# Patient Record
Sex: MALE | Race: WHITE | NOT HISPANIC OR LATINO | Employment: PART TIME | URBAN - METROPOLITAN AREA
[De-identification: names, ages, dates, MRNs, and addresses within clinical notes are randomized per-mention and may not be internally consistent; named-entity substitution may affect disease eponyms.]

---

## 2017-01-14 DIAGNOSIS — I10 BENIGN ESSENTIAL HYPERTENSION: Primary | ICD-10-CM

## 2017-01-18 RX ORDER — CARVEDILOL 25 MG/1
TABLET ORAL
Qty: 180 TABLET | Refills: 0 | Status: SHIPPED | OUTPATIENT
Start: 2017-01-18 | End: 2017-04-25

## 2017-01-18 NOTE — TELEPHONE ENCOUNTER
Coreg      Last Written Prescription Date: 10/10/2016  Last Fill Quantity: 180, # refills: 0    Last Office Visit with G, P or [facility] prescribing provider:  8/31/2016   Future Office Visit:        BP Readings from Last 3 Encounters:   08/31/16 148/92   08/24/16 142/74   06/09/16 124/68

## 2017-01-18 NOTE — TELEPHONE ENCOUNTER
Medication is being filled for 1 time refill only due to:  Patient needs to be seen because Needs b/p check.

## 2017-02-27 ENCOUNTER — OFFICE VISIT (OUTPATIENT)
Dept: FAMILY MEDICINE | Facility: CLINIC | Age: 62
End: 2017-02-27
Payer: COMMERCIAL

## 2017-02-27 VITALS
RESPIRATION RATE: 16 BRPM | SYSTOLIC BLOOD PRESSURE: 128 MMHG | WEIGHT: 272.8 LBS | HEIGHT: 73 IN | OXYGEN SATURATION: 96 % | DIASTOLIC BLOOD PRESSURE: 78 MMHG | TEMPERATURE: 97.7 F | BODY MASS INDEX: 36.15 KG/M2 | HEART RATE: 66 BPM

## 2017-02-27 DIAGNOSIS — Z12.11 SCREEN FOR COLON CANCER: ICD-10-CM

## 2017-02-27 DIAGNOSIS — L97.521 FOOT ULCER, LEFT, LIMITED TO BREAKDOWN OF SKIN (H): Primary | ICD-10-CM

## 2017-02-27 DIAGNOSIS — I10 BENIGN ESSENTIAL HYPERTENSION: ICD-10-CM

## 2017-02-27 DIAGNOSIS — Z11.59 NEED FOR HEPATITIS C SCREENING TEST: ICD-10-CM

## 2017-02-27 DIAGNOSIS — I87.2 STASIS DERMATITIS OF BOTH LEGS: ICD-10-CM

## 2017-02-27 DIAGNOSIS — E66.09 NON MORBID OBESITY DUE TO EXCESS CALORIES: ICD-10-CM

## 2017-02-27 DIAGNOSIS — D50.9 IRON DEFICIENCY ANEMIA, UNSPECIFIED IRON DEFICIENCY ANEMIA TYPE: ICD-10-CM

## 2017-02-27 LAB
ERYTHROCYTE [DISTWIDTH] IN BLOOD BY AUTOMATED COUNT: 12.3 % (ref 10–15)
HBA1C MFR BLD: 5.6 % (ref 4.3–6)
HCT VFR BLD AUTO: 41.8 % (ref 40–53)
HGB BLD-MCNC: 14.2 G/DL (ref 13.3–17.7)
MCH RBC QN AUTO: 32.1 PG (ref 26.5–33)
MCHC RBC AUTO-ENTMCNC: 34 G/DL (ref 31.5–36.5)
MCV RBC AUTO: 94 FL (ref 78–100)
PLATELET # BLD AUTO: 247 10E9/L (ref 150–450)
RBC # BLD AUTO: 4.43 10E12/L (ref 4.4–5.9)
WBC # BLD AUTO: 7 10E9/L (ref 4–11)

## 2017-02-27 PROCEDURE — 85027 COMPLETE CBC AUTOMATED: CPT | Performed by: FAMILY MEDICINE

## 2017-02-27 PROCEDURE — 82043 UR ALBUMIN QUANTITATIVE: CPT | Performed by: FAMILY MEDICINE

## 2017-02-27 PROCEDURE — 83036 HEMOGLOBIN GLYCOSYLATED A1C: CPT | Performed by: FAMILY MEDICINE

## 2017-02-27 PROCEDURE — 36415 COLL VENOUS BLD VENIPUNCTURE: CPT | Performed by: FAMILY MEDICINE

## 2017-02-27 PROCEDURE — 80048 BASIC METABOLIC PNL TOTAL CA: CPT | Performed by: FAMILY MEDICINE

## 2017-02-27 PROCEDURE — 84460 ALANINE AMINO (ALT) (SGPT): CPT | Performed by: FAMILY MEDICINE

## 2017-02-27 PROCEDURE — 99214 OFFICE O/P EST MOD 30 MIN: CPT | Performed by: FAMILY MEDICINE

## 2017-02-27 RX ORDER — MUPIROCIN 20 MG/G
OINTMENT TOPICAL 3 TIMES DAILY
Qty: 22 G | Refills: 1 | Status: SHIPPED | OUTPATIENT
Start: 2017-02-27 | End: 2017-08-16

## 2017-02-27 NOTE — NURSING NOTE
"Chief Complaint   Patient presents with     Musculoskeletal Problem     left foot       Initial /78 (BP Location: Left arm, Cuff Size: Adult Large)  Pulse 66  Temp 97.7  F (36.5  C) (Tympanic)  Resp 16  Ht 6' 1\" (1.854 m)  Wt 272 lb 12.8 oz (123.7 kg)  SpO2 96%  BMI 35.99 kg/m2 Estimated body mass index is 35.99 kg/(m^2) as calculated from the following:    Height as of this encounter: 6' 1\" (1.854 m).    Weight as of this encounter: 272 lb 12.8 oz (123.7 kg).  Medication Reconciliation: complete   Raeann Huynh CMA    "

## 2017-02-27 NOTE — LETTER
[facility]  [address]  [address]  [place] [address] [phone]                                                                                                           Sav Sánchez Mendoza  [facility] [address] [facility] [address]    March 1, 2017      Dear Sav,    The results of your recent tests were reviewed and are enclosed.     NORMAL DIABETES URINE PROTEIN TEST   NORMAL, RENAL AND BLOOD SALTS  EXCEPT BORDERLINE GLUCOSE   NORMAL LIVER FUNCTION TEST   NORMAL THREE MONTH GLUCOSE AVERAGE IN PRE DIABETES AREA   NORMAL COMPLETE BLOOD PANEL WBCS RBCS AND PLATELETS   ANEMIA HAS CLEARED UP   CONGRATULATIONS   Results for orders placed or performed in visit on 02/27/17   Basic metabolic panel   Result Value Ref Range    Sodium 140 133 - 144 mmol/L    Potassium 3.8 3.4 - 5.3 mmol/L    Chloride 105 94 - 109 mmol/L    Carbon Dioxide 27 20 - 32 mmol/L    Anion Gap 8 3 - 14 mmol/L    Glucose 116 (H) 70 - 99 mg/dL    Urea Nitrogen 17 7 - 30 mg/dL    Creatinine 0.79 0.66 - 1.25 mg/dL    GFR Estimate >90  Non  GFR Calc   >60 mL/min/1.7m2    GFR Estimate If Black >90   GFR Calc   >60 mL/min/1.7m2    Calcium 9.1 8.5 - 10.1 mg/dL   Hemoglobin A1c   Result Value Ref Range    Hemoglobin A1C 5.6 4.3 - 6.0 %   ALT   Result Value Ref Range    ALT 24 0 - 70 U/L   Albumin Random Urine Quantitative   Result Value Ref Range    Creatinine Urine 176 mg/dL    Albumin Urine mg/L 22 mg/L    Albumin Urine mg/g Cr 12.78 0 - 17 mg/g Cr   CBC with platelets   Result Value Ref Range    WBC 7.0 4.0 - 11.0 10e9/L    RBC Count 4.43 4.4 - 5.9 10e12/L    Hemoglobin 14.2 13.3 - 17.7 g/dL    Hematocrit 41.8 40.0 - 53.0 %    MCV 94 78 - 100 fl    MCH 32.1 26.5 - 33.0 pg    MCHC 34.0 31.5 - 36.5 g/dL    RDW 12.3 10.0 - 15.0 %    Platelet Count 247 150 - 450 10e9/L           Thank you for choosing [facility].  We appreciate the opportunity to  serve you and look forward to supporting your healthcare needs in the future.    If you have any questions or concerns, please call me or my staff at [phone].      Sincerely,    Rodney Viveros Jr MD

## 2017-02-27 NOTE — MR AVS SNAPSHOT
"              After Visit Summary   2/27/2017    Sav Mendoza    MRN: [id number]           Patient Information     Date Of Birth          1955        Visit Information        Provider Department      2/27/2017 9:45 AM Rodney Viveros MD [facility]        Today's Diagnoses     Foot ulcer, left, limited to breakdown of skin (H)    -  1    Screen for colon cancer        Need for hepatitis C screening test        Stasis dermatitis of both legs        Benign essential hypertension        Non morbid obesity due to excess calories with comorbid HTN        Iron deficiency anemia, unspecified iron deficiency anemia type           Follow-ups after your visit        Who to contact     If you have questions or need follow up information about today's clinic visit or your schedule please contact [facility] directly at [phone].  Normal or non-critical lab and imaging results will be communicated to you by MyChart, letter or phone within 4 business days after the clinic has received the results. If you do not hear from us within 7 days, please contact the clinic through MyChart or phone. If you have a critical or abnormal lab result, we will notify you by phone as soon as possible.  Submit refill requests through [email or web address] or call your pharmacy and they will forward the refill request to us. Please allow 3 business days for your refill to be completed.          Additional Information About Your Visit        MyChart Information     [email or web address] lets you send messages to your doctor, view your test results, renew your prescriptions, schedule appointments and more. To sign up, go to [email or web address] . Click on \"Log in\" on the left side of the screen, which will take you to the Welcome page. Then click on \"Sign up Now\" on the right side of the page.     You will be asked to enter the access code listed below, as well as some personal " "information. Please follow the directions to create your username and password.     Your access code is: [email or web address]  Expires: 2017 10:47 AM     Your access code will  in 90 days. If you need help or a new code, please call your [facility] clinic or [phone].        Care EveryWhere ID     This is your Care EveryWhere ID. This could be used by other organizations to access your [facility] medical records  [id number]        Your Vitals Were     Pulse Temperature Respirations Height Pulse Oximetry BMI (Body Mass Index)    66 97.7  F (36.5  C) (Tympanic) 16 6' 1\" (1.854 m) 96% 35.99 kg/m2       Blood Pressure from Last 3 Encounters:   17 128/78   16 (!) 148/92   16 142/74    Weight from Last 3 Encounters:   17 272 lb 12.8 oz (123.7 kg)   16 271 lb (122.9 kg)   16 271 lb 9.6 oz (123.2 kg)              We Performed the Following     Albumin Random Urine Quantitative     ALT     Basic metabolic panel     CBC with platelets     Hemoglobin A1c          Today's Medication Changes          These changes are accurate as of: 17 10:47 AM.  If you have any questions, ask your nurse or doctor.               Start taking these medicines.        Dose/Directions    mupirocin 2 % ointment   Commonly known as:  BACTROBAN   Used for:  Foot ulcer, left, limited to breakdown of skin (H)   Started by:  Rodney Viveros MD        Apply topically 3 times daily for 5 days   Quantity:  22 g   Refills:  1            Where to get your medicines      These medications were sent to [facility] [address] [place] [address] - [address] [place] [address] Pkw[address]  [address], [facility] [address]     Phone:  [phone]     mupirocin 2 % ointment                Primary Care Provider Office Phone # Fax #    Rodney Viveros [phone]       [facility] [address]  [facility] [address]        Thank you!     Thank you for choosing [facility] " [facility]  for your care. Our goal is always to provide you with excellent care. Hearing back from our patients is one way we can continue to improve our services. Please take a few minutes to complete the written survey that you may receive in the mail after your visit with us. Thank you!             Your Updated Medication List - Protect others around you: Learn how to safely use, store and throw away your medicines at www.disposemymeds.org.          This list is accurate as of: 2/27/17 10:47 AM.  Always use your most recent med list.                   Brand Name Dispense Instructions for use    amLODIPine 10 MG tablet    NORVASC    90 tablet    Take 1 tablet (10 mg) by mouth daily       aspirin 81 MG EC tablet     90 tablet    Take 1 tablet (81 mg) by mouth daily       Blood Pressure Monitor/Wrist Coco     1 Device    1 Device 2 times daily       carvedilol 25 MG tablet    COREG    180 tablet    TAKE ONE TABLET BY MOUTH TWICE DAILY WITH MEALS. NEED TO BE SEEN FOR MORE REFILLS       hydrALAZINE 25 MG tablet    APRESOLINE    120 tablet    Take 1 tablet (25 mg) by mouth 4 times daily       hydrochlorothiazide 25 MG tablet    HYDRODIURIL    90 tablet    Take 1 tablet (25 mg) by mouth daily       lisinopril 40 MG tablet    PRINIVIL/ZESTRIL    90 tablet    TAKE ONE TABLET (40 MG) BY MOUTH EVERY EVENING       multivitamin, therapeutic with minerals Tabs tablet      Take 1 tablet by mouth daily       mupirocin 2 % ointment    BACTROBAN    22 g    Apply topically 3 times daily for 5 days       order for DME     3 Device    Equipment being ordered: stocking black closed toe  20- 25mm of mercury   3 pairs Wash by hand daily Please feet size to adjust probably large or extra large       * order for DME     2 each    Abdominal Binder - Large       * order for DME     1 Device    Equipment being ordered:  Right wrist carpal tunnel splint  right hand carpal tunnel syndrome  One* Use as right wrist at night or  when gripping walker       * order for DME     1 Device    Equipment being ordered:  Abdominal binder  One * Ventral hernia *       pyridOXINE 100 MG Tabs    VITAMIN B6    90 tablet    Take 1 tablet (100 mg) by mouth daily       VITAMIN C PO      Take 500 mg by mouth daily + 500 mg po qHS PRN       * Notice:  This list has 3 medication(s) that are the same as other medications prescribed for you. Read the directions carefully, and ask your doctor or other care provider to review them with you.

## 2017-02-27 NOTE — PROGRESS NOTES
SUBJECTIVE:                                                    Sav Mendoza is a 61 year old male who presents to clinic today for the following health issues:  Health Maintenance Due   Topic Date Due     HEPATITIS C SCREENING  04/24/1973     COLON CANCER SCREEN (SYSTEM ASSIGNED)  04/24/2005     INFLUENZA VACCINE (SYSTEM ASSIGNED)  09/01/2016     Health Maintenance reviewed at today's visit patient asked to schedule/complete:   Colon Cancer:  Patient agrees to schedule  Immunizations:  Patient declined      Musculoskeletal problem/pain      Duration: 2 weeks    Description  Location: left foot    Intensity:  moderate    Accompanying signs and symptoms: swelling, redness and open wound    History  Previous similar problem: no   Previous evaluation:  Has had cellulitis on the left fot in the past    Precipitating or alleviating factors:  Trauma or overuse: YES- foot was poked by a screw  Aggravating factors include: walking    Therapies tried and outcome: soaking with epsom, neosporin       .  Current Outpatient Prescriptions   Medication Sig Dispense Refill     mupirocin (BACTROBAN) 2 % ointment Apply topically 3 times daily for 5 days 22 g 1     carvedilol (COREG) 25 MG tablet TAKE ONE TABLET BY MOUTH TWICE DAILY WITH MEALS. NEED TO BE SEEN FOR MORE REFILLS 180 tablet 0     amLODIPine (NORVASC) 10 MG tablet Take 1 tablet (10 mg) by mouth daily 90 tablet 0     lisinopril (PRINIVIL,ZESTRIL) 40 MG tablet TAKE ONE TABLET (40 MG) BY MOUTH EVERY EVENING 90 tablet 2     order for DME Equipment being ordered:  Right wrist carpal tunnel splint   right hand carpal tunnel syndrome   One*  Use as right wrist at night or when gripping walker 1 Device 1     pyridOXINE (VITAMIN B6) 100 MG TABS Take 1 tablet (100 mg) by mouth daily 90 tablet 11     order for DME Equipment being ordered:  Abdominal binder   One *  Ventral hernia * 1 Device 0     hydrALAZINE (APRESOLINE) 25 MG tablet Take 1 tablet (25 mg) by mouth 4 times  daily 120 tablet 0     Ascorbic Acid (VITAMIN C PO) Take 500 mg by mouth daily + 500 mg po qHS PRN       multivitamin, therapeutic with minerals (THERA-VIT-M) TABS Take 1 tablet by mouth daily       hydrochlorothiazide (HYDRODIURIL) 25 MG tablet Take 1 tablet (25 mg) by mouth daily 90 tablet 3     Blood Pressure Monitoring (BLOOD PRESSURE MONITOR/WRIST) LAURA 1 Device 2 times daily 1 Device 0     ORDER FOR DME Abdominal Binder - Large 2 each 1     ORDER FOR DME Equipment being ordered: stocking black closed toe  20- 25mm of mercury    3 pairs  Wash by hand daily  Please feet size to adjust probably large or extra large 3 Device prn     [DISCONTINUED] hydrochlorothiazide (HYDRODIURIL) 25 MG tablet TAKE ONE TABLET (25 MG) BY MOUTH ONCE DAILY (Patient not taking: Reported on 2017) 90 tablet 2     aspirin 81 MG EC tablet Take 1 tablet (81 mg) by mouth daily (Patient not taking: Reported on 2017) 90 tablet 3     [DISCONTINUED] lisinopril (PRINIVIL,ZESTRIL) 40 MG tablet Take 1 tablet (40 mg) by mouth every evening (Patient not taking: Reported on 2017) 90 tablet 0        No Known Allergies    Immunization History   Administered Date(s) Administered     TDAP (ADACEL AGES 11-64) 05/10/2016         reports that he drinks alcohol.      reports that he does not use illicit drugs.    family history includes Alzheimer Disease in his mother.    indicated that his mother is . He indicated that his father is . He indicated that his daughter is alive. He indicated that both of his sons are alive.      has a past surgical history that includes orthopedic surgery (Left, 2010).     reports that he does not engage in sexual activity.  .  Pediatric History   Patient Guardian Status     Not on file.     Other Topics Concern     Parent/Sibling W/ Cabg, Mi Or Angioplasty Before 65f 55m? No     Social History Narrative         reports that he has never smoked. He has never used smokeless tobacco.    Medical,  social, surgical, and family histories reviewed.    Problem list, Medication list, Allergies, and Medical/Social/Surgical histories reviewed in [facility] and updated as appropriate.  Labs reviewed in EPIC  Patient Active Problem List   Diagnosis     Cellulitis of Lt lower leg since 2-02-hzpetxti since last saw ID  5-7-14     Baker's cyst     Ventral hernia     Left inguinal hernia     Diverticulosis of large intestine     Edema of both legs     Stasis dermatitis of both legs     Benign essential hypertension     Need for hepatitis C screening test     Cellulitis and abscess of leg: Lt  Lat Malleolus  onset late 4-16      ACP (advance care planning)     Non morbid obesity due to excess calories with comorbid HTN     Cellulitis of ankle     Bilateral leg edema     Past Surgical History   Procedure Laterality Date     Orthopedic surgery Left 2010     ankle fusion       Social History   Substance Use Topics     Smoking status: Never Smoker     Smokeless tobacco: Never Used     Alcohol use Yes     Family History   Problem Relation Age of Onset     Alzheimer Disease Mother          No Known Allergies  Recent Labs   Lab Test  05/31/16   1200  05/24/16   1400  05/17/16   0540  05/16/16   0612   05/10/16   1752  09/18/15   1049  04/08/14   1129  03/10/14   1100   02/18/14   1405   A1C   --    --    --    --    --   5.8   --    --    --    --   5.4   LDL   --    --    --    --    --    --   104  100   --    --    --    HDL   --    --    --    --    --    --   34*  26*   --    --    --    TRIG   --    --    --    --    --    --   172*  177*   --    --    --    ALT   --    --    --    --    --    --   30  28  32   < >   --    CR  0.68  0.84  0.80  0.63*   < >  0.73  1.00  0.80  0.80   < >  0.92   GFRESTIMATED  >90  Non  GFR Calc    >90  Non  GFR Calc    >90  Non  GFR Calc    >90  Non  GFR Calc     < >  >90  Non  GFR Calc    76  >90  >90   < >  85    GFRESTBLACK  >90   GFR Calc    >90   GFR Calc    >90   GFR Calc    >90   GFR Calc     < >  >90   GFR Calc    >90  >90  >90   < >  >90   POTASSIUM   --    --   3.8  3.6   < >  3.1*  3.9  3.8  3.5   < >  4.3    < > = values in this interval not displayed.        BP Readings from Last 6 Encounters:   02/27/17 128/78   08/31/16 (!) 148/92   08/24/16 142/74   06/09/16 124/68   06/06/16 134/77   05/18/16 144/79       Wt Readings from Last 3 Encounters:   02/27/17 272 lb 12.8 oz (123.7 kg)   08/31/16 271 lb (122.9 kg)   08/24/16 271 lb 9.6 oz (123.2 kg)         Positive symptoms or findings indicated by bold designation:     ROS: 10 point ROS neg other than the symptoms noted above in the HPI.except  has Cellulitis of Lt lower leg since 7-55-[id number] since last saw ID  5-7-14; Baker's cyst; Ventral hernia; Left inguinal hernia; Diverticulosis of large intestine; Edema of both legs; Stasis dermatitis of both legs; Benign essential hypertension; Need for hepatitis C screening test; Cellulitis and abscess of leg: Lt  Lat Malleolus  onset late 4-16 ; ACP (advance care planning); Non morbid obesity due to excess calories with comorbid HTN; Cellulitis of ankle; and Bilateral leg edema on his problem list.   Constitutional: The patient denied fatigue, fever, insomnia, night sweats, recent illness and weight loss.  OBESITY     Eyes: The patient denied blindness, eye pain, eye tearing, photophobia, vision change and visual disturbance. Normal vision       Ears/Nose/Throat/Neck: The patient denied dizziness, facial pain, hearing loss, nasal discharge, oral pain, otalgia, postnasal drip, sinus congestion, sore throat, tinnitus and voice change.   NORMAL HEARING AND BALANCE     Cardiovascular: The patient denied arrhythmia, chest pain/pressure, claudication, edema, exercise intolerance, fatigue, orthopnea, palpitations and syncope.  ASYMPTOMATIC EXCEPT  BILATERAL EDEMA AND VENOUS STASIS CONTINUES     Respiratory: The patient denied asthma, chest congestion, cough, dyspnea on exertion, dyspnea/shortness of breath, hemoptysis, pedal edema, pleuritic pain, productive sputum, snoring and wheezing. NORMAL BREATHING     Gastrointestinal: The patient denied abdominal pain, anorexia, constipation, diarrhea, dysphagia, gastroesophageal reflux, hematochezia, hemorrhoids, melena, nausea and vomiting . NO SIGNIFICANT GASTROESOPHAGEAL REFLUX DISEASE WITHOUT ESOPHAGITIS     Genitourinary/Nephrology: The patient denied breast complaint, dysuria, nocturia sexual dysfunction, t, urinary frequency, urinary incontinence, urinary urgency    ASYMPTOMATIC     Musculoskeletal: The patient denied arthralgia(s), back pain, joint complaint, muscle weakness, myalgias, osteoporosis, sciatica, stiffness and swelling.  PAIN LEFT OUTER FOOT SEE HP     Dermatoligic:: The patient denied acne, dermatitis, ecchymosis, itching, mole change, rash, skin cancer, skin lesion and sores.  VENOUS STASIS CHANGES ARE NOTED BILATERAL  LEFT OUTER FOOT CALLOSITY WITH SPLIT SKIN   HEALING PROCESS OF PLANTAR BLISTER  SKIN REMOVED AND PAIN RELIEVED     Neurologic: The patient denied dizziness, gait abnormality, headache, memory loss, mental status change, paresis, paresthesia, seizure, syncope, tremor and vision change.     Psychiatric: The patient denied anxiety, depression, disturbances of memory, drug abuse, insomnia, mood swings and relationship difficulties.      Endocrine: The patient denied , goiter, obesity, polyuria and thyroid disease.      Hematologic/Lymphatic: The patient denied abnormal bleeding and bruising, abnormal ecchymoses, anemia, lymph node enlargement/mass, petechiae and venous  Thrombosis.      Allergy/Immunology: The patient denied food allergy and  Allergic rhinitis or conjunctivitis.        PE:  /78 (BP Location: Left arm, Cuff Size: Adult Large)  Pulse 66  Temp 97.7  F (36.5  C)  "(Tympanic)  Resp 16  Ht 6' 1\" (1.854 m)  Wt 272 lb 12.8 oz (123.7 kg)  SpO2 96%  BMI 35.99 kg/m2 Body mass index is 35.99 kg/(m^2).    Constitutional: general appearance, well nourished, well developed, in no acute distress, well developed, appears stated age, normal body habitus,  OBESITY     Eyes:; The patient has normal eyelids sclerae and conjunctivae :      Ears/Nose/Throat: external ear, overall: normal appearance; external nose, overall: benign appearance, normal moujth gums and lips  The patient has:      Neck: thyroid, overall: normal size, normal consistency, nontender,      Respiratory:  palpation of chest, overall: normal excursion,    Clear to percussion and auscultation      Tachypnea   Color      Cardiovascular:  Good color with no peripheral edema    Regular sinus rhythm without murmur. Physiologic heart sounds Heart is unelarged  .   Chest/Breast: normal shape       Abdominal exam,  Liver and spleen are  unenlarged        Tenderness    Scars  NORMAL     Urogenital; no renal, flank or bladder  tenderness;  NORMAL     Lymphatic: neck nodes,  NORMAL    Other nodes  WITHIN NORMAL LIMITS     Musculoskeletal:  Brief ortho exam normal except:  LEFT OUTER BUNION PLANTAR HEALING BLISTER     Integument: inspection of skin, no rash, lesions; and, palpation, no induration, no tenderness.      Neurologic mental status, overall: alert and oriented; gait, no ataxia, no unsteadiness; coordination, no tremors; cranial nerves, overall: normal motor, overall: normal bulk, tone.      Psychiatric: orientation/consciousness, overall: oriented to person, place and time; behavior/psychomotor activity, no tics, normal psychomotor activity; mood and affect, overall: normal mood and affect; appearance, overall: well-groomed, good eye contact; speech, overall: normal quality, no aphasia and normal quality, quantity, intact.      Diagnostic Test Results:  Results for orders placed or performed in visit on 02/27/17   Hemoglobin " A1c   Result Value Ref Range    Hemoglobin A1C 5.6 4.3 - 6.0 %   CBC with platelets   Result Value Ref Range    WBC 7.0 4.0 - 11.0 10e9/L    RBC Count 4.43 4.4 - 5.9 10e12/L    Hemoglobin 14.2 13.3 - 17.7 g/dL    Hematocrit 41.8 40.0 - 53.0 %    MCV 94 78 - 100 fl    MCH 32.1 26.5 - 33.0 pg    MCHC 34.0 31.5 - 36.5 g/dL    RDW 12.3 10.0 - 15.0 %    Platelet Count 247 150 - 450 10e9/L         ICD-10-CM    1. Foot ulcer, left, limited to breakdown of skin (H) L97.521 mupirocin (BACTROBAN) 2 % ointment   2. Screen for colon cancer Z12.11    3. Need for hepatitis C screening test Z11.59    4. Stasis dermatitis of both legs I83.11     I83.12    5. Benign essential hypertension I10    6. Non morbid obesity due to excess calories with comorbid HTN E66.09 Basic metabolic panel     Hemoglobin A1c     ALT     Albumin Random Urine Quantitative   7. Iron deficiency anemia, unspecified iron deficiency anemia type D50.9 CBC with platelets        .    Side effects benefits and risks thoroughly discussed. .he may come in early if unimproved or getting worse          Importance of adhering to regimen discussed and if medications were dispensed, the importance of taking medications discussed and bringing in the medications after every visit for chronic problems         Please drink 2 glasses of water prior to meals and walk 15-30 minutes after meals    I spent 25 MINUTES SPENT  with patient discussing the following issues   The primary encounter diagnosis was Foot ulcer, left, limited to breakdown of skin (H). Diagnoses of Screen for colon cancer, Need for hepatitis C screening test, Stasis dermatitis of both legs, Benign essential hypertension, Non morbid obesity due to excess calories with comorbid HTN, and Iron deficiency anemia, unspecified iron deficiency anemia type were also pertinent to this visit. over half of which involved counseling and coordination of care.    There are no Patient Instructions on file for this  visit.    Diet:  MEDITERRANEAN DIET  LOW SALT     Exercise:  KILEY CONDE RECOMMENDED    Exercises Range of motion, balance, isometric, and strengthening exercises 30 repetitions twice daily of involved joints      .DAYSI GUIDRY MD 2/27/2017 10:34 AM  February 27, 2017

## 2017-02-28 LAB
ALT SERPL W P-5'-P-CCNC: 24 U/L (ref 0–70)
ANION GAP SERPL CALCULATED.3IONS-SCNC: 8 MMOL/L (ref 3–14)
BUN SERPL-MCNC: 17 MG/DL (ref 7–30)
CALCIUM SERPL-MCNC: 9.1 MG/DL (ref 8.5–10.1)
CHLORIDE SERPL-SCNC: 105 MMOL/L (ref 94–109)
CO2 SERPL-SCNC: 27 MMOL/L (ref 20–32)
CREAT SERPL-MCNC: 0.79 MG/DL (ref 0.66–1.25)
CREAT UR-MCNC: 176 MG/DL
GFR SERPL CREATININE-BSD FRML MDRD: ABNORMAL ML/MIN/1.7M2
GLUCOSE SERPL-MCNC: 116 MG/DL (ref 70–99)
MICROALBUMIN UR-MCNC: 22 MG/L
MICROALBUMIN/CREAT UR: 12.78 MG/G CR (ref 0–17)
POTASSIUM SERPL-SCNC: 3.8 MMOL/L (ref 3.4–5.3)
SODIUM SERPL-SCNC: 140 MMOL/L (ref 133–144)

## 2017-02-28 NOTE — PROGRESS NOTES
Please send normal lab letter when labs are complete  NORMAL DIABETES URINE PROTEIN TEST   NORMAL, RENAL AND BLOOD SALTS  EXCEPT BORDERLINE GLUCOSE   NORMAL LIVER FUNCTION TEST   NORMAL THREE MONTH GLUCOSE AVERAGE IN PRE DIABETES AREA   NORMAL COMPLETE BLOOD PANEL WBCS RBCS AND PLATELETS   ANEMIA HAS CLEARED UP   CONGRATULATIONS   DAYSI GUIDRY JR., MD

## 2017-03-13 DIAGNOSIS — I10 BENIGN ESSENTIAL HYPERTENSION: ICD-10-CM

## 2017-03-14 RX ORDER — AMLODIPINE BESYLATE 10 MG/1
TABLET ORAL
Qty: 90 TABLET | Refills: 2 | Status: SHIPPED | OUTPATIENT
Start: 2017-03-14 | End: 2017-08-28

## 2017-03-14 NOTE — TELEPHONE ENCOUNTER
Amlodipine 19 mg    Last Written Prescription Date: 11/29/2016  Last Fill Quantity: 90, # refills: 0  Last Office Visit with G, P or [facility] prescribing provider: 2/27/2017       Potassium   Date Value Ref Range Status   02/27/2017 3.8 3.4 - 5.3 mmol/L Final     Creatinine   Date Value Ref Range Status   02/27/2017 0.79 0.66 - 1.25 mg/dL Final     BP Readings from Last 3 Encounters:   02/27/17 128/78   08/31/16 (!) 148/92   08/24/16 142/74

## 2017-06-25 ENCOUNTER — HOSPITAL ENCOUNTER (EMERGENCY)
Facility: CLINIC | Age: 62
Discharge: HOME | End: 2017-06-26
Attending: EMERGENCY MEDICINE | Admitting: EMERGENCY MEDICINE
Payer: COMMERCIAL

## 2017-06-25 DIAGNOSIS — L08.9 INFECTED SKIN LESION: ICD-10-CM

## 2017-06-25 DIAGNOSIS — I89.1 LYMPHANGITIS: ICD-10-CM

## 2017-06-25 PROCEDURE — 99284 EMERGENCY DEPT VISIT MOD MDM: CPT | Mod: Z6 | Performed by: EMERGENCY MEDICINE

## 2017-06-25 PROCEDURE — 99284 EMERGENCY DEPT VISIT MOD MDM: CPT | Performed by: EMERGENCY MEDICINE

## 2017-06-25 PROCEDURE — 96365 THER/PROPH/DIAG IV INF INIT: CPT | Mod: 59 | Performed by: EMERGENCY MEDICINE

## 2017-06-25 PROCEDURE — 96366 THER/PROPH/DIAG IV INF ADDON: CPT | Performed by: EMERGENCY MEDICINE

## 2017-06-25 NOTE — ED AVS SNAPSHOT
[facility], [place], Emergency Department    [address]    [facility] [address]    Phone:  [phone]    Fax:  [phone]                                       Sav Mendoza   MRN: [id number]    Department:  [facility], Emergency Department   Date of Visit:  6/25/2017           After Visit Summary Signature Page     I have received my discharge instructions, and my questions have been answered. I have discussed any challenges I see with this plan with the nurse or doctor.    ..........................................................................................................................................  Patient/Patient Representative Signature      ..........................................................................................................................................  Patient Representative Print Name and Relationship to Patient    ..................................................               ................................................  Date                                            Time    ..........................................................................................................................................  Reviewed by Signature/Title    ...................................................              ..............................................  Date                                                            Time

## 2017-06-25 NOTE — ED AVS SNAPSHOT
[facility], Emergency Department    [address]    [place] [address]    Phone:  [phone]    Fax:  [phone]                                       Sav Mendoza   MRN: [id number]    Department:  [facility], Emergency Department   Date of Visit:  6/25/2017           Patient Information     Date Of Birth          1955        Your diagnoses for this visit were:     Infected skin lesion        You were seen by Reynaldo Scanlon MD.      Follow-up Information     Follow up with Rodney Viveros MD.    Specialty:  Family Practice    Contact information:    [facility]  [address]  [facility] [address] [phone]          Discharge Instructions       Take antibiotics as directed. Elevate leg.  Clinic follow up tomorrow.  Return if persistent symptoms or if new or worsening symptoms, especially if increased pain, redness, fever. Keep wound clean, dry, and covered.    24 Hour Appointment Hotline       To make an appointment at any [facility] clinic, call [phone] ([phone]). If you don't have a family doctor or clinic, we will help you find one. [facility] clinics are conveniently located to serve the needs of you and your family.             Review of your medicines      START taking        Dose / Directions Last dose taken    amoxicillin-clavulanate 875-125 MG per tablet   Commonly known as:  AUGMENTIN   Dose:  1 tablet   Quantity:  20 tablet        Take 1 tablet by mouth 2 times daily for 10 days   Refills:  0        sulfamethoxazole-trimethoprim 800-160 MG per tablet   Commonly known as:  BACTRIM DS   Dose:  1 tablet   Quantity:  20 tablet        Take 1 tablet by mouth 2 times daily for 10 days   Refills:  0          Our records show that you are taking the medicines listed below. If these are incorrect, please call your family doctor or clinic.        Dose / Directions Last dose taken    amLODIPine 10 MG tablet   Commonly known as:  NORVASC    Quantity:  90 tablet        TAKE ONE TABLET BY MOUTH ONCE DAILY   Refills:  2        aspirin 81 MG EC tablet   Dose:  81 mg   Quantity:  90 tablet        Take 1 tablet (81 mg) by mouth daily   Refills:  3        Blood Pressure Monitor/Wrist Coco   Dose:  1 Device   Quantity:  1 Device        1 Device 2 times daily   Refills:  0        carvedilol 25 MG tablet   Commonly known as:  COREG   Quantity:  180 tablet        TAKE ONE TABLET BY MOUTH TWICE DAILY WITH MEALS NEED  TO  BE  SEEN  FOR  MORE  REFILLS   Refills:  3        hydrALAZINE 25 MG tablet   Commonly known as:  APRESOLINE   Dose:  25 mg   Quantity:  120 tablet        Take 1 tablet (25 mg) by mouth 4 times daily   Refills:  0        hydrochlorothiazide 25 MG tablet   Commonly known as:  HYDRODIURIL   Dose:  25 mg   Quantity:  90 tablet        Take 1 tablet (25 mg) by mouth daily   Refills:  3        lisinopril 40 MG tablet   Commonly known as:  PRINIVIL/ZESTRIL   Quantity:  90 tablet        TAKE ONE TABLET (40 MG) BY MOUTH EVERY EVENING   Refills:  2        multivitamin, therapeutic with minerals Tabs tablet   Dose:  1 tablet        Take 1 tablet by mouth daily   Refills:  0        order for DME   Quantity:  3 Device        Equipment being ordered: stocking black closed toe  20- 25mm of mercury   3 pairs Wash by hand daily Please feet size to adjust probably large or extra large   Refills:  prn        * order for DME   Quantity:  2 each        Abdominal Binder - Large   Refills:  1        * order for DME   Quantity:  1 Device        Equipment being ordered:  Right wrist carpal tunnel splint  right hand carpal tunnel syndrome  One* Use as right wrist at night or when gripping walker   Refills:  1        * order for DME   Quantity:  1 Device        Equipment being ordered:  Abdominal binder  One * Ventral hernia *   Refills:  0        pyridOXINE 100 MG Tabs   Commonly known as:  VITAMIN B6   Dose:  100 mg   Quantity:  90 tablet        Take 1 tablet (100 mg) by  mouth daily   Refills:  11        VITAMIN C PO   Dose:  500 mg        Take 500 mg by mouth daily + 500 mg po qHS PRN   Refills:  0        * Notice:  This list has 3 medication(s) that are the same as other medications prescribed for you. Read the directions carefully, and ask your doctor or other care provider to review them with you.            Prescriptions were sent or printed at these locations (2 Prescriptions)                   Other Prescriptions                Printed at Department/Unit printer (2 of 2)         amoxicillin-clavulanate (AUGMENTIN) 875-125 MG per tablet               sulfamethoxazole-trimethoprim (BACTRIM DS) 800-160 MG per tablet                Procedures and tests performed during your visit     Basic metabolic panel    Blood culture ONE site    CBC with platelets differential    CRP inflammation    Foot XR, G/E 3 views, left    Peripheral IV catheter      Orders Needing Specimen Collection     None      Pending Results     Date and Time Order Name Status Description    6/26/2017 0054 Foot XR, G/E 3 views, left Preliminary     6/26/2017 0054 Blood culture ONE site In process             Pending Culture Results     Date and Time Order Name Status Description    6/26/2017 0054 Blood culture ONE site In process             Pending Results Instructions     If you had any lab results that were not finalized at the time of your Discharge, you can call the ED Lab Result RN at [phone]. You will be contacted by this team for any positive Lab results or changes in treatment. The nurses are available 7 days a week from 10A to 6:30P.  You can leave a message 24 hours per day and they will return your call.        Thank you for choosing [facility]       Thank you for choosing [facility] for your care. Our goal is always to provide you with excellent care. Hearing back from our patients is one way we can continue to improve our services. Please take a few minutes to complete the written survey that you  "may receive in the mail after you visit with us. Thank you!        [employer] Information     [employer] lets you send messages to your doctor, view your test results, renew your prescriptions, schedule appointments and more. To sign up, go to [email or web address] . Click on \"Log in\" on the left side of the screen, which will take you to the Welcome page. Then click on \"Sign up Now\" on the right side of the page.     You will be asked to enter the access code listed below, as well as some personal information. Please follow the directions to create your username and password.     Your access code is: [email or web address]  Expires: 2017  2:41 AM     Your access code will  in 90 days. If you need help or a new code, please call your [facility] clinic or [phone].        Care EveryWhere ID     This is your Care EveryWhere ID. This could be used by other organizations to access your [facility] medical records  [id number]        Equal Access to Services     [facility] Center[facility] : Hadii yumiko pazo Sodonna, waaxda luqadaha, qaybta kaalmada adepoppyyajessica, carlos sanches . So [facility] [phone].    ATENCIÓN: Si habla español, tiene a richards disposición servicios gratuitos de asistencia lingüística. Llame al [phone].    We comply with applicable federal civil rights laws and [place] laws. We do not discriminate on the basis of race, color, national origin, age, disability sex, sexual orientation or gender identity.            After Visit Summary       This is your record. Keep this with you and show to your community pharmacist(s) and doctor(s) at your next visit.                  "

## 2017-06-26 ENCOUNTER — APPOINTMENT (OUTPATIENT)
Dept: GENERAL RADIOLOGY | Facility: CLINIC | Age: 62
End: 2017-06-26
Attending: EMERGENCY MEDICINE
Payer: COMMERCIAL

## 2017-06-26 VITALS
BODY MASS INDEX: 36.5 KG/M2 | TEMPERATURE: 97.9 F | HEIGHT: 73 IN | OXYGEN SATURATION: 97 % | RESPIRATION RATE: 18 BRPM | SYSTOLIC BLOOD PRESSURE: 137 MMHG | DIASTOLIC BLOOD PRESSURE: 87 MMHG | HEART RATE: 70 BPM | WEIGHT: 275.4 LBS

## 2017-06-26 LAB
ANION GAP SERPL CALCULATED.3IONS-SCNC: 10 MMOL/L (ref 3–14)
BASOPHILS # BLD AUTO: 0 10E9/L (ref 0–0.2)
BASOPHILS NFR BLD AUTO: 0.6 %
BUN SERPL-MCNC: 23 MG/DL (ref 7–30)
CALCIUM SERPL-MCNC: 8.9 MG/DL (ref 8.5–10.1)
CHLORIDE SERPL-SCNC: 107 MMOL/L (ref 94–109)
CO2 SERPL-SCNC: 24 MMOL/L (ref 20–32)
CREAT SERPL-MCNC: 0.9 MG/DL (ref 0.66–1.25)
CRP SERPL-MCNC: 3.1 MG/L (ref 0–8)
DIFFERENTIAL METHOD BLD: NORMAL
EOSINOPHIL # BLD AUTO: 0.3 10E9/L (ref 0–0.7)
EOSINOPHIL NFR BLD AUTO: 4.7 %
ERYTHROCYTE [DISTWIDTH] IN BLOOD BY AUTOMATED COUNT: 12.4 % (ref 10–15)
GFR SERPL CREATININE-BSD FRML MDRD: 85 ML/MIN/1.7M2
GLUCOSE SERPL-MCNC: 140 MG/DL (ref 70–99)
HCT VFR BLD AUTO: 41.7 % (ref 40–53)
HGB BLD-MCNC: 14.3 G/DL (ref 13.3–17.7)
IMM GRANULOCYTES # BLD: 0 10E9/L (ref 0–0.4)
IMM GRANULOCYTES NFR BLD: 0.1 %
LYMPHOCYTES # BLD AUTO: 2.7 10E9/L (ref 0.8–5.3)
LYMPHOCYTES NFR BLD AUTO: 39.4 %
MCH RBC QN AUTO: 32.4 PG (ref 26.5–33)
MCHC RBC AUTO-ENTMCNC: 34.3 G/DL (ref 31.5–36.5)
MCV RBC AUTO: 94 FL (ref 78–100)
MONOCYTES # BLD AUTO: 0.6 10E9/L (ref 0–1.3)
MONOCYTES NFR BLD AUTO: 8.4 %
NEUTROPHILS # BLD AUTO: 3.2 10E9/L (ref 1.6–8.3)
NEUTROPHILS NFR BLD AUTO: 46.8 %
NRBC # BLD AUTO: 0 10*3/UL
NRBC BLD AUTO-RTO: 0 /100
PLATELET # BLD AUTO: 220 10E9/L (ref 150–450)
POTASSIUM SERPL-SCNC: 3.5 MMOL/L (ref 3.4–5.3)
RBC # BLD AUTO: 4.42 10E12/L (ref 4.4–5.9)
SODIUM SERPL-SCNC: 141 MMOL/L (ref 133–144)
WBC # BLD AUTO: 6.8 10E9/L (ref 4–11)

## 2017-06-26 PROCEDURE — 73630 X-RAY EXAM OF FOOT: CPT | Mod: LT

## 2017-06-26 PROCEDURE — S0077 INJECTION, CLINDAMYCIN PHOSP: HCPCS | Performed by: EMERGENCY MEDICINE

## 2017-06-26 PROCEDURE — 86140 C-REACTIVE PROTEIN: CPT | Performed by: EMERGENCY MEDICINE

## 2017-06-26 PROCEDURE — 80048 BASIC METABOLIC PNL TOTAL CA: CPT | Performed by: EMERGENCY MEDICINE

## 2017-06-26 PROCEDURE — 96365 THER/PROPH/DIAG IV INF INIT: CPT | Performed by: EMERGENCY MEDICINE

## 2017-06-26 PROCEDURE — 96366 THER/PROPH/DIAG IV INF ADDON: CPT | Performed by: EMERGENCY MEDICINE

## 2017-06-26 PROCEDURE — 25000125 ZZHC RX 250: Performed by: EMERGENCY MEDICINE

## 2017-06-26 PROCEDURE — 85025 COMPLETE CBC W/AUTO DIFF WBC: CPT | Performed by: EMERGENCY MEDICINE

## 2017-06-26 PROCEDURE — 87040 BLOOD CULTURE FOR BACTERIA: CPT | Performed by: EMERGENCY MEDICINE

## 2017-06-26 RX ORDER — CLINDAMYCIN PHOSPHATE 900 MG/50ML
900 INJECTION, SOLUTION INTRAVENOUS ONCE
Status: COMPLETED | OUTPATIENT
Start: 2017-06-26 | End: 2017-06-26

## 2017-06-26 RX ORDER — LIDOCAINE 40 MG/G
CREAM TOPICAL
Status: DISCONTINUED | OUTPATIENT
Start: 2017-06-26 | End: 2017-06-26 | Stop reason: HOSPADM

## 2017-06-26 RX ORDER — SULFAMETHOXAZOLE/TRIMETHOPRIM 800-160 MG
1 TABLET ORAL 2 TIMES DAILY
Qty: 20 TABLET | Refills: 0 | Status: SHIPPED | OUTPATIENT
Start: 2017-06-26 | End: 2017-07-06

## 2017-06-26 RX ADMIN — CLINDAMYCIN PHOSPHATE 900 MG: 18 INJECTION, SOLUTION INTRAVENOUS at 01:02

## 2017-06-26 ASSESSMENT — ENCOUNTER SYMPTOMS
ABDOMINAL PAIN: 0
SHORTNESS OF BREATH: 0
FEVER: 0

## 2017-06-26 NOTE — DISCHARGE INSTRUCTIONS
Take antibiotics as directed. Elevate leg.  Clinic follow up tomorrow.  Return if persistent symptoms or if new or worsening symptoms, especially if increased pain, redness, fever. Keep wound clean, dry, and covered.

## 2017-06-26 NOTE — ED PROVIDER NOTES
History     Chief Complaint   Patient presents with     Sore     open wound on left foot with swelling and red streaks     HPI  Sav Mendoza is a 62 year old male with a history of cellulitis of left and right leg, lower extremity edema and HTN who presents to the Emergency Department for evaluation of a wound. For the past several months, the patient has been struggling with a wound to the bottom of his left foot. He was seen in clinic at the end of February (4 months ago) where he was prescribed Bactroban ointment, which he was complaint with and reports improvement. However, recently his wound has worsened with associated pain, swelling and now red streaks to his foot and ankle.  He denies any other concerns or complaints at this time. No history of DVT/PE or diabetes.    Past Medical History:   Diagnosis Date     Hypertension      Ventral hernia 6/24/14       Past Surgical History:   Procedure Laterality Date     ORTHOPEDIC SURGERY Left 2010    ankle fusion       Family History   Problem Relation Age of Onset     Alzheimer Disease Mother        Social History   Substance Use Topics     Smoking status: Never Smoker     Smokeless tobacco: Never Used     Alcohol use Yes      Comment: rare       No current facility-administered medications for this encounter.      Current Outpatient Prescriptions   Medication     lisinopril (PRINIVIL/ZESTRIL) 40 MG tablet     hydrochlorothiazide (HYDRODIURIL) 25 MG tablet     carvedilol (COREG) 25 MG tablet     amLODIPine (NORVASC) 10 MG tablet     order for DME     pyridOXINE (VITAMIN B6) 100 MG TABS     order for DME     hydrALAZINE (APRESOLINE) 25 MG tablet     Ascorbic Acid (VITAMIN C PO)     multivitamin, therapeutic with minerals (THERA-VIT-M) TABS     aspirin 81 MG EC tablet     hydrochlorothiazide (HYDRODIURIL) 25 MG tablet     Blood Pressure Monitoring (BLOOD PRESSURE MONITOR/WRIST) LAURA     ORDER FOR DME     ORDER FOR DME      No Known Allergies     I have reviewed  "the Medications, Allergies, Past Medical and Surgical History, and Social History in the Epic system.    Review of Systems   Constitutional: Negative for chills, diaphoresis, fatigue and fever.   HENT: Negative for congestion, rhinorrhea, sinus pressure and sore throat.    Respiratory: Negative for cough, chest tightness and shortness of breath.    Cardiovascular: Positive for leg swelling. Negative for chest pain and palpitations.   Gastrointestinal: Negative for abdominal pain, nausea and vomiting.   Genitourinary: Negative for difficulty urinating, dysuria, flank pain and frequency.   Musculoskeletal: Negative for arthralgias, myalgias and neck pain.        Positive for left foot wound with swelling and pain.   Skin: Positive for wound.   Allergic/Immunologic: Negative for immunocompromised state.   Neurological: Negative for dizziness, syncope, weakness, light-headedness and headaches.   Hematological: Does not bruise/bleed easily.   Psychiatric/Behavioral: Negative for confusion.   All other systems reviewed and are negative.      Physical Exam   BP: (!) 166/92  Pulse: 70  Temp: 97.9  F (36.6  C)  Resp: 16  Height: 185.4 cm (6' 1\")  Weight: 124.9 kg (275 lb 6.4 oz)  SpO2: 96 %  Physical Exam   Constitutional: He appears well-developed and well-nourished. No distress.   HENT:   Head: Normocephalic and atraumatic.   Mouth/Throat: Oropharynx is clear and moist.   Eyes: Conjunctivae are normal.   Neck: Normal range of motion. Neck supple.   Cardiovascular: Normal rate, regular rhythm, normal heart sounds and intact distal pulses.    Pulmonary/Chest: Effort normal and breath sounds normal. No respiratory distress.   Abdominal: Soft. There is no tenderness.   Musculoskeletal: He exhibits edema and tenderness. He exhibits no deformity.   Neurological: He is alert.   Skin: Skin is warm and dry. Rash noted.   Superficial skin erosion and erythema with lymphangitis on left foot.    Psychiatric: He has a normal mood and " affect. His behavior is normal.   Nursing note and vitals reviewed.      ED Course     12:13 AM  The patient was seen and examined by Dr. Scanlon in Room 3.     ED Course     Procedures             Critical Care time:  none               Labs Ordered and Resulted from Time of ED Arrival Up to the Time of Departure from the ED   BASIC METABOLIC PANEL - Abnormal; Notable for the following:        Result Value    Glucose 140 (*)     All other components within normal limits   CBC WITH PLATELETS DIFFERENTIAL   CRP INFLAMMATION   PERIPHERAL IV CATHETER            Assessments & Plan (with Medical Decision Making)   Infected skin lesion with lymphangitis. No fever or systemic symptoms. No evidence of osteomyelitis on the XR. Findings do not suggest thrombophlebitis. No evidence of sepsis or necrotizing infection. Given dose of IV antibiotics and will discharge with antibiotics noted. Clinic recheck tomorrow. Return if persistent symptoms.    I have reviewed the nursing notes.    I have reviewed the findings, diagnosis, plan and need for follow up with the patient.    Discharge Medication List as of 6/26/2017  2:41 AM      START taking these medications    Details   amoxicillin-clavulanate (AUGMENTIN) 875-125 MG per tablet Take 1 tablet by mouth 2 times daily for 10 days, Disp-20 tablet, R-0, Local Print      sulfamethoxazole-trimethoprim (BACTRIM DS) 800-160 MG per tablet Take 1 tablet by mouth 2 times daily for 10 days, Disp-20 tablet, R-0, Local Print             Final diagnoses:   Infected skin lesion     IJacqueline, am serving as a trained medical scribe to document services personally performed by Reynaldo Scanlon MD, based on the provider's statements to me.      Reynaldo NICHOLSON MD, was physically present and have reviewed and verified the accuracy of this note documented by Jacqueline Ott.     6/25/2017   [facility] EMERGENCY DEPARTMENT     Reynaldo Scanlon MD  07/25/17 8212

## 2017-06-26 NOTE — ED NOTES
Patient has been struggling with an open sore on his left foot.  He thought it was about healed but when he took his sock off tonight he noticed a red streak up his foot

## 2017-07-02 LAB
BACTERIA SPEC CULT: NO GROWTH
Lab: NORMAL
MICRO REPORT STATUS: NORMAL
SPECIMEN SOURCE: NORMAL

## 2017-07-25 ASSESSMENT — ENCOUNTER SYMPTOMS
DIAPHORESIS: 0
FREQUENCY: 0
COUGH: 0
FATIGUE: 0
HEADACHES: 0
FLANK PAIN: 0
DIZZINESS: 0
CHILLS: 0
LIGHT-HEADEDNESS: 0
SINUS PRESSURE: 0
CHEST TIGHTNESS: 0
NECK PAIN: 0
RHINORRHEA: 0
DIFFICULTY URINATING: 0
WOUND: 1
NAUSEA: 0
ARTHRALGIAS: 0
MYALGIAS: 0
SORE THROAT: 0
VOMITING: 0
PALPITATIONS: 0
WEAKNESS: 0
BRUISES/BLEEDS EASILY: 0
CONFUSION: 0
DYSURIA: 0

## 2017-08-16 DIAGNOSIS — L97.521 FOOT ULCER, LEFT, LIMITED TO BREAKDOWN OF SKIN (H): ICD-10-CM

## 2017-08-16 NOTE — TELEPHONE ENCOUNTER
Mupirocin 2%      Last Written Prescription Date:  2/27/17  Last Fill Quantity: 22 g,   # refills: 1  Last Office Visit with [facility], [facility] or [facility] prescribing provider: 2/27/17  Future Office visit:    Next 5 appointments (look out 90 days)     Aug 28, 2017  9:00 AM CDT   [facility] Visit with Rod[facility]os [facility]   [facility]    [address] [place] [id number] [phone]                   Routing refill request to provider for review/approval because:  Drug not on the [facility], [facility] or [facility] refill protocol or controlled substance

## 2017-08-21 RX ORDER — MUPIROCIN 20 MG/G
OINTMENT TOPICAL
Qty: 15 G | Refills: 0 | Status: ON HOLD | OUTPATIENT
Start: 2017-08-21 | End: 2017-12-13

## 2017-08-28 ENCOUNTER — OFFICE VISIT (OUTPATIENT)
Dept: FAMILY MEDICINE | Facility: CLINIC | Age: 62
End: 2017-08-28
Payer: COMMERCIAL

## 2017-08-28 VITALS
TEMPERATURE: 98.2 F | HEART RATE: 70 BPM | SYSTOLIC BLOOD PRESSURE: 142 MMHG | OXYGEN SATURATION: 95 % | DIASTOLIC BLOOD PRESSURE: 88 MMHG | BODY MASS INDEX: 36.61 KG/M2 | WEIGHT: 277.5 LBS | RESPIRATION RATE: 16 BRPM

## 2017-08-28 DIAGNOSIS — R60.0 LOCALIZED EDEMA: Primary | ICD-10-CM

## 2017-08-28 DIAGNOSIS — I10 HYPERTENSION GOAL BP (BLOOD PRESSURE) < 140/80: Chronic | ICD-10-CM

## 2017-08-28 PROCEDURE — 99214 OFFICE O/P EST MOD 30 MIN: CPT | Performed by: FAMILY MEDICINE

## 2017-08-28 RX ORDER — HYDROCHLOROTHIAZIDE 25 MG/1
25 TABLET ORAL DAILY
Qty: 90 TABLET | Refills: 3 | Status: SHIPPED | OUTPATIENT
Start: 2017-08-28 | End: 2018-09-21

## 2017-08-28 RX ORDER — AMLODIPINE BESYLATE 5 MG/1
5 TABLET ORAL DAILY
Qty: 30 TABLET | Refills: 1 | Status: ON HOLD | OUTPATIENT
Start: 2017-08-28 | End: 2017-12-13

## 2017-08-28 NOTE — MR AVS SNAPSHOT
"              After Visit Summary   8/28/2017    Sav Mendoza    MRN: [id number]           Patient Information     Date Of Birth          1955        Visit Information        Provider Department      8/28/2017 9:00 AM Rodney Viveros MD [facility]        Today's Diagnoses     Localized edema    -  1    Hypertension goal BP (blood pressure) < 140/80          Care Instructions      TRIMMED CALLOSITIES TODAY     ELEVATE ABOVE HEART WHENEVER ABLE     COMPRESSION STOCKING    PEDRITO BOARD 5 MINUTES AFTER SHOWER OR FOOT SOAKIKNG    FIT BIT TWITCH RECOMMENDED  10,000 STEPS PER DAY     1400 CALORIES     PER DAY     EXERCYCYLE DAILY     Diabetes: Exchange List    What are the exchange lists?     The exchange lists show you portions of food that equal 1 exchange. Foods are divided into food lists. The foods on each list are called exchanges because they have a similar number of calories, protein, carbohydrate and fat content. Foods from each list can be traded or \"exchanged\" for any other food on the same list because they all have a similar exchange value. A dietitian will help you plan how much food your child should eat at each meal and from what lists the foods should come from. At first you should measure your food until you are able to make good estimates about serving sizes. The following list is a sample of foods found on the exchange lists. For more information, you can buy the Exchange Lists for Meal Planning from: The American Diabetes Association [address] [place] [address] [phone] http://www.diabetes.org    Carbohydrate group     Starch List: One starch exchange contains about 15 grams of carbohydrate, 3 grams of protein, 0 to 1 grams of fat, and 80 calories. A starch exchange is sometimes called a carb exchange. Examples of one starch (carb) exchange are:     one slice of bread     1/2 hamburger or hot dog bun     3/4 cup of unsweetened cereal " "    1/3 cup pasta     3 cups popcorn     crackers (6 small saltines, 3 squares of kylah crackers, 3 of most other crackers)     1 pancake or waffle (5 inch)     15 to 20 fat-free or baked potato or corn chips.     The vegetables included in the starch exchanges include:     corn (1/2 cup or 1/2 cob)     white potato (1/4 large baked with skin or 1/2 cup mashed)     yam or sweet potato (1/2 cup)     green peas (1/2 cup)     squash, winter (1 cup)     lima beans (2/3 cup).     Fruit List: 1 fruit exchange contains about 15 grams of carbohydrate and 60 calories. Examples of one fruit exchange are:     grape juice (1/3 cup)     apple or pineapple juice (1/2 cup)     orange or grapefruit juice (1/2 cup)     1 small apple     orange or peach     1/2 banana     1 cup raspberries     1/3 of a small cantaloupe     1 slice of watermelon.     Milk List: 1 milk exchange contains about 8 grams of protein and 12 grams of carbohydrate. Items on the milk list are divided into fat-free, reduced fat, and whole milk depending on the number of fat grams in the exchange. Examples of one milk exchange are: Fat-Free (0 to 3 grams of fat)     1 cup of skim or non-fat milk     1 cup of 1% milk (also includes 1/2 fat exchange)     6 ounces flavored fat-free yogurt     Reduced-Fat (5 grams of fat)     6 ounces of plain, low-fat yogurt     1 cup 2% milk (also includes one fat exchange).     Whole Milk (8 grams of fat)     8 ounces of plain yogurt (made from whole milk)     1 cup whole milk.     Vegetable List: One vegetable exchange has 5 grams of carbohydrate, 2 grams of protein, no fat, and 25 calories. One-half cup of cooked or a cup of raw vegetables is a good measure for 1 exchange of most vegetables. Raw lettuce may be taken in larger quantities, but salad dressing usually equals 1 fat exchange. Other Carbohydrates List: One \"other carbohydrate\" exchange has 15 grams of carbohydrate. Many of these foods count as a starch exchange and " one or more fat exchanges.     brownie (2 inch square) = 1 carb, 1 fat exchange     fruit snack roll = 1 carb exchange     granola bar = 1 and 1/2 carb exchanges     ice cream (1/2 cup) = 1 carb, 2 fat exchanges     frozen yogurt (1/2 cup) = 1 carb, 0 to 1 fat exchanges     tortilla chips (6-12) = 1 carb, 2 fat exchanges.     Meat and Meat Substitute Group     Meats are divided into very lean meats, lean meats, medium-fat meats, and high-fat meats. People with diabetes should try to eat more lean and medium fat meats and stay away from the high fat choices. The Very Lean meat group includes foods that contain 7 grams of protein, 0 to 1 gram of fat, and 35 calories for 1 exchange. Examples include:     1 ounce chicken or turkey (white meat, no skin)     1 ounce fresh fish     1 ounce fat-free cheese     2 egg whites     The Lean meat group includes foods that contain 7 grams of protein, 3 grams of fat, and 55 calories for 1 meat exchange. Examples include:     1 ounce chicken or turkey (dark meat, no skin)     1 ounce fish     1 ounce lean pork     1 ounce USDA Select or Choice grades of lean beef     1 ounce cheese (with 3 grams of fat or less per ounce).     The Medium-Fat group includes foods that have 7 grams of protein, 5 grams of fat, and 75 calories for 1 meat exchange. Examples include:     1 ounce of ground beef, most cuts of beef, pork, lamb or veal     1 ounce of cheese (5 grams of fat per ounce or less)     1 egg     1 ounce fried fish.     The High-Fat foods have 7 grams of protein, 8 grams of fat, and 100 calories for 1 meat exchange. This group includes:     1 ounce of pork sausage     1 ounce of spare ribs     1 oz of regular cheese (American, Swiss etc.)     1 oz of lunch meat     1 hot dog (turkey or chicken).     Fat Group     Fat List: Fat is necessary for the body and is particularly important during periods of fasting (overnight), when it is very slowly absorbed. 1 fat exchange contains 5 grams  of fat and 45 calories. The monounsaturated fats and polyunsaturated fats are better for us than saturated fats. The fat list includes: 1 exchange of monounsaturated fats equals:     1/2 Tbsp peanut butter     6 almonds     1 tsp of oil (olive, peanut, canola).     1 exchange of polyunsaturated fats equals:     1 tsp margarine     1 tsp of any vegetable oil (except coconut).     1 exchange of saturated fat includes:     1 tsp butter     1 strip of lam     2 Tbsp of cream (half and half).     Free Foods     A free food contains less than 20 calories or less than 5 grams of carbohydrate per serving. If the food has a serving size listed on its package, it should be limited to 3 servings spread throughout the day. Examples of free foods include:     4 Tbsp fat-free margarine     1 Tbsp fat-free Miracle Whip     sugar-free gelatin     diet soft drinks     catsup     soy sauce     spices.     Combination Foods     Many foods, such as casseroles, are mixed together. Your dietitian can help you figure out how many exchanges to count for combination foods. For example:     lasagna (1 cup) = 2 carb exchanges and 2 medium-fat meat exchanges     spaghetti with meatballs (1 cup) = 2 carb exchanges and 2 medium-fat meat exchanges     pizza, cheese (1/4 of 12 in.) = 2 carbs, 2 medium-fat meats     chicken noodle soup (1 cup) = 1 carb exchange     frozen entrée (less than 300 calories) = 2 carbs, 3 lean meat exchanges     macaroni and cheese (1 cup) = 2 carb exchanges and 2 medium-fat meat exchanges.                   Follow-ups after your visit        Who to contact     If you have questions or need follow up information about today's clinic visit or your schedule please contact [facility] directly at [phone].  Normal or non-critical lab and imaging results will be communicated to you by MyChart, letter or phone within 4 business days after the clinic has received the results. If you do  "not hear from us within 7 days, please contact the clinic through [employer] or phone. If you have a critical or abnormal lab result, we will notify you by phone as soon as possible.  Submit refill requests through [employer] or call your pharmacy and they will forward the refill request to us. Please allow 3 business days for your refill to be completed.          Additional Information About Your Visit        [employer] Information     [employer] lets you send messages to your doctor, view your test results, renew your prescriptions, schedule appointments and more. To sign up, go to [email or web address] . Click on \"Log in\" on the left side of the screen, which will take you to the Welcome page. Then click on \"Sign up Now\" on the right side of the page.     You will be asked to enter the access code listed below, as well as some personal information. Please follow the directions to create your username and password.     Your access code is: [email or web address]  Expires: 2017  2:41 AM     Your access code will  in 90 days. If you need help or a new code, please call your [facility] clinic or [phone].        Care EveryWhere ID     This is your Care EveryWhere ID. This could be used by other organizations to access your [facility] medical records  [id number]        Your Vitals Were     Pulse Temperature Respirations Pulse Oximetry BMI (Body Mass Index)       70 98.2  F (36.8  C) (Tympanic) 16 95% 36.61 kg/m2        Blood Pressure from Last 3 Encounters:   17 142/88   17 137/87   17 128/78    Weight from Last 3 Encounters:   17 277 lb 8 oz (125.9 kg)   17 275 lb 6.4 oz (124.9 kg)   17 272 lb 12.8 oz (123.7 kg)              Today, you had the following     No orders found for display         Today's Medication Changes          These changes are accurate as of: 17  9:46 AM.  If you have any questions, ask your nurse or doctor.               These medicines have changed or have " updated prescriptions.        Dose/Directions    amLODIPine 5 MG tablet   Commonly known as:  NORVASC   This may have changed:  See the new instructions.   Used for:  Localized edema   Changed by:  Rodney Viveros MD        Dose:  5 mg   Take 1 tablet (5 mg) by mouth daily   Quantity:  30 tablet   Refills:  1            Where to get your medicines      These medications were sent to [facility] [address]  [address] [facility] [address]     Phone:  [phone]     amLODIPine 5 MG tablet    hydrochlorothiazide 25 MG tablet                Primary Care Provider Office Phone # Fax #    Rodney Viveros [phone] [address] [address]        Equal Access to Services     KARINA GAINES : Hadii yumiko maza hadasho Soomaali, waaxda luqadaha, qaybta kaalmada adeegyada, carlos sanches . So [facility] [phone].    ATENCIÓN: Si habla español, tiene a richards disposición servicios gratuitos de asistencia lingüística. [facility] [phone].    We comply with applicable federal civil rights laws and [place] laws. We do not discriminate on the basis of race, color, national origin, age, disability sex, sexual orientation or gender identity.            Thank you!     Thank you for choosing [facility]  for your care. Our goal is always to provide you with excellent care. Hearing back from our patients is one way we can continue to improve our services. Please take a few minutes to complete the written survey that you may receive in the mail after your visit with us. Thank you!             Your Updated Medication List - Protect others around you: Learn how to safely use, store and throw away your medicines at www.disposemymeds.org.          This list is accurate as of: 8/28/17  9:46 AM.  Always use your most recent med list.                   Brand Name Dispense Instructions  for use Diagnosis    amLODIPine 5 MG tablet    NORVASC    30 tablet    Take 1 tablet (5 mg) by mouth daily    Localized edema       aspirin 81 MG EC tablet     90 tablet    Take 1 tablet (81 mg) by mouth daily    Hypertension goal BP (blood pressure) < 140/80, Hyperlipidemia LDL goal < 130       Blood Pressure Monitor/Wrist Coco     1 Device    1 Device 2 times daily    Hypertension goal BP (blood pressure) < 140/80       carvedilol 25 MG tablet    COREG    180 tablet    TAKE ONE TABLET BY MOUTH TWICE DAILY WITH MEALS NEED  TO  BE  SEEN  FOR  MORE  REFILLS    Benign essential hypertension       hydrochlorothiazide 25 MG tablet    HYDRODIURIL    90 tablet    Take 1 tablet (25 mg) by mouth daily    Hypertension goal BP (blood pressure) < 140/80       lisinopril 40 MG tablet    PRINIVIL/ZESTRIL    90 tablet    TAKE ONE TABLET BY MOUTH ONCE DAILY IN THE EVENING    Essential hypertension, benign       multivitamin, therapeutic with minerals Tabs tablet      Take 1 tablet by mouth daily        mupirocin 2 % ointment    BACTROBAN    15 g    APPLY  OINTMENT TOPICALLY THREE TIMES DAILY FOR 5 DAYS    Foot ulcer, left, limited to breakdown of skin (H)       order for DME     3 Device    Equipment being ordered: stocking black closed toe  20- 25mm of mercury   3 pairs Wash by hand daily Please feet size to adjust probably large or extra large    Cellulitis       * order for DME     2 each    Abdominal Binder - Large    Ventral hernia without mention of obstruction or gangrene       * order for DME     1 Device    Equipment being ordered:  Right wrist carpal tunnel splint  right hand carpal tunnel syndrome  One* Use as right wrist at night or when gripping walker    Carpal tunnel syndrome of right wrist       * order for DME     1 Device    Equipment being ordered:  Abdominal binder  One * Ventral hernia *    Ventral hernia without obstruction or gangrene       pyridOXINE 100 MG Tabs    VITAMIN B6    90 tablet    Take 1 tablet  (100 mg) by mouth daily    Carpal tunnel syndrome of right wrist       VITAMIN C PO      Take 500 mg by mouth daily + 500 mg po qHS PRN        * Notice:  This list has 3 medication(s) that are the same as other medications prescribed for you. Read the directions carefully, and ask your doctor or other care provider to review them with you.

## 2017-08-28 NOTE — PROGRESS NOTES
SUBJECTIVE:   Sav Mendoza is a 62 year old male who presents to clinic today for the following health issues:      Musculoskeletal problem/pain    CALLOSITY LEFT OUTER FOOT    SOURCE OF CELLULITIS    BILATERAL SWELLING AND VENOUS STASIS     HISTORY OF  BILATERAL ANKLE SURGERY     ON AMLODOPINE 10MG FOR HYPERTENSION WITH GOAL OF LESS THAN 140/80 CONTROL CONTRIBUTING TO SWELLING       Duration: ongoing    Description  Location: left foot    Intensity:  severe    Accompanying signs and symptoms: callous, pain when walking    History  Previous similar problem: YES  Previous evaluation:  yes    Precipitating or alleviating factors:  Trauma or overuse: YES  Aggravating factors include: walking    Therapies tried and outcome: needs to have callous removed    RESOLVED CELLULITIS     HISTORY OF FREITAS'S CYST    DIVERTICULOSIS IN REMISSION    BILATERAL STASIS DERMATITIS SEVERE     EDEMA BILATERAL     PREVIOUS ANKLE SURGERIES BILATERAL     MORBID     BENIGN ESSENTIAL HYPERTENSION  ,THAT IS HYPERTENSION WITH GOAL OF LESS THAN 140/80         Hypertension Follow-up      Outpatient blood pressures are not being checked.    Low Salt Diet: no added salt          Problem list and histories reviewed & adjusted, as indicated.  Additional history: as documented      Patient Active Problem List   Diagnosis     Cellulitis of Lt lower leg since 9-02-mvamyrxy since last saw ID  5-7-14     Baker's cyst     Ventral hernia     Left inguinal hernia     Diverticulosis of large intestine     Edema of both legs     Stasis dermatitis of both legs     Benign essential hypertension     Need for hepatitis C screening test     Cellulitis and abscess of leg: Lt  Lat Malleolus  onset late 4-16      ACP (advance care planning)     Non morbid obesity due to excess calories with comorbid HTN     Cellulitis of ankle     Bilateral leg edema     Past Surgical History:   Procedure Laterality Date     ORTHOPEDIC SURGERY Left 2010    ankle fusion        Social History   Substance Use Topics     Smoking status: Never Smoker     Smokeless tobacco: Never Used     Alcohol use Yes      Comment: rare     Family History   Problem Relation Age of Onset     Alzheimer Disease Mother          Current Outpatient Prescriptions   Medication Sig Dispense Refill     hydrochlorothiazide (HYDRODIURIL) 25 MG tablet Take 1 tablet (25 mg) by mouth daily 90 tablet 3     amLODIPine (NORVASC) 5 MG tablet Take 1 tablet (5 mg) by mouth daily 30 tablet 1     mupirocin (BACTROBAN) 2 % ointment APPLY  OINTMENT TOPICALLY THREE TIMES DAILY FOR 5 DAYS 15 g 0     lisinopril (PRINIVIL/ZESTRIL) 40 MG tablet TAKE ONE TABLET BY MOUTH ONCE DAILY IN THE EVENING 90 tablet 3     carvedilol (COREG) 25 MG tablet TAKE ONE TABLET BY MOUTH TWICE DAILY WITH MEALS NEED  TO  BE  SEEN  FOR  MORE  REFILLS 180 tablet 3     order for DME Equipment being ordered:  Right wrist carpal tunnel splint   right hand carpal tunnel syndrome   One*  Use as right wrist at night or when gripping walker 1 Device 1     pyridOXINE (VITAMIN B6) 100 MG TABS Take 1 tablet (100 mg) by mouth daily 90 tablet 11     order for DME Equipment being ordered:  Abdominal binder   One *  Ventral hernia * 1 Device 0     Ascorbic Acid (VITAMIN C PO) Take 500 mg by mouth daily + 500 mg po qHS PRN       multivitamin, therapeutic with minerals (THERA-VIT-M) TABS Take 1 tablet by mouth daily       aspirin 81 MG EC tablet Take 1 tablet (81 mg) by mouth daily 90 tablet 3     Blood Pressure Monitoring (BLOOD PRESSURE MONITOR/WRIST) LAURA 1 Device 2 times daily 1 Device 0     ORDER FOR DME Abdominal Binder - Large 2 each 1     ORDER FOR DME Equipment being ordered: stocking black closed toe  20- 25mm of mercury    3 pairs  Wash by hand daily  Please feet size to adjust probably large or extra large 3 Device prn     [DISCONTINUED] hydrochlorothiazide (HYDRODIURIL) 25 MG tablet TAKE ONE TABLET BY MOUTH ONCE DAILY (Patient not taking: Reported on 8/28/2017)  90 tablet 3     [DISCONTINUED] amLODIPine (NORVASC) 10 MG tablet TAKE ONE TABLET BY MOUTH ONCE DAILY 90 tablet 2     [DISCONTINUED] hydrochlorothiazide (HYDRODIURIL) 25 MG tablet Take 1 tablet (25 mg) by mouth daily 90 tablet 3     No Known Allergies  Recent Labs   Lab Test  06/26/17   0014  02/27/17   1056   05/10/16   1752  09/18/15   1049  04/08/14   1129   02/18/14   1405   A1C   --   5.6   --   5.8   --    --    --   5.4   LDL   --    --    --    --   104  100   --    --    HDL   --    --    --    --   34*  26*   --    --    TRIG   --    --    --    --   172*  177*   --    --    ALT   --   24   --    --   30  28   < >   --    CR  0.90  0.79   < >  0.73  1.00  0.80   < >  0.92   GFRESTIMATED  85  >90  Non  GFR Calc     < >  >90  Non  GFR Calc    76  >90   < >  85   GFRESTBLACK  >90   GFR Calc    >90   GFR Calc     < >  >90   GFR Calc    >90  >90   < >  >90   POTASSIUM  3.5  3.8   < >  3.1*  3.9  3.8   < >  4.3    < > = values in this interval not displayed.      BP Readings from Last 3 Encounters:   08/28/17 142/88   06/26/17 137/87   02/27/17 128/78    Wt Readings from Last 3 Encounters:   08/28/17 277 lb 8 oz (125.9 kg)   06/25/17 275 lb 6.4 oz (124.9 kg)   02/27/17 272 lb 12.8 oz (123.7 kg)                  Labs reviewed in EPIC          Reviewed and updated as needed this visit by clinical staff     Reviewed and updated as needed this visit by Provider         ROS: has Cellulitis of Lt lower leg since 1-00-[id number] since last saw ID  5-7-14; Baker's cyst; Ventral hernia; Left inguinal hernia; Diverticulosis of large intestine; Edema of both legs; Stasis dermatitis of both legs; Benign essential hypertension; Need for hepatitis C screening test; Cellulitis and abscess of leg: Lt  Lat Malleolus  onset late 4-16 ; ACP (advance care planning); Non morbid obesity due to excess calories with comorbid HTN; Cellulitis of ankle; and  Bilateral leg edema on his problem list.  Review Of Systems  Skin: negative,  VENOUS STASIS AND CALLOSITY REMOVAL   LEFT OUTER FOOT WITHOUT COMPLICATION   Eyes: negative, glasses  Ears/Nose/Throat: negative  Respiratory: No shortness of breath, dyspnea on exertion, cough, or hemoptysis  Cardiovascular: negative  Gastrointestinal: negative HISTORY OF DIVERTICULOSIS   Genitourinary: negative  Musculoskeletal: BILATERAL LEG SWELLING   CHRONIC ANKLE PAIN AND WEAKNESS   WITH TENDENCY TO FALL   Neurologic: negative  Psychiatric: negative  Hematologic/Lymphatic/Immunologic: negative  Endocrine: negative        OBJECTIVE:     /88  Pulse 70  Temp 98.2  F (36.8  C) (Tympanic)  Resp 16  Wt 277 lb 8 oz (125.9 kg)  SpO2 95%  BMI 36.61 kg/m2  Body mass index is 36.61 kg/(m^2).  GENERAL: healthy, alert and no distress  EYES: Eyes grossly normal to inspection, PERRL and conjunctivae and sclerae normal  HENT: ear canals and TM's normal, nose and mouth without ulcers or lesions  NECK: no adenopathy, no asymmetry, masses, or scars and thyroid normal to palpation  RESP: lungs clear to auscultation - no rales, rhonchi or wheezes  CV: regular rate and rhythm, normal S1 S2, no S3 or S4, no murmur, click or rub, no peripheral edema and peripheral pulses strong  ABDOMEN: soft, nontender, no hepatosplenomegaly, no masses and bowel sounds normal  MS: no gross musculoskeletal defects noted, no edema  SKIN: no suspicious lesions or rashes and  SEVERE BILATERAL VENOUS STASIS   NEURO: Normal strength and tone, mentation intact and speech normal  PSYCH: mentation appears normal, affect normal/bright  Diabetic foot exam: normal DP and PT pulses, no trophic changes or ulcerative lesions, normal sensory exam and   LEFT OUTER FOOT CALLOSITY     Diagnostic Test Results:  Results for orders placed or performed during the hospital encounter of 06/25/17   Foot XR, G/E 3 views, left    Narrative    XR FOOT LEFT GREATER THAN 3 VIEWS  6/26/2017  1:17 AM      HISTORY: Infected wound plantar aspect of the fifth toe.    COMPARISON: None.      Impression    IMPRESSION: No acute fracture or dislocation. No bone destruction to  suggest osteomyelitis. Orthopedic hardware in the ankle and hindfoot.    LILIAN HTIBODEAUX MD   CBC with platelets differential   Result Value Ref Range    WBC 6.8 4.0 - 11.0 10e9/L    RBC Count 4.42 4.4 - 5.9 10e12/L    Hemoglobin 14.3 13.3 - 17.7 g/dL    Hematocrit 41.7 40.0 - 53.0 %    MCV 94 78 - 100 fl    MCH 32.4 26.5 - 33.0 pg    MCHC 34.3 31.5 - 36.5 g/dL    RDW 12.4 10.0 - 15.0 %    Platelet Count 220 150 - 450 10e9/L    Diff Method Automated Method     % Neutrophils 46.8 %    % Lymphocytes 39.4 %    % Monocytes 8.4 %    % Eosinophils 4.7 %    % Basophils 0.6 %    % Immature Granulocytes 0.1 %    Nucleated RBCs 0 0 /100    Absolute Neutrophil 3.2 1.6 - 8.3 10e9/L    Absolute Lymphocytes 2.7 0.8 - 5.3 10e9/L    Absolute Monocytes 0.6 0.0 - 1.3 10e9/L    Absolute Eosinophils 0.3 0.0 - 0.7 10e9/L    Absolute Basophils 0.0 0.0 - 0.2 10e9/L    Abs Immature Granulocytes 0.0 0 - 0.4 10e9/L    Absolute Nucleated RBC 0.0    CRP inflammation   Result Value Ref Range    CRP Inflammation 3.1 0.0 - 8.0 mg/L   Basic metabolic panel   Result Value Ref Range    Sodium 141 133 - 144 mmol/L    Potassium 3.5 3.4 - 5.3 mmol/L    Chloride 107 94 - 109 mmol/L    Carbon Dioxide 24 20 - 32 mmol/L    Anion Gap 10 3 - 14 mmol/L    Glucose 140 (H) 70 - 99 mg/dL    Urea Nitrogen 23 7 - 30 mg/dL    Creatinine 0.90 0.66 - 1.25 mg/dL    GFR Estimate 85 >60 mL/min/1.7m2    GFR Estimate If Black >90   GFR Calc   >60 mL/min/1.7m2    Calcium 8.9 8.5 - 10.1 mg/dL   Blood culture ONE site   Result Value Ref Range    Specimen Description Blood Left Arm     Special Requests Aerobic and anaerobic bottles received     Culture Micro No growth     Micro Report Status FINAL 07/02/2017        ASSESSMENT/PLAN:           ICD-10-CM    1. Localized edema R60.0  "amLODIPine (NORVASC) 5 MG tablet   2. Hypertension goal BP (blood pressure) < 140/80 I10 hydrochlorothiazide (HYDRODIURIL) 25 MG tablet       Patient Instructions     TRIMMED CALLOSITIES TODAY     ELEVATE ABOVE HEART WHENEVER ABLE     COMPRESSION STOCKING    PEDRITO BOARD 5 MINUTES AFTER SHOWER OR FOOT SOAKIKNG    FIT BIT TWITCH RECOMMENDED  10,000 STEPS PER DAY     1400 CALORIES     PER DAY     EXERCYCYLE DAILY     Diabetes: Exchange List    What are the exchange lists?     The exchange lists show you portions of food that equal 1 exchange. Foods are divided into food lists. The foods on each list are called exchanges because they have a similar number of calories, protein, carbohydrate and fat content. Foods from each list can be traded or \"exchanged\" for any other food on the same list because they all have a similar exchange value. A dietitian will help you plan how much food your child should eat at each meal and from what lists the foods should come from. At first you should measure your food until you are able to make good estimates about serving sizes. The following list is a sample of foods found on the exchange lists. For more information, you can buy the Exchange Lists for Meal Planning from: The American Diabetes Association [address] [place] [address] 1-377.170.3869 http://www.diabetes.org    Carbohydrate group     Starch List: One starch exchange contains about 15 grams of carbohydrate, 3 grams of protein, 0 to 1 grams of fat, and 80 calories. A starch exchange is sometimes called a carb exchange. Examples of one starch (carb) exchange are:     one slice of bread     1/2 hamburger or hot dog bun     3/4 cup of unsweetened cereal     1/3 cup pasta     3 cups popcorn     crackers (6 small saltines, 3 squares of kylah crackers, 3 of most other crackers)     1 pancake or waffle (5 inch)     15 to 20 fat-free or baked potato or corn chips.     The vegetables included in the starch exchanges include: " "    corn (1/2 cup or 1/2 cob)     white potato (1/4 large baked with skin or 1/2 cup mashed)     yam or sweet potato (1/2 cup)     green peas (1/2 cup)     squash, winter (1 cup)     lima beans (2/3 cup).     Fruit List: 1 fruit exchange contains about 15 grams of carbohydrate and 60 calories. Examples of one fruit exchange are:     grape juice (1/3 cup)     apple or pineapple juice (1/2 cup)     orange or grapefruit juice (1/2 cup)     1 small apple     orange or peach     1/2 banana     1 cup raspberries     1/3 of a small cantaloupe     1 slice of watermelon.     Milk List: 1 milk exchange contains about 8 grams of protein and 12 grams of carbohydrate. Items on the milk list are divided into fat-free, reduced fat, and whole milk depending on the number of fat grams in the exchange. Examples of one milk exchange are: Fat-Free (0 to 3 grams of fat)     1 cup of skim or non-fat milk     1 cup of 1% milk (also includes 1/2 fat exchange)     6 ounces flavored fat-free yogurt     Reduced-Fat (5 grams of fat)     6 ounces of plain, low-fat yogurt     1 cup 2% milk (also includes one fat exchange).     Whole Milk (8 grams of fat)     8 ounces of plain yogurt (made from whole milk)     1 cup whole milk.     Vegetable List: One vegetable exchange has 5 grams of carbohydrate, 2 grams of protein, no fat, and 25 calories. One-half cup of cooked or a cup of raw vegetables is a good measure for 1 exchange of most vegetables. Raw lettuce may be taken in larger quantities, but salad dressing usually equals 1 fat exchange. Other Carbohydrates List: One \"other carbohydrate\" exchange has 15 grams of carbohydrate. Many of these foods count as a starch exchange and one or more fat exchanges.     brownie (2 inch square) = 1 carb, 1 fat exchange     fruit snack roll = 1 carb exchange     granola bar = 1 and 1/2 carb exchanges     ice cream (1/2 cup) = 1 carb, 2 fat exchanges     frozen yogurt (1/2 cup) = 1 carb, 0 to 1 fat exchanges "     tortilla chips (6-12) = 1 carb, 2 fat exchanges.     Meat and Meat Substitute Group     Meats are divided into very lean meats, lean meats, medium-fat meats, and high-fat meats. People with diabetes should try to eat more lean and medium fat meats and stay away from the high fat choices. The Very Lean meat group includes foods that contain 7 grams of protein, 0 to 1 gram of fat, and 35 calories for 1 exchange. Examples include:     1 ounce chicken or turkey (white meat, no skin)     1 ounce fresh fish     1 ounce fat-free cheese     2 egg whites     The Lean meat group includes foods that contain 7 grams of protein, 3 grams of fat, and 55 calories for 1 meat exchange. Examples include:     1 ounce chicken or turkey (dark meat, no skin)     1 ounce fish     1 ounce lean pork     1 ounce USDA Select or Choice grades of lean beef     1 ounce cheese (with 3 grams of fat or less per ounce).     The Medium-Fat group includes foods that have 7 grams of protein, 5 grams of fat, and 75 calories for 1 meat exchange. Examples include:     1 ounce of ground beef, most cuts of beef, pork, lamb or veal     1 ounce of cheese (5 grams of fat per ounce or less)     1 egg     1 ounce fried fish.     The High-Fat foods have 7 grams of protein, 8 grams of fat, and 100 calories for 1 meat exchange. This group includes:     1 ounce of pork sausage     1 ounce of spare ribs     1 oz of regular cheese (American, Swiss etc.)     1 oz of lunch meat     1 hot dog (turkey or chicken).     Fat Group     Fat List: Fat is necessary for the body and is particularly important during periods of fasting (overnight), when it is very slowly absorbed. 1 fat exchange contains 5 grams of fat and 45 calories. The monounsaturated fats and polyunsaturated fats are better for us than saturated fats. The fat list includes: 1 exchange of monounsaturated fats equals:     1/2 Tbsp peanut butter     6 almonds     1 tsp of oil (olive, peanut, canola).     1  exchange of polyunsaturated fats equals:     1 tsp margarine     1 tsp of any vegetable oil (except coconut).     1 exchange of saturated fat includes:     1 tsp butter     1 strip of lam     2 Tbsp of cream (half and half).     Free Foods     A free food contains less than 20 calories or less than 5 grams of carbohydrate per serving. If the food has a serving size listed on its package, it should be limited to 3 servings spread throughout the day. Examples of free foods include:     4 Tbsp fat-free margarine     1 Tbsp fat-free Miracle Whip     sugar-free gelatin     diet soft drinks     catsup     soy sauce     spices.     Combination Foods     Many foods, such as casseroles, are mixed together. Your dietitian can help you figure out how many exchanges to count for combination foods. For example:     lasagna (1 cup) = 2 carb exchanges and 2 medium-fat meat exchanges     spaghetti with meatballs (1 cup) = 2 carb exchanges and 2 medium-fat meat exchanges     pizza, cheese (1/4 of 12 in.) = 2 carbs, 2 medium-fat meats     chicken noodle soup (1 cup) = 1 carb exchange     frozen entrée (less than 300 calories) = 2 carbs, 3 lean meat exchanges     macaroni and cheese (1 cup) = 2 carb exchanges and 2 medium-fat meat exchanges.               DAYSI GUIDRY MD  [facility]

## 2017-08-28 NOTE — NURSING NOTE
"Chief Complaint   Patient presents with     Callouses       Initial /88  Pulse 70  Temp 98.2  F (36.8  C) (Tympanic)  Resp 16  Wt 277 lb 8 oz (125.9 kg)  SpO2 95%  BMI 36.61 kg/m2 Estimated body mass index is 36.61 kg/(m^2) as calculated from the following:    Height as of 6/25/17: 6' 1\" (1.854 m).    Weight as of this encounter: 277 lb 8 oz (125.9 kg).  Medication Reconciliation: complete   Raeann Huynh CMA    "

## 2017-09-29 ENCOUNTER — TELEPHONE (OUTPATIENT)
Dept: FAMILY MEDICINE | Facility: CLINIC | Age: 62
End: 2017-09-29

## 2017-09-29 NOTE — TELEPHONE ENCOUNTER
9/29/2017    Call Regarding Preventive Health Screening Colonoscopy    Attempt 1    Message on voicemail     Comments: MANUAL DIALED      Outreach   KV

## 2017-10-04 NOTE — TELEPHONE ENCOUNTER
10/4/2017    Call Regarding Preventive Health Screening Colonoscopy    Attempt 2    Message on voicemail     Comments: manual      Outreach   Kelly Watson

## 2017-10-07 NOTE — TELEPHONE ENCOUNTER
10/7/2017    Call Regarding Preventive Health Screening Colonoscopy    Attempt 3    Message on voicemail     Comments:         Outreach   Jacqueline Peñaloza

## 2017-12-12 ENCOUNTER — APPOINTMENT (OUTPATIENT)
Dept: GENERAL RADIOLOGY | Facility: CLINIC | Age: 62
DRG: 354 | End: 2017-12-12
Attending: EMERGENCY MEDICINE

## 2017-12-12 ENCOUNTER — APPOINTMENT (OUTPATIENT)
Dept: CT IMAGING | Facility: CLINIC | Age: 62
DRG: 354 | End: 2017-12-12
Attending: EMERGENCY MEDICINE

## 2017-12-12 ENCOUNTER — HOSPITAL ENCOUNTER (INPATIENT)
Facility: CLINIC | Age: 62
LOS: 4 days | Discharge: HOME | DRG: 354 | End: 2017-12-16
Attending: EMERGENCY MEDICINE | Admitting: SURGERY

## 2017-12-12 ENCOUNTER — ANESTHESIA (OUTPATIENT)
Dept: SURGERY | Facility: CLINIC | Age: 62
DRG: 354 | End: 2017-12-12

## 2017-12-12 ENCOUNTER — ANESTHESIA EVENT (OUTPATIENT)
Dept: SURGERY | Facility: CLINIC | Age: 62
DRG: 354 | End: 2017-12-12

## 2017-12-12 ENCOUNTER — SURGERY (OUTPATIENT)
Age: 62
End: 2017-12-12

## 2017-12-12 DIAGNOSIS — K46.0 INCARCERATED HERNIA: ICD-10-CM

## 2017-12-12 LAB
ABO + RH BLD: NORMAL
ABO + RH BLD: NORMAL
ALBUMIN SERPL-MCNC: 3.8 G/DL (ref 3.4–5)
ALP SERPL-CCNC: 58 U/L (ref 40–150)
ALT SERPL W P-5'-P-CCNC: 27 U/L (ref 0–70)
ANION GAP SERPL CALCULATED.3IONS-SCNC: 7 MMOL/L (ref 3–14)
AST SERPL W P-5'-P-CCNC: 16 U/L (ref 0–45)
BASOPHILS # BLD AUTO: 0 10E9/L (ref 0–0.2)
BASOPHILS NFR BLD AUTO: 0.4 %
BILIRUB SERPL-MCNC: 1 MG/DL (ref 0.2–1.3)
BLD GP AB SCN SERPL QL: NORMAL
BLOOD BANK CMNT PATIENT-IMP: NORMAL
BUN SERPL-MCNC: 17 MG/DL (ref 7–30)
CALCIUM SERPL-MCNC: 9.3 MG/DL (ref 8.5–10.1)
CHLORIDE SERPL-SCNC: 94 MMOL/L (ref 94–109)
CO2 SERPL-SCNC: 33 MMOL/L (ref 20–32)
CREAT SERPL-MCNC: 0.62 MG/DL (ref 0.66–1.25)
DIFFERENTIAL METHOD BLD: ABNORMAL
EOSINOPHIL # BLD AUTO: 0 10E9/L (ref 0–0.7)
EOSINOPHIL NFR BLD AUTO: 0.4 %
ERYTHROCYTE [DISTWIDTH] IN BLOOD BY AUTOMATED COUNT: 12.3 % (ref 10–15)
GFR SERPL CREATININE-BSD FRML MDRD: >90 ML/MIN/1.7M2
GLUCOSE BLDC GLUCOMTR-MCNC: 112 MG/DL (ref 70–99)
GLUCOSE SERPL-MCNC: 167 MG/DL (ref 70–99)
HCT VFR BLD AUTO: 45 % (ref 40–53)
HGB BLD-MCNC: 15.7 G/DL (ref 13.3–17.7)
IMM GRANULOCYTES # BLD: 0 10E9/L (ref 0–0.4)
IMM GRANULOCYTES NFR BLD: 0.3 %
LACTATE BLD-SCNC: 1.6 MMOL/L (ref 0.7–2)
LYMPHOCYTES # BLD AUTO: 1.7 10E9/L (ref 0.8–5.3)
LYMPHOCYTES NFR BLD AUTO: 14.9 %
MCH RBC QN AUTO: 31.8 PG (ref 26.5–33)
MCHC RBC AUTO-ENTMCNC: 34.9 G/DL (ref 31.5–36.5)
MCV RBC AUTO: 91 FL (ref 78–100)
MONOCYTES # BLD AUTO: 0.5 10E9/L (ref 0–1.3)
MONOCYTES NFR BLD AUTO: 4.5 %
NEUTROPHILS # BLD AUTO: 9.1 10E9/L (ref 1.6–8.3)
NEUTROPHILS NFR BLD AUTO: 79.5 %
NRBC # BLD AUTO: 0 10*3/UL
NRBC BLD AUTO-RTO: 0 /100
PLATELET # BLD AUTO: 246 10E9/L (ref 150–450)
POTASSIUM SERPL-SCNC: 3.2 MMOL/L (ref 3.4–5.3)
PROT SERPL-MCNC: 7.9 G/DL (ref 6.8–8.8)
RBC # BLD AUTO: 4.93 10E12/L (ref 4.4–5.9)
SODIUM SERPL-SCNC: 134 MMOL/L (ref 133–144)
SPECIMEN EXP DATE BLD: NORMAL
WBC # BLD AUTO: 11.4 10E9/L (ref 4–11)

## 2017-12-12 PROCEDURE — 25000128 H RX IP 250 OP 636: Performed by: STUDENT IN AN ORGANIZED HEALTH CARE EDUCATION/TRAINING PROGRAM

## 2017-12-12 PROCEDURE — 25000128 H RX IP 250 OP 636: Performed by: EMERGENCY MEDICINE

## 2017-12-12 PROCEDURE — 25000565 ZZH ISOFLURANE, EA 15 MIN: Performed by: SURGERY

## 2017-12-12 PROCEDURE — 96374 THER/PROPH/DIAG INJ IV PUSH: CPT | Mod: 59 | Performed by: EMERGENCY MEDICINE

## 2017-12-12 PROCEDURE — 37000009 ZZH ANESTHESIA TECHNICAL FEE, EACH ADDTL 15 MIN: Performed by: SURGERY

## 2017-12-12 PROCEDURE — C9399 UNCLASSIFIED DRUGS OR BIOLOG: HCPCS | Performed by: STUDENT IN AN ORGANIZED HEALTH CARE EDUCATION/TRAINING PROGRAM

## 2017-12-12 PROCEDURE — 40000170 ZZH STATISTIC PRE-PROCEDURE ASSESSMENT II: Performed by: SURGERY

## 2017-12-12 PROCEDURE — 86900 BLOOD TYPING SEROLOGIC ABO: CPT | Performed by: STUDENT IN AN ORGANIZED HEALTH CARE EDUCATION/TRAINING PROGRAM

## 2017-12-12 PROCEDURE — 96376 TX/PRO/DX INJ SAME DRUG ADON: CPT | Performed by: EMERGENCY MEDICINE

## 2017-12-12 PROCEDURE — 12000006 ZZH R&B IMCU INTERMEDIATE UMMC

## 2017-12-12 PROCEDURE — 0WUF0JZ SUPPLEMENT ABDOMINAL WALL WITH SYNTHETIC SUBSTITUTE, OPEN APPROACH: ICD-10-PCS | Performed by: SURGERY

## 2017-12-12 PROCEDURE — 99285 EMERGENCY DEPT VISIT HI MDM: CPT | Mod: 25 | Performed by: EMERGENCY MEDICINE

## 2017-12-12 PROCEDURE — 36000057 ZZH SURGERY LEVEL 3 1ST 30 MIN - UMMC: Performed by: SURGERY

## 2017-12-12 PROCEDURE — 25000125 ZZHC RX 250: Performed by: EMERGENCY MEDICINE

## 2017-12-12 PROCEDURE — 40000986 XR ABDOMEN 1 VW

## 2017-12-12 PROCEDURE — 37000008 ZZH ANESTHESIA TECHNICAL FEE, 1ST 30 MIN: Performed by: SURGERY

## 2017-12-12 PROCEDURE — 85025 COMPLETE CBC W/AUTO DIFF WBC: CPT | Performed by: EMERGENCY MEDICINE

## 2017-12-12 PROCEDURE — 00000146 ZZHCL STATISTIC GLUCOSE BY METER IP

## 2017-12-12 PROCEDURE — 99285 EMERGENCY DEPT VISIT HI MDM: CPT | Mod: Z6 | Performed by: EMERGENCY MEDICINE

## 2017-12-12 PROCEDURE — 96375 TX/PRO/DX INJ NEW DRUG ADDON: CPT | Mod: 59 | Performed by: EMERGENCY MEDICINE

## 2017-12-12 PROCEDURE — 86901 BLOOD TYPING SEROLOGIC RH(D): CPT | Performed by: STUDENT IN AN ORGANIZED HEALTH CARE EDUCATION/TRAINING PROGRAM

## 2017-12-12 PROCEDURE — 74177 CT ABD & PELVIS W/CONTRAST: CPT

## 2017-12-12 PROCEDURE — 80053 COMPREHEN METABOLIC PANEL: CPT | Performed by: EMERGENCY MEDICINE

## 2017-12-12 PROCEDURE — C1781 MESH (IMPLANTABLE): HCPCS | Performed by: SURGERY

## 2017-12-12 PROCEDURE — 36000059 ZZH SURGERY LEVEL 3 EA 15 ADDTL MIN UMMC: Performed by: SURGERY

## 2017-12-12 PROCEDURE — 25000128 H RX IP 250 OP 636: Performed by: ANESTHESIOLOGY

## 2017-12-12 PROCEDURE — 71000014 ZZH RECOVERY PHASE 1 LEVEL 2 FIRST HR: Performed by: SURGERY

## 2017-12-12 PROCEDURE — 88302 TISSUE EXAM BY PATHOLOGIST: CPT | Performed by: SURGERY

## 2017-12-12 PROCEDURE — 25000125 ZZHC RX 250: Performed by: NURSE ANESTHETIST, CERTIFIED REGISTERED

## 2017-12-12 PROCEDURE — 96361 HYDRATE IV INFUSION ADD-ON: CPT | Performed by: EMERGENCY MEDICINE

## 2017-12-12 PROCEDURE — 83605 ASSAY OF LACTIC ACID: CPT | Performed by: EMERGENCY MEDICINE

## 2017-12-12 PROCEDURE — 71000015 ZZH RECOVERY PHASE 1 LEVEL 2 EA ADDTL HR: Performed by: SURGERY

## 2017-12-12 PROCEDURE — 27210995 ZZH RX 272: Performed by: SURGERY

## 2017-12-12 PROCEDURE — 86850 RBC ANTIBODY SCREEN: CPT | Performed by: STUDENT IN AN ORGANIZED HEALTH CARE EDUCATION/TRAINING PROGRAM

## 2017-12-12 PROCEDURE — 27210794 ZZH OR GENERAL SUPPLY STERILE: Performed by: SURGERY

## 2017-12-12 PROCEDURE — 25000128 H RX IP 250 OP 636: Performed by: NURSE ANESTHETIST, CERTIFIED REGISTERED

## 2017-12-12 PROCEDURE — 25000125 ZZHC RX 250: Performed by: ANESTHESIOLOGY

## 2017-12-12 DEVICE — MESH PHASIX RECONSTRUCTIVE RESORB 4X6" 1201015: Type: IMPLANTABLE DEVICE | Site: ABDOMEN | Status: FUNCTIONAL

## 2017-12-12 RX ORDER — ONDANSETRON 2 MG/ML
8 INJECTION INTRAMUSCULAR; INTRAVENOUS ONCE
Status: COMPLETED | OUTPATIENT
Start: 2017-12-12 | End: 2017-12-12

## 2017-12-12 RX ORDER — SODIUM CHLORIDE, SODIUM LACTATE, POTASSIUM CHLORIDE, CALCIUM CHLORIDE 600; 310; 30; 20 MG/100ML; MG/100ML; MG/100ML; MG/100ML
INJECTION, SOLUTION INTRAVENOUS CONTINUOUS
Status: DISCONTINUED | OUTPATIENT
Start: 2017-12-12 | End: 2017-12-14

## 2017-12-12 RX ORDER — LIDOCAINE HYDROCHLORIDE 20 MG/ML
INJECTION, SOLUTION INFILTRATION; PERINEURAL PRN
Status: DISCONTINUED | OUTPATIENT
Start: 2017-12-12 | End: 2017-12-12

## 2017-12-12 RX ORDER — LIDOCAINE 40 MG/G
CREAM TOPICAL
Status: DISCONTINUED | OUTPATIENT
Start: 2017-12-12 | End: 2017-12-12

## 2017-12-12 RX ORDER — ONDANSETRON 2 MG/ML
4 INJECTION INTRAMUSCULAR; INTRAVENOUS EVERY 6 HOURS PRN
Status: DISCONTINUED | OUTPATIENT
Start: 2017-12-12 | End: 2017-12-12

## 2017-12-12 RX ORDER — POTASSIUM CHLORIDE 1.5 G/1.58G
20-40 POWDER, FOR SOLUTION ORAL
Status: DISCONTINUED | OUTPATIENT
Start: 2017-12-12 | End: 2017-12-12

## 2017-12-12 RX ORDER — LIDOCAINE 40 MG/G
CREAM TOPICAL
Status: DISCONTINUED | OUTPATIENT
Start: 2017-12-12 | End: 2017-12-16 | Stop reason: HOSPADM

## 2017-12-12 RX ORDER — OXYCODONE HYDROCHLORIDE 5 MG/1
5-10 TABLET ORAL
Status: DISCONTINUED | OUTPATIENT
Start: 2017-12-12 | End: 2017-12-12

## 2017-12-12 RX ORDER — SODIUM CHLORIDE, SODIUM LACTATE, POTASSIUM CHLORIDE, CALCIUM CHLORIDE 600; 310; 30; 20 MG/100ML; MG/100ML; MG/100ML; MG/100ML
INJECTION, SOLUTION INTRAVENOUS CONTINUOUS
Status: DISCONTINUED | OUTPATIENT
Start: 2017-12-12 | End: 2017-12-12 | Stop reason: HOSPADM

## 2017-12-12 RX ORDER — MAGNESIUM HYDROXIDE 1200 MG/15ML
LIQUID ORAL PRN
Status: DISCONTINUED | OUTPATIENT
Start: 2017-12-12 | End: 2017-12-12 | Stop reason: HOSPADM

## 2017-12-12 RX ORDER — SODIUM CHLORIDE 9 MG/ML
INJECTION, SOLUTION INTRAVENOUS CONTINUOUS
Status: DISCONTINUED | OUTPATIENT
Start: 2017-12-12 | End: 2017-12-12

## 2017-12-12 RX ORDER — HYDROMORPHONE HYDROCHLORIDE 1 MG/ML
.3-.5 INJECTION, SOLUTION INTRAMUSCULAR; INTRAVENOUS; SUBCUTANEOUS EVERY 5 MIN PRN
Status: DISCONTINUED | OUTPATIENT
Start: 2017-12-12 | End: 2017-12-12 | Stop reason: HOSPADM

## 2017-12-12 RX ORDER — POTASSIUM CL/LIDO/0.9 % NACL 10MEQ/0.1L
10 INTRAVENOUS SOLUTION, PIGGYBACK (ML) INTRAVENOUS
Status: DISCONTINUED | OUTPATIENT
Start: 2017-12-12 | End: 2017-12-12

## 2017-12-12 RX ORDER — FENTANYL CITRATE 50 UG/ML
INJECTION, SOLUTION INTRAMUSCULAR; INTRAVENOUS PRN
Status: DISCONTINUED | OUTPATIENT
Start: 2017-12-12 | End: 2017-12-12

## 2017-12-12 RX ORDER — POTASSIUM CHLORIDE 29.8 MG/ML
20 INJECTION INTRAVENOUS
Status: DISCONTINUED | OUTPATIENT
Start: 2017-12-12 | End: 2017-12-12

## 2017-12-12 RX ORDER — PROCHLORPERAZINE 25 MG
25 SUPPOSITORY, RECTAL RECTAL EVERY 12 HOURS PRN
Status: DISCONTINUED | OUTPATIENT
Start: 2017-12-12 | End: 2017-12-12

## 2017-12-12 RX ORDER — ONDANSETRON 4 MG/1
4 TABLET, ORALLY DISINTEGRATING ORAL EVERY 6 HOURS PRN
Status: DISCONTINUED | OUTPATIENT
Start: 2017-12-12 | End: 2017-12-12

## 2017-12-12 RX ORDER — POTASSIUM CHLORIDE 750 MG/1
20-40 TABLET, EXTENDED RELEASE ORAL
Status: DISCONTINUED | OUTPATIENT
Start: 2017-12-12 | End: 2017-12-12

## 2017-12-12 RX ORDER — GLYCOPYRROLATE 0.2 MG/ML
INJECTION, SOLUTION INTRAMUSCULAR; INTRAVENOUS PRN
Status: DISCONTINUED | OUTPATIENT
Start: 2017-12-12 | End: 2017-12-12

## 2017-12-12 RX ORDER — AMLODIPINE BESYLATE 5 MG/1
5 TABLET ORAL DAILY
Status: DISCONTINUED | OUTPATIENT
Start: 2017-12-13 | End: 2017-12-16 | Stop reason: HOSPADM

## 2017-12-12 RX ORDER — METOPROLOL TARTRATE 1 MG/ML
5 INJECTION, SOLUTION INTRAVENOUS EVERY 6 HOURS
Status: DISCONTINUED | OUTPATIENT
Start: 2017-12-13 | End: 2017-12-13

## 2017-12-12 RX ORDER — HYDROMORPHONE HYDROCHLORIDE 1 MG/ML
0.5 INJECTION, SOLUTION INTRAMUSCULAR; INTRAVENOUS; SUBCUTANEOUS ONCE
Status: COMPLETED | OUTPATIENT
Start: 2017-12-12 | End: 2017-12-12

## 2017-12-12 RX ORDER — SODIUM CHLORIDE 9 MG/ML
INJECTION, SOLUTION INTRAVENOUS CONTINUOUS PRN
Status: DISCONTINUED | OUTPATIENT
Start: 2017-12-12 | End: 2017-12-12

## 2017-12-12 RX ORDER — ACETAMINOPHEN 325 MG/1
650 TABLET ORAL EVERY 4 HOURS PRN
Status: DISCONTINUED | OUTPATIENT
Start: 2017-12-12 | End: 2017-12-12

## 2017-12-12 RX ORDER — FENTANYL CITRATE 50 UG/ML
25-50 INJECTION, SOLUTION INTRAMUSCULAR; INTRAVENOUS
Status: DISCONTINUED | OUTPATIENT
Start: 2017-12-12 | End: 2017-12-12 | Stop reason: HOSPADM

## 2017-12-12 RX ORDER — NALOXONE HYDROCHLORIDE 0.4 MG/ML
.1-.4 INJECTION, SOLUTION INTRAMUSCULAR; INTRAVENOUS; SUBCUTANEOUS
Status: DISCONTINUED | OUTPATIENT
Start: 2017-12-12 | End: 2017-12-16 | Stop reason: HOSPADM

## 2017-12-12 RX ORDER — IOPAMIDOL 755 MG/ML
100 INJECTION, SOLUTION INTRAVASCULAR ONCE
Status: COMPLETED | OUTPATIENT
Start: 2017-12-12 | End: 2017-12-12

## 2017-12-12 RX ORDER — PROPOFOL 10 MG/ML
INJECTION, EMULSION INTRAVENOUS PRN
Status: DISCONTINUED | OUTPATIENT
Start: 2017-12-12 | End: 2017-12-12

## 2017-12-12 RX ORDER — PROCHLORPERAZINE MALEATE 5 MG
10 TABLET ORAL EVERY 6 HOURS PRN
Status: DISCONTINUED | OUTPATIENT
Start: 2017-12-12 | End: 2017-12-12

## 2017-12-12 RX ORDER — PIPERACILLIN SODIUM, TAZOBACTAM SODIUM 3; .375 G/15ML; G/15ML
3.38 INJECTION, POWDER, LYOPHILIZED, FOR SOLUTION INTRAVENOUS ONCE
Status: COMPLETED | OUTPATIENT
Start: 2017-12-12 | End: 2017-12-12

## 2017-12-12 RX ORDER — NALOXONE HYDROCHLORIDE 0.4 MG/ML
.1-.4 INJECTION, SOLUTION INTRAMUSCULAR; INTRAVENOUS; SUBCUTANEOUS
Status: DISCONTINUED | OUTPATIENT
Start: 2017-12-12 | End: 2017-12-12

## 2017-12-12 RX ORDER — METOPROLOL TARTRATE 1 MG/ML
1-2 INJECTION, SOLUTION INTRAVENOUS EVERY 5 MIN PRN
Status: DISCONTINUED | OUTPATIENT
Start: 2017-12-12 | End: 2017-12-12 | Stop reason: HOSPADM

## 2017-12-12 RX ORDER — POTASSIUM CHLORIDE 7.45 MG/ML
10 INJECTION INTRAVENOUS
Status: DISCONTINUED | OUTPATIENT
Start: 2017-12-12 | End: 2017-12-12

## 2017-12-12 RX ORDER — ONDANSETRON 4 MG/1
4 TABLET, ORALLY DISINTEGRATING ORAL EVERY 30 MIN PRN
Status: DISCONTINUED | OUTPATIENT
Start: 2017-12-12 | End: 2017-12-12 | Stop reason: HOSPADM

## 2017-12-12 RX ORDER — HYDROMORPHONE HYDROCHLORIDE 1 MG/ML
.3-.5 INJECTION, SOLUTION INTRAMUSCULAR; INTRAVENOUS; SUBCUTANEOUS
Status: DISCONTINUED | OUTPATIENT
Start: 2017-12-12 | End: 2017-12-12

## 2017-12-12 RX ORDER — HYDRALAZINE HYDROCHLORIDE 20 MG/ML
2.5-5 INJECTION INTRAMUSCULAR; INTRAVENOUS EVERY 10 MIN PRN
Status: DISCONTINUED | OUTPATIENT
Start: 2017-12-12 | End: 2017-12-12 | Stop reason: HOSPADM

## 2017-12-12 RX ORDER — PIPERACILLIN SODIUM, TAZOBACTAM SODIUM 2; .25 G/10ML; G/10ML
INJECTION, POWDER, LYOPHILIZED, FOR SOLUTION INTRAVENOUS PRN
Status: DISCONTINUED | OUTPATIENT
Start: 2017-12-12 | End: 2017-12-12

## 2017-12-12 RX ORDER — SODIUM CHLORIDE, SODIUM LACTATE, POTASSIUM CHLORIDE, CALCIUM CHLORIDE 600; 310; 30; 20 MG/100ML; MG/100ML; MG/100ML; MG/100ML
INJECTION, SOLUTION INTRAVENOUS CONTINUOUS PRN
Status: DISCONTINUED | OUTPATIENT
Start: 2017-12-12 | End: 2017-12-12

## 2017-12-12 RX ORDER — ONDANSETRON 2 MG/ML
4 INJECTION INTRAMUSCULAR; INTRAVENOUS EVERY 30 MIN PRN
Status: DISCONTINUED | OUTPATIENT
Start: 2017-12-12 | End: 2017-12-12 | Stop reason: HOSPADM

## 2017-12-12 RX ADMIN — LIDOCAINE HYDROCHLORIDE 90 MG: 20 INJECTION, SOLUTION INFILTRATION; PERINEURAL at 14:50

## 2017-12-12 RX ADMIN — ONDANSETRON 4 MG: 2 INJECTION INTRAMUSCULAR; INTRAVENOUS at 20:03

## 2017-12-12 RX ADMIN — HYDROMORPHONE HYDROCHLORIDE 0.2 MG: 1 INJECTION, SOLUTION INTRAMUSCULAR; INTRAVENOUS; SUBCUTANEOUS at 18:32

## 2017-12-12 RX ADMIN — ROCURONIUM BROMIDE 20 MG: 10 INJECTION INTRAVENOUS at 16:31

## 2017-12-12 RX ADMIN — Medication 0.2 MG: at 15:38

## 2017-12-12 RX ADMIN — HYDROMORPHONE HYDROCHLORIDE: 10 INJECTION, SOLUTION INTRAMUSCULAR; INTRAVENOUS; SUBCUTANEOUS at 20:19

## 2017-12-12 RX ADMIN — ROCURONIUM BROMIDE 20 MG: 10 INJECTION INTRAVENOUS at 16:00

## 2017-12-12 RX ADMIN — SODIUM CHLORIDE 1000 ML: 900 IRRIGANT IRRIGATION at 18:22

## 2017-12-12 RX ADMIN — PROPOFOL 160 MG: 10 INJECTION, EMULSION INTRAVENOUS at 14:50

## 2017-12-12 RX ADMIN — ONDANSETRON 8 MG: 2 INJECTION INTRAMUSCULAR; INTRAVENOUS at 01:21

## 2017-12-12 RX ADMIN — METOPROLOL TARTRATE 2 MG: 5 INJECTION INTRAVENOUS at 19:35

## 2017-12-12 RX ADMIN — PIPERACILLIN SODIUM AND TAZOBACTAM SODIUM 3.38 G: .375; 3 INJECTION, POWDER, LYOPHILIZED, FOR SOLUTION INTRAVENOUS at 05:07

## 2017-12-12 RX ADMIN — HYDRALAZINE HYDROCHLORIDE 5 MG: 20 INJECTION INTRAMUSCULAR; INTRAVENOUS at 20:03

## 2017-12-12 RX ADMIN — PHENYLEPHRINE HYDROCHLORIDE 100 MCG: 10 INJECTION, SOLUTION INTRAMUSCULAR; INTRAVENOUS; SUBCUTANEOUS at 14:58

## 2017-12-12 RX ADMIN — HYDRALAZINE HYDROCHLORIDE 5 MG: 20 INJECTION INTRAMUSCULAR; INTRAVENOUS at 19:52

## 2017-12-12 RX ADMIN — HYDRALAZINE HYDROCHLORIDE 5 MG: 20 INJECTION INTRAMUSCULAR; INTRAVENOUS at 20:26

## 2017-12-12 RX ADMIN — ROCURONIUM BROMIDE 10 MG: 10 INJECTION INTRAVENOUS at 17:44

## 2017-12-12 RX ADMIN — FENTANYL CITRATE 50 MCG: 50 INJECTION, SOLUTION INTRAMUSCULAR; INTRAVENOUS at 16:20

## 2017-12-12 RX ADMIN — Medication 10 MEQ: at 11:30

## 2017-12-12 RX ADMIN — PIPERACILLIN SODIUM, TAZOBACTAM SODIUM 3.38 G: 3; .375 INJECTION, POWDER, LYOPHILIZED, FOR SOLUTION INTRAVENOUS at 15:10

## 2017-12-12 RX ADMIN — SODIUM CHLORIDE, POTASSIUM CHLORIDE, SODIUM LACTATE AND CALCIUM CHLORIDE: 600; 310; 30; 20 INJECTION, SOLUTION INTRAVENOUS at 16:23

## 2017-12-12 RX ADMIN — Medication 0.5 MG: at 04:13

## 2017-12-12 RX ADMIN — FENTANYL CITRATE 100 MCG: 50 INJECTION, SOLUTION INTRAMUSCULAR; INTRAVENOUS at 15:28

## 2017-12-12 RX ADMIN — SUGAMMADEX 200 MG: 100 INJECTION, SOLUTION INTRAVENOUS at 18:28

## 2017-12-12 RX ADMIN — FENTANYL CITRATE 50 MCG: 50 INJECTION, SOLUTION INTRAMUSCULAR; INTRAVENOUS at 20:03

## 2017-12-12 RX ADMIN — Medication 10 MEQ: at 10:25

## 2017-12-12 RX ADMIN — IOPAMIDOL 100 ML: 755 INJECTION, SOLUTION INTRAVENOUS at 02:26

## 2017-12-12 RX ADMIN — ROCURONIUM BROMIDE 10 MG: 10 INJECTION INTRAVENOUS at 17:15

## 2017-12-12 RX ADMIN — ROCURONIUM BROMIDE 20 MG: 10 INJECTION INTRAVENOUS at 15:19

## 2017-12-12 RX ADMIN — FENTANYL CITRATE 50 MCG: 50 INJECTION, SOLUTION INTRAMUSCULAR; INTRAVENOUS at 16:37

## 2017-12-12 RX ADMIN — HYDROMORPHONE HYDROCHLORIDE 0.5 MG: 1 INJECTION, SOLUTION INTRAMUSCULAR; INTRAVENOUS; SUBCUTANEOUS at 17:45

## 2017-12-12 RX ADMIN — SODIUM CHLORIDE: 9 INJECTION, SOLUTION INTRAVENOUS at 14:35

## 2017-12-12 RX ADMIN — PIPERACILLIN SODIUM AND TAZOBACTAM SODIUM 2.25 G: .25; 2 INJECTION, POWDER, LYOPHILIZED, FOR SOLUTION INTRAVENOUS at 17:10

## 2017-12-12 RX ADMIN — HYDROMORPHONE HYDROCHLORIDE 0.3 MG: 10 INJECTION, SOLUTION INTRAMUSCULAR; INTRAVENOUS; SUBCUTANEOUS at 19:58

## 2017-12-12 RX ADMIN — FENTANYL CITRATE 50 MCG: 50 INJECTION, SOLUTION INTRAMUSCULAR; INTRAVENOUS at 14:50

## 2017-12-12 RX ADMIN — METOPROLOL TARTRATE 2 MG: 5 INJECTION INTRAVENOUS at 19:17

## 2017-12-12 RX ADMIN — Medication 0.5 MG: at 02:07

## 2017-12-12 RX ADMIN — FENTANYL CITRATE 25 MCG: 50 INJECTION, SOLUTION INTRAMUSCULAR; INTRAVENOUS at 18:36

## 2017-12-12 RX ADMIN — SODIUM CHLORIDE: 9 INJECTION, SOLUTION INTRAVENOUS at 08:36

## 2017-12-12 RX ADMIN — MIDAZOLAM 2 MG: 1 INJECTION INTRAMUSCULAR; INTRAVENOUS at 14:35

## 2017-12-12 RX ADMIN — FENTANYL CITRATE 50 MCG: 50 INJECTION, SOLUTION INTRAMUSCULAR; INTRAVENOUS at 20:21

## 2017-12-12 RX ADMIN — SODIUM CHLORIDE 50 ML: 9 INJECTION, SOLUTION INTRAVENOUS at 02:26

## 2017-12-12 RX ADMIN — SODIUM CHLORIDE 1000 ML: 9 INJECTION, SOLUTION INTRAVENOUS at 02:07

## 2017-12-12 RX ADMIN — ROCURONIUM BROMIDE 50 MG: 10 INJECTION INTRAVENOUS at 14:50

## 2017-12-12 RX ADMIN — HYDROMORPHONE HYDROCHLORIDE 0.2 MG: 10 INJECTION, SOLUTION INTRAMUSCULAR; INTRAVENOUS; SUBCUTANEOUS at 20:28

## 2017-12-12 RX ADMIN — Medication 10 MEQ: at 12:38

## 2017-12-12 ASSESSMENT — ENCOUNTER SYMPTOMS
VOMITING: 0
NAUSEA: 1
FEVER: 0
ABDOMINAL PAIN: 1
COUGH: 0
SHORTNESS OF BREATH: 0

## 2017-12-12 ASSESSMENT — ACTIVITIES OF DAILY LIVING (ADL)
DRESS: 0-->INDEPENDENT
ADLS_ACUITY_SCORE: 12
FALL_HISTORY_WITHIN_LAST_SIX_MONTHS: NO
TRANSFERRING: 0-->INDEPENDENT
RETIRED_EATING: 0-->INDEPENDENT
BATHING: 0-->INDEPENDENT
RETIRED_COMMUNICATION: 0-->UNDERSTANDS/COMMUNICATES WITHOUT DIFFICULTY
SWALLOWING: 0-->SWALLOWS FOODS/LIQUIDS WITHOUT DIFFICULTY
COGNITION: 0 - NO COGNITION ISSUES REPORTED
AMBULATION: 1-->ASSISTIVE EQUIPMENT
TOILETING: 0-->INDEPENDENT

## 2017-12-12 ASSESSMENT — PAIN DESCRIPTION - DESCRIPTORS
DESCRIPTORS: SORE;DISCOMFORT
DESCRIPTORS: ACHING;SORE;DISCOMFORT
DESCRIPTORS: ACHING;SORE;DISCOMFORT

## 2017-12-12 NOTE — PROGRESS NOTES
Transfer  Transferred from: [facility]  Via: bed  Reason for transfer: Pt appropriate for 6B.  Family: Aware of transfer  Belongings: Received with pt  Chart: Received with pt    2 RN Skin Assessment Completed By: Celena Rodriguez and Vinay Burrows  Report received from: JAJA Wilson  Pt status: A/Ox4. Afebrile. SR with HR 80's. /100's. O2 sats stable on RA. NGT to LIS. NPO.

## 2017-12-12 NOTE — OR NURSING
Dr. CONNIE Parkinson contacted about Pt's hypertension.  He will come to see Pt. As soon as possible.

## 2017-12-12 NOTE — ED PROVIDER NOTES
[facility] EMERGENCY DEPARTMENT ([facility])    12/12/17     ED 5   History     Chief Complaint   Patient presents with     Hernia     ventral hernia became much larger than usual today and painful, not usually painful. Started about 12 hours ago. Feels bloated, dizzy, nauseated.     The history is provided by the patient and medical records.     Sav Mendoza is a 62-year-old male who presents to the Emergency Department today with complaint abdominal pain and enlarged and tender ventral hernia.  He states she s had a large hernia for quite some time.  He typically is able to self-reduce the hernia, wear an abdominal binder.  Today he states that when he came home for lunch, he saw the hernia was out, was much more swollen than normal.  He attempted to self-reduce but was unable to do so.  He did eat lunch.  At about 1 PM, nearly 12 hours prior to me seeing him, he stated he started having some pain in the hernia site.  He states the pain is progressively worsened and he has had progressively increasing nausea.  No vomiting.  He did have one bowel movement around the time the pain started, though doesn t believe he s had any gas since then, no stools.  No fever.  No cough or shortness of breath.  No other specific complaints.  He has attempted to self-reduce since then and has been unable to.    This part of the document was transcribed by Mark Anthony Palomo Scribe.     Past Medical History:   Diagnosis Date     Hypertension      Ventral hernia 6/24/14       Past Surgical History:   Procedure Laterality Date     ORTHOPEDIC SURGERY Left 2010    ankle fusion       Family History   Problem Relation Age of Onset     Alzheimer Disease Mother        Social History   Substance Use Topics     Smoking status: Never Smoker     Smokeless tobacco: Never Used     Alcohol use Yes      Comment: rare         I have reviewed the Medications, Allergies, Past Medical and Surgical History, and Social History in the  Epic system.    Review of Systems   Constitutional: Negative for fever.   Respiratory: Negative for cough and shortness of breath.    Gastrointestinal: Positive for abdominal pain and nausea. Negative for vomiting.        No passage of gas or stools since onset of pain   All other systems reviewed and are negative.      Physical Exam   BP: (!) 175/110  Pulse: 82  Heart Rate: 88  Temp: 97.6  F (36.4  C)  Resp: 16  Weight: 128.8 kg (284 lb)  SpO2: 96 %      Physical Exam   Constitutional: No distress.   HENT:   Head: Atraumatic.   Mouth/Throat: Oropharynx is clear and moist. No oropharyngeal exudate.   Eyes: Pupils are equal, round, and reactive to light. No scleral icterus.   Cardiovascular: Normal heart sounds and intact distal pulses.    Pulmonary/Chest: Breath sounds normal. No respiratory distress.   Abdominal: Soft. There is tenderness.       Musculoskeletal: He exhibits no edema or tenderness.   Skin: Skin is warm. No rash noted. He is not diaphoretic.       ED Course     ED Course     Procedures             Critical Care time:  none             Labs Ordered and Resulted from Time of ED Arrival Up to the Time of Departure from the ED   CBC WITH PLATELETS DIFFERENTIAL - Abnormal; Notable for the following:        Result Value    WBC 11.4 (*)     Absolute Neutrophil 9.1 (*)     All other components within normal limits   COMPREHENSIVE METABOLIC PANEL - Abnormal; Notable for the following:     Potassium 3.2 (*)     Carbon Dioxide 33 (*)     Glucose 167 (*)     Creatinine 0.62 (*)     All other components within normal limits   LACTIC ACID WHOLE BLOOD   NASOGASTRIC TUBE DECOMPRESSION            Assessments & Plan (with Medical Decision Making)   I was not able to reduce the hernia.  It is quite large.  CMP was obtained which revealed a mildly low potassium 3.2.  CBC revealed a mildly elevated white count of 14.1.  CT of the abdomen revealed a mechanical small bowel obstruction due to small bowel loops tract within  the large ventral hernia with a relatively narrow neck.  There is fluid and fat stranding/inflammation within the hernia.  I did speak with Dr. Parsons, regarding the above findings.  He has an NG tube placed, which was done.  The patient will be admitted to the surgical service at the [facility] for further definitive management.  He did receive some antiemetics and analgesia here in the ER.      Dictation Disclaimer: Some of this Note has been completed with voice-recognition dictation software. Although errors are generally corrected real-time, there is the potential for a rare error to be present in the completed chart.    I have reviewed the nursing notes.    I have reviewed the findings, diagnosis, plan and need for follow up with the patient.    Current Discharge Medication List          Final diagnoses:   Incarcerated hernia       12/12/2017   [facility] [place], EMERGENCY DEPARTMENT     Manuela Jeffries MD  12/12/17 0539

## 2017-12-12 NOTE — ANESTHESIA PREPROCEDURE EVALUATION
Anesthesia Evaluation     .             ROS/MED HX    ENT/Pulmonary:       Neurologic:       Cardiovascular:     (+) hypertension----. : . . . :. .       METS/Exercise Tolerance:  >4 METS   Hematologic:         Musculoskeletal:         GI/Hepatic:         Renal/Genitourinary:         Endo:     (+) Obesity, .      Psychiatric:         Infectious Disease:         Malignancy:         Other:                                    Anesthesia Plan      History & Physical Review  History and physical reviewed and following examination; no interval change.    ASA Status:  3 .    NPO Status:  > 8 hours    Plan for General and ETT with Intravenous induction. Maintenance will be Balanced.           Postoperative Care      Consents  Anesthetic plan, risks, benefits and alternatives discussed with:  Patient..        Procedure:  Procedure(s):  DiagnosticLaparoscopy possible Laparotomy and venteral hernia repair - Wound Class: I-Clean   - Wound Class: I-Clean    Patient Active Problem List   Diagnosis     Cellulitis of Lt lower leg since 5-23-oubelcni since last saw ID  [id number]     Baker's cyst     Ventral hernia     Left inguinal hernia     Diverticulosis of large intestine     Edema of both legs     Stasis dermatitis of both legs     Benign essential hypertension     Need for hepatitis C screening test     Cellulitis and abscess of leg: Lt  Lat Malleolus  onset late 4-16      ACP (advance care planning)     Non morbid obesity due to excess calories with comorbid HTN     Cellulitis of ankle     Bilateral leg edema     Incarcerated hernia       Past Medical History:   Diagnosis Date     Hypertension      Ventral hernia 6/24/14       Past Surgical History:   Procedure Laterality Date     ORTHOPEDIC SURGERY Left 2010    ankle fusion         No current facility-administered medications on file prior to encounter.   Current Outpatient Prescriptions on File Prior to Encounter:  amLODIPine (NORVASC) 5 MG tablet Take 1 tablet (5 mg) by  mouth daily   lisinopril (PRINIVIL/ZESTRIL) 40 MG tablet TAKE ONE TABLET BY MOUTH ONCE DAILY IN THE EVENING   carvedilol (COREG) 25 MG tablet TAKE ONE TABLET BY MOUTH TWICE DAILY WITH MEALS NEED  TO  BE  SEEN  FOR  MORE  REFILLS   pyridOXINE (VITAMIN B6) 100 MG TABS Take 1 tablet (100 mg) by mouth daily   Ascorbic Acid (VITAMIN C PO) Take 500 mg by mouth daily + 500 mg po qHS PRN   multivitamin, therapeutic with minerals (THERA-VIT-M) TABS Take 1 tablet by mouth daily   hydrochlorothiazide (HYDRODIURIL) 25 MG tablet Take 1 tablet (25 mg) by mouth daily   mupirocin (BACTROBAN) 2 % ointment APPLY  OINTMENT TOPICALLY THREE TIMES DAILY FOR 5 DAYS   order for DME Equipment being ordered:  Right wrist carpal tunnel splint right hand carpal tunnel syndrome One*Use as right wrist at night or when gripping walker   order for DME Equipment being ordered:  Abdominal binder One *Ventral hernia *   aspirin 81 MG EC tablet Take 1 tablet (81 mg) by mouth daily   Blood Pressure Monitoring (BLOOD PRESSURE MONITOR/WRIST) LAURA 1 Device 2 times daily   ORDER FOR DME Abdominal Binder - Large   ORDER FOR DME Equipment being ordered: stocking black closed toe  20- 25mm of mercury  3 pairsWash by hand dailyPlease feet size to adjust probably large or extra large       No Known Allergies    Recent Labs   Lab Test  12/12/17 0107   HGB  15.7     Recent Labs   Lab Test  12/12/17 0107   POTASSIUM  3.2*     Recent Labs   Lab Test  12/12/17 0107   PLT  246     No results for input(s): INR in the last 23723 hours.    No results found for this or any previous visit (from the past 4320 hour(s)).      Attending Anesthesiologist    Kamran Parkinson MD    *[phone]                  .

## 2017-12-12 NOTE — ED NOTES
ED to Floor Handoff      S:  Sav Mendoza is a 62 year old male who speaks English and lives with a spouse,  in a home  They arrived in the ED by car from home with a complaint of Hernia (ventral hernia became much larger than usual today and painful, not usually painful. Started about 12 hours ago. Feels bloated, dizzy, nauseated.)    Initial vitals were:   BP: (!) 175/110  Pulse: 82  Heart Rate: 88  Temp: 97.6  F (36.4  C)  Resp: 16  Weight: 128.8 kg (284 lb)  SpO2: 96 %  Allergies: No Known Allergies.  The meds given in the ED and their home medications are:   No current facility-administered medications for this encounter.      Current Outpatient Prescriptions   Medication     amLODIPine (NORVASC) 5 MG tablet     lisinopril (PRINIVIL/ZESTRIL) 40 MG tablet     carvedilol (COREG) 25 MG tablet     hydrochlorothiazide (HYDRODIURIL) 25 MG tablet     mupirocin (BACTROBAN) 2 % ointment     order for DME     pyridOXINE (VITAMIN B6) 100 MG TABS     order for DME     Ascorbic Acid (VITAMIN C PO)     multivitamin, therapeutic with minerals (THERA-VIT-M) TABS     aspirin 81 MG EC tablet     Blood Pressure Monitoring (BLOOD PRESSURE MONITOR/WRIST) LAURA     ORDER FOR DME     ORDER FOR DME     Social demographics are   Social History     Social History     Marital status:      Spouse name: N/A     Number of children: N/A     Years of education: N/A     Social History Main Topics     Smoking status: Never Smoker     Smokeless tobacco: Never Used     Alcohol use Yes      Comment: rare     Drug use: No     Sexual activity: No     Other Topics Concern     Parent/Sibling W/ Cabg, Mi Or Angioplasty Before 65f 55m? No     Social History Narrative       B:   The patient has been ill for 1 day(s) and during this time the symptoms have increased.  In the ED was diagnosed with   Final diagnoses:   Incarcerated hernia    Infection/sepsis suspected:No Isolation type; No active isolations   A:   In the ED these meds were given:    Medications   ondansetron (ZOFRAN) injection 8 mg (8 mg Intravenous Given 12/12/17 0121)   HYDROmorphone (PF) (DILAUDID) injection 0.5 mg (0.5 mg Intravenous Given 12/12/17 0207)   0.9% sodium chloride BOLUS (0 mLs Intravenous Stopped 12/12/17 0314)   sodium chloride 0.9 % bag 500mL for CT scan flush use (50 mLs As instructed Given 12/12/17 0226)   iopamidol (ISOVUE-370) solution 100 mL (100 mLs Intravenous Given 12/12/17 0226)   HYDROmorphone (PF) (DILAUDID) injection 0.5 mg (0.5 mg Intravenous Given 12/12/17 0753)     Drips running?  No  Labs results   Labs Ordered and Resulted from Time of ED Arrival Up to the Time of Departure from the ED   CBC WITH PLATELETS DIFFERENTIAL - Abnormal; Notable for the following:        Result Value    WBC 11.4 (*)     Absolute Neutrophil 9.1 (*)     All other components within normal limits   COMPREHENSIVE METABOLIC PANEL - Abnormal; Notable for the following:     Potassium 3.2 (*)     Carbon Dioxide 33 (*)     Glucose 167 (*)     Creatinine 0.62 (*)     All other components within normal limits   LACTIC ACID WHOLE BLOOD   NASOGASTRIC TUBE DECOMPRESSION     Imaging Studies:   Recent Results (from the past 24 hour(s))   CT Abdomen Pelvis w Contrast    Narrative    CT ABDOMEN PELVIS W CONTRAST  12/12/2017 2:21 AM     HISTORY: Incarcerated/Strangulated hernia - abdominal pain.    TECHNIQUE: Volumetric acquisition through abdomen and pelvis with IV  contrast. 100 mL Isovue-370. Radiation dose for this scan was reduced  using automated exposure control, adjustment of the mA and/or kV  according to patient size, or iterative reconstruction technique.    COMPARISON: 2/23/2014    FINDINGS: The liver, gallbladder, spleen, pancreas, adrenal glands and  kidneys demonstrate no worrisome findings. There are two small left  renal lesions which are too small to characterize but are likely  cysts. Small nodules in each adrenal gland measuring up to 1.7 cm on  the right. Small hiatal hernia  containing fluid. Bochdalek  fat-containing hernia at the left lung base. Coronary artery  calcifications.    There is a large midline ventral hernia containing fat and small bowel  loops, as well as fat stranding and fluid. The bowel loops within the  hernia are mostly decompressed and the bowel loops above the hernia  are dilated with air-fluid levels consistent with mechanical small  bowel obstruction. The hernia measures approximately 10 x 19.2 cm with  a neck of approximately 6.5 cm in diameter.    Diverticulosis in the descending and sigmoid colon. Mild prostatic  enlargement. Small fat-containing left inguinal hernia. Atheromatous  changes of the abdominal aorta with slight ectasia of the infrarenal  aorta. No free air.      Impression    IMPRESSION:  1. Mechanical small bowel obstruction due to small bowel loops trapped  within a large ventral hernia with a relatively narrow neck. Recommend  surgical consult.  2. There is fluid and fat stranding/inflammation within the hernia.  3. Small bilateral adrenal nodules, most likely adenomas.  4. Colonic diverticulosis without diverticulitis. Other incidental  findings noted above.    ENRRIQUE WHITE MD   XR Abdomen 1 View    Narrative    XR ABDOMEN 1 VW   12/12/2017 4:02 AM     INDICATION: Small bowel obstruction. Status post NG tube placement.    COMPARISON: CT 12/12/2017 at 0230 hours.      Impression    IMPRESSION: Multiple dilated small bowel loops consistent with  mechanical small bowel obstruction as reported on the earlier CT. NG  tube tip in the stomach.    ENRRIQUE WHITE MD     Recent vital signs BP (!) 157/104  Pulse 82  Temp 97.6  F (36.4  C) (Oral)  Resp 18  Wt 128.8 kg (284 lb)  SpO2 94%  BMI 37.47 kg/m2  Cardiac Rhythm: ,      Abnormal labs/tests/findings requiring intervention:---  Pain control: fair  Nausea control: fair  R:   Transfer assistance needed: Stand with Assist  Family present during ED course? Yes   Family currently present?  No  Pt needs tele? No  Code Status:   Tasks needing to be completed:---    BRANDON WOODSON  [facility]-- [phone] [place] [facility]  [phone] [facility] ED

## 2017-12-12 NOTE — IP AVS SNAPSHOT
[address] [address]    Phone:  [phone]                                       After Visit Summary   12/12/2017    Sav Mendoza    MRN: [id number]           After Visit Summary Signature Page     I have received my discharge instructions, and my questions have been answered. I have discussed any challenges I see with this plan with the nurse or doctor.    ..........................................................................................................................................  Patient/Patient Representative Signature      ..........................................................................................................................................  Patient Representative Print Name and Relationship to Patient    ..................................................               ................................................  Date                                            Time    ..........................................................................................................................................  Reviewed by Signature/Title    ...................................................              ..............................................  Date                                                            Time

## 2017-12-12 NOTE — IP AVS SNAPSHOT
MRN:[id number]                      After Visit Summary   12/12/2017    Sav Mendoza    MRN: [id number]           Thank you!     Thank you for choosing [facility] for your care. Our goal is always to provide you with excellent care. Hearing back from our patients is one way we can continue to improve our services. Please take a few minutes to complete the written survey that you may receive in the mail after you visit with us. Thank you!        Patient Information     Date Of Birth          1955        Designated Caregiver       Most Recent Value    Caregiver    Will someone help with your care after discharge? no      About your hospital stay     You were admitted on:  December 12, 2017 You last received care in the:  [address] [facility]    You were discharged on:  December 16, 2017        Reason for your hospital stay       You had an incarcerated hernia that was repaired with mesh.                  Who to Call     For medical emergencies, please call 911.  For non-urgent questions about your medical care, please call your primary care provider or clinic, [phone]  For questions related to your surgery, please call your surgery clinic        Attending Provider     Provider Specialty    Manuela Jeffries MD Emergency Medicine    Parsons, Mina Aj MD General Surgery       Primary Care Provider Office Phone # Fax #    Rodney Maddy Viveros [phone]      After Care Instructions     Activity       See discharge instructions.            Diet       Regular diet            Discharge Instructions       If you have questions about your follow-up appointment, please call the General Surgery clinic at [phone].     WHEN TO CALL OR SEEK CARE:  Please call or seek medical attention if you experience increasing abdominal pain, nausea, vomiting, are unable to have bowel movements or stop passing gas, increasing drainage from or spreading redness around your wounds,  chills, or fever >101.5.   - You may contact Dr. Mina Parsons (your surgeon) any time on his cell phones (he has two separate numbers, and you may try both: [phone] or [phone]).  - During normal business hours, please contact the General Surgery clinic at [phone].  - If you are having troubles in the evenings, at night, or on weekends, call [phone] and ask to speak with surgery resident on call.    ACTIVITY:  Do not lift more than 10 pounds for 8 weeks after surgery. You must wear an abdominal binder while out of bed for the first 8 weeks after surgery. Otherwise, activity as tolerated. Use common sense; if it hurts, don't do it. Stay active and walk plenty.    WOUND CARE:  You may shower but do not scrub incisions; simply let soapy water run over them. You may cover your incision with a dressing to keep it from being irritated, but this is not necessary. Staples will be taken out at your follow up appointment in General Surgery clinic (should be taken out ~2 weeks after surgery).    The part of the incision near your belly button is a little blackened; this is expected and the top layer of skin will likely slough off. If it becomes more painful or reddened, or is draining pus, please call or seek care as above.    Do not soak in a bath tub or pool for 6 weeks after surgery.    PAIN:  No driving while on narcotic pain medications. You may wish to take a stool softener or laxative while taking narcotics (Miralax has been prescribed for you).    You may take Tylenol (acetaminophen) and/or Motrin (ibuprofen) in addition or instead of narcotic medication; it is helpful to take these medications as you wean down off of the narcotic medications. If you have been prescribed Percocet, be aware that it contains Tylenol and oxycodone together. Do not take a total of more than 3500 mg of Tylenol per 24 hours to avoid liver damage.            Supplies       Abdominal binder                  Follow-up  "Appointments     Adult [facility] Follow-up and recommended labs and tests       You should follow up with your surgeon, Dr. Parsons, in 1-2 weeks for a wound check and to see how you are doing. Our office will call to schedule this appointment for you. If you do not hear from us within 3 business days from leaving the hospital, please call us ([phone] General Surgery clinic) to schedule your appointment.     You should see your primary care provider, Dr. Viveros, within 1 week from discharge from the hospital to follow up after surgery. Your blood pressures were elevated in the hospital, so you should also discuss blood pressure control with Dr. Viveros at that appointment. You will need to call Dr. Viveros's office to make this appointment.      Appointments on [facility] and/or [facility] (with [facility] or [facility] provider or service). Call [phone] if you haven't heard regarding these appointments within 7 days of discharge.                  Pending Results     No orders found from 12/10/2017 to 12/13/2017.            Statement of Approval     Ordered          12/16/17 1258  I have reviewed and agree with all the recommendations and orders detailed in this document.  EFFECTIVE NOW     Approved and electronically signed by:  Bernadette Bean MD             Admission Information     Date & Time Department Dept. Phone    12/12/2017 [address] [facility] [employer] [phone]      Your Vitals Were     Blood Pressure Pulse Temperature Respirations Height Weight    124/67 (BP Location: Left arm) 82 97.1  F (36.2  C) (Oral) 18 1.854 m (6' 1\") 124.1 kg (273 lb 9.5 oz)    Pulse Oximetry BMI (Body Mass Index)                96% 36.1 kg/m2          MyChart Information     [employer] lets you send messages to your doctor, view your test results, renew your prescriptions, schedule appointments and more. To sign up, go to [email or web address] . Click on \"Log in\" on the left side of the screen, which will take you to the " "Welcome page. Then click on \"Sign up Now\" on the right side of the page.     You will be asked to enter the access code listed below, as well as some personal information. Please follow the directions to create your username and password.     Your access code is: [email or web address]  Expires: 3/16/2018  1:46 PM     Your access code will  in 90 days. If you need help or a new code, please call your [facility] clinic or [phone].        Care EveryWhere ID     This is your Care EveryWhere ID. This could be used by other organizations to access your [facility] medical records  [id number]        Equal Access to Services     JENA GAINES : Pradip Hidalgo, abeba ruiz, raphael rasmussen, carlos sanches . So [facility] [phone].    ATENCIÓN: Si habla español, tiene a richards disposición servicios gratuitos de asistencia lingüística. Llame al [phone].    We comply with applicable federal civil rights laws and [place] laws. We do not discriminate on the basis of race, color, national origin, age, disability, sex, sexual orientation, or gender identity.               Review of your medicines      START taking        Dose / Directions    oxyCODONE IR 5 MG tablet   Commonly known as:  ROXICODONE   Used for:  Incarcerated hernia        Dose:  5 mg   Take 1 tablet (5 mg) by mouth every 4 hours as needed for pain maximum 12 tablet(s) per day   Quantity:  15 tablet   Refills:  0       polyethylene glycol powder   Commonly known as:  MIRALAX   Used for:  Incarcerated hernia        Dose:  1 capful   Take 17 g (1 capful) by mouth daily Take while taking narcotics to prevent constipation.   Quantity:  510 g   Refills:  1         CONTINUE these medicines which may have CHANGED, or have new prescriptions. If we are uncertain of the size of tablets/capsules you have at home, strength may be listed as something that might have changed.        Dose / Directions    acetaminophen 500 MG " tablet   Commonly known as:  TYLENOL   This may have changed:    - medication strength  - when to take this  - reasons to take this  - additional instructions   Used for:  Incarcerated hernia        Dose:  500 mg   Take 1 tablet (500 mg) by mouth every 8 hours Take around the clock while taking narcotic pain medication, then can take as needed.   Quantity:  100 tablet   Refills:  3         CONTINUE these medicines which have NOT CHANGED        Dose / Directions    aspirin 81 MG EC tablet   Used for:  Hypertension goal BP (blood pressure) < 140/80, Hyperlipidemia LDL goal < 130        Dose:  81 mg   Take 1 tablet (81 mg) by mouth daily   Quantity:  90 tablet   Refills:  3       Blood Pressure Monitor/Wrist Coco   Used for:  Hypertension goal BP (blood pressure) < 140/80        Dose:  1 Device   1 Device 2 times daily   Quantity:  1 Device   Refills:  0       carvedilol 25 MG tablet   Commonly known as:  COREG   Used for:  Benign essential hypertension        TAKE ONE TABLET BY MOUTH TWICE DAILY WITH MEALS NEED  TO  BE  SEEN  FOR  MORE  REFILLS   Quantity:  180 tablet   Refills:  3       hydrochlorothiazide 25 MG tablet   Commonly known as:  HYDRODIURIL   Used for:  Hypertension goal BP (blood pressure) < 140/80        Dose:  25 mg   Take 1 tablet (25 mg) by mouth daily   Quantity:  90 tablet   Refills:  3       lisinopril 40 MG tablet   Commonly known as:  PRINIVIL/ZESTRIL   Used for:  Essential hypertension, benign        TAKE ONE TABLET BY MOUTH ONCE DAILY IN THE EVENING   Quantity:  90 tablet   Refills:  3       multivitamin, therapeutic with minerals Tabs tablet        Dose:  1 tablet   Take 1 tablet by mouth daily   Refills:  0       order for DME   Used for:  Cellulitis        Equipment being ordered: stocking black closed toe  20- 25mm of mercury   3 pairs Wash by hand daily Please feet size to adjust probably large or extra large   Quantity:  3 Device   Refills:  prn       * order for DME   Used for:  Ventral  hernia without mention of obstruction or gangrene        Abdominal Binder - Large   Quantity:  2 each   Refills:  1       * order for DME   Used for:  Carpal tunnel syndrome of right wrist        Equipment being ordered:  Right wrist carpal tunnel splint  right hand carpal tunnel syndrome  One* Use as right wrist at night or when gripping walker   Quantity:  1 Device   Refills:  1       * order for DME   Used for:  Ventral hernia without obstruction or gangrene        Equipment being ordered:  Abdominal binder  One * Ventral hernia *   Quantity:  1 Device   Refills:  0       pyridOXINE 100 MG Tabs   Commonly known as:  VITAMIN B6   Used for:  Carpal tunnel syndrome of right wrist        Dose:  100 mg   Take 1 tablet (100 mg) by mouth daily   Quantity:  90 tablet   Refills:  11       VITAMIN C PO        Dose:  500 mg   Take 500 mg by mouth daily + 500 mg po qHS PRN   Refills:  0       Zinc 50 MG Caps        Dose:  1 capsule   Take 1 capsule by mouth daily   Refills:  0       * Notice:  This list has 3 medication(s) that are the same as other medications prescribed for you. Read the directions carefully, and ask your doctor or other care provider to review them with you.         Where to get your medicines      These medications were sent to [facility] [address]  [address] [address]     Phone:  [phone]     acetaminophen 500 MG tablet    polyethylene glycol powder         Some of these will need a paper prescription and others can be bought over the counter. Ask your nurse if you have questions.     Bring a paper prescription for each of these medications     oxyCODONE IR 5 MG tablet                Protect others around you: Learn how to safely use, store and throw away your medicines at www.disposemymeds.org.             Medication List: This is a list of all your medications and when to take them. Check marks below indicate your daily home  schedule. Keep this list as a reference.      Medications           Morning Afternoon Evening Bedtime As Needed    acetaminophen 500 MG tablet   Commonly known as:  TYLENOL   Take 1 tablet (500 mg) by mouth every 8 hours Take around the clock while taking narcotic pain medication, then can take as needed.                                aspirin 81 MG EC tablet   Take 1 tablet (81 mg) by mouth daily   Last time this was given:  81 mg on 12/16/2017  8:11 AM                                Blood Pressure Monitor/Wrist Coco   1 Device 2 times daily                                carvedilol 25 MG tablet   Commonly known as:  COREG   TAKE ONE TABLET BY MOUTH TWICE DAILY WITH MEALS NEED  TO  BE  SEEN  FOR  MORE  REFILLS   Last time this was given:  25 mg on 12/16/2017  8:21 AM                                hydrochlorothiazide 25 MG tablet   Commonly known as:  HYDRODIURIL   Take 1 tablet (25 mg) by mouth daily   Last time this was given:  25 mg on 12/16/2017  8:17 AM                                lisinopril 40 MG tablet   Commonly known as:  PRINIVIL/ZESTRIL   TAKE ONE TABLET BY MOUTH ONCE DAILY IN THE EVENING   Last time this was given:  40 mg on 12/15/2017  8:54 PM                                multivitamin, therapeutic with minerals Tabs tablet   Take 1 tablet by mouth daily                                order for DME   Equipment being ordered: stocking black closed toe  20- 25mm of mercury   3 pairs Wash by hand daily Please feet size to adjust probably large or extra large                                * order for DME   Abdominal Binder - Large                                * order for DME   Equipment being ordered:  Right wrist carpal tunnel splint  right hand carpal tunnel syndrome  One* Use as right wrist at night or when gripping walker                                * order for DME   Equipment being ordered:  Abdominal binder  One * Ventral hernia *                                oxyCODONE IR 5 MG tablet    Commonly known as:  ROXICODONE   Take 1 tablet (5 mg) by mouth every 4 hours as needed for pain maximum 12 tablet(s) per day                                polyethylene glycol powder   Commonly known as:  MIRALAX   Take 17 g (1 capful) by mouth daily Take while taking narcotics to prevent constipation.                                pyridOXINE 100 MG Tabs   Commonly known as:  VITAMIN B6   Take 1 tablet (100 mg) by mouth daily                                VITAMIN C PO   Take 500 mg by mouth daily + 500 mg po qHS PRN                                Zinc 50 MG Caps   Take 1 capsule by mouth daily                                * Notice:  This list has 3 medication(s) that are the same as other medications prescribed for you. Read the directions carefully, and ask your doctor or other care provider to review them with you.

## 2017-12-12 NOTE — OR NURSING
Dr. CONNIE Parkinson contacted. He is okay with BP values at this time.  He will be here to see Pt.

## 2017-12-12 NOTE — PLAN OF CARE
Problem: Patient Care Overview  Goal: Plan of Care/Patient Progress Review  Outcome: No Change  Pt BP elevated, no meds currently ordered for pt.  Denies any pain or nausea.  NG to LIS, minimal drainage.  Voiding per urinal.  NS @ 150cc/hr.  K+ replacement started, continued in pre op.  Left floor around 1030.  All belongings left at the bedside.  Wife aware of plan.

## 2017-12-12 NOTE — LETTER
Return to  Work Release    Date: 12/16/2017      Name: Sav Mendoza                       YOB: 1955    Medical Record Number: [id number]    The patient was seen at the [facility] from 12/12/2017 to 12/16/2017.    He may return to work on 1/2/2018 provided the following restrictions may be met:  - Do not lift more than 10 pounds for 8 weeks after surgery.   - You must wear an abdominal binder while out of bed for the first 8 weeks after surgery.     Please contact us with questions or concerns. Note that we cannot give out personalized health information without the patient's consent.    Sincerely,         Bernadette Bean MD  General Surgery

## 2017-12-13 LAB
ANION GAP SERPL CALCULATED.3IONS-SCNC: 7 MMOL/L (ref 3–14)
BASOPHILS # BLD AUTO: 0 10E9/L (ref 0–0.2)
BASOPHILS NFR BLD AUTO: 0.2 %
BUN SERPL-MCNC: 12 MG/DL (ref 7–30)
CALCIUM SERPL-MCNC: 8.4 MG/DL (ref 8.5–10.1)
CHLORIDE SERPL-SCNC: 103 MMOL/L (ref 94–109)
CO2 SERPL-SCNC: 30 MMOL/L (ref 20–32)
CREAT SERPL-MCNC: 0.68 MG/DL (ref 0.66–1.25)
DIFFERENTIAL METHOD BLD: NORMAL
EOSINOPHIL # BLD AUTO: 0.2 10E9/L (ref 0–0.7)
EOSINOPHIL NFR BLD AUTO: 1.6 %
ERYTHROCYTE [DISTWIDTH] IN BLOOD BY AUTOMATED COUNT: 13.5 % (ref 10–15)
GFR SERPL CREATININE-BSD FRML MDRD: >90 ML/MIN/1.7M2
GLUCOSE SERPL-MCNC: 102 MG/DL (ref 70–99)
HCT VFR BLD AUTO: 44 % (ref 40–53)
HGB BLD-MCNC: 14.3 G/DL (ref 13.3–17.7)
IMM GRANULOCYTES # BLD: 0 10E9/L (ref 0–0.4)
IMM GRANULOCYTES NFR BLD: 0.1 %
LYMPHOCYTES # BLD AUTO: 2.2 10E9/L (ref 0.8–5.3)
LYMPHOCYTES NFR BLD AUTO: 21.7 %
MAGNESIUM SERPL-MCNC: 2.1 MG/DL (ref 1.6–2.3)
MCH RBC QN AUTO: 32.1 PG (ref 26.5–33)
MCHC RBC AUTO-ENTMCNC: 32.5 G/DL (ref 31.5–36.5)
MCV RBC AUTO: 99 FL (ref 78–100)
MONOCYTES # BLD AUTO: 0.9 10E9/L (ref 0–1.3)
MONOCYTES NFR BLD AUTO: 8.7 %
NEUTROPHILS # BLD AUTO: 6.8 10E9/L (ref 1.6–8.3)
NEUTROPHILS NFR BLD AUTO: 67.7 %
NRBC # BLD AUTO: 0 10*3/UL
NRBC BLD AUTO-RTO: 0 /100
PHOSPHATE SERPL-MCNC: 2.6 MG/DL (ref 2.5–4.5)
PLATELET # BLD AUTO: 208 10E9/L (ref 150–450)
POTASSIUM SERPL-SCNC: 3.4 MMOL/L (ref 3.4–5.3)
RBC # BLD AUTO: 4.46 10E12/L (ref 4.4–5.9)
SODIUM SERPL-SCNC: 140 MMOL/L (ref 133–144)
WBC # BLD AUTO: 10.1 10E9/L (ref 4–11)

## 2017-12-13 PROCEDURE — 84100 ASSAY OF PHOSPHORUS: CPT | Performed by: SURGERY

## 2017-12-13 PROCEDURE — 85025 COMPLETE CBC W/AUTO DIFF WBC: CPT | Performed by: SURGERY

## 2017-12-13 PROCEDURE — 12000006 ZZH R&B IMCU INTERMEDIATE UMMC

## 2017-12-13 PROCEDURE — 83735 ASSAY OF MAGNESIUM: CPT | Performed by: SURGERY

## 2017-12-13 PROCEDURE — 25000128 H RX IP 250 OP 636: Performed by: STUDENT IN AN ORGANIZED HEALTH CARE EDUCATION/TRAINING PROGRAM

## 2017-12-13 PROCEDURE — 25000125 ZZHC RX 250: Performed by: STUDENT IN AN ORGANIZED HEALTH CARE EDUCATION/TRAINING PROGRAM

## 2017-12-13 PROCEDURE — 25000128 H RX IP 250 OP 636: Performed by: SURGERY

## 2017-12-13 PROCEDURE — 36415 COLL VENOUS BLD VENIPUNCTURE: CPT | Performed by: SURGERY

## 2017-12-13 PROCEDURE — 25000132 ZZH RX MED GY IP 250 OP 250 PS 637: Performed by: STUDENT IN AN ORGANIZED HEALTH CARE EDUCATION/TRAINING PROGRAM

## 2017-12-13 PROCEDURE — 25000132 ZZH RX MED GY IP 250 OP 250 PS 637: Performed by: SURGERY

## 2017-12-13 PROCEDURE — 80048 BASIC METABOLIC PNL TOTAL CA: CPT | Performed by: SURGERY

## 2017-12-13 RX ORDER — POTASSIUM CHLORIDE 7.45 MG/ML
10 INJECTION INTRAVENOUS
Status: DISCONTINUED | OUTPATIENT
Start: 2017-12-13 | End: 2017-12-16 | Stop reason: HOSPADM

## 2017-12-13 RX ORDER — MAGNESIUM SULFATE HEPTAHYDRATE 40 MG/ML
4 INJECTION, SOLUTION INTRAVENOUS EVERY 4 HOURS PRN
Status: DISCONTINUED | OUTPATIENT
Start: 2017-12-13 | End: 2017-12-16 | Stop reason: HOSPADM

## 2017-12-13 RX ORDER — HYDROCHLOROTHIAZIDE 25 MG/1
25 TABLET ORAL DAILY
Status: DISCONTINUED | OUTPATIENT
Start: 2017-12-13 | End: 2017-12-16 | Stop reason: HOSPADM

## 2017-12-13 RX ORDER — HYDRALAZINE HYDROCHLORIDE 20 MG/ML
5-10 INJECTION INTRAMUSCULAR; INTRAVENOUS EVERY 6 HOURS PRN
Status: DISCONTINUED | OUTPATIENT
Start: 2017-12-13 | End: 2017-12-16 | Stop reason: HOSPADM

## 2017-12-13 RX ORDER — POTASSIUM CHLORIDE 29.8 MG/ML
20 INJECTION INTRAVENOUS
Status: DISCONTINUED | OUTPATIENT
Start: 2017-12-13 | End: 2017-12-13 | Stop reason: RX

## 2017-12-13 RX ORDER — METOPROLOL TARTRATE 1 MG/ML
10 INJECTION, SOLUTION INTRAVENOUS EVERY 6 HOURS
Status: DISCONTINUED | OUTPATIENT
Start: 2017-12-13 | End: 2017-12-14

## 2017-12-13 RX ORDER — NALOXONE HYDROCHLORIDE 0.4 MG/ML
.1-.4 INJECTION, SOLUTION INTRAMUSCULAR; INTRAVENOUS; SUBCUTANEOUS
Status: ACTIVE | OUTPATIENT
Start: 2017-12-13 | End: 2017-12-14

## 2017-12-13 RX ORDER — POTASSIUM CHLORIDE 1.5 G/1.58G
20-40 POWDER, FOR SOLUTION ORAL
Status: DISCONTINUED | OUTPATIENT
Start: 2017-12-13 | End: 2017-12-16 | Stop reason: HOSPADM

## 2017-12-13 RX ORDER — ASPIRIN 81 MG/1
81 TABLET ORAL DAILY
Status: DISCONTINUED | OUTPATIENT
Start: 2017-12-13 | End: 2017-12-16 | Stop reason: HOSPADM

## 2017-12-13 RX ORDER — POTASSIUM CHLORIDE 750 MG/1
20-40 TABLET, EXTENDED RELEASE ORAL
Status: DISCONTINUED | OUTPATIENT
Start: 2017-12-13 | End: 2017-12-16 | Stop reason: HOSPADM

## 2017-12-13 RX ORDER — POTASSIUM CL/LIDO/0.9 % NACL 10MEQ/0.1L
10 INTRAVENOUS SOLUTION, PIGGYBACK (ML) INTRAVENOUS
Status: DISCONTINUED | OUTPATIENT
Start: 2017-12-13 | End: 2017-12-16 | Stop reason: HOSPADM

## 2017-12-13 RX ADMIN — Medication 10 MEQ: at 16:00

## 2017-12-13 RX ADMIN — HYDRALAZINE HYDROCHLORIDE 10 MG: 20 INJECTION INTRAMUSCULAR; INTRAVENOUS at 10:44

## 2017-12-13 RX ADMIN — HYDROCHLOROTHIAZIDE 25 MG: 25 TABLET ORAL at 15:56

## 2017-12-13 RX ADMIN — HYDRALAZINE HYDROCHLORIDE 10 MG: 20 INJECTION INTRAMUSCULAR; INTRAVENOUS at 16:54

## 2017-12-13 RX ADMIN — METOPROLOL TARTRATE 10 MG: 5 INJECTION INTRAVENOUS at 19:27

## 2017-12-13 RX ADMIN — HYDRALAZINE HYDROCHLORIDE 10 MG: 20 INJECTION INTRAMUSCULAR; INTRAVENOUS at 23:29

## 2017-12-13 RX ADMIN — ENOXAPARIN SODIUM 40 MG: 40 INJECTION SUBCUTANEOUS at 13:59

## 2017-12-13 RX ADMIN — METOPROLOL TARTRATE 5 MG: 5 INJECTION INTRAVENOUS at 07:47

## 2017-12-13 RX ADMIN — Medication 10 MEQ: at 17:32

## 2017-12-13 RX ADMIN — AMLODIPINE BESYLATE 5 MG: 5 TABLET ORAL at 07:47

## 2017-12-13 RX ADMIN — METOPROLOL TARTRATE 10 MG: 5 INJECTION INTRAVENOUS at 14:00

## 2017-12-13 RX ADMIN — ASPIRIN 81 MG: 81 TABLET, COATED ORAL at 15:57

## 2017-12-13 ASSESSMENT — PAIN DESCRIPTION - DESCRIPTORS
DESCRIPTORS: DISCOMFORT
DESCRIPTORS: ACHING;DISCOMFORT
DESCRIPTORS: DISCOMFORT

## 2017-12-13 ASSESSMENT — ACTIVITIES OF DAILY LIVING (ADL)
ADLS_ACUITY_SCORE: 12

## 2017-12-13 NOTE — PROVIDER NOTIFICATION
Dr. Bolaños notified at 0945 of pt elevated BP's.  /180's with DBP 's.  AM meds were given without any improvement.  MD stated he would reorder pt other home medications for better control.   Orders were then placed for PRN Hydralazine and increased scheduled metoprolol dose from 5 to 10mg q 6 hours.  Will continue to monitor.

## 2017-12-13 NOTE — PROGRESS NOTES
"GVS Progress Note    Subjective:  POD # 1 s/p ex-lap, ventral hernia repair. No acute issues overnight. Pain controlled.    Objective:   BP (!) 165/95  Pulse 82  Temp 98.2  F (36.8  C) (Oral)  Resp 18  Ht 1.854 m (6' 1\")  Wt 124.1 kg (273 lb 9.5 oz)  SpO2 96%  BMI 36.1 kg/m2    I/O:  I/O last 3 completed shifts:  In: 3737.5 [I.V.:3677.5; NG/GT:60]  Out: 3535 [Urine:3135; Emesis/NG output:400]    PE:  Gen: Awake, alert, NAD   CV: RRR  Resp: non-labored at rest, CTAB  Abd: Soft, non-distended, NTTP  Ext: warm and well perfused  Incision: c/d/i    Labs: Reviewed    Imaging: Reviewed    A/P: Sav Mendoza is a 62 year old male, who is POD # 1 following an Ex-lap and open ventral hernia repair     Doing well this AM  Keep NG on LIS  Replete electrolytes per protocol  BMP, Mg, Phos and CBC in the AM  Continue pain control with dilaudid pca  Restarted home meds    Disposition Plan   Expected discharge in 2-3 days to prior living arrangement once bowel function returns.     Entered: Coy Bolaños 12/13/2017, 5:25 PM     Seen and discussed with GVS Team and staff    Coy Bolaños MD  General Surgery PGY-1  Pager [phone]        "

## 2017-12-13 NOTE — ANESTHESIA POSTPROCEDURE EVALUATION
Patient: Sav Mendoza    Procedure(s):  Exploratory Laparotomy and  Gint Ventral Hernia Repair with Mesh - Wound Class: I-Clean    Diagnosis:incarcerated ventral hernia  Diagnosis Additional Information: No value filed.    Anesthesia Type:  General, ETT    Note:  Anesthesia Post Evaluation    Patient location during evaluation: PACU  Patient participation: Able to fully participate in evaluation  Level of consciousness: awake  Pain management: adequate  Airway patency: patent  Cardiovascular status: acceptable  Respiratory status: acceptable  Hydration status: acceptable  PONV: none             Last vitals:  Vitals:    12/12/17 2020 12/12/17 2030 12/12/17 2040   BP: 157/81 148/81 145/79   Pulse:      Resp: 12 14 14   Temp:      SpO2: 97% 94% 97%         Electronically Signed By: Olman Fowler MD  December 12, 2017  8:51 PM

## 2017-12-13 NOTE — CONSULTS
General Surgery Consult    Sav Mendoza MRN# [id number]   YOB: 1955 Age: 62 year old      Date of Admission:  12/12/2017        Consult for:    Consulting physician/team: ED        Assessment:      62 year old male who presents with abdominal pain 2/2 to incarcerated ventral hernia ( dilated loops of bowels in hernia sac with signs of mechanical bowel obstruction)        Plan:        NPO, NG to Health system    OR for emergent ex-lap, ventral hernia repair +/mesh    Preop orders and consent completed         History of Present Illness:     Sav Mendoza is a 62 year old male who presents to the ED today with complaints of abdominal pain.    Of note patient has a ventral hernia for unknown duration. He reports that he typically is able to reduce his hernia but today he noticed increased size of his hernia and he has been unable to reduce it.  He also noticed abdominal pain which ha sbeen localized aroun his hernia.  has progressively worsened and associated with nausea.  Denies any vomiting, chest pian, SOB, fevers or chills.  He did have one bowel movement around the time the pain started, though doesn t believe he s had any gas since then.    He reports having no prior intraabdominal surgeries.     Past Medical History:  Past Medical History:   Diagnosis Date     Hypertension      Ventral hernia 6/24/14       Past Surgical History:  Past Surgical History:   Procedure Laterality Date     ORTHOPEDIC SURGERY Left 2010    ankle fusion       Allergies:   No Known Allergies    Medications:    No current facility-administered medications on file prior to encounter.   Current Outpatient Prescriptions on File Prior to Encounter:  amLODIPine (NORVASC) 5 MG tablet Take 1 tablet (5 mg) by mouth daily   lisinopril (PRINIVIL/ZESTRIL) 40 MG tablet TAKE ONE TABLET BY MOUTH ONCE DAILY IN THE EVENING   carvedilol (COREG) 25 MG tablet TAKE ONE TABLET BY MOUTH TWICE DAILY WITH MEALS NEED  TO  BE  SEEN  FOR  MORE   REFILLS   pyridOXINE (VITAMIN B6) 100 MG TABS Take 1 tablet (100 mg) by mouth daily   Ascorbic Acid (VITAMIN C PO) Take 500 mg by mouth daily + 500 mg po qHS PRN   multivitamin, therapeutic with minerals (THERA-VIT-M) TABS Take 1 tablet by mouth daily   hydrochlorothiazide (HYDRODIURIL) 25 MG tablet Take 1 tablet (25 mg) by mouth daily   mupirocin (BACTROBAN) 2 % ointment APPLY  OINTMENT TOPICALLY THREE TIMES DAILY FOR 5 DAYS   order for DME Equipment being ordered:  Right wrist carpal tunnel splint right hand carpal tunnel syndrome One*Use as right wrist at night or when gripping walker   order for DME Equipment being ordered:  Abdominal binder One *Ventral hernia *   aspirin 81 MG EC tablet Take 1 tablet (81 mg) by mouth daily   Blood Pressure Monitoring (BLOOD PRESSURE MONITOR/WRIST) LAURA 1 Device 2 times daily   ORDER FOR DME Abdominal Binder - Large   ORDER FOR DME Equipment being ordered: stocking black closed toe  20- 25mm of mercury  3 pairsWash by hand dailyPlease feet size to adjust probably large or extra large       Social History:  Social History     Social History     Marital status:      Spouse name: N/A     Number of children: N/A     Years of education: N/A     Occupational History     Not on file.     Social History Main Topics     Smoking status: Never Smoker     Smokeless tobacco: Never Used     Alcohol use Yes      Comment: rare     Drug use: No     Sexual activity: No     Other Topics Concern     Parent/Sibling W/ Cabg, Mi Or Angioplasty Before 65f 55m? No     Social History Narrative       Family History:  Family History   Problem Relation Age of Onset     Alzheimer Disease Mother        ROS:  C: NEGATIVE for fever, chills, change in weight,  vomiting   E/M: NEGATIVE for changes in vision, hearing, voice, or swallowing. No visual irritation, epistaxis, rhinorrhea, or other ear, mouth and throat problems.  R: NEGATIVE for significant cough, SOB, difficulty breathing.  CV: NEGATIVE for  "chest pain, palpitations or peripheral edema   GI: NEGATIVE for melena, hematochezia, heartburn  : NEGATIVE for frequency, dysuria, or hematuria   M: NEGATIVE for significant arthralgias or myalgia.  N: NEGATIVE for weakness, dizziness or paresthesias   H/I: NEGATIVE for bleeding problems, worrisome rashes, moles or lesions.  P: NEGATIVE for changes in mood or affect  The remainder of the complete ROS was negative unless noted in the HPI.    Exam:  BP (!) 167/101  Pulse 82  Temp 98.5  F (36.9  C) (Oral)  Resp 16  Ht 1.854 m (6' 1\")  Wt 125 kg (275 lb 8 oz)  SpO2 96%  BMI 36.35 kg/m2  General: Alert and oriented with appropriate responses to questions, in distress  HEENT: NC/AT, sclera anicteric, PERRL, EOMI, OP clear with MMM  Neck: Supple, no JVD or cervical LAD, full aROM.  Resp: clear to auscultation bilaterally, no crackles or wheezes  Cardiac: regular rate and rhythm, no murmur.  Abdomen: Soft, moderately tender to palpation diffusely but especially around hernia site. No rebound tenderness or guarding appreciated. Hernia was 13 x 14 cm approximately , tender to touch with signs of chronic skin changes  Extremities: No LE edema, 5/5 strength, 2+ radial and dp pulses.   Skin: Warm and dry, no jaundice or rash  Neuro: Cn II-XII intact, moves all extremities equally    Labs:  Most Recent CBC:   Recent Labs   Lab Test  12/12/17   0107   WBC  11.4*   RBC  4.93   HGB  15.7   HCT  45.0   MCV  91   MCH  31.8   MCHC  34.9   RDW  12.3   PLT  246     Most Recent BMP:   Recent Labs   Lab Test  12/12/17   0107   NA  134   POTASSIUM  3.2*   CHLORIDE  94   CO2  33*   BUN  17   CR  0.62*   GLC  167*         Imaging:  Recent Results (from the past 24 hour(s))   CT Abdomen Pelvis w Contrast    Narrative    CT ABDOMEN PELVIS W CONTRAST  12/12/2017 2:21 AM     HISTORY: Incarcerated/Strangulated hernia - abdominal pain.    TECHNIQUE: Volumetric acquisition through abdomen and pelvis with IV  contrast. 100 mL Isovue-370. " Radiation dose for this scan was reduced  using automated exposure control, adjustment of the mA and/or kV  according to patient size, or iterative reconstruction technique.    COMPARISON: 2/23/2014    FINDINGS: The liver, gallbladder, spleen, pancreas, adrenal glands and  kidneys demonstrate no worrisome findings. There are two small left  renal lesions which are too small to characterize but are likely  cysts. Small nodules in each adrenal gland measuring up to 1.7 cm on  the right. Small hiatal hernia containing fluid. [place]k  fat-containing hernia at the left lung base. Coronary artery  calcifications.    There is a large midline ventral hernia containing fat and small bowel  loops, as well as fat stranding and fluid. The bowel loops within the  hernia are mostly decompressed and the bowel loops above the hernia  are dilated with air-fluid levels consistent with mechanical small  bowel obstruction. The hernia measures approximately 10 x 19.2 cm with  a neck of approximately 6.5 cm in diameter.    Diverticulosis in the descending and sigmoid colon. Mild prostatic  enlargement. Small fat-containing left inguinal hernia. Atheromatous  changes of the abdominal aorta with slight ectasia of the infrarenal  aorta. No free air.      Impression    IMPRESSION:  1. Mechanical small bowel obstruction due to small bowel loops trapped  within a large ventral hernia with a relatively narrow neck. Recommend  surgical consult.  2. There is fluid and fat stranding/inflammation within the hernia.  3. Small bilateral adrenal nodules, most likely adenomas.  4. Colonic diverticulosis without diverticulitis. Other incidental  findings noted above.    ENRRIQUE WHITE MD   XR Abdomen 1 View    Narrative    XR ABDOMEN 1 VW   12/12/2017 4:02 AM     INDICATION: Small bowel obstruction. Status post NG tube placement.    COMPARISON: CT 12/12/2017 at 0230 hours.      Impression    IMPRESSION: Multiple dilated small bowel loops consistent  with  mechanical small bowel obstruction as reported on the earlier CT. NG  tube tip in the stomach.    ENRRIQUE WHITE MD       Assessment/ Plan: See above.     Kandi Velasco MD   General Surgery Resident PGY-2  Pager: [phone]    Pt reviewed with Dr. Parsons

## 2017-12-13 NOTE — PLAN OF CARE
"Problem: Patient Care Overview  Goal: Plan of Care/Patient Progress Review  Outcome: No Change  BP (!) 162/92 (BP Location: Left arm)  Pulse 82  Temp 98.2  F (36.8  C) (Oral)  Resp 16  Ht 1.854 m (6' 1\")  Wt 124.1 kg (273 lb 9.5 oz)  SpO2 97%  BMI 36.1 kg/m2    Neuro: A/Ox4.   Cardiac: SR with HR 70-80. 's/90's. Afebrile.   Respiratory: O2 sats stable on 2L NC. Capno in use with no alarms.  GI/: Love intact with adequate UOP.   Diet/appetite: NPO.   Activity:  Ax 1.   Pain: PCA dilaudid 0.2mg Q10 min.   Skin: Abd incision with drainage - drsg reinforced.     MIV at 150mL/hr.     R: Continue with POC. Notify primary team with changes.      "

## 2017-12-13 NOTE — BRIEF OP NOTE
[facility] [place]    Brief Operative Note    Pre-operative diagnosis: incarcerated ventral hernia  Post-operative diagnosis Incarcerated ventral hernia  Procedure: Procedure(s):  Exploratory Laparotomy and Ventral Hernia Repair with Mesh - Wound Class: I-Clean  Surgeon: Surgeon(s) and Role:     * Mina Parsons MD - Primary     * Jose Juan Higginbotham MD - Resident - Assisting     * Kandi Velasco MD - Resident - Assisting  Anesthesia: General   Estimated blood loss: Less than 10 ml  Drains: None  Specimens:   ID Type Source Tests Collected by Time Destination   A : Ventral hernia sac and abdominal skin Tissue Abdomen SURGICAL PATHOLOGY EXAM Mina Parsons MD 12/12/2017  5:01 PM      Findings:   Large supraumbilical ventral hernia, hernia reduced after induction of anesthesia. Pre-peritoneal mesh placement (Phasix).  Complications: None.  Implants: None.

## 2017-12-13 NOTE — PHARMACY-ADMISSION MEDICATION HISTORY
Admission medication history interview status for the 12/12/2017 admission is complete. See Epic admission navigator for allergy information, pharmacy, prior to admission medications and immunization status.     Medication history interview sources:  patient, wife    Changes made to PTA medication list (reason)  Added: zinc 50 mg, Tylenol 500 mg  Deleted: amlodipine 5 mg (no longer taking), mupirocin 2% ointment (therapy completed)  Changed: none    Additional medication history information (including reliability of information, actions taken by pharmacist):  - patient is a decent historian  - he does not get flu shots  - currently not taking aspirin 81 mg due to recent surgery   - he stated he is no longer taking amlodipine 5 mg because it was a short course therapy (discontinued per MD)      Prior to Admission medications    Medication Sig Last Dose Taking? Auth Provider   Zinc 50 MG CAPS Take 1 capsule by mouth daily 12/11/2017 Yes Unknown, Entered By History   hydrochlorothiazide (HYDRODIURIL) 25 MG tablet Take 1 tablet (25 mg) by mouth daily 12/11/2017 Yes Rodney Viveros MD   lisinopril (PRINIVIL/ZESTRIL) 40 MG tablet TAKE ONE TABLET BY MOUTH ONCE DAILY IN THE EVENING 12/11/2017 at 2200 Yes Rodney Viveros MD   carvedilol (COREG) 25 MG tablet TAKE ONE TABLET BY MOUTH TWICE DAILY WITH MEALS NEED  TO  BE  SEEN  FOR  MORE  REFILLS 12/11/2017 at 2200 Yes Rodney Viveros MD   pyridOXINE (VITAMIN B6) 100 MG TABS Take 1 tablet (100 mg) by mouth daily 12/11/2017 at 0600 Yes Rodney Viveros MD   Ascorbic Acid (VITAMIN C PO) Take 500 mg by mouth daily + 500 mg po qHS PRN 12/11/2017 at 2200 Yes Unknown, Entered By History   multivitamin, therapeutic with minerals (THERA-VIT-M) TABS Take 1 tablet by mouth daily 12/11/2017 at 0600 Yes Unknown, Entered By History   Acetaminophen (TYLENOL PO) Take 500 mg by mouth every 6 hours as needed for mild pain or fever Unknown at Unknown time   Unknown, Entered By History   order for DME Equipment being ordered:  Right wrist carpal tunnel splint   right hand carpal tunnel syndrome   One*  Use as right wrist at night or when gripping walker   Rodney Viveros MD   order for DME Equipment being ordered:  Abdominal binder   One *  Ventral hernia *   Rodney Viveros MD   aspirin 81 MG EC tablet Take 1 tablet (81 mg) by mouth daily  Patient not taking: Reported on 12/13/2017 More than a month at Unknown time  Rodney Viveros MD   Blood Pressure Monitoring (BLOOD PRESSURE MONITOR/WRIST) LAURA 1 Device 2 times daily   Rodney Viveros MD   ORDER FOR DME Abdominal Binder - Large   Luis Alberto Fuentes MD   ORDER FOR DME Equipment being ordered: stocking black closed toe  20- 25mm of mercury    3 pairs  Wash by hand daily  Please feet size to adjust probably large or extra large   Rodney Viveros MD         Medication history completed by:   Cyndee Rollins  PharmD Candidate

## 2017-12-13 NOTE — OR NURSING
"Patient received a total of Metoprolol 4 mg IV and Hydralazine 15 mg IV, in addition to Fentanyl 100 mcg and Dilaudid 0.5 mg IV for pain management.  Hypertension resolved.  Patient will have a Dilaudid PCA for pain management on 6B and will be restarted on his oral hypertension medications.      Continue to monitor closely.     /81 (BP Location: Right arm)  Pulse 82  Temp 98.7  F (37.1  C) (Oral)  Resp 18  Ht 1.854 m (6' 1\")  Wt 125 kg (275 lb 8 oz)  SpO2 96%  BMI 36.35 kg/m2    "

## 2017-12-13 NOTE — ANESTHESIA CARE TRANSFER NOTE
Patient: Sav Mendoza    Procedure(s):  Exploratory Laparotomy and  Gint Ventral Hernia Repair with Mesh - Wound Class: I-Clean    Diagnosis: incarcerated ventral hernia  Diagnosis Additional Information: No value filed.    Anesthesia Type:   General, ETT     Note:  Airway :Face Mask  Patient transferred to:PACU  Comments: Vss, sats 100% on facemask, No pain on extubation, No adverse anesthesia events.          Vitals: (Last set prior to Anesthesia Care Transfer)    CRNA VITALS  12/12/2017 1815 - 12/12/2017 1852      12/12/2017             Pulse: 107    Ht Rate: 106    SpO2: 92 %                Electronically Signed By: Vicky Shafer MD  December 12, 2017  6:52 PM

## 2017-12-13 NOTE — PLAN OF CARE
"Problem: Patient Care Overview  Goal: Plan of Care/Patient Progress Review  Outcome: No Change  Blood pressure (!) 157/94, pulse 82, temperature 98.6  F (37  C), temperature source Oral, resp. rate 16, height 1.854 m (6' 1\"), weight 124.1 kg (273 lb 9.5 oz), SpO2 97 %.    Pt  BP elevated, started on PRN Hydralazine, given x 1 with good response.  Scheduled metoprolol dose increased.  C/o abdominal discomfort, tolerable with Dilaudid PCA.  Incision CDI, abd's in place, no drainage noted.  Up in chair x 1.  Ambulated in halls x 1 with walker and SBA.  Love in place, good UOP.  NG to LIS, brown drainage.  Call light within reach.  Continue with plan of care.       "

## 2017-12-13 NOTE — OR NURSING
Dr. Fowler at the bedside to assess patient.  Patient is hypertensive and will receive Metoprolol and Hydralazine in addition to pain medication.

## 2017-12-13 NOTE — PROGRESS NOTES
"POST OP CHECK  12/13/2017    Sav Mendoza is a 62 year old male with h/o incarcerated ventral hernia now POD #0 s/p ex lap and ventral hernia repair with mesh.    Pt reports doing well and pain controlled. NPO with NG tube in place to LIS. Denies nausea or emesis. Voiding to romero with adequate UOP.     BP (!) 150/94 (BP Location: Left arm)  Pulse 82  Temp 98.6  F (37  C) (Oral)  Resp 16  Ht 1.854 m (6' 1\")  Wt 125 kg (275 lb 8 oz)  SpO2 96%  BMI 36.35 kg/m2  General: Alert, interactive, & in NA  Resp: Non labored breathing  Cardiac: RRR  Abdomen: Soft, obese, non-tender, mildly distended  Incision: dressed with saturated s/s shadowing, appears intact  Extremities: WWP    A/P:Sav Mendoza is a 62 year old male with h/o incarcerated ventral hernia now POD #0 s/p above procedure.  -recovering well  -continue cares per primary team orders    Abby Chambers MD  PGY-1, General Surgery        "

## 2017-12-14 LAB
ANION GAP SERPL CALCULATED.3IONS-SCNC: 10 MMOL/L (ref 3–14)
BUN SERPL-MCNC: 8 MG/DL (ref 7–30)
CALCIUM SERPL-MCNC: 8.7 MG/DL (ref 8.5–10.1)
CHLORIDE SERPL-SCNC: 97 MMOL/L (ref 94–109)
CO2 SERPL-SCNC: 26 MMOL/L (ref 20–32)
CREAT SERPL-MCNC: 0.6 MG/DL (ref 0.66–1.25)
ERYTHROCYTE [DISTWIDTH] IN BLOOD BY AUTOMATED COUNT: 13.2 % (ref 10–15)
GFR SERPL CREATININE-BSD FRML MDRD: >90 ML/MIN/1.7M2
GLUCOSE SERPL-MCNC: 108 MG/DL (ref 70–99)
HCT VFR BLD AUTO: 42.4 % (ref 40–53)
HGB BLD-MCNC: 13.7 G/DL (ref 13.3–17.7)
MAGNESIUM SERPL-MCNC: 1.9 MG/DL (ref 1.6–2.3)
MCH RBC QN AUTO: 31.9 PG (ref 26.5–33)
MCHC RBC AUTO-ENTMCNC: 32.3 G/DL (ref 31.5–36.5)
MCV RBC AUTO: 99 FL (ref 78–100)
PHOSPHATE SERPL-MCNC: 2 MG/DL (ref 2.5–4.5)
PLATELET # BLD AUTO: 180 10E9/L (ref 150–450)
POTASSIUM SERPL-SCNC: 3.1 MMOL/L (ref 3.4–5.3)
POTASSIUM SERPL-SCNC: 3.4 MMOL/L (ref 3.4–5.3)
RBC # BLD AUTO: 4.29 10E12/L (ref 4.4–5.9)
SODIUM SERPL-SCNC: 133 MMOL/L (ref 133–144)
WBC # BLD AUTO: 10.6 10E9/L (ref 4–11)

## 2017-12-14 PROCEDURE — 25000132 ZZH RX MED GY IP 250 OP 250 PS 637: Performed by: STUDENT IN AN ORGANIZED HEALTH CARE EDUCATION/TRAINING PROGRAM

## 2017-12-14 PROCEDURE — 83735 ASSAY OF MAGNESIUM: CPT | Performed by: STUDENT IN AN ORGANIZED HEALTH CARE EDUCATION/TRAINING PROGRAM

## 2017-12-14 PROCEDURE — 25000128 H RX IP 250 OP 636: Performed by: STUDENT IN AN ORGANIZED HEALTH CARE EDUCATION/TRAINING PROGRAM

## 2017-12-14 PROCEDURE — 36415 COLL VENOUS BLD VENIPUNCTURE: CPT | Performed by: SURGERY

## 2017-12-14 PROCEDURE — 25000125 ZZHC RX 250: Performed by: STUDENT IN AN ORGANIZED HEALTH CARE EDUCATION/TRAINING PROGRAM

## 2017-12-14 PROCEDURE — 84100 ASSAY OF PHOSPHORUS: CPT | Performed by: STUDENT IN AN ORGANIZED HEALTH CARE EDUCATION/TRAINING PROGRAM

## 2017-12-14 PROCEDURE — 36415 COLL VENOUS BLD VENIPUNCTURE: CPT | Performed by: STUDENT IN AN ORGANIZED HEALTH CARE EDUCATION/TRAINING PROGRAM

## 2017-12-14 PROCEDURE — 12000006 ZZH R&B IMCU INTERMEDIATE UMMC

## 2017-12-14 PROCEDURE — 84132 ASSAY OF SERUM POTASSIUM: CPT | Performed by: SURGERY

## 2017-12-14 PROCEDURE — 25000132 ZZH RX MED GY IP 250 OP 250 PS 637: Performed by: SURGERY

## 2017-12-14 PROCEDURE — 25000125 ZZHC RX 250: Performed by: SURGERY

## 2017-12-14 PROCEDURE — 25800025 ZZH RX 258: Performed by: SURGERY

## 2017-12-14 PROCEDURE — 80048 BASIC METABOLIC PNL TOTAL CA: CPT | Performed by: STUDENT IN AN ORGANIZED HEALTH CARE EDUCATION/TRAINING PROGRAM

## 2017-12-14 PROCEDURE — 25000128 H RX IP 250 OP 636: Performed by: SURGERY

## 2017-12-14 PROCEDURE — 85027 COMPLETE CBC AUTOMATED: CPT | Performed by: STUDENT IN AN ORGANIZED HEALTH CARE EDUCATION/TRAINING PROGRAM

## 2017-12-14 RX ORDER — DEXTROSE MONOHYDRATE, SODIUM CHLORIDE, AND POTASSIUM CHLORIDE 50; 1.49; 4.5 G/1000ML; G/1000ML; G/1000ML
INJECTION, SOLUTION INTRAVENOUS CONTINUOUS
Status: DISCONTINUED | OUTPATIENT
Start: 2017-12-14 | End: 2017-12-16

## 2017-12-14 RX ORDER — LABETALOL HYDROCHLORIDE 5 MG/ML
5-10 INJECTION, SOLUTION INTRAVENOUS EVERY 4 HOURS PRN
Status: DISCONTINUED | OUTPATIENT
Start: 2017-12-14 | End: 2017-12-16

## 2017-12-14 RX ORDER — BISACODYL 10 MG
10 SUPPOSITORY, RECTAL RECTAL ONCE
Status: COMPLETED | OUTPATIENT
Start: 2017-12-14 | End: 2017-12-14

## 2017-12-14 RX ORDER — LISINOPRIL 20 MG/1
40 TABLET ORAL EVERY EVENING
Status: DISCONTINUED | OUTPATIENT
Start: 2017-12-14 | End: 2017-12-16 | Stop reason: HOSPADM

## 2017-12-14 RX ADMIN — Medication 10 MEQ: at 16:58

## 2017-12-14 RX ADMIN — AMLODIPINE BESYLATE 5 MG: 5 TABLET ORAL at 08:43

## 2017-12-14 RX ADMIN — Medication 10 MG: at 17:32

## 2017-12-14 RX ADMIN — ASPIRIN 81 MG: 81 TABLET, COATED ORAL at 08:43

## 2017-12-14 RX ADMIN — POTASSIUM CHLORIDE, DEXTROSE MONOHYDRATE AND SODIUM CHLORIDE: 150; 5; 450 INJECTION, SOLUTION INTRAVENOUS at 15:21

## 2017-12-14 RX ADMIN — HYDROMORPHONE HYDROCHLORIDE: 10 INJECTION, SOLUTION INTRAMUSCULAR; INTRAVENOUS; SUBCUTANEOUS at 22:00

## 2017-12-14 RX ADMIN — Medication 10 MEQ: at 18:17

## 2017-12-14 RX ADMIN — POTASSIUM CHLORIDE, DEXTROSE MONOHYDRATE AND SODIUM CHLORIDE: 150; 5; 450 INJECTION, SOLUTION INTRAVENOUS at 06:48

## 2017-12-14 RX ADMIN — Medication 10 MEQ: at 09:54

## 2017-12-14 RX ADMIN — POTASSIUM PHOSPHATE, MONOBASIC AND POTASSIUM PHOSPHATE, DIBASIC 15 MMOL: 224; 236 INJECTION, SOLUTION INTRAVENOUS at 17:07

## 2017-12-14 RX ADMIN — PANTOPRAZOLE SODIUM 40 MG: 40 TABLET, DELAYED RELEASE ORAL at 20:41

## 2017-12-14 RX ADMIN — PANTOPRAZOLE SODIUM 40 MG: 40 TABLET, DELAYED RELEASE ORAL at 10:59

## 2017-12-14 RX ADMIN — BISACODYL 10 MG: 10 SUPPOSITORY RECTAL at 08:55

## 2017-12-14 RX ADMIN — METOPROLOL TARTRATE 10 MG: 5 INJECTION INTRAVENOUS at 03:17

## 2017-12-14 RX ADMIN — Medication 2 G: at 08:47

## 2017-12-14 RX ADMIN — ENOXAPARIN SODIUM 40 MG: 40 INJECTION SUBCUTANEOUS at 11:59

## 2017-12-14 RX ADMIN — HYDROCHLOROTHIAZIDE 25 MG: 25 TABLET ORAL at 08:42

## 2017-12-14 RX ADMIN — Medication 10 MEQ: at 08:47

## 2017-12-14 RX ADMIN — METOPROLOL TARTRATE 10 MG: 5 INJECTION INTRAVENOUS at 08:43

## 2017-12-14 RX ADMIN — CARVEDILOL 25 MG: 25 TABLET, FILM COATED ORAL at 20:41

## 2017-12-14 RX ADMIN — Medication 10 MEQ: at 11:59

## 2017-12-14 RX ADMIN — Medication 10 MEQ: at 10:58

## 2017-12-14 RX ADMIN — LISINOPRIL 40 MG: 40 TABLET ORAL at 20:41

## 2017-12-14 ASSESSMENT — PAIN DESCRIPTION - DESCRIPTORS
DESCRIPTORS: DISCOMFORT;ACHING
DESCRIPTORS: DISCOMFORT
DESCRIPTORS: ACHING
DESCRIPTORS: DISCOMFORT

## 2017-12-14 ASSESSMENT — ACTIVITIES OF DAILY LIVING (ADL)
ADLS_ACUITY_SCORE: 12

## 2017-12-14 NOTE — PLAN OF CARE
"Problem: Patient Care Overview  Goal: Plan of Care/Patient Progress Review  Outcome: No Change  /89  Pulse 82  Temp 98.6  F (37  C) (Oral)  Resp 16  Ht 1.854 m (6' 1\")  Wt 124.1 kg (273 lb 9.5 oz)  SpO2 93%  BMI 36.1 kg/m2    Neuro: A/Ox4.   Cardiac: SR with HR 70's. -170/90's - PRN hydralazine administered x1. Afebrile.    Respiratory: O2 sats stable on RA.   GI/: Love intact with adequate UOP. Pt not passing gas yet.   Diet/appetite: NPO.   Activity:  Ax 1.   Pain: PCA dilaudid 0.2mg Q10min.    Skin: Abd incision with ABD pads over CDI - abd binder in place.     MIV at 150mL/hr.     R: Continue with POC. Notify primary team with changes.      "

## 2017-12-14 NOTE — PLAN OF CARE
Problem: Patient Care Overview  Goal: Plan of Care/Patient Progress Review  Outcome: No Change  [id number]:    Neuro: A/Ox4.   Cardiac: SR with HR 70-80. 's/90's, PRN hydralazine given x 1 with little response, available q6h. Afebrile.   Respiratory: O2 sats stable on 2L NC. Capno in use with no alarms.  GI/: Love intact with adequate UOP. Hypoactive BS all quads, denies flatus  Diet/appetite: NPO. Clamped NG for 1 hour p po pills admin, denies nausea while clampled  Activity:  Ax 1.   Pain: PCA dilaudid avail 0.2mg Q10 min, states mostly has pain w/ movement  Skin: Abd incision with drainage - drsg reinforced on prior shift, not needed this shift     MIV at 150mL/hr. K+ replaced per protocol, recheck in AM     R: Continue with POC. Notify primary team with changes. Awaiting PEDRO

## 2017-12-14 NOTE — PLAN OF CARE
"Problem: Patient Care Overview  Goal: Plan of Care/Patient Progress Review  Outcome: No Change  Blood pressure 146/86, pulse 82, temperature 98.1  F (36.7  C), temperature source Oral, resp. rate 16, height 1.854 m (6' 1\"), weight 124.1 kg (273 lb 9.5 oz), SpO2 94 %.    Pt BP elevated this morning, has been improving as home meds are restarted.  Pain well controlled with Dilaudid PCA.  NG to LIS, dark brown.  Given suppository this morning, no BM or flatus yet.  Love removed, void x 1 since.  Remains NPO.  D5 1/2 NS+ 20 KCL infusing.  Mag and K+ replaced per protocols.  Phos replacement protocol just ordered.  Midline incision dressing CDI, changed by MD today, abdominal binder on.  Up in chair most of the afternoon.  Ambulated in halls x 1 with SBA and walker.  Call light within reach.  Continue with plan of care.       "

## 2017-12-14 NOTE — PROGRESS NOTES
"[facility] Progress Note    Subjective:  POD # 2 s/p ex-lap, ventral hernia repair. No acute issues overnight. Pain controlled. -flatus, -BM    Objective:   /86 (BP Location: Left arm)  Pulse 82  Temp 98.1  F (36.7  C) (Oral)  Resp 16  Ht 1.854 m (6' 1\")  Wt 124.1 kg (273 lb 9.5 oz)  SpO2 94%  BMI 36.1 kg/m2    I/O:  I/O last 3 completed shifts:  In: 3140 [I.V.:3050; NG/GT:90]  Out: 4525 [Urine:3825; Emesis/NG output:700]    PE:  Gen: Awake, alert, NAD   CV: RRR  Resp: non-labored at rest, CTAB  Abd: Soft, non-distended, NTTP  Ext: warm and well perfused  Incision: c/d/i    Labs: Reviewed  Lab Results   Component Value Date    WBC 10.6 12/14/2017    HGB 13.7 12/14/2017    HCT 42.4 12/14/2017     12/14/2017     12/14/2017    POTASSIUM 3.1 (L) 12/14/2017    CHLORIDE 97 12/14/2017    CO2 26 12/14/2017    BUN 8 12/14/2017    CR 0.60 (L) 12/14/2017     (H) 12/14/2017    SED 38 (H) 05/31/2016    AST 16 12/12/2017    ALT 27 12/12/2017    ALKPHOS 58 12/12/2017    BILITOTAL 1.0 12/12/2017       Imaging:   XR ABDOMEN 1 VW   12/12/2017 4:02 AM   INDICATION: Small bowel obstruction. Status post NG tube placement    IMPRESSION:   Multiple dilated small bowel loops consistent with  mechanical small bowel obstruction as reported on the earlier CT. NG  tube tip in the stomach.      A/P: Sav Mendoza is a 62 year old male, who is POD # 1 following an Ex-lap and open ventral hernia repair, with a post-op course complicated by ileus.     Doing well this AM, continue bowel rest.  D/c romero catheter  Keep NG on LIS  Protonix BID  Replete electrolytes per protocol  Daily BMP, Mg, Phos and CBC in the AM  Continue pain control with dilaudid pca  Restarted some home meds  Prn antihypertensives on board      Disposition Plan   Expected discharge in 2-3 days to prior living arrangement once bowel function returns.     Entered: Coy Bolaños 12/14/2017, 1:29 PM     Seen and discussed with GVS Team and " staff    Coy Bolaños MD  General Surgery PGY-1

## 2017-12-15 LAB
ANION GAP SERPL CALCULATED.3IONS-SCNC: 9 MMOL/L (ref 3–14)
BUN SERPL-MCNC: 8 MG/DL (ref 7–30)
CALCIUM SERPL-MCNC: 8.1 MG/DL (ref 8.5–10.1)
CHLORIDE SERPL-SCNC: 106 MMOL/L (ref 94–109)
CO2 SERPL-SCNC: 26 MMOL/L (ref 20–32)
CREAT SERPL-MCNC: 0.66 MG/DL (ref 0.66–1.25)
GFR SERPL CREATININE-BSD FRML MDRD: >90 ML/MIN/1.7M2
GLUCOSE SERPL-MCNC: 122 MG/DL (ref 70–99)
MAGNESIUM SERPL-MCNC: 2.3 MG/DL (ref 1.6–2.3)
PHOSPHATE SERPL-MCNC: 2.2 MG/DL (ref 2.5–4.5)
PLATELET # BLD AUTO: 160 10E9/L (ref 150–450)
POTASSIUM SERPL-SCNC: 3.6 MMOL/L (ref 3.4–5.3)
SODIUM SERPL-SCNC: 140 MMOL/L (ref 133–144)

## 2017-12-15 PROCEDURE — 25000128 H RX IP 250 OP 636: Performed by: STUDENT IN AN ORGANIZED HEALTH CARE EDUCATION/TRAINING PROGRAM

## 2017-12-15 PROCEDURE — 83735 ASSAY OF MAGNESIUM: CPT | Performed by: STUDENT IN AN ORGANIZED HEALTH CARE EDUCATION/TRAINING PROGRAM

## 2017-12-15 PROCEDURE — 80048 BASIC METABOLIC PNL TOTAL CA: CPT | Performed by: STUDENT IN AN ORGANIZED HEALTH CARE EDUCATION/TRAINING PROGRAM

## 2017-12-15 PROCEDURE — 85049 AUTOMATED PLATELET COUNT: CPT | Performed by: SURGERY

## 2017-12-15 PROCEDURE — 25000132 ZZH RX MED GY IP 250 OP 250 PS 637: Performed by: SURGERY

## 2017-12-15 PROCEDURE — 36415 COLL VENOUS BLD VENIPUNCTURE: CPT | Performed by: STUDENT IN AN ORGANIZED HEALTH CARE EDUCATION/TRAINING PROGRAM

## 2017-12-15 PROCEDURE — 25000132 ZZH RX MED GY IP 250 OP 250 PS 637: Performed by: STUDENT IN AN ORGANIZED HEALTH CARE EDUCATION/TRAINING PROGRAM

## 2017-12-15 PROCEDURE — 12000001 ZZH R&B MED SURG/OB UMMC

## 2017-12-15 PROCEDURE — 84100 ASSAY OF PHOSPHORUS: CPT | Performed by: STUDENT IN AN ORGANIZED HEALTH CARE EDUCATION/TRAINING PROGRAM

## 2017-12-15 RX ORDER — CARVEDILOL 12.5 MG/1
25 TABLET ORAL 2 TIMES DAILY WITH MEALS
Status: DISCONTINUED | OUTPATIENT
Start: 2017-12-15 | End: 2017-12-16 | Stop reason: HOSPADM

## 2017-12-15 RX ORDER — PANTOPRAZOLE SODIUM 40 MG/1
40 TABLET, DELAYED RELEASE ORAL
Status: DISCONTINUED | OUTPATIENT
Start: 2017-12-15 | End: 2017-12-16 | Stop reason: HOSPADM

## 2017-12-15 RX ORDER — HYDROMORPHONE HYDROCHLORIDE 1 MG/ML
.3-.5 INJECTION, SOLUTION INTRAMUSCULAR; INTRAVENOUS; SUBCUTANEOUS EVERY 6 HOURS PRN
Status: DISCONTINUED | OUTPATIENT
Start: 2017-12-15 | End: 2017-12-16

## 2017-12-15 RX ADMIN — ENOXAPARIN SODIUM 40 MG: 40 INJECTION SUBCUTANEOUS at 14:42

## 2017-12-15 RX ADMIN — CARVEDILOL 25 MG: 12.5 TABLET, FILM COATED ORAL at 17:17

## 2017-12-15 RX ADMIN — ASPIRIN 81 MG: 81 TABLET, COATED ORAL at 08:45

## 2017-12-15 RX ADMIN — PANTOPRAZOLE SODIUM 40 MG: 40 TABLET, DELAYED RELEASE ORAL at 08:40

## 2017-12-15 RX ADMIN — CARVEDILOL 25 MG: 25 TABLET, FILM COATED ORAL at 08:40

## 2017-12-15 RX ADMIN — PANTOPRAZOLE SODIUM 40 MG: 40 TABLET, DELAYED RELEASE ORAL at 15:58

## 2017-12-15 RX ADMIN — POTASSIUM CHLORIDE 20 MEQ: 1.5 POWDER, FOR SOLUTION ORAL at 08:40

## 2017-12-15 RX ADMIN — AMLODIPINE BESYLATE 5 MG: 5 TABLET ORAL at 08:40

## 2017-12-15 RX ADMIN — HYDROMORPHONE HYDROCHLORIDE: 10 INJECTION, SOLUTION INTRAMUSCULAR; INTRAVENOUS; SUBCUTANEOUS at 14:42

## 2017-12-15 RX ADMIN — HYDROCHLOROTHIAZIDE 25 MG: 25 TABLET ORAL at 08:40

## 2017-12-15 RX ADMIN — LISINOPRIL 40 MG: 40 TABLET ORAL at 20:54

## 2017-12-15 ASSESSMENT — ACTIVITIES OF DAILY LIVING (ADL)
ADLS_ACUITY_SCORE: 12

## 2017-12-15 NOTE — PLAN OF CARE
"Problem: Patient Care Overview  Goal: Plan of Care/Patient Progress Review  Outcome: No Change  BP (!) 165/91  Pulse 82  Temp 98.1  F (36.7  C) (Oral)  Resp 16  Ht 1.854 m (6' 1\")  Wt 124.1 kg (273 lb 9.5 oz)  SpO2 96%  BMI 36.1 kg/m2     SBP >160, HR 94, prn labetalol given, K+ re checked came back 3.4 and was replaced per protocol. Phos 2.0, K+ phos infusing now. Not passing flatus or belching. Abdomen distended with hypoactive bowel sound. NGT to LIS with small brown drainage. NPO. IVF at 100ml/hr.       "

## 2017-12-15 NOTE — OP NOTE
DATE OF PROCEDURE:  12/12/2017      PREOPERATIVE DIAGNOSIS:  Incarcerated ventral hernia.      POSTOPERATIVE DIAGNOSIS:  Incarcerated ventral hernia.      PROCEDURES:  Exploratory laparotomy, ventral hernia repair with mesh.      STAFF:  Mina Parsons MD      RESIDENT SURGEON:  Jose Juan Higginbotham MD      ASSISTANT:  MEAGAN Plasencia      CLINICAL HISTORY:  Sav Mendoza is a 62-year-old male with a longstanding ventral hernia which has increased in size.  This hernia has always been reducible in the past; however, at this point the patient's hernia is no longer reducible.  The patient has severe pain and has not passed stool or flatus.  The patient underwent a CT scan which demonstrated an incarcerated ventral hernia with a limb of threatened bowel.  This was markedly tender as well as a complete mechanical obstruction was identified.  At this point, the patient was given full informed consent regarding the risks and benefits of surgery including the potential for recurrence of the hernia and emergency repair,  the potential for need for resection of bowel that was injured as well as all the complications associated with a general anesthetic including myocardial infarction, pulmonary emboli, stroke and even death in the operating room.  The patient understood these risks and wished to proceed.      DESCRIPTION OF PROCEDURE:  Sav Mendoza was taken to the main operating room under general endotracheal anesthesia.  The patient was prepped and draped in sterile fashion.  Following the appropriate timeout procedure to assure patient identification and procedure, a midline abdominal incision was made with a #10 blade and taken down to the level of the midline fascia with electrocautery and blunt dissection.  The abdomen was entered, the fascia was identified in a 360-degree manner surrounding the fascial defect of which there were 2.  The superior defect was approximately 3 cm in diameter.  The lower larger  defect was approximately 12 mm in diameter.  These 2 were combined to create a 15 cm defect.  At this point, the hernia sac was completely dissected from the subcutaneous tissue and  from the overlying skin.  Hernia sac was then opened and the bowel was carefully inspected.  The bowel was run from the proximal ligament of Treitz all the way to the distal terminal ileum.  No small bowel injury was noted other than some erythema and some area of swelling.  There was no threatened bowel, no injury to bowel, and the remainder of the laparotomy was unremarkable.  The fascia was dissected free inferiorly.  The hernia sac was then resected in portion and sent to pathology.  The remainder of the hernia sac was then closed with running 3-0 Vicryl suture, and a portion of Phasix mesh was placed below the level of fascia, sutured into place with 0 PDS suture.  Following this the anterior midline fascia was then reapproximated with running 0 looped PDS as well as interrupted 0 PDS.  Following this the skin edges were carefully prepared by removing excessive skin on the right and the left abdominal wall.  Skin was then sent with the hernia sac for permanent gross only evaluation.  At this point the skin edges were then carefully inspected.  The umbilicus was noted to be slightly dusky; however, all the skin edges were then reapproximated with interrupted 3-0 Vicryl and the skin edges were then finally reapproximated with surgical clips.  A sterile dressing was applied.  The patient tolerated the procedure well.  Needle and sponge count were correct x2, and the patient was returned to postanesthesia recovery in good condition.         JOSE SHORT MD             D: 2017 20:16   T: 12/15/2017 08:10   MT: kayla      Name:     BRADEN LUIS   MRN:      [id number]        Account:        [id number]   :      1955           Procedure Date: 2017      Document: [id number]       cc: [facility] Billing

## 2017-12-15 NOTE — PROGRESS NOTES
"GVS Progress Note    Subjective:  POD # 3 s/p ex-lap, ventral hernia repair. No acute issues overnight. Pain controlled. +flatus, +BM    Objective:   /88 (BP Location: Left arm)  Pulse 82  Temp 98.2  F (36.8  C) (Oral)  Resp 18  Ht 1.854 m (6' 1\")  Wt 124.1 kg (273 lb 9.5 oz)  SpO2 96%  BMI 36.1 kg/m2    I/O:  I/O last 3 completed shifts:  In: 3687.5 [I.V.:3387.5; NG/GT:300]  Out: 2200 [Urine:2050; Emesis/NG output:150]    PE:  Gen: Awake, alert, NAD   CV: RRR  Resp: non-labored at rest, CTAB  Abd: Soft, non-distended, NTTP  Ext: warm and well perfused  Incision: c/d/i    Labs: Reviewed  Lab Results   Component Value Date    WBC 10.6 12/14/2017    HGB 13.7 12/14/2017    HCT 42.4 12/14/2017     12/15/2017     12/15/2017    POTASSIUM 3.6 12/15/2017    CHLORIDE 106 12/15/2017    CO2 26 12/15/2017    BUN 8 12/15/2017    CR 0.66 12/15/2017     (H) 12/15/2017    SED 38 (H) 05/31/2016    AST 16 12/12/2017    ALT 27 12/12/2017    ALKPHOS 58 12/12/2017    BILITOTAL 1.0 12/12/2017       Imaging:   XR ABDOMEN 1 VW   12/12/2017 4:02 AM   INDICATION: Small bowel obstruction. Status post NG tube placement    IMPRESSION:   Multiple dilated small bowel loops consistent with  mechanical small bowel obstruction as reported on the earlier CT. NG  tube tip in the stomach.      A/P: Sav Mendoza is a 62 year old male, who is POD # 3 following an Ex-lap and open ventral hernia repair, with a post-op course complicated by ileus.     Doing well this AM, having anterograde bowel function  D/c NG today and started clears today  Continue Protonix BID  Replete electrolytes per protocol, daily BMP, Mg, Phos in the AM  Continue pain control with prn meds  Restarted some home meds  Prn antihypertensives on board      Disposition Plan   Expected discharge in 1-2 days to prior living arrangement once bowel function returns.     Entered: Coy Bolaños 12/15/2017, 3:12 PM     Seen and discussed with GVS Team and " staff    Coy Bolaños MD  General Surgery PGY-1

## 2017-12-16 VITALS
HEIGHT: 73 IN | HEART RATE: 82 BPM | BODY MASS INDEX: 36.26 KG/M2 | SYSTOLIC BLOOD PRESSURE: 124 MMHG | DIASTOLIC BLOOD PRESSURE: 67 MMHG | OXYGEN SATURATION: 96 % | TEMPERATURE: 97.1 F | RESPIRATION RATE: 18 BRPM | WEIGHT: 273.59 LBS

## 2017-12-16 LAB — COPATH REPORT: NORMAL

## 2017-12-16 PROCEDURE — 25000132 ZZH RX MED GY IP 250 OP 250 PS 637: Performed by: SURGERY

## 2017-12-16 PROCEDURE — 25000132 ZZH RX MED GY IP 250 OP 250 PS 637: Performed by: STUDENT IN AN ORGANIZED HEALTH CARE EDUCATION/TRAINING PROGRAM

## 2017-12-16 PROCEDURE — 25800025 ZZH RX 258: Performed by: SURGERY

## 2017-12-16 PROCEDURE — 25000128 H RX IP 250 OP 636: Performed by: STUDENT IN AN ORGANIZED HEALTH CARE EDUCATION/TRAINING PROGRAM

## 2017-12-16 RX ORDER — OXYCODONE HYDROCHLORIDE 5 MG/1
5-10 TABLET ORAL EVERY 4 HOURS PRN
Status: DISCONTINUED | OUTPATIENT
Start: 2017-12-16 | End: 2017-12-16 | Stop reason: HOSPADM

## 2017-12-16 RX ORDER — ACETAMINOPHEN 500 MG
500 TABLET ORAL EVERY 8 HOURS
Qty: 100 TABLET | Refills: 3 | Status: SHIPPED | OUTPATIENT
Start: 2017-12-16 | End: 2019-02-09

## 2017-12-16 RX ORDER — POLYETHYLENE GLYCOL 3350 17 G/17G
1 POWDER, FOR SOLUTION ORAL DAILY
Qty: 510 G | Refills: 1 | Status: SHIPPED | OUTPATIENT
Start: 2017-12-16 | End: 2019-02-15

## 2017-12-16 RX ORDER — OXYCODONE HYDROCHLORIDE 5 MG/1
5 TABLET ORAL EVERY 4 HOURS PRN
Qty: 15 TABLET | Refills: 0 | Status: SHIPPED | OUTPATIENT
Start: 2017-12-16 | End: 2019-02-15

## 2017-12-16 RX ADMIN — ASPIRIN 81 MG: 81 TABLET, COATED ORAL at 08:11

## 2017-12-16 RX ADMIN — ENOXAPARIN SODIUM 40 MG: 40 INJECTION SUBCUTANEOUS at 12:11

## 2017-12-16 RX ADMIN — AMLODIPINE BESYLATE 5 MG: 5 TABLET ORAL at 08:10

## 2017-12-16 RX ADMIN — HYDROCHLOROTHIAZIDE 25 MG: 25 TABLET ORAL at 08:17

## 2017-12-16 RX ADMIN — POTASSIUM CHLORIDE, DEXTROSE MONOHYDRATE AND SODIUM CHLORIDE: 150; 5; 450 INJECTION, SOLUTION INTRAVENOUS at 03:03

## 2017-12-16 RX ADMIN — CARVEDILOL 25 MG: 12.5 TABLET, FILM COATED ORAL at 08:21

## 2017-12-16 RX ADMIN — PANTOPRAZOLE SODIUM 40 MG: 40 TABLET, DELAYED RELEASE ORAL at 08:11

## 2017-12-16 NOTE — PLAN OF CARE
Problem: Patient Care Overview  Goal: Plan of Care/Patient Progress Review  Vitals:    12/16/17 0155 12/16/17 0710 12/16/17 0930 12/16/17 1216   BP: 148/85 (!) 155/97 131/78 124/67   BP Location: Right arm Left arm Right arm Left arm   Pulse:       Resp: 18 18 18 18   Temp:  97.8  F (36.6  C) 96.8  F (36  C) 97.1  F (36.2  C)   TempSrc:  Oral Oral Oral   SpO2: 98% 97% 95% 96%   Weight:       Height:       discharge script written, teaching has been done, pt discharged home.

## 2017-12-16 NOTE — PLAN OF CARE
Problem: Patient Care Overview  Goal: Plan of Care/Patient Progress Review  Patient transferred from , with ongoing MIVF at 125 ml/hr via PIV in left arm, other PIV SL. A/Ox4. Ambulatory with assistance, right foot with deformity. Denies SOB, clear LS. +BS, soft non tender, mid abdominal incision is covered with primapore, cdi. Denies pain. Denies problem with B&B.  Oriented to unit set up and call light use. Wearing own compression stockings, +pedal pulses. Continue poc.

## 2017-12-16 NOTE — PROGRESS NOTES
"GVS Progress Note    Subjective:  No acute issues overnight. Pain controlled. +flatus, +BM. Tolerated clears with no n/v. Ambulating frequently.    Objective:   /78 (BP Location: Right arm)  Pulse 82  Temp 96.8  F (36  C) (Oral)  Resp 18  Ht 1.854 m (6' 1\")  Wt 124.1 kg (273 lb 9.5 oz)  SpO2 95%  BMI 36.1 kg/m2    I/O:  I/O last 3 completed shifts:  In: 2585 [P.O.:620; I.V.:1875; NG/GT:90]  Out: 4725 [Urine:4725]    PE:  Gen: Awake, alert, NAD   CV: regular rate, wwp  Resp: non-labored at rest  Abd: Soft, non-distended, NTTP  Ext: no edema  Incision: c/d/i with staples, umbilical skin slightly dusky but warm and no erythema or drainage    Labs: No new.  Imaging: No new.      A/P: Sav Mendoza is a 62 year old male, who is POD #4 following an Ex-lap and open ventral hernia repair. Doing well this AM, continues to have antegrade bowel fu    - Regular diet, d/c IVFs.  - No need for AM labs.  - Continue pain control with prn meds  - May discharge later today if tolerating regular diet well and pain controlled. Please page on-call for final discharge order. Will follow up with PCP for HTN.      Disposition Plan   Expected discharge in 0-1 days to prior living arrangement once tolerating diet.     Entered: Bernadette Bean 12/16/2017, 9:37 AM     Patient will be discussed with staff.  - - - - - - - - - - - - - - - - - -  Bernadette Bean MD  General Surgery PGY-2    "

## 2017-12-16 NOTE — DISCHARGE SUMMARY
[facility] Discharge Summary    Sav Mendoza MRN: [id number]   YOB: 1955 Age: 62 year old     Date of Admission:  12/12/2017  Date of Discharge::  12/16/2017  Admitting Physician:  Mina Parsons MD  Discharge Physician:  Bernadette Bean MD  Primary Care Physician:         Rodney Viveros          Admission Diagnoses:   Incarcerated hernia [K46.0]          Discharge Diagnosis:   Same         Procedures:   12/12/2017 open ventral hernia repair with mesh (Dr. Parsons)        Non-operative procedures:   None performed          Consultations:   MEDICATION HISTORY IP PHARMACY CONSULT            Brief History of Illness:   Sav Mendoza is a 62 year old male who presented to the ED with complaints of abdominal pain. Of note patient has a ventral hernia for unknown duration. He reports that he typically is able to reduce his hernia but today he noticed increased size of his hernia and he has been unable to reduce it.  He also noticed abdominal pain which ha sbeen localized aroun his hernia. It has progressively worsened and associated with nausea.  Denies any vomiting, chest pian, SOB, fevers or chills.  He did have one bowel movement around the time the pain started, though doesn t believe he s had any gas since then. He reports having no prior intraabdominal surgeries.            Hospital Course:   The patient underwent open ventral hernia repair urgently on 12/12/2017, which he tolerated well without complications. There were no signs of bowel ischemia intraoperatively and no bowel resection was necessary. He was transferred to the floor for routine postoperative care and the remainder of his hospitalization was unremarkable.     He remained with an NG tube in place for decompression given his presentation of obstruction until POD#3, when he had return of bowel function and the NG tube was removed. His diet was slowly advanced and at the time of discharge, he was tolerating a  regular diet. He had had BMs and passed flatus and was not distended. He was ambulating, voiding spontaneously without difficulty, and pain was controlled with oral pain medications. The patient was discharged home in stable and improved condition. He will follow up in clinic in 1-2 weeks with Dr. Parsons for wound check and staple removal. He will also call to make an appointment with his PCP to follow up on his chronic hypertension as he had high blood pressures while in the hospital.         Final Pathology Result:   No pathology submitted         Medications Prior to Admission:     Prescriptions Prior to Admission   Medication Sig Dispense Refill Last Dose     Zinc 50 MG CAPS Take 1 capsule by mouth daily   12/11/2017     hydrochlorothiazide (HYDRODIURIL) 25 MG tablet Take 1 tablet (25 mg) by mouth daily 90 tablet 3 12/11/2017     lisinopril (PRINIVIL/ZESTRIL) 40 MG tablet TAKE ONE TABLET BY MOUTH ONCE DAILY IN THE EVENING 90 tablet 3 12/11/2017 at 2200     carvedilol (COREG) 25 MG tablet TAKE ONE TABLET BY MOUTH TWICE DAILY WITH MEALS NEED  TO  BE  SEEN  FOR  MORE  REFILLS 180 tablet 3 12/11/2017 at 2200     pyridOXINE (VITAMIN B6) 100 MG TABS Take 1 tablet (100 mg) by mouth daily 90 tablet 11 12/11/2017 at 0600     Ascorbic Acid (VITAMIN C PO) Take 500 mg by mouth daily + 500 mg po qHS PRN   12/11/2017 at 2200     multivitamin, therapeutic with minerals (THERA-VIT-M) TABS Take 1 tablet by mouth daily   12/11/2017 at 0600     order for DME Equipment being ordered:  Right wrist carpal tunnel splint   right hand carpal tunnel syndrome   One*  Use as right wrist at night or when gripping walker 1 Device 1 Taking     order for DME Equipment being ordered:  Abdominal binder   One *  Ventral hernia * 1 Device 0 Taking     aspirin 81 MG EC tablet Take 1 tablet (81 mg) by mouth daily (Patient not taking: Reported on 12/13/2017) 90 tablet 3 More than a month at Unknown time     Blood Pressure Monitoring (BLOOD PRESSURE  MONITOR/WRIST) LAURA 1 Device 2 times daily 1 Device 0 Taking     ORDER FOR DME Abdominal Binder - Large 2 each 1 Taking     ORDER FOR DME Equipment being ordered: stocking black closed toe  20- 25mm of mercury    3 pairs  Wash by hand daily  Please feet size to adjust probably large or extra large 3 Device prn Taking            Discharge Medications:     Current Discharge Medication List      START taking these medications    Details   polyethylene glycol (MIRALAX) powder Take 17 g (1 capful) by mouth daily Take while taking narcotics to prevent constipation.  Qty: 510 g, Refills: 1    Associated Diagnoses: Incarcerated hernia      oxyCODONE IR (ROXICODONE) 5 MG tablet Take 1 tablet (5 mg) by mouth every 4 hours as needed for pain maximum 12 tablet(s) per day  Qty: 15 tablet, Refills: 0    Associated Diagnoses: Incarcerated hernia         CONTINUE these medications which have CHANGED    Details   acetaminophen (TYLENOL) 500 MG tablet Take 1 tablet (500 mg) by mouth every 8 hours Take around the clock while taking narcotic pain medication, then can take as needed.  Qty: 100 tablet, Refills: 3    Associated Diagnoses: Incarcerated hernia         CONTINUE these medications which have NOT CHANGED    Details   Zinc 50 MG CAPS Take 1 capsule by mouth daily      hydrochlorothiazide (HYDRODIURIL) 25 MG tablet Take 1 tablet (25 mg) by mouth daily  Qty: 90 tablet, Refills: 3    Associated Diagnoses: Hypertension goal BP (blood pressure) < 140/80      lisinopril (PRINIVIL/ZESTRIL) 40 MG tablet TAKE ONE TABLET BY MOUTH ONCE DAILY IN THE EVENING  Qty: 90 tablet, Refills: 3    Associated Diagnoses: Essential hypertension, benign      carvedilol (COREG) 25 MG tablet TAKE ONE TABLET BY MOUTH TWICE DAILY WITH MEALS NEED  TO  BE  SEEN  FOR  MORE  REFILLS  Qty: 180 tablet, Refills: 3    Associated Diagnoses: Benign essential hypertension      pyridOXINE (VITAMIN B6) 100 MG TABS Take 1 tablet (100 mg) by mouth daily  Qty: 90 tablet,  Refills: 11    Associated Diagnoses: Carpal tunnel syndrome of right wrist      Ascorbic Acid (VITAMIN C PO) Take 500 mg by mouth daily + 500 mg po qHS PRN      multivitamin, therapeutic with minerals (THERA-VIT-M) TABS Take 1 tablet by mouth daily      !! order for DME Equipment being ordered:  Right wrist carpal tunnel splint   right hand carpal tunnel syndrome   One*  Use as right wrist at night or when gripping walker  Qty: 1 Device, Refills: 1    Associated Diagnoses: Carpal tunnel syndrome of right wrist      !! order for DME Equipment being ordered:  Abdominal binder   One *  Ventral hernia *  Qty: 1 Device, Refills: 0    Associated Diagnoses: Ventral hernia without obstruction or gangrene      aspirin 81 MG EC tablet Take 1 tablet (81 mg) by mouth daily  Qty: 90 tablet, Refills: 3    Associated Diagnoses: Hypertension goal BP (blood pressure) < 140/80; Hyperlipidemia LDL goal < 130      Blood Pressure Monitoring (BLOOD PRESSURE MONITOR/WRIST) LAURA 1 Device 2 times daily  Qty: 1 Device, Refills: 0    Associated Diagnoses: Hypertension goal BP (blood pressure) < 140/80      !! ORDER FOR DME Abdominal Binder - Large  Qty: 2 each, Refills: 1    Associated Diagnoses: Ventral hernia without mention of obstruction or gangrene      !! ORDER FOR DME Equipment being ordered: stocking black closed toe  20- 25mm of mercury    3 pairs  Wash by hand daily  Please feet size to adjust probably large or extra large  Qty: 3 Device, Refills: prn    Associated Diagnoses: Cellulitis       !! - Potential duplicate medications found. Please discuss with provider.      STOP taking these medications       amLODIPine (NORVASC) 5 MG tablet Comments:   Reason for Stopping:                    Day of Discharge Physical Exam:   Temp:  [96.8  F (36  C)-98.8  F (37.1  C)] 96.8  F (36  C)  Heart Rate:  [76-80] 77  Resp:  [18] 18  BP: (131-183)/(78-97) 131/78  SpO2:  [95 %-98 %] 95 %     Gen: Awake, alert, NAD   CV: regular rate, wwp  Resp:  non-labored at rest  Abd: Soft, non-distended, NTTP  Ext: no edema  Incision: c/d/i with staples, umbilical skin slightly dusky but warm and no erythema or drainage         Discharge Instructions and Follow-Up:     Reason for your hospital stay   You had an incarcerated hernia that was repaired with mesh.     Adult [facility] Follow-up and recommended labs and tests   You should follow up with your surgeon, Dr. Parsons, in 1-2 weeks for a wound check and to see how you are doing. Our office will call to schedule this appointment for you. If you do not hear from us within 3 business days from leaving the hospital, please call us ([phone] General Surgery clinic) to schedule your appointment.     You should see your primary care provider, Dr. Viveros, within 1 week from discharge from the hospital to follow up after surgery. Your blood pressures were elevated in the hospital, so you should also discuss blood pressure control with Dr. Viveros at that appointment. You will need to call Dr. Viveros's office to make this appointment.      Appointments on [place] and/or [facility] (with [facility] or [facility] provider or service). Call [phone] if you haven't heard regarding these appointments within 7 days of discharge.     Activity   See discharge instructions.     Discharge Instructions   If you have questions about your follow-up appointment, please call the General Surgery clinic at [phone].     WHEN TO CALL OR SEEK CARE:  Please call or seek medical attention if you experience increasing abdominal pain, nausea, vomiting, are unable to have bowel movements or stop passing gas, increasing drainage from or spreading redness around your wounds, chills, or fever >101.5.   - You may contact Dr. Mina Parsons (your surgeon) any time on his cell phones (he has two separate numbers, and you may try both: [phone] or [phone]).  - During normal business hours, please contact the General Surgery clinic at  [phone].  - If you are having troubles in the evenings, at night, or on weekends, call [phone] and ask to speak with surgery resident on call.    ACTIVITY:  Do not lift more than 10 pounds for 8 weeks after surgery. You must wear an abdominal binder while out of bed for the first 8 weeks after surgery. Otherwise, activity as tolerated. Use common sense; if it hurts, don't do it. Stay active and walk plenty.    WOUND CARE:  You may shower but do not scrub incisions; simply let soapy water run over them. You may cover your incision with a dressing to keep it from being irritated, but this is not necessary. Staples will be taken out at your follow up appointment in General Surgery clinic (should be taken out ~2 weeks after surgery).    The part of the incision near your belly button is a little blackened; this is expected and the top layer of skin will likely slough off. If it becomes more painful or reddened, or is draining pus, please call or seek care as above.    Do not soak in a bath tub or pool for 6 weeks after surgery.    PAIN:  No driving while on narcotic pain medications. You may wish to take a stool softener or laxative while taking narcotics (Miralax has been prescribed for you).    You may take Tylenol (acetaminophen) and/or Motrin (ibuprofen) in addition or instead of narcotic medication; it is helpful to take these medications as you wean down off of the narcotic medications. If you have been prescribed Percocet, be aware that it contains Tylenol and oxycodone together. Do not take a total of more than 3500 mg of Tylenol per 24 hours to avoid liver damage.     Supplies   Abdominal binder     Diet   Regular diet             Home Health Care:   Not needed            Discharge Disposition:   Discharged to home      Condition at discharge: Stable      Discussed with Dr. Parsons.  - - - - - - - - - - - - - - - - - -  Bernadette Bean MD  General Surgery PGY-2

## 2017-12-18 ENCOUNTER — CARE COORDINATION (OUTPATIENT)
Dept: SURGERY | Facility: CLINIC | Age: 62
End: 2017-12-18

## 2017-12-18 NOTE — PROGRESS NOTES
RN Post-Op/Post-Discharge Care Coordination Note    Mr. Sav Mendoza is a 62 year old male who underwent ventral hernia repair with recent discharge from the hospital.  Spoke with Patient.    Support  Patient able to care for self independently.  Spouse is available if needed.     Health Status  Fevers/chills: Patient denies any fever or chills.  Nausea/Vomiting: Patient denies nausea/vomiting.  Eating/drinking: Patient is able to eat and drink without any complaints.  Bowel habits: Patient reports having a normal bowel movement.  Patient is taking Miralax.  Asked to continue until no longer taking narcotics or bowels regulate.  Drains (JOSH): N/A  Incisions: Patient denies any signs and symptoms of infection..  Wound closure:  Staples   Pain: Improving.  Took Oxycodone last evening and is going to try Tylenol.  Discussed weaning off of narcotics.  New Medications:  Tylenol, Miralax, Oxycodone  Patient states that he does not like how he feels on narcotics- felt light headed this am but it passed.  Drinking adequate fluids.  Discussed stopping narcotics.  If symptoms continue, he should contact this office.    Activity/Restrictions  No lifting in excess of 15-20 pounds for 3-4 weeks.  Restrictions will be reviewed at post op appointment.    Equipment  None    Pathology reviewed with patient:  No: Will be reviewed at post op visit    All of his questions were answered including reviewing restrictions and wound care.  He will call this office if he has any further questions and/or concerns.      Post op appointment arranged with Dr. Parsons 12/26 at 1030 for wound check and staple removal    Whom and When to Call  Patient acknowledges understanding of how to manage any medication changes and   when to seek medical care.     Patient advised that if after hour medical concerns arise to please call [phone] and choose option 4 to speak to the physician on call.

## 2017-12-18 NOTE — LETTER
12/18/2017           TO: Sav Mendoza  [address] [id number]           Dear Sav,  This letter serves as a confirmation of what we discussed in our recent phone conversation.      Appointment date December 26, 2017   Appointment time 10:30 AM     Provider Mina Parsons MD, PhD   [facility]  [address]  4th Floor ([address])  [place] [address]  Phone: [phone]  Fax: [phone]       Sincerely,  Dori LANGLEY  Phone Number: [phone], #[phone]

## 2017-12-21 ENCOUNTER — OFFICE VISIT (OUTPATIENT)
Dept: FAMILY MEDICINE | Facility: CLINIC | Age: 62
End: 2017-12-21

## 2017-12-21 VITALS
DIASTOLIC BLOOD PRESSURE: 74 MMHG | WEIGHT: 275.5 LBS | TEMPERATURE: 98.2 F | SYSTOLIC BLOOD PRESSURE: 120 MMHG | OXYGEN SATURATION: 96 % | HEART RATE: 78 BPM | BODY MASS INDEX: 36.35 KG/M2 | RESPIRATION RATE: 16 BRPM

## 2017-12-21 DIAGNOSIS — K46.0 INCARCERATED HERNIA: ICD-10-CM

## 2017-12-21 DIAGNOSIS — I10 BENIGN ESSENTIAL HYPERTENSION: Primary | ICD-10-CM

## 2017-12-21 DIAGNOSIS — R60.0 BILATERAL LEG EDEMA: ICD-10-CM

## 2017-12-21 PROCEDURE — 99214 OFFICE O/P EST MOD 30 MIN: CPT | Performed by: FAMILY MEDICINE

## 2017-12-21 RX ORDER — AMLODIPINE BESYLATE 5 MG/1
5 TABLET ORAL DAILY
Qty: 30 TABLET | Refills: 11 | Status: SHIPPED | OUTPATIENT
Start: 2017-12-21 | End: 2018-12-27

## 2017-12-21 NOTE — NURSING NOTE
"Chief Complaint   Patient presents with     Hospital F/U     hernia       Initial /74  Pulse 78  Temp 98.2  F (36.8  C) (Tympanic)  Resp 16  Wt 275 lb 8 oz (125 kg)  SpO2 96%  BMI 36.35 kg/m2 Estimated body mass index is 36.35 kg/(m^2) as calculated from the following:    Height as of 12/12/17: 6' 1\" (1.854 m).    Weight as of this encounter: 275 lb 8 oz (125 kg).  Medication Reconciliation: complete   Raeann Huynh CMA    "

## 2017-12-21 NOTE — PROGRESS NOTES
SUBJECTIVE:   Sav Mendoza is a 62 year old male who presents to clinic today for the following health issues:          Hospital Follow-up Visit:    Hospital/Nursing Home/IP Rehab Facility: [facility]  Date of Admission: 12/11/2017  Date of Discharge: 12/16/2017  Reason(s) for Admission: hernia            Problems taking medications regularly:  None       Medication changes since discharge: None       Problems adhering to non-medication therapy:  None    Summary of hospitalization:  [facility] discharge summary reviewed  Diagnostic Tests/Treatments reviewed.  Follow up needed: none  Other Healthcare Providers Involved in Patient s Care:         None  Update since discharge: improved.     Post Discharge Medication Reconciliation: discharge medications reconciled, continue medications without change.  Plan of care communicated with patient     Coding guidelines for this visit:  Type of Medical   Decision Making Face-to-Face Visit       within 7 Days of discharge Face-to-Face Visit        within 14 days of discharge   Moderate Complexity 29713 [id number]   High Complexity 21363 [id number]          /74  Pulse 78  Temp 98.2  F (36.8  C) (Tympanic)  Resp 16  Wt 275 lb 8 oz (125 kg)  SpO2 96%  BMI 36.35 kg/m2    .DAYSI GUIDRY MD .12/21/2017 4:24 PM .December 21, 2017      Sav Mendoza is a 62 year old male who is who presents with follow up  Incarcerated hernia requiring surgery and mesh     Wound looking ok except at T lower part of incision with some gap    Also  HYPERTENSION WITH GOAL OF LESS THAN 140/80 HIGHER THAN USUAL    HE HAS BEEN OFF AMLODAPINE  WHICH HAD BEEN RESTARTED IN HOSPITAL     Onset : 12/10/2017     Severity: SEVERE      Home treatments NOT APPLICABLE      Additional Symptoms: OBSTRUCTIVE BOWEL SYMPTOMS AND PAIN WITH MESH REPAIR     Course ONGOING MORBID OBESITY BMI 36.35    HISTORY OF  RECURRENT CELLULITIS     HISTORY OF LEFT INGUINAL AND VENTRAL HERNIA      HISTORY OF DIVERTICULOSIS     BILATERAL LEG EDEMA AND STASIS DERMATITIS     HYPERTENSION WITH GOAL OF LESS THAN 140/80 CONTROLLEE WELL UNTIL RECENTLY     CONSTIPATION ON MIRALAX            .  Current Outpatient Prescriptions   Medication Sig Dispense Refill     acetaminophen (TYLENOL) 500 MG tablet Take 1 tablet (500 mg) by mouth every 8 hours Take around the clock while taking narcotic pain medication, then can take as needed. 100 tablet 3     polyethylene glycol (MIRALAX) powder Take 17 g (1 capful) by mouth daily Take while taking narcotics to prevent constipation. 510 g 1     Zinc 50 MG CAPS Take 1 capsule by mouth daily       hydrochlorothiazide (HYDRODIURIL) 25 MG tablet Take 1 tablet (25 mg) by mouth daily 90 tablet 3     lisinopril (PRINIVIL/ZESTRIL) 40 MG tablet TAKE ONE TABLET BY MOUTH ONCE DAILY IN THE EVENING 90 tablet 3     carvedilol (COREG) 25 MG tablet TAKE ONE TABLET BY MOUTH TWICE DAILY WITH MEALS NEED  TO  BE  SEEN  FOR  MORE  REFILLS 180 tablet 3     order for DME Equipment being ordered:  Right wrist carpal tunnel splint   right hand carpal tunnel syndrome   One*  Use as right wrist at night or when gripping walker 1 Device 1     pyridOXINE (VITAMIN B6) 100 MG TABS Take 1 tablet (100 mg) by mouth daily 90 tablet 11     order for DME Equipment being ordered:  Abdominal binder   One *  Ventral hernia * 1 Device 0     Ascorbic Acid (VITAMIN C PO) Take 500 mg by mouth daily + 500 mg po qHS PRN       multivitamin, therapeutic with minerals (THERA-VIT-M) TABS Take 1 tablet by mouth daily       aspirin 81 MG EC tablet Take 1 tablet (81 mg) by mouth daily 90 tablet 3     Blood Pressure Monitoring (BLOOD PRESSURE MONITOR/WRIST) LAURA 1 Device 2 times daily 1 Device 0     ORDER FOR DME Abdominal Binder - Large 2 each 1     ORDER FOR DME Equipment being ordered: stocking black closed toe  20- 25mm of mercury    3 pairs  Wash by hand daily  Please feet size to adjust probably large or extra large 3  Device prn     oxyCODONE IR (ROXICODONE) 5 MG tablet Take 1 tablet (5 mg) by mouth every 4 hours as needed for pain maximum 12 tablet(s) per day (Patient not taking: Reported on 2017) 15 tablet 0        No Known Allergies    Immunization History   Administered Date(s) Administered     TDAP Vaccine (Adacel) 05/10/2016         reports that he drinks alcohol.      reports that he does not use illicit drugs.    family history includes Alzheimer Disease in his mother.    indicated that his mother is . He indicated that his father is . He indicated that his daughter is alive. He indicated that both of his sons are alive.      has a past surgical history that includes orthopedic surgery (Left, ) and Laparotomy exploratory (N/A, 2017).     reports that he does not engage in sexual activity.  .  Pediatric History   Patient Guardian Status     Not on file.     Other Topics Concern     Parent/Sibling W/ Cabg, Mi Or Angioplasty Before 65f 55m? No     Social History Narrative         reports that he has never smoked. He has never used smokeless tobacco.    Medical, social, surgical, and family histories reviewed.    Labs reviewed in EPIC  Patient Active Problem List   Diagnosis     Cellulitis of Lt lower leg since 9-65-[id number] since last saw ID  5-14     Baker's cyst     Ventral hernia     Left inguinal hernia     Diverticulosis of large intestine     Edema of both legs     Stasis dermatitis of both legs     Benign essential hypertension     Need for hepatitis C screening test     Cellulitis and abscess of leg: Lt  Lat Malleolus  onset late 4-16      ACP (advance care planning)     Non morbid obesity due to excess calories with comorbid HTN     Cellulitis of ankle     Bilateral leg edema     Incarcerated hernia     Past Surgical History:   Procedure Laterality Date     LAPAROTOMY EXPLORATORY N/A 2017    Procedure: LAPAROTOMY EXPLORATORY;  Exploratory Laparotomy and  Gint Ventral Hernia  Repair with Mesh;  Surgeon: Mina Parsons MD;  Location: [facility] OR     ORTHOPEDIC SURGERY Left 2010    ankle fusion       Social History   Substance Use Topics     Smoking status: Never Smoker     Smokeless tobacco: Never Used     Alcohol use Yes      Comment: rare     Family History   Problem Relation Age of Onset     Alzheimer Disease Mother          No Known Allergies  Recent Labs   Lab Test  12/15/17   0547  12/14/17   1625  12/14/17   0618   12/12/17   0107   02/27/17   1056   05/10/16   1752  09/18/15   1049  04/08/14   1129   02/18/14   1405   A1C   --    --    --    --    --    --   5.6   --   5.8   --    --    --   5.4   LDL   --    --    --    --    --    --    --    --    --   104  100   --    --    HDL   --    --    --    --    --    --    --    --    --   34*  26*   --    --    TRIG   --    --    --    --    --    --    --    --    --   172*  177*   --    --    ALT   --    --    --    --   27   --   24   --    --   30  28   < >   --    CR  0.66   --   0.60*   < >  0.62*   < >  0.79   < >  0.73  1.00  0.80   < >  0.92   GFRESTIMATED  >90   --   >90   < >  >90   < >  >90  Non  GFR Calc     < >  >90  Non  GFR Calc    76  >90   < >  85   GFRESTBLACK  >90   --   >90   < >  >90   < >  >90   GFR Calc     < >  >90   GFR Calc    >90  >90   < >  >90   POTASSIUM  3.6  3.4  3.1*   < >  3.2*   < >  3.8   < >  3.1*  3.9  3.8   < >  4.3    < > = values in this interval not displayed.        BP Readings from Last 6 Encounters:   12/21/17 120/74   12/16/17 124/67   08/28/17 142/88   06/26/17 137/87   02/27/17 128/78   08/31/16 (!) 148/92       Wt Readings from Last 3 Encounters:   12/21/17 275 lb 8 oz (125 kg)   12/13/17 273 lb 9.5 oz (124.1 kg)   08/28/17 277 lb 8 oz (125.9 kg)         Positive symptoms or findings indicated by bold designation:     ROS: 10 point ROS neg other than the symptoms noted above in the HPI.except  has Cellulitis of Lt lower  leg since 6-34-srmpuaow since last saw ID  5-7-14; Baker's cyst; Ventral hernia; Left inguinal hernia; Diverticulosis of large intestine; Edema of both legs; Stasis dermatitis of both legs; Benign essential hypertension; Need for hepatitis C screening test; Cellulitis and abscess of leg: Lt  Lat Malleolus  onset late 4-16 ; ACP (advance care planning); Non morbid obesity due to excess calories with comorbid HTN; Cellulitis of ankle; Bilateral leg edema; and Incarcerated hernia on his problem list.   Constitutional: The patient denied fatigue, fever, insomnia, night sweats, recent illness and weight loss.  MORBID OBESITY     Eyes: The patient denied blindness, eye pain, eye tearing, photophobia, vision change and visual disturbance. GLASSES WORKING DISTANCE AND READING       Ears/Nose/Throat/Neck: The patient denied dizziness, facial pain, hearing loss, nasal discharge, oral pain, otalgia, postnasal drip, sinus congestion, sore throat, tinnitus and voice change.   NORMAL HEARING     Cardiovascular: The patient denied arrhythmia, chest pain/pressure, claudication, edema, exercise intolerance, fatigue, orthopnea, palpitations and syncope.  NORMAL     Respiratory: The patient denied asthma, chest congestion, cough, dyspnea on exertion, dyspnea/shortness of breath, hemoptysis, pedal edema, pleuritic pain, productive sputum, snoring and wheezing. NOL     Gastrointestinal: The patient denied abdominal pain, anorexia, constipation, diarrhea, dysphagia, gastroesophageal reflux, hematochezia, hemorrhoids, melena, nausea and vomiting . RECENT HERNIA REPAIN  DIVERTICULAR DISEASE ASYMPTOMATIC     Genitourinary/Nephrology: The patient denied breast complaint, dysuria, nocturia sexual dysfunction, t, urinary frequency, urinary incontinence, urinary urgency        Musculoskeletal: The patient denied arthralgia(s), back pain, joint complaint, muscle weakness, myalgias, osteoporosis, sciatica, stiffness and swelling.       Dermatoligic:: The patient denied acne, dermatitis, ecchymosis, itching, mole change, rash, skin cancer, skin lesion and sores.  HISTORY OF RECURRENT CELLULITIS LOWER EXTREMITY BILATERAL     Neurologic: The patient denied dizziness, gait abnormality, headache, memory loss, mental status change, paresis, paresthesia, seizure, syncope, tremor and vision change.     Psychiatric: The patient denied anxiety, depression, disturbances of memory, drug abuse, insomnia, mood swings and relationship difficulties.      Endocrine: The patient denied , goiter, obesity, polyuria and thyroid disease.  OBESITY     Hematologic/Lymphatic: The patient denied abnormal bleeding and bruising, abnormal ecchymoses, anemia, lymph node enlargement/mass, petechiae and venous  Thrombosis.      Allergy/Immunology: The patient denied food allergy and  Allergic rhinitis or conjunctivitis.        PE:  /74  Pulse 78  Temp 98.2  F (36.8  C) (Tympanic)  Resp 16  Wt 275 lb 8 oz (125 kg)  SpO2 96%  BMI 36.35 kg/m2 Body mass index is 36.35 kg/(m^2).    Constitutional: general appearance, well nourished, well developed, in no acute distress, well developed, appears stated age, normal body habitus,  OBESITY     Eyes:; The patient has normal eyelids sclerae and conjunctivae :      Ears/Nose/Throat: external ear, overall: normal appearance; external nose, overall: benign appearance, normal moujth gums and lips  The patient has:      Neck: thyroid, overall: normal size, normal consistency, nontender,      Respiratory:  palpation of chest, overall: normal excursion,    Clear to percussion and auscultation      Tachypnea   Color  NORMAL     Cardiovascular:  Good color with no peripheral edema    Regular sinus rhythm without murmur. Physiologic heart sounds Heart is unelarged  .   Chest/Breast: normal shape        Abdominal exam,  Liver and spleen are  unenlarged        Tenderness  INCISIONAL   Scars   MIDLINE WITH LOWER T     Urogenital; no  renal, flank or bladder  tenderness;      Lymphatic: neck nodes,     Other nodes     Musculoskeletal:  Brief ortho exam normal except:   DECREASE RANGE OF MOTION OF BACK     Integument: inspection of skin, no rash, lesions; and, palpation, no induration, no tenderness.      Neurologic mental status, overall: alert and oriented; gait, no ataxia, no unsteadiness; coordination, no tremors; cranial nerves, overall: normal motor, overall: normal bulk, tone.      Psychiatric: orientation/consciousness, overall: oriented to person, place and time; behavior/psychomotor activity, no tics, normal psychomotor activity; mood and affect, overall: normal mood and affect; appearance, overall: well-groomed, good eye contact; speech, overall: normal quality, no aphasia and normal quality, quantity, intact.      Diagnostic Test Results:  Results for orders placed or performed during the hospital encounter of 12/12/17   CT Abdomen Pelvis w Contrast    Narrative    CT ABDOMEN PELVIS W CONTRAST  12/12/2017 2:21 AM     HISTORY: Incarcerated/Strangulated hernia - abdominal pain.    TECHNIQUE: Volumetric acquisition through abdomen and pelvis with IV  contrast. 100 mL Isovue-370. Radiation dose for this scan was reduced  using automated exposure control, adjustment of the mA and/or kV  according to patient size, or iterative reconstruction technique.    COMPARISON: 2/23/2014    FINDINGS: The liver, gallbladder, spleen, pancreas, adrenal glands and  kidneys demonstrate no worrisome findings. There are two small left  renal lesions which are too small to characterize but are likely  cysts. Small nodules in each adrenal gland measuring up to 1.7 cm on  the right. Small hiatal hernia containing fluid. [place]  fat-containing hernia at the left lung base. Coronary artery  calcifications.    There is a large midline ventral hernia containing fat and small bowel  loops, as well as fat stranding and fluid. The bowel loops within the  hernia are  mostly decompressed and the bowel loops above the hernia  are dilated with air-fluid levels consistent with mechanical small  bowel obstruction. The hernia measures approximately 10 x 19.2 cm with  a neck of approximately 6.5 cm in diameter.    Diverticulosis in the descending and sigmoid colon. Mild prostatic  enlargement. Small fat-containing left inguinal hernia. Atheromatous  changes of the abdominal aorta with slight ectasia of the infrarenal  aorta. No free air.      Impression    IMPRESSION:  1. Mechanical small bowel obstruction due to small bowel loops trapped  within a large ventral hernia with a relatively narrow neck. Recommend  surgical consult.  2. There is fluid and fat stranding/inflammation within the hernia.  3. Small bilateral adrenal nodules, most likely adenomas.  4. Colonic diverticulosis without diverticulitis. Other incidental  findings noted above.    ENRRIQUE WHITE MD   XR Abdomen 1 View    Narrative    XR ABDOMEN 1 VW   12/12/2017 4:02 AM     INDICATION: Small bowel obstruction. Status post NG tube placement.    COMPARISON: CT 12/12/2017 at 0230 hours.      Impression    IMPRESSION: Multiple dilated small bowel loops consistent with  mechanical small bowel obstruction as reported on the earlier CT. NG  tube tip in the stomach.    ENRRIQUE WHITE MD   CBC with platelets differential   Result Value Ref Range    WBC 11.4 (H) 4.0 - 11.0 10e9/L    RBC Count 4.93 4.4 - 5.9 10e12/L    Hemoglobin 15.7 13.3 - 17.7 g/dL    Hematocrit 45.0 40.0 - 53.0 %    MCV 91 78 - 100 fl    MCH 31.8 26.5 - 33.0 pg    MCHC 34.9 31.5 - 36.5 g/dL    RDW 12.3 10.0 - 15.0 %    Platelet Count 246 150 - 450 10e9/L    Diff Method Automated Method     % Neutrophils 79.5 %    % Lymphocytes 14.9 %    % Monocytes 4.5 %    % Eosinophils 0.4 %    % Basophils 0.4 %    % Immature Granulocytes 0.3 %    Nucleated RBCs 0 0 /100    Absolute Neutrophil 9.1 (H) 1.6 - 8.3 10e9/L    Absolute Lymphocytes 1.7 0.8 - 5.3 10e9/L     "Absolute Monocytes 0.5 0.0 - 1.3 10e9/L    Absolute Eosinophils 0.0 0.0 - 0.7 10e9/L    Absolute Basophils 0.0 0.0 - 0.2 10e9/L    Abs Immature Granulocytes 0.0 0 - 0.4 10e9/L    Absolute Nucleated RBC 0.0    Lactic acid   Result Value Ref Range    Lactic Acid 1.6 0.7 - 2.0 mmol/L   Comprehensive metabolic panel   Result Value Ref Range    Sodium 134 133 - 144 mmol/L    Potassium 3.2 (L) 3.4 - 5.3 mmol/L    Chloride 94 94 - 109 mmol/L    Carbon Dioxide 33 (H) 20 - 32 mmol/L    Anion Gap 7 3 - 14 mmol/L    Glucose 167 (H) 70 - 99 mg/dL    Urea Nitrogen 17 7 - 30 mg/dL    Creatinine 0.62 (L) 0.66 - 1.25 mg/dL    GFR Estimate >90 >60 mL/min/1.7m2    GFR Estimate If Black >90 >60 mL/min/1.7m2    Calcium 9.3 8.5 - 10.1 mg/dL    Bilirubin Total 1.0 0.2 - 1.3 mg/dL    Albumin 3.8 3.4 - 5.0 g/dL    Protein Total 7.9 6.8 - 8.8 g/dL    Alkaline Phosphatase 58 40 - 150 U/L    ALT 27 0 - 70 U/L    AST 16 0 - 45 U/L   Glucose by meter   Result Value Ref Range    Glucose 112 (H) 70 - 99 mg/dL   Surgical pathology exam   Result Value Ref Range    Copath Report       Patient Name: BRADEN LUIS  MR#: [id number]  Specimen #: [id number]  Collected: 12/12/2017  Received: 12/12/2017  Reported: 12/16/2017 14:53  Ordering Phy(s): JOSE SHORT    For improved result formatting, select 'View Enhanced Report Format'  under Linked Documents section.    SPECIMEN(S):  Ventral hernia sac and abdominal skin    FINAL DIAGNOSIS:  Ventral hernia sac and abdominal skin:  - Benign skin and fibroadipose tissue    I have personally reviewed all specimens and/or slides, including the  listed special stains, and used them with my medical judgement to  determine or confirm the final diagnosis.    Electronically signed out by:    Marlen Quinonez M.D., [facility]    CLINICAL HISTORY:  Incarcerated ventral hernia.  GROSS:  The specimen is received in formalin with proper patient identification,  labeled \"ventral hernia sac and abdominal skin\".  " The specimen consists  of an 18.5 x 12.2 x 4.1 cm aggregate of pink-tan fibromembranous tissue  and skin, which is seria lly sectioned to reveal a fibromembranous cut  surface with no areas of firmness or nodularity.  Representative  sections are submitted in cassette A1. (Dictated by: Mirian Crowder  12/13/2017 12:24 PM)    MICROSCOPIC:  Microscopic examination was performed.    CPT Codes:  A: 46169-XR1    TESTING LAB LOCATION:  [address]   [id number] [phone]    COLLECTION SITE:  Client: [facility]  Location: [facility] (B)       Basic metabolic panel   Result Value Ref Range    Sodium 140 133 - 144 mmol/L    Potassium 3.4 3.4 - 5.3 mmol/L    Chloride 103 94 - 109 mmol/L    Carbon Dioxide 30 20 - 32 mmol/L    Anion Gap 7 3 - 14 mmol/L    Glucose 102 (H) 70 - 99 mg/dL    Urea Nitrogen 12 7 - 30 mg/dL    Creatinine 0.68 0.66 - 1.25 mg/dL    GFR Estimate >90 >60 mL/min/1.7m2    GFR Estimate If Black >90 >60 mL/min/1.7m2    Calcium 8.4 (L) 8.5 - 10.1 mg/dL   Magnesium   Result Value Ref Range    Magnesium 2.1 1.6 - 2.3 mg/dL   Phosphorus   Result Value Ref Range    Phosphorus 2.6 2.5 - 4.5 mg/dL   CBC with platelets differential   Result Value Ref Range    WBC 10.1 4.0 - 11.0 10e9/L    RBC Count 4.46 4.4 - 5.9 10e12/L    Hemoglobin 14.3 13.3 - 17.7 g/dL    Hematocrit 44.0 40.0 - 53.0 %    MCV 99 78 - 100 fl    MCH 32.1 26.5 - 33.0 pg    MCHC 32.5 31.5 - 36.5 g/dL    RDW 13.5 10.0 - 15.0 %    Platelet Count 208 150 - 450 10e9/L    Diff Method Automated Method     % Neutrophils 67.7 %    % Lymphocytes 21.7 %    % Monocytes 8.7 %    % Eosinophils 1.6 %    % Basophils 0.2 %    % Immature Granulocytes 0.1 %    Nucleated RBCs 0 0 /100    Absolute Neutrophil 6.8 1.6 - 8.3 10e9/L    Absolute Lymphocytes 2.2 0.8 - 5.3 10e9/L    Absolute Monocytes 0.9 0.0 - 1.3 10e9/L    Absolute Eosinophils 0.2  0.0 - 0.7 10e9/L    Absolute Basophils 0.0 0.0 - 0.2 10e9/L    Abs Immature Granulocytes 0.0 0 - 0.4 10e9/L    Absolute Nucleated RBC 0.0    Basic metabolic panel   Result Value Ref Range    Sodium 133 133 - 144 mmol/L    Potassium 3.1 (L) 3.4 - 5.3 mmol/L    Chloride 97 94 - 109 mmol/L    Carbon Dioxide 26 20 - 32 mmol/L    Anion Gap 10 3 - 14 mmol/L    Glucose 108 (H) 70 - 99 mg/dL    Urea Nitrogen 8 7 - 30 mg/dL    Creatinine 0.60 (L) 0.66 - 1.25 mg/dL    GFR Estimate >90 >60 mL/min/1.7m2    GFR Estimate If Black >90 >60 mL/min/1.7m2    Calcium 8.7 8.5 - 10.1 mg/dL   Magnesium   Result Value Ref Range    Magnesium 1.9 1.6 - 2.3 mg/dL   Phosphorus   Result Value Ref Range    Phosphorus 2.0 (L) 2.5 - 4.5 mg/dL   CBC with platelets   Result Value Ref Range    WBC 10.6 4.0 - 11.0 10e9/L    RBC Count 4.29 (L) 4.4 - 5.9 10e12/L    Hemoglobin 13.7 13.3 - 17.7 g/dL    Hematocrit 42.4 40.0 - 53.0 %    MCV 99 78 - 100 fl    MCH 31.9 26.5 - 33.0 pg    MCHC 32.3 31.5 - 36.5 g/dL    RDW 13.2 10.0 - 15.0 %    Platelet Count 180 150 - 450 10e9/L   Potassium   Result Value Ref Range    Potassium 3.4 3.4 - 5.3 mmol/L   Basic metabolic panel   Result Value Ref Range    Sodium 140 133 - 144 mmol/L    Potassium 3.6 3.4 - 5.3 mmol/L    Chloride 106 94 - 109 mmol/L    Carbon Dioxide 26 20 - 32 mmol/L    Anion Gap 9 3 - 14 mmol/L    Glucose 122 (H) 70 - 99 mg/dL    Urea Nitrogen 8 7 - 30 mg/dL    Creatinine 0.66 0.66 - 1.25 mg/dL    GFR Estimate >90 >60 mL/min/1.7m2    GFR Estimate If Black >90 >60 mL/min/1.7m2    Calcium 8.1 (L) 8.5 - 10.1 mg/dL   Magnesium   Result Value Ref Range    Magnesium 2.3 1.6 - 2.3 mg/dL   Phosphorus   Result Value Ref Range    Phosphorus 2.2 (L) 2.5 - 4.5 mg/dL   Platelet count   Result Value Ref Range    Platelet Count 160 150 - 450 10e9/L   ABO/Rh type and screen   Result Value Ref Range    ABO O     RH(D) Pos     Antibody Screen Neg     Test Valid Only At          [facility]  [place],[facility]    Specimen Expires 12/15/2017          ICD-10-CM    1. Benign essential hypertension I10    2. Bilateral leg edema R60.0    3. BMI 36.0-36.9,adult Z68.36 BARIATRIC ADULT REFERRAL   4. Incarcerated hernia K46.0     MESH WTHOUT COMPLICATION        .    Side effects benefits and risks thoroughly discussed. .he may come in early if unimproved or getting worse          Importance of adhering to regimen discussed and if medications were dispensed, the importance of taking medications discussed and bringing in the medications after every visit for chronic problems         Please drink 2 glasses of water prior to meals and walk 15-30 minutes after meals    I spent  25 MINUTES SPENT  with patient discussing the following issues   The primary encounter diagnosis was Benign essential hypertension. Diagnoses of Bilateral leg edema, BMI 36.0-36.9,adult, and Incarcerated hernia were also pertinent to this visit. over half of which involved counseling and coordination of care.    Patient Instructions   (I10) Benign essential hypertension  (primary encounter diagnosis)  Comment:    Plan:      (R60.0) Bilateral leg edema  Comment:    Plan:      (Z68.36) BMI 36.0-36.9,adult  Comment:    Plan: BARIATRIC ADULT REFERRAL                          ALL THE ABOVE PROBLEMS ARE STABLE AND MED CHANGES AS NOTED    Diet:  MEDITERRANEAN DIET DIAG AND WEIGHT LOSS     Exercise:  FIT BIT TWITCH RECOMMENDED  AND AEROBIC   Exercises Range of motion, balance, isometric, and strengthening exercises 30 repetitions twice daily of involved joints      .DAYSI GUIDRY MD 12/21/2017 4:24 PM  December 21, 2017

## 2017-12-21 NOTE — PATIENT INSTRUCTIONS
(I10) Benign essential hypertension  (primary encounter diagnosis)  Comment:    Plan:      (R60.0) Bilateral leg edema  Comment:    Plan:      (Z68.36) BMI 36.0-36.9,adult  Comment:    Plan: BARIATRIC ADULT REFERRAL

## 2017-12-21 NOTE — MR AVS SNAPSHOT
After Visit Summary   12/21/2017    Sav Mendoza    MRN: [id number]           Patient Information     Date Of Birth          1955        Visit Information        Provider Department      12/21/2017 9:30 AM Rodney Viveros MD [facility]        Today's Diagnoses     Benign essential hypertension    -  1    Bilateral leg edema        BMI 36.0-36.9,adult          Care Instructions    (I10) Benign essential hypertension  (primary encounter diagnosis)  Comment:    Plan:      (R60.0) Bilateral leg edema  Comment:    Plan:      (Z68.36) BMI 36.0-36.9,adult  Comment:    Plan: BARIATRIC ADULT REFERRAL                            Follow-ups after your visit        Additional Services     BARIATRIC ADULT REFERRAL       Your provider has referred you to: [facility]: [facility] [phone]. [email or web address]    Please be aware that coverage of these services is subject to the terms and limitations of your health insurance plan.  Call member services at your health plan with any benefit or coverage questions.      Please bring the following with you to your appointment:      (1) List of current medications   (2) This referral request   (3) Any documents/labs given to you for this referral                  Your next 10 appointments already scheduled     Dec 26, 2017 10:30 AM CST   (Arrive by 10:15 AM)   Post-Op with Mina Parsons MD   [facility] ([facility])    [address] [place] [address] [phone]              Who to contact     If you have questions or need follow up information about today's clinic visit or your schedule please contact [facility] [place] directly at [phone].  Normal or non-critical lab and imaging results will be communicated to  "you by MyChart, letter or phone within 4 business days after the clinic has received the results. If you do not hear from us within 7 days, please contact the clinic through [employer] or phone. If you have a critical or abnormal lab result, we will notify you by phone as soon as possible.  Submit refill requests through [employer] or call your pharmacy and they will forward the refill request to us. Please allow 3 business days for your refill to be completed.          Additional Information About Your Visit        [employer] Information     [employer] lets you send messages to your doctor, view your test results, renew your prescriptions, schedule appointments and more. To sign up, go to [email or web address] . Click on \"Log in\" on the left side of the screen, which will take you to the Welcome page. Then click on \"Sign up Now\" on the right side of the page.     You will be asked to enter the access code listed below, as well as some personal information. Please follow the directions to create your username and password.     Your access code is: [email or web address]  Expires: 3/16/2018  1:46 PM     Your access code will  in 90 days. If you need help or a new code, please call your [facility] clinic or [phone].        Care EveryWhere ID     This is your Care EveryWhere ID. This could be used by other organizations to access your [facility] medical records  [id number]        Your Vitals Were     Pulse Temperature Respirations Pulse Oximetry BMI (Body Mass Index)       78 98.2  F (36.8  C) (Tympanic) 16 96% 36.35 kg/m2        Blood Pressure from Last 3 Encounters:   17 120/74   17 124/67   17 142/88    Weight from Last 3 Encounters:   17 275 lb 8 oz (125 kg)   17 273 lb 9.5 oz (124.1 kg)   17 277 lb 8 oz (125.9 kg)              We Performed the Following     BARIATRIC ADULT REFERRAL        Primary Care Provider Office Phone # Fax #    Rodney Viveros [phone]    "    [address] [address]        Equal Access to Services     KARINA GAINES : Hadii yumiko Hidalgo, abeba ruiz, raphael rasmussen, carlos cordero. So [facility] [phone].    ATENCIÓN: Si habla español, tiene a richards disposición servicios gratuitos de asistencia lingüística. Llame al [phone].    We comply with applicable federal civil rights laws and [place] laws. We do not discriminate on the basis of race, color, national origin, age, disability, sex, sexual orientation, or gender identity.            Thank you!     Thank you for choosing [facility]  for your care. Our goal is always to provide you with excellent care. Hearing back from our patients is one way we can continue to improve our services. Please take a few minutes to complete the written survey that you may receive in the mail after your visit with us. Thank you!             Your Updated Medication List - Protect others around you: Learn how to safely use, store and throw away your medicines at www.disposemymeds.org.          This list is accurate as of: 12/21/17 10:26 AM.  Always use your most recent med list.                   Brand Name Dispense Instructions for use Diagnosis    acetaminophen 500 MG tablet    TYLENOL    100 tablet    Take 1 tablet (500 mg) by mouth every 8 hours Take around the clock while taking narcotic pain medication, then can take as needed.    Incarcerated hernia       aspirin 81 MG EC tablet     90 tablet    Take 1 tablet (81 mg) by mouth daily    Hypertension goal BP (blood pressure) < 140/80, Hyperlipidemia LDL goal < 130       Blood Pressure Monitor/Wrist Coco     1 Device    1 Device 2 times daily    Hypertension goal BP (blood pressure) < 140/80       carvedilol 25 MG tablet    COREG    180 tablet    TAKE ONE TABLET BY MOUTH TWICE DAILY WITH MEALS NEED  TO  BE  SEEN  FOR  MORE  REFILLS    Benign essential  hypertension       hydrochlorothiazide 25 MG tablet    HYDRODIURIL    90 tablet    Take 1 tablet (25 mg) by mouth daily    Hypertension goal BP (blood pressure) < 140/80       lisinopril 40 MG tablet    PRINIVIL/ZESTRIL    90 tablet    TAKE ONE TABLET BY MOUTH ONCE DAILY IN THE EVENING    Essential hypertension, benign       multivitamin, therapeutic with minerals Tabs tablet      Take 1 tablet by mouth daily        order for DME     3 Device    Equipment being ordered: stocking black closed toe  20- 25mm of mercury   3 pairs Wash by hand daily Please feet size to adjust probably large or extra large    Cellulitis       * order for DME     2 each    Abdominal Binder - Large    Ventral hernia without mention of obstruction or gangrene       * order for DME     1 Device    Equipment being ordered:  Right wrist carpal tunnel splint  right hand carpal tunnel syndrome  One* Use as right wrist at night or when gripping walker    Carpal tunnel syndrome of right wrist       * order for DME     1 Device    Equipment being ordered:  Abdominal binder  One * Ventral hernia *    Ventral hernia without obstruction or gangrene       oxyCODONE IR 5 MG tablet    ROXICODONE    15 tablet    Take 1 tablet (5 mg) by mouth every 4 hours as needed for pain maximum 12 tablet(s) per day    Incarcerated hernia       polyethylene glycol powder    MIRALAX    510 g    Take 17 g (1 capful) by mouth daily Take while taking narcotics to prevent constipation.    Incarcerated hernia       pyridOXINE 100 MG Tabs    VITAMIN B6    90 tablet    Take 1 tablet (100 mg) by mouth daily    Carpal tunnel syndrome of right wrist       VITAMIN C PO      Take 500 mg by mouth daily + 500 mg po qHS PRN        Zinc 50 MG Caps      Take 1 capsule by mouth daily        * Notice:  This list has 3 medication(s) that are the same as other medications prescribed for you. Read the directions carefully, and ask your doctor or other care provider to review them with you.

## 2017-12-22 ENCOUNTER — TELEPHONE (OUTPATIENT)
Dept: SURGERY | Facility: CLINIC | Age: 62
End: 2017-12-22

## 2017-12-22 NOTE — TELEPHONE ENCOUNTER
Established Patient Telephone Reminder Call    Date of call:  12/22/17  Phone numbers:  Home number on file [phone] (home)    Reached patient/confirmed appointment:  No - left message:   on voicemail  Appointment with:   Dr. Mina Parsons  Reason for visit:  Post op-staples out

## 2017-12-26 ENCOUNTER — OFFICE VISIT (OUTPATIENT)
Dept: SURGERY | Facility: CLINIC | Age: 62
End: 2017-12-26

## 2017-12-26 VITALS
HEIGHT: 73 IN | TEMPERATURE: 97.5 F | HEART RATE: 75 BPM | OXYGEN SATURATION: 96 % | WEIGHT: 275.2 LBS | SYSTOLIC BLOOD PRESSURE: 155 MMHG | BODY MASS INDEX: 36.47 KG/M2 | DIASTOLIC BLOOD PRESSURE: 94 MMHG

## 2017-12-26 DIAGNOSIS — G89.18 POSTOPERATIVE PAIN: Primary | ICD-10-CM

## 2017-12-26 ASSESSMENT — PAIN SCALES - GENERAL: PAINLEVEL: NO PAIN (0)

## 2017-12-26 NOTE — MR AVS SNAPSHOT
After Visit Summary   2017    Sav Mendoza    MRN: [id number]           Patient Information     Date Of Birth          1955        Visit Information        Provider Department      2017 10:30 AM Mina Parsons MD [facility]        Care Instructions    Please follow up with Dr. Parsons on 2017 at 8:00 am. This appointment will be located at:    [place] [address]  1st floor-Endoscopy Center          Follow-ups after your visit        Who to contact     Please call your clinic at [phone] to:    Ask questions about your health    Make or cancel appointments    Discuss your medicines    Learn about your test results    Speak to your doctor   If you have compliments or concerns about an experience at your clinic, or if you wish to file a complaint, please contact [facility] Patient Relations at [phone] or email us at [email or web address]         Additional Information About Your Visit        MyChart Information     [employer] is an electronic gateway that provides easy, online access to your medical records. With [employer], you can request a clinic appointment, read your test results, renew a prescription or communicate with your care team.     To sign up for [employer] visit the website at [email or web address]   You will be asked to enter the access code listed below, as well as some personal information. Please follow the directions to create your username and password.     Your access code is: [email or web address]  Expires: 3/16/2018  1:46 PM     Your access code will  in 90 days. If you need help or a new code, please contact your [facility] or call [phone] for assistance.        Care EveryWhere ID     This is your Care EveryWhere ID. This could be used by other organizations to access your [facility]  "records  [id number]        Your Vitals Were     Pulse Temperature Height Pulse Oximetry BMI (Body Mass Index)       75 97.5  F (36.4  C) 6' 1\" 96% 36.31 kg/m2        Blood Pressure from Last 3 Encounters:   12/26/17 (!) 155/94   12/21/17 120/74   12/16/17 124/67    Weight from Last 3 Encounters:   12/26/17 275 lb 3.2 oz   12/21/17 275 lb 8 oz   12/13/17 273 lb 9.5 oz              Today, you had the following     No orders found for display       Primary Care Provider Office Phone # Fax #    Rodney Maddy Viveros [phone] [address] [address]        Equal Access to Services     KARINA GAINES : Pradip Hidalgo, wajeancarlos luqadaha, qaybta kaalmada adepoppyyajessica, carlos sanches . So [facility] [phone].    ATENCIÓN: Si habla español, tiene a richards disposición servicios gratuitos de asistencia lingüística. Llame al [phone].    We comply with applicable federal civil rights laws and [place] laws. We do not discriminate on the basis of race, color, national origin, age, disability, sex, sexual orientation, or gender identity.            Thank you!     Thank you for choosing [facility]  for your care. Our goal is always to provide you with excellent care. Hearing back from our patients is one way we can continue to improve our services. Please take a few minutes to complete the written survey that you may receive in the mail after your visit with us. Thank you!             Your Updated Medication List - Protect others around you: Learn how to safely use, store and throw away your medicines at www.disposemymeds.org.          This list is accurate as of: 12/26/17 11:38 AM.  Always use your most recent med list.                   Brand Name Dispense Instructions for use Diagnosis    acetaminophen 500 MG tablet    TYLENOL    100 tablet    Take 1 tablet (500 mg) by mouth every 8 hours Take around the clock while taking narcotic " pain medication, then can take as needed.    Incarcerated hernia       amLODIPine 5 MG tablet    NORVASC    30 tablet    Take 1 tablet (5 mg) by mouth daily    Benign essential hypertension       aspirin 81 MG EC tablet     90 tablet    Take 1 tablet (81 mg) by mouth daily    Hypertension goal BP (blood pressure) < 140/80, Hyperlipidemia LDL goal < 130       Blood Pressure Monitor/Wrist Coco     1 Device    1 Device 2 times daily    Hypertension goal BP (blood pressure) < 140/80       carvedilol 25 MG tablet    COREG    180 tablet    TAKE ONE TABLET BY MOUTH TWICE DAILY WITH MEALS NEED  TO  BE  SEEN  FOR  MORE  REFILLS    Benign essential hypertension       hydrochlorothiazide 25 MG tablet    HYDRODIURIL    90 tablet    Take 1 tablet (25 mg) by mouth daily    Hypertension goal BP (blood pressure) < 140/80       lisinopril 40 MG tablet    PRINIVIL/ZESTRIL    90 tablet    TAKE ONE TABLET BY MOUTH ONCE DAILY IN THE EVENING    Essential hypertension, benign       multivitamin, therapeutic with minerals Tabs tablet      Take 1 tablet by mouth daily        order for DME     3 Device    Equipment being ordered: stocking black closed toe  20- 25mm of mercury   3 pairs Wash by hand daily Please feet size to adjust probably large or extra large    Cellulitis       * order for DME     2 each    Abdominal Binder - Large    Ventral hernia without mention of obstruction or gangrene       * order for DME     1 Device    Equipment being ordered:  Right wrist carpal tunnel splint  right hand carpal tunnel syndrome  One* Use as right wrist at night or when gripping walker    Carpal tunnel syndrome of right wrist       * order for DME     1 Device    Equipment being ordered:  Abdominal binder  One * Ventral hernia *    Ventral hernia without obstruction or gangrene       oxyCODONE IR 5 MG tablet    ROXICODONE    15 tablet    Take 1 tablet (5 mg) by mouth every 4 hours as needed for pain maximum 12 tablet(s) per day    Incarcerated  hernia       polyethylene glycol powder    MIRALAX    510 g    Take 17 g (1 capful) by mouth daily Take while taking narcotics to prevent constipation.    Incarcerated hernia       pyridOXINE 100 MG Tabs    VITAMIN B6    90 tablet    Take 1 tablet (100 mg) by mouth daily    Carpal tunnel syndrome of right wrist       VITAMIN C PO      Take 500 mg by mouth daily + 500 mg po qHS PRN        Zinc 50 MG Caps      Take 1 capsule by mouth daily        * Notice:  This list has 3 medication(s) that are the same as other medications prescribed for you. Read the directions carefully, and ask your doctor or other care provider to review them with you.

## 2017-12-26 NOTE — PATIENT INSTRUCTIONS
Please follow up with Dr. Parsons on 12/28/2017 at 8:00 am. This appointment will be located at:    [facility]  [address] [place] [address]  1st floor-Endoscopy Center

## 2017-12-26 NOTE — LETTER
12/26/2017       RE: Sav Mendoza  [address] [facility] [address]     Dear Colleague,    Thank you for referring your patient, Sav Mendoza, to the [facility]. Please see a copy of my visit note below.    [facility] Staff;    The patient, Mr. Sav Mendoza, is a 62-year-old man who is s/p emergency repair of a large ventral hernia.  The area around the umbilicus has some spotting on the guaze and there is a bulge in the mid portion of the repair.      Exam:  Gen: the patient appears completely non-toxic.  He moves easily to the exam table.    The skin edges are clean and dry except at the umbilicus where the edges of the skin are elvin and drain a small amount of brown material.  The mid portion of the repair is a firm mass 10 x 8 x 3 cm with is stained with light green yellow pigment c/w hematoma.  There is no facial deficit detected.  The remainder of the abdominal exam is soft and non-tender.    Ext: warm and well perfused.    A/P:     Mr. Sav Mendoza, is doing well after emergency repair of a large ventral hernia.  The skin at the umbilicus appears ischemic but without infection.  There appears to be a hematoma a the mid portion of the repair.  Will wash with skin with 3.3% PCMX soap and keep the area of the umbilicus dry using a guaze packing.  The hematoma should resolve without surgical intervention.  Will follow closely.       Mina Parsons MD, PhD

## 2017-12-26 NOTE — NURSING NOTE
"No chief complaint on file.      Vitals:    12/26/17 1038   BP: (!) 155/94   BP Location: Left arm   Patient Position: Chair   Cuff Size: Adult Regular   Pulse: 75   Temp: 97.5  F (36.4  C)   SpO2: 96%   Weight: 275 lb 3.2 oz   Height: 6' 1\"       Body mass index is 36.31 kg/(m^2).      Yakelin Samuels                          "

## 2017-12-28 ENCOUNTER — OFFICE VISIT (OUTPATIENT)
Dept: SURGERY | Facility: CLINIC | Age: 62
End: 2017-12-28
Attending: SURGERY

## 2017-12-28 DIAGNOSIS — T14.8XXD WOUND HEALING, DELAYED: Primary | ICD-10-CM

## 2017-12-28 NOTE — LETTER
12/28/2017       RE: Sav Mendoza  [address] [facility] [address]     Dear Colleague,    Thank you for referring your patient, Sav Mendoza, to the [facility] SURGICAL CARE OUTPATIENT at [facility]. Please see a copy of my visit note below.    [facility] Staff:    The patient, Mr. Sav Mendoza,?is a 62-year-old man who is s/p emergency repair of a large ventral hernia. ?The area around the umbilicus has some spotting on the guaze and there is a bulge in the mid portion of the repair.   ?   Exam:   Gen: the patient appears completely non-toxic. ?He moves easily to the exam table. ?   The skin edges are clean and dry except at the umbilicus where the edges of the skin are elvin and drain a small amount of brown material. ?The mid portion of the repair is a firm mass 10 x 8 x 3 cm with is stained with light green yellow pigment c/w hematoma. ?There is no facial deficit detected. ?The remainder of the abdominal exam is soft and non-tender. ?   Ext: warm and well perfused.     A/P:     Mr. Sav Mendoza, is doing well after emergency repair of a large ventral hernia. ?The skin at the umbilicus appears ischemic but without infection. ?There appears to be a hematoma a the mid portion of the repair. ?Will wash with skin with 3.3% PCMX soap and keep the area of the umbilicus dry using a guaze packing. ?The hematoma should resolve without surgical intervention. ?    Mina Parsons MD, PhD

## 2017-12-28 NOTE — NURSING NOTE
0808: Pt admitted to SOC Rm: 328 for SOC visit.  Pt is accompanied today by self. Ambulated without assistance.     Total initial assessment time spent with pt: 1 minutes.     Dr. Parsons paged regarding pt arrival.  0824: Dr. Parsons in room with patient for assessment/treatment.   0851: Dr. Parsons out of room following including assessment, and update to POC.     Discharge education complete by Dr. Parsons.  0851: Pt discharged from SOC to home via ambulation, accompanied by self.    Total additional time spent with pt 0 minutes.     Supplies used: staple remover kit, chloroxylenol 3% surgical scrub 4 oz, 3 packs 4x4 gauze    JAJA Camarillo  [facility]

## 2017-12-28 NOTE — MR AVS SNAPSHOT
After Visit Summary   2017    Sav Mendoza    MRN: [id number]           Patient Information     Date Of Birth          1955        Visit Information        Provider Department      2017 8:00 AM [facility] SOC PROVIDER [facility] Surgical Care Outpatient        Today's Diagnoses     Wound healing, delayed    -  1       Follow-ups after your visit        Who to contact     Please call your clinic at [phone] to:    Ask questions about your health    Make or cancel appointments    Discuss your medicines    Learn about your test results    Speak to your doctor   If you have compliments or concerns about an experience at your clinic, or if you wish to file a complaint, please contact [facility] Patient Relations at [phone] or email us at [email or web address]         Additional Information About Your Visit        MyChart Information     Zouxiu is an electronic gateway that provides easy, online access to your medical records. With Zouxiu, you can request a clinic appointment, read your test results, renew a prescription or communicate with your care team.     To sign up for Zouxiu visit the website at [email or web address]   You will be asked to enter the access code listed below, as well as some personal information. Please follow the directions to create your username and password.     Your access code is: [email or web address]  Expires: 3/16/2018  1:46 PM     Your access code will  in 90 days. If you need help or a new code, please contact your [facility] or call [phone] for assistance.        Care EveryWhere ID     This is your Care EveryWhere ID. This could be used by other organizations to access your [facility] medical records  [id number]         Blood Pressure from Last 3 Encounters:   18 (!) 165/109   17 (!) 155/94   17 120/74    Weight from Last 3 Encounters:   18 276 lb 1.6 oz    12/26/17 275 lb 3.2 oz   12/21/17 275 lb 8 oz              Today, you had the following     No orders found for display       Primary Care Provider Office Phone # Fax #    Rodney Maddy Viveros [phone]       [address] [address]        Equal Access to Services     KARINA GAINES : Hadii aad ku hadasho Soomaali, waaxda luqadaha, qaybta kaalmada adeegyada, waxrafael knoxin hayaan adepoppy khyashdillon sanches . So [facility] [phone].    ATENCIÓN: Si habla español, tiene a richards disposición servicios gratuitos de asistencia lingüística. Llame al [phone].    We comply with applicable federal civil rights laws and [place] laws. We do not discriminate on the basis of race, color, national origin, age, disability, sex, sexual orientation, or gender identity.            Thank you!     Thank you for choosing [facility] SURGICAL CARE OUTPATIENT  for your care. Our goal is always to provide you with excellent care. Hearing back from our patients is one way we can continue to improve our services. Please take a few minutes to complete the written survey that you may receive in the mail after your visit with us. Thank you!             Your Updated Medication List - Protect others around you: Learn how to safely use, store and throw away your medicines at www.disposemymeds.org.          This list is accurate as of: 12/28/17 11:59 PM.  Always use your most recent med list.                   Brand Name Dispense Instructions for use Diagnosis    acetaminophen 500 MG tablet    TYLENOL    100 tablet    Take 1 tablet (500 mg) by mouth every 8 hours Take around the clock while taking narcotic pain medication, then can take as needed.    Incarcerated hernia       amLODIPine 5 MG tablet    NORVASC    30 tablet    Take 1 tablet (5 mg) by mouth daily    Benign essential hypertension       aspirin 81 MG EC tablet     90 tablet    Take 1 tablet (81 mg) by mouth daily    Hypertension goal BP (blood pressure) <  140/80, Hyperlipidemia LDL goal < 130       Blood Pressure Monitor/Wrist Coco     1 Device    1 Device 2 times daily    Hypertension goal BP (blood pressure) < 140/80       carvedilol 25 MG tablet    COREG    180 tablet    TAKE ONE TABLET BY MOUTH TWICE DAILY WITH MEALS NEED  TO  BE  SEEN  FOR  MORE  REFILLS    Benign essential hypertension       hydrochlorothiazide 25 MG tablet    HYDRODIURIL    90 tablet    Take 1 tablet (25 mg) by mouth daily    Hypertension goal BP (blood pressure) < 140/80       lisinopril 40 MG tablet    PRINIVIL/ZESTRIL    90 tablet    TAKE ONE TABLET BY MOUTH ONCE DAILY IN THE EVENING    Essential hypertension, benign       multivitamin, therapeutic with minerals Tabs tablet      Take 1 tablet by mouth daily        order for DME     3 Device    Equipment being ordered: stocking black closed toe  20- 25mm of mercury   3 pairs Wash by hand daily Please feet size to adjust probably large or extra large    Cellulitis       * order for DME     2 each    Abdominal Binder - Large    Ventral hernia without mention of obstruction or gangrene       * order for DME     1 Device    Equipment being ordered:  Right wrist carpal tunnel splint  right hand carpal tunnel syndrome  One* Use as right wrist at night or when gripping walker    Carpal tunnel syndrome of right wrist       * order for DME     1 Device    Equipment being ordered:  Abdominal binder  One * Ventral hernia *    Ventral hernia without obstruction or gangrene       oxyCODONE IR 5 MG tablet    ROXICODONE    15 tablet    Take 1 tablet (5 mg) by mouth every 4 hours as needed for pain maximum 12 tablet(s) per day    Incarcerated hernia       polyethylene glycol powder    MIRALAX    510 g    Take 17 g (1 capful) by mouth daily Take while taking narcotics to prevent constipation.    Incarcerated hernia       pyridOXINE 100 MG Tabs    VITAMIN B6    90 tablet    Take 1 tablet (100 mg) by mouth daily    Carpal tunnel syndrome of right wrist        VITAMIN C PO      Take 500 mg by mouth daily + 500 mg po qHS PRN        Zinc 50 MG Caps      Take 1 capsule by mouth daily        * Notice:  This list has 3 medication(s) that are the same as other medications prescribed for you. Read the directions carefully, and ask your doctor or other care provider to review them with you.

## 2017-12-29 ENCOUNTER — TELEPHONE (OUTPATIENT)
Dept: SURGERY | Facility: CLINIC | Age: 62
End: 2017-12-29

## 2017-12-29 NOTE — TELEPHONE ENCOUNTER
----- Message from Bravo Taylor RN sent at 12/29/2017 10:18 AM CST -----  Can you call pt and help him set up an appt with  for next Thursday at 3:30 in clinic?    He can be reached at [phone].    Thanks,  Bravo

## 2017-12-29 NOTE — TELEPHONE ENCOUNTER
Per task, I called the patient and let him know that he is scheduled to see Dr. Parsons on 01/04/2018 at 3:30 pm.

## 2017-12-29 NOTE — PROGRESS NOTES
S Staff;    The patient, Mr. Sav Mendoza, is a 62-year-old man who is s/p emergency repair of a large ventral hernia.  The area around the umbilicus has some spotting on the guaze and there is a bulge in the mid portion of the repair.      Exam:  Gen: the patient appears completely non-toxic.  He moves easily to the exam table.    The skin edges are clean and dry except at the umbilicus where the edges of the skin are elvin and drain a small amount of brown material.  The mid portion of the repair is a firm mass 10 x 8 x 3 cm with is stained with light green yellow pigment c/w hematoma.  There is no facial deficit detected.  The remainder of the abdominal exam is soft and non-tender.    Ext: warm and well perfused.    A/P:     Mr. Sav Mendoza, is doing well after emergency repair of a large ventral hernia.  The skin at the umbilicus appears ischemic but without infection.  There appears to be a hematoma a the mid portion of the repair.  Will wash with skin with 3.3% PCMX soap and keep the area of the umbilicus dry using a guaze packing.  The hematoma should resolve without surgical intervention.  Will follow closely.       Mina Parsons MD, PhD

## 2018-01-03 ENCOUNTER — TELEPHONE (OUTPATIENT)
Dept: SURGERY | Facility: CLINIC | Age: 63
End: 2018-01-03

## 2018-01-03 NOTE — TELEPHONE ENCOUNTER
Established Patient Telephone Reminder Call    Date of call:  01/03/18  Phone numbers:  Home number on file [phone] (home)    Reached patient/confirmed appointment:  No - left message:   on voicemail  Appointment with:   Dr. Mina Parsons  Reason for visit:  Post op-wound check

## 2018-01-04 ENCOUNTER — OFFICE VISIT (OUTPATIENT)
Dept: SURGERY | Facility: CLINIC | Age: 63
End: 2018-01-04

## 2018-01-04 VITALS
OXYGEN SATURATION: 100 % | HEART RATE: 76 BPM | DIASTOLIC BLOOD PRESSURE: 109 MMHG | SYSTOLIC BLOOD PRESSURE: 165 MMHG | HEIGHT: 73 IN | WEIGHT: 276.1 LBS | BODY MASS INDEX: 36.59 KG/M2

## 2018-01-04 DIAGNOSIS — Z51.89 VISIT FOR WOUND CHECK: Primary | ICD-10-CM

## 2018-01-04 ASSESSMENT — PAIN SCALES - GENERAL: PAINLEVEL: NO PAIN (0)

## 2018-01-04 NOTE — NURSING NOTE
"Chief Complaint   Patient presents with     Surgical Followup     wound check        Vitals:    01/04/18 1553   BP: (!) 165/109   Pulse: 76   SpO2: 100%   Weight: 276 lb 1.6 oz   Height: 6' 1\"       Body mass index is 36.43 kg/(m^2).      Marquise CARR.                  "

## 2018-01-04 NOTE — LETTER
1/4/2018       RE: Sav Mendoza  [address] [address]     Dear Colleague,    Thank you for referring your patient, Sav Mendoza, to the [facility] SURGERY at [facility]. Please see a copy of my visit note below.    [facility]:     The patient, Mr. Sav Mendoza, is a 62-year-old man who is s/p emergency repair of a large ventral hernia. The area around the umbilicus has an escar and there is resolution  and flattening of the bulge in the mid portion of the repair. ?      Exam:      Gen: the patient appears completely non-toxic. ?He moves easily to the exam table. ?   The mid portion of the repair is now nearly flat with very light green yellow pigment c/w resolving hematoma. There is an escar at the umbilicus.?The remainder of the abdominal exam is soft and non-tender. All surgical clips were removed and steri-strips applied.     Ext: warm and well perfused.      A/P:      Mr. Sav Mendoza, is doing very well after emergency repair of a large ventral hernia. The skin at the umbilicus appears healthy and without infection. There appears to be resolution of the hematoma at the mid portion of the repair. Rec. continuing to wash with skin with 3.3% PCMX soap and keep the area of the umbilicus dry using a guaze packing. Will follow the repair one more time in the [facility] clinic in the spring of 2018.       Mina Parsons MD, PhD

## 2018-01-04 NOTE — PROCEDURES
[facility]:     The patient, Mr. Sav Mendoza, is a 62-year-old man who is s/p emergency repair of a large ventral hernia. The area around the umbilicus has an escar and there is resolution  and flattening of the bulge in the mid portion of the repair. ?      Exam:      Gen: the patient appears completely non-toxic. ?He moves easily to the exam table. ?   The mid portion of the repair is now nearly flat with very light green yellow pigment c/w resolving hematoma. There is an escar at the umbilicus.?The remainder of the abdominal exam is soft and non-tender. All surgical clips were removed and steri-strips applied.     Ext: warm and well perfused.      A/P:      Mr. Sav Mendoza, is doing very well after emergency repair of a large ventral hernia. The skin at the umbilicus appears healthy and without infection. There appears to be resolution of the hematoma at the mid portion of the repair. Rec. continuing to wash with skin with 3.3% PCMX soap and keep the area of the umbilicus dry using a guaze packing. Will follow the repair one more time in the [facility] clinic in the spring of 2018.       Mina Parsons MD, PhD

## 2018-01-04 NOTE — PROGRESS NOTES
S Staff:    The patient, Mr. Sav Mendoza,?is a 62-year-old man who is s/p emergency repair of a large ventral hernia. ?The area around the umbilicus has some spotting on the guaze and there is a bulge in the mid portion of the repair.   ?   Exam:   Gen: the patient appears completely non-toxic. ?He moves easily to the exam table. ?   The skin edges are clean and dry except at the umbilicus where the edges of the skin are elvin and drain a small amount of brown material. ?The mid portion of the repair is a firm mass 10 x 8 x 3 cm with is stained with light green yellow pigment c/w hematoma. ?There is no facial deficit detected. ?The remainder of the abdominal exam is soft and non-tender. ?   Ext: warm and well perfused.     A/P:     Mr. Sav Mendoza, is doing well after emergency repair of a large ventral hernia. ?The skin at the umbilicus appears ischemic but without infection. ?There appears to be a hematoma a the mid portion of the repair. ?Will wash with skin with 3.3% PCMX soap and keep the area of the umbilicus dry using a guaze packing. ?The hematoma should resolve without surgical intervention. ?    Mina Parsons MD, PhD

## 2018-01-04 NOTE — MR AVS SNAPSHOT
"              After Visit Summary   2018    Sav Mendoza    MRN: [id number]           Patient Information     Date Of Birth          1955        Visit Information        Provider Department      2018 3:30 PM Mina Parsons MD [facility] Surgery        Today's Diagnoses     Visit for wound check    -  1       Follow-ups after your visit        Who to contact     Please call your clinic at [phone] to:    Ask questions about your health    Make or cancel appointments    Discuss your medicines    Learn about your test results    Speak to your doctor   If you have compliments or concerns about an experience at your clinic, or if you wish to file a complaint, please contact [facility] Patient Relations at [phone] or email us at [email or web address]         Additional Information About Your Visit        MyChart Information     [employer] is an electronic gateway that provides easy, online access to your medical records. With [employer], you can request a clinic appointment, read your test results, renew a prescription or communicate with your care team.     To sign up for Wormholet visit the website at [email or web address]   You will be asked to enter the access code listed below, as well as some personal information. Please follow the directions to create your username and password.     Your access code is: [email or web address]  Expires: 3/16/2018  1:46 PM     Your access code will  in 90 days. If you need help or a new code, please contact your [facility] or call [phone] for assistance.        Care EveryWhere ID     This is your Care EveryWhere ID. This could be used by other organizations to access your [facility] medical records  [id number]        Your Vitals Were     Pulse Height Pulse Oximetry BMI (Body Mass Index)          76 6' 1\" 100% 36.43 kg/m2         Blood Pressure from Last 3 Encounters:   18 " (!) 165/109   12/26/17 (!) 155/94   12/21/17 120/74    Weight from Last 3 Encounters:   01/04/18 276 lb 1.6 oz   12/26/17 275 lb 3.2 oz   12/21/17 275 lb 8 oz              Today, you had the following     No orders found for display       Primary Care Provider Office Phone # Fax #    Rodney Maddy Viveros [phone]       [address] [address]        Equal Access to Services     KARINA GAINES : Hadii yumiko ku hadasho Soomaali, waaxda luqadaha, qaybta kaalmada adeegyada, carlos sanches . So [facility] [phone].    ATENCIÓN: Si habla español, tiene a richards disposición servicios gratuitos de asistencia lingüística. [facility] [phone].    We comply with applicable federal civil rights laws and [place] laws. We do not discriminate on the basis of race, color, national origin, age, disability, sex, sexual orientation, or gender identity.            Thank you!     Thank you for choosing [facility] SURGERY  for your care. Our goal is always to provide you with excellent care. Hearing back from our patients is one way we can continue to improve our services. Please take a few minutes to complete the written survey that you may receive in the mail after your visit with us. Thank you!             Your Updated Medication List - Protect others around you: Learn how to safely use, store and throw away your medicines at www.disposemymeds.org.          This list is accurate as of: 1/4/18  5:15 PM.  Always use your most recent med list.                   Brand Name Dispense Instructions for use Diagnosis    acetaminophen 500 MG tablet    TYLENOL    100 tablet    Take 1 tablet (500 mg) by mouth every 8 hours Take around the clock while taking narcotic pain medication, then can take as needed.    Incarcerated hernia       amLODIPine 5 MG tablet    NORVASC    30 tablet    Take 1 tablet (5 mg) by mouth daily    Benign essential hypertension       aspirin 81 MG EC  tablet     90 tablet    Take 1 tablet (81 mg) by mouth daily    Hypertension goal BP (blood pressure) < 140/80, Hyperlipidemia LDL goal < 130       Blood Pressure Monitor/Wrist Coco     1 Device    1 Device 2 times daily    Hypertension goal BP (blood pressure) < 140/80       carvedilol 25 MG tablet    COREG    180 tablet    TAKE ONE TABLET BY MOUTH TWICE DAILY WITH MEALS NEED  TO  BE  SEEN  FOR  MORE  REFILLS    Benign essential hypertension       hydrochlorothiazide 25 MG tablet    HYDRODIURIL    90 tablet    Take 1 tablet (25 mg) by mouth daily    Hypertension goal BP (blood pressure) < 140/80       lisinopril 40 MG tablet    PRINIVIL/ZESTRIL    90 tablet    TAKE ONE TABLET BY MOUTH ONCE DAILY IN THE EVENING    Essential hypertension, benign       multivitamin, therapeutic with minerals Tabs tablet      Take 1 tablet by mouth daily        order for DME     3 Device    Equipment being ordered: stocking black closed toe  20- 25mm of mercury   3 pairs Wash by hand daily Please feet size to adjust probably large or extra large    Cellulitis       * order for DME     2 each    Abdominal Binder - Large    Ventral hernia without mention of obstruction or gangrene       * order for DME     1 Device    Equipment being ordered:  Right wrist carpal tunnel splint  right hand carpal tunnel syndrome  One* Use as right wrist at night or when gripping walker    Carpal tunnel syndrome of right wrist       * order for DME     1 Device    Equipment being ordered:  Abdominal binder  One * Ventral hernia *    Ventral hernia without obstruction or gangrene       oxyCODONE IR 5 MG tablet    ROXICODONE    15 tablet    Take 1 tablet (5 mg) by mouth every 4 hours as needed for pain maximum 12 tablet(s) per day    Incarcerated hernia       polyethylene glycol powder    MIRALAX    510 g    Take 17 g (1 capful) by mouth daily Take while taking narcotics to prevent constipation.    Incarcerated hernia       pyridOXINE 100 MG Tabs    VITAMIN  B6    90 tablet    Take 1 tablet (100 mg) by mouth daily    Carpal tunnel syndrome of right wrist       VITAMIN C PO      Take 500 mg by mouth daily + 500 mg po qHS PRN        Zinc 50 MG Caps      Take 1 capsule by mouth daily        * Notice:  This list has 3 medication(s) that are the same as other medications prescribed for you. Read the directions carefully, and ask your doctor or other care provider to review them with you.

## 2018-04-06 ENCOUNTER — OFFICE VISIT (OUTPATIENT)
Dept: SURGERY | Facility: CLINIC | Age: 63
End: 2018-04-06
Attending: SURGERY

## 2018-04-06 DIAGNOSIS — Z98.890 POSTOPERATIVE STATE: Primary | ICD-10-CM

## 2018-04-06 NOTE — LETTER
4/6/2018     RE: Sav Mendoza  [address] [facility] [address]     Dear Colleague,    Thank you for referring your patient, Sav Mendoza, to the [facility] SURGICAL CARE OUTPATIENT at [facility]. Please see a copy of my visit note below.    [facility] Staff:    I examined the patient.  I reviewed the patient's medical record and the progress notes.  The patient, Mr. Sav Mendoza, is a 62-year-old man who is well known to the S service.  The patient is now doing well and has no new concerns or complaints.      ROS: a ten point ROS is negative.    PAST MEDICAL HISTORY:  Past Medical History:   Diagnosis Date     Hypertension      Ventral hernia 6/24/14        PAST SURGICAL HISTORY:  Past Surgical History:   Procedure Laterality Date     LAPAROTOMY EXPLORATORY N/A 12/12/2017    Procedure: LAPAROTOMY EXPLORATORY;  Exploratory Laparotomy and  Gint Ventral Hernia Repair with Mesh;  Surgeon: Mina Parsons MD;  Location: [facility] OR     ORTHOPEDIC SURGERY Left 2010    ankle fusion        ALLERGIES:  No Known Allergies     SOCIAL HISTORY:  Social History     Social History     Marital status:      Spouse name: N/A     Number of children: N/A     Years of education: N/A     Social History Main Topics     Smoking status: Never Smoker     Smokeless tobacco: Never Used     Alcohol use Yes      Comment: rare     Drug use: No     Sexual activity: No     Other Topics Concern     Parent/Sibling W/ Cabg, Mi Or Angioplasty Before 65f 55m? No     Social History Narrative       FAMILY HISTORY:  Family History   Problem Relation Age of Onset     Alzheimer Disease Mother       PHYSICAL EXAM::  Gen: The patient appears completely non-toxic.  He moves easily to the exam table.    ABD: the abdomen is soft and non tender.  The surgical site is healing well.   EXT: warm and well perfused.      A/P:  Mr. Sav Mendoza, is doing well after emergency repair of a large ventral hernia.   There is  no evidence of hernia recurrence. The skin at the umbilicus is now healing well.  The patient will return to the [facility] clinic on a prn basis.       Mina Parsons MD, PhD

## 2018-05-17 DIAGNOSIS — I10 BENIGN ESSENTIAL HYPERTENSION: ICD-10-CM

## 2018-05-18 RX ORDER — CARVEDILOL 25 MG/1
TABLET ORAL
Qty: 60 TABLET | Refills: 11 | Status: SHIPPED | OUTPATIENT
Start: 2018-05-18 | End: 2019-02-09

## 2018-05-18 NOTE — TELEPHONE ENCOUNTER
"Requested Prescriptions   Pending Prescriptions Disp Refills     carvedilol (COREG) 25 MG tablet [Pharmacy Med Name: CARVEDILOL 25MG     TAB]  PATIENT NEEDS AN OFFICE VISIT.  Last Written Prescription Date:  4/27/17  Last Fill Quantity: 180 TABLET,  # refills: 3   Last office visit: 12/21/2017 with prescribing provider:  JOSÉ GUIDRY   Future Office Visit:     60 tablet 11     Sig: TAKE ONE TABLET BY MOUTH TWICE DAILY WITH MEALS NEED  TO  BE  SEEN  FOR  MORE  REFILLS    Beta-Blockers Protocol Failed    5/17/2018  8:55 PM       Failed - Blood pressure under 140/90 in past 12 months    BP Readings from Last 3 Encounters:   01/04/18 (!) 165/109   12/26/17 (!) 155/94   12/21/17 120/74                Passed - Patient is age 6 or older       Passed - Recent (12 mo) or future (30 days) visit within the authorizing provider's specialty    Patient had office visit in the last 12 months or has a visit in the next 30 days with authorizing provider or within the authorizing provider's specialty.  See \"Patient Info\" tab in inbasket, or \"Choose Columns\" in Meds & Orders section of the refill encounter.              "

## 2018-05-18 NOTE — TELEPHONE ENCOUNTER
Left voice message asking patient to schedule office visit.  Routing refill request to provider for review/approval because:  BP out of range:  Above 140/90

## 2018-06-04 NOTE — PROGRESS NOTES
S Staff:    I examined the patient.  I reviewed the patient's medical record and the progress notes.  The patient, Mr. Sav Mendoza, is a 62-year-old man who is well known to the S service.  The patient is now doing well and has no new concerns or complaints.      ROS: a ten point ROS is negative.    PAST MEDICAL HISTORY:  Past Medical History:   Diagnosis Date     Hypertension      Ventral hernia 6/24/14        PAST SURGICAL HISTORY:  Past Surgical History:   Procedure Laterality Date     LAPAROTOMY EXPLORATORY N/A 12/12/2017    Procedure: LAPAROTOMY EXPLORATORY;  Exploratory Laparotomy and  Gint Ventral Hernia Repair with Mesh;  Surgeon: Mina Parsons MD;  Location: [facility] OR     ORTHOPEDIC SURGERY Left 2010    ankle fusion        ALLERGIES:  No Known Allergies     SOCIAL HISTORY:  Social History     Social History     Marital status:      Spouse name: N/A     Number of children: N/A     Years of education: N/A     Social History Main Topics     Smoking status: Never Smoker     Smokeless tobacco: Never Used     Alcohol use Yes      Comment: rare     Drug use: No     Sexual activity: No     Other Topics Concern     Parent/Sibling W/ Cabg, Mi Or Angioplasty Before 65f 55m? No     Social History Narrative       FAMILY HISTORY:  Family History   Problem Relation Age of Onset     Alzheimer Disease Mother         PHYSICAL EXAM::  Gen: The patient appears completely non-toxic.  He moves easily to the exam table.    ABD: the abdomen is soft and non tender.  The surgical site is healing well.   EXT: warm and well perfused.      A/P:  Mr. Sav Mendoza, is doing well after emergency repair of a large ventral hernia.  There is no evidence of hernia recurrence. The skin at the umbilicus is now healing well.  The patient will return to the S clinic on a prn basis.       Mina Parsons MD, PhD

## 2018-07-06 DIAGNOSIS — I10 ESSENTIAL HYPERTENSION, BENIGN: ICD-10-CM

## 2018-07-06 NOTE — TELEPHONE ENCOUNTER
"Requested Prescriptions   Pending Prescriptions Disp Refills     lisinopril (PRINIVIL/ZESTRIL) 40 MG tablet [Pharmacy Med Name: LISINOPRIL 40MG     TAB] 30 tablet 5    Last Written Prescription Date:  06/30/2017  Last Fill Quantity: 90,  # refills: 3   Last office visit: 12/21/2017 with prescribing provider:  Dr. Viveros   Future Office Visit:   Sig: TAKE ONE TABLET BY MOUTH ONCE DAILY IN THE EVENING    ACE Inhibitors (Including Combos) Protocol Failed    7/6/2018  5:30 AM       Failed - Blood pressure under 140/90 in past 12 months    BP Readings from Last 3 Encounters:   01/04/18 (!) 165/109   12/26/17 (!) 155/94   12/21/17 120/74                Passed - Recent (12 mo) or future (30 days) visit within the authorizing provider's specialty    Patient had office visit in the last 12 months or has a visit in the next 30 days with authorizing provider or within the authorizing provider's specialty.  See \"Patient Info\" tab in inbasket, or \"Choose Columns\" in Meds & Orders section of the refill encounter.           Passed - Patient is age 18 or older       Passed - Normal serum creatinine on file in past 12 months    Recent Labs   Lab Test  12/15/17   0547   CR  0.66            Passed - Normal serum potassium on file in past 12 months    Recent Labs   Lab Test  12/15/17   0547   POTASSIUM  3.6             "

## 2018-07-09 RX ORDER — LISINOPRIL 40 MG/1
TABLET ORAL
Qty: 30 TABLET | Refills: 5 | Status: SHIPPED | OUTPATIENT
Start: 2018-07-09 | End: 2019-01-23

## 2018-09-21 DIAGNOSIS — I10 HYPERTENSION GOAL BP (BLOOD PRESSURE) < 140/80: Chronic | ICD-10-CM

## 2018-09-22 NOTE — TELEPHONE ENCOUNTER
"Requested Prescriptions   Pending Prescriptions Disp Refills     hydrochlorothiazide (HYDRODIURIL) 25 MG tablet [Pharmacy Med Name: HYDROCHLOROT 25MG   TAB]  Last Written Prescription Date:  08/28/2017  Last Fill Quantity: 90,  # refills: 3   Last office visit: 12/21/2017 with prescribing provider:  JOSÉ GUIDRY    Future Office Visit:     30 tablet 11     Sig: TAKE ONE TABLET BY MOUTH ONCE DAILY    Diuretics (Including Combos) Protocol Failed    9/21/2018  7:14 PM       Failed - Blood pressure under 140/90 in past 12 months    BP Readings from Last 3 Encounters:   01/04/18 (!) 165/109   12/26/17 (!) 155/94   12/21/17 120/74                Passed - Recent (12 mo) or future (30 days) visit within the authorizing provider's specialty    Patient had office visit in the last 12 months or has a visit in the next 30 days with authorizing provider or within the authorizing provider's specialty.  See \"Patient Info\" tab in inbasket, or \"Choose Columns\" in Meds & Orders section of the refill encounter.           Passed - Patient is age 18 or older       Passed - Normal serum creatinine on file in past 12 months    Recent Labs   Lab Test  12/15/17   0547   CR  0.66             Passed - Normal serum potassium on file in past 12 months    Recent Labs   Lab Test  12/15/17   0547   POTASSIUM  3.6                   Passed - Normal serum sodium on file in past 12 months    Recent Labs   Lab Test  12/15/17   0547   NA  140                "

## 2018-09-24 NOTE — TELEPHONE ENCOUNTER
Routing refill request to provider for review/approval because:  BP elevated.  Would you like him to schedule a Office Visit  Celia Cannon RN- Triage [employer]

## 2018-09-25 RX ORDER — HYDROCHLOROTHIAZIDE 25 MG/1
TABLET ORAL
Qty: 30 TABLET | Refills: 11 | Status: SHIPPED | OUTPATIENT
Start: 2018-09-25 | End: 2019-02-18

## 2018-12-27 DIAGNOSIS — I10 BENIGN ESSENTIAL HYPERTENSION: ICD-10-CM

## 2018-12-28 NOTE — TELEPHONE ENCOUNTER
"Requested Prescriptions   Pending Prescriptions Disp Refills     amLODIPine (NORVASC) 5 MG tablet [Pharmacy Med Name: AMLODIPINE 5MG TAB]  Last Written Prescription Date:  12/21/17  Last Fill Quantity: 30,  # refills: 11   Last office visit: 12/21/2017 with prescribing provider:  chandler   Future Office Visit:     30 tablet 11     Sig: TAKE ONE TABLET BY MOUTH ONCE DAILY    Calcium Channel Blockers Protocol  Failed - 12/27/2018  8:46 PM       Failed - Blood pressure under 140/90 in past 12 months    BP Readings from Last 3 Encounters:   01/04/18 (!) 165/109   12/26/17 (!) 155/94   12/21/17 120/74                Failed - Recent (12 mo) or future (30 days) visit within the authorizing provider's specialty    Patient had office visit in the last 12 months or has a visit in the next 30 days with authorizing provider or within the authorizing provider's specialty.  See \"Patient Info\" tab in inbasket, or \"Choose Columns\" in Meds & Orders section of the refill encounter.             Failed - Normal serum creatinine on file in past 12 months    Recent Labs   Lab Test 12/15/17  0547   CR 0.66            Passed - Patient is age 18 or older          "

## 2019-01-02 RX ORDER — AMLODIPINE BESYLATE 5 MG/1
TABLET ORAL
Qty: 30 TABLET | Refills: 11 | Status: SHIPPED | OUTPATIENT
Start: 2019-01-02 | End: 2019-02-09

## 2019-01-02 NOTE — TELEPHONE ENCOUNTER
Routing refill request to provider for review/approval because:  Yamini given x1 and patient did not follow up, please advise  Patient needs to be seen because it has been more than 1 year since last office visit.

## 2019-02-09 ENCOUNTER — OFFICE VISIT (OUTPATIENT)
Dept: FAMILY MEDICINE | Facility: CLINIC | Age: 64
End: 2019-02-09

## 2019-02-09 VITALS
BODY MASS INDEX: 39.28 KG/M2 | WEIGHT: 290 LBS | TEMPERATURE: 97.6 F | RESPIRATION RATE: 14 BRPM | HEIGHT: 72 IN | DIASTOLIC BLOOD PRESSURE: 84 MMHG | HEART RATE: 67 BPM | SYSTOLIC BLOOD PRESSURE: 136 MMHG | OXYGEN SATURATION: 98 %

## 2019-02-09 DIAGNOSIS — E66.01 MORBID OBESITY (H): ICD-10-CM

## 2019-02-09 DIAGNOSIS — Z11.59 NEED FOR HEPATITIS C SCREENING TEST: ICD-10-CM

## 2019-02-09 DIAGNOSIS — Z11.4 SCREENING FOR HIV (HUMAN IMMUNODEFICIENCY VIRUS): ICD-10-CM

## 2019-02-09 DIAGNOSIS — I10 BENIGN ESSENTIAL HYPERTENSION: ICD-10-CM

## 2019-02-09 DIAGNOSIS — E78.5 HYPERLIPIDEMIA LDL GOAL <130: ICD-10-CM

## 2019-02-09 DIAGNOSIS — Z12.11 SCREEN FOR COLON CANCER: ICD-10-CM

## 2019-02-09 DIAGNOSIS — L84 CALLUS OF FOOT: ICD-10-CM

## 2019-02-09 DIAGNOSIS — Z00.00 ROUTINE HISTORY AND PHYSICAL EXAMINATION OF ADULT: Primary | ICD-10-CM

## 2019-02-09 DIAGNOSIS — R60.0 BILATERAL LEG EDEMA: ICD-10-CM

## 2019-02-09 DIAGNOSIS — Z87.2 HISTORY OF CELLULITIS: ICD-10-CM

## 2019-02-09 DIAGNOSIS — K46.0 INCARCERATED HERNIA: ICD-10-CM

## 2019-02-09 DIAGNOSIS — Z12.5 SCREENING PSA (PROSTATE SPECIFIC ANTIGEN): ICD-10-CM

## 2019-02-09 LAB
ALT SERPL W P-5'-P-CCNC: 30 U/L (ref 0–70)
ANION GAP SERPL CALCULATED.3IONS-SCNC: 6 MMOL/L (ref 3–14)
BUN SERPL-MCNC: 17 MG/DL (ref 7–30)
CALCIUM SERPL-MCNC: 9.1 MG/DL (ref 8.5–10.1)
CHLORIDE SERPL-SCNC: 105 MMOL/L (ref 94–109)
CHOLEST SERPL-MCNC: 182 MG/DL
CO2 SERPL-SCNC: 28 MMOL/L (ref 20–32)
CREAT SERPL-MCNC: 0.7 MG/DL (ref 0.66–1.25)
CREAT UR-MCNC: 158 MG/DL
GFR SERPL CREATININE-BSD FRML MDRD: >90 ML/MIN/{1.73_M2}
GLUCOSE SERPL-MCNC: 112 MG/DL (ref 70–99)
HBA1C MFR BLD: 5.6 % (ref 0–5.6)
HDLC SERPL-MCNC: 35 MG/DL
LDLC SERPL CALC-MCNC: 104 MG/DL
MICROALBUMIN UR-MCNC: 14 MG/L
MICROALBUMIN/CREAT UR: 8.86 MG/G CR (ref 0–17)
NONHDLC SERPL-MCNC: 147 MG/DL
POTASSIUM SERPL-SCNC: 4.2 MMOL/L (ref 3.4–5.3)
SODIUM SERPL-SCNC: 139 MMOL/L (ref 133–144)
TRIGL SERPL-MCNC: 217 MG/DL

## 2019-02-09 PROCEDURE — 80048 BASIC METABOLIC PNL TOTAL CA: CPT | Performed by: FAMILY MEDICINE

## 2019-02-09 PROCEDURE — 82043 UR ALBUMIN QUANTITATIVE: CPT | Performed by: FAMILY MEDICINE

## 2019-02-09 PROCEDURE — G0103 PSA SCREENING: HCPCS | Performed by: FAMILY MEDICINE

## 2019-02-09 PROCEDURE — 36415 COLL VENOUS BLD VENIPUNCTURE: CPT | Performed by: FAMILY MEDICINE

## 2019-02-09 PROCEDURE — 80061 LIPID PANEL: CPT | Performed by: FAMILY MEDICINE

## 2019-02-09 PROCEDURE — 99396 PREV VISIT EST AGE 40-64: CPT | Performed by: FAMILY MEDICINE

## 2019-02-09 PROCEDURE — 83036 HEMOGLOBIN GLYCOSYLATED A1C: CPT | Performed by: FAMILY MEDICINE

## 2019-02-09 PROCEDURE — 84460 ALANINE AMINO (ALT) (SGPT): CPT | Performed by: FAMILY MEDICINE

## 2019-02-09 RX ORDER — ACETAMINOPHEN 500 MG
500 TABLET ORAL EVERY 8 HOURS
Qty: 100 TABLET | Refills: 3 | Status: SHIPPED | OUTPATIENT
Start: 2019-02-09 | End: 2019-02-15

## 2019-02-09 RX ORDER — AMLODIPINE BESYLATE 5 MG/1
5 TABLET ORAL DAILY
Qty: 90 TABLET | Refills: 3 | Status: SHIPPED | OUTPATIENT
Start: 2019-02-09 | End: 2020-04-20

## 2019-02-09 RX ORDER — CARVEDILOL 25 MG/1
TABLET ORAL
Qty: 1180 TABLET | Refills: 3 | Status: SHIPPED | OUTPATIENT
Start: 2019-02-09 | End: 2019-02-15

## 2019-02-09 RX ORDER — LISINOPRIL 40 MG/1
TABLET ORAL
Qty: 90 TABLET | Refills: 3 | Status: SHIPPED | OUTPATIENT
Start: 2019-02-09 | End: 2020-03-29

## 2019-02-09 ASSESSMENT — MIFFLIN-ST. JEOR: SCORE: 2148.43

## 2019-02-09 NOTE — PROGRESS NOTES
SUBJECTIVE:   CC: Sav Mendoza is an 63 year old male who presents for preventative health visit.     Physical   Annual:     Getting at least 3 servings of Calcium per day:  Yes    Bi-annual eye exam:  Yes    Dental care twice a year:  NO    Sleep apnea or symptoms of sleep apnea:  None    Diet:  Regular (no restrictions)    Frequency of exercise:  2-3 days/week    Duration of exercise:  15-30 minutes    Taking medications regularly:  Yes    Medication side effects:  None    Additional concerns today:  Yes    PHQ-2 Total Score: 0        Morbid obesity secondary to excessive calories difficult to lose weight    History of diverticulosis of large colon    Hypertension well-controlled current regimen    Bilateral leg edema with tendency toward cellulitis    History of Baker's cyst behind the knee    History of ventral hernia    Patient has a large callosity in the right outer foot which is been bothering him for the last several months is because he walks on the outside of his foot he has been using a foot pad I believe he needs to see Dr. Murphy at the [facility] he is at risk for getting another episode of cellulitis        Today's PHQ-2 Score:   PHQ-2 ( 1999 [employer]) 2/9/2019   Q1: Little interest or pleasure in doing things 0   Q2: Feeling down, depressed or hopeless 0   PHQ-2 Score 0   Q1: Little interest or pleasure in doing things Not at all   Q2: Feeling down, depressed or hopeless Not at all   PHQ-2 Score 0       Abuse: Current or Past(Physical, Sexual or Emotional)- No  Do you feel safe in your environment? Yes    Social History     Tobacco Use     Smoking status: Never Smoker     Smokeless tobacco: Never Used   Substance Use Topics     Alcohol use: Yes     Comment: rare     Alcohol Use 2/9/2019   If you drink alcohol do you typically have greater than 3 drinks per day OR greater than 7 drinks per week? No   No flowsheet data found.    Last PSA:   PSA   Date Value Ref Range Status   02/09/2019 2.59 0 -  4 ug/L Final     Comment:     Assay Method:  Chemiluminescence using Siemens Vista analyzer       Reviewed orders with patient. Reviewed health maintenance and updated orders accordingly - Yes     Labs reviewed in EPIC  BP Readings from Last 3 Encounters:   02/09/19 136/84   01/04/18 (!) 165/109   12/26/17 (!) 155/94    Wt Readings from Last 3 Encounters:   02/09/19 131.5 kg (290 lb)   01/04/18 125.2 kg (276 lb 1.6 oz)   12/26/17 124.8 kg (275 lb 3.2 oz)                  Patient Active Problem List   Diagnosis     Cellulitis of Lt lower leg since [id number] since last saw ID  5-7-14     Baker's cyst     Ventral hernia     Left inguinal hernia     Diverticulosis of large intestine     Edema of both legs     Stasis dermatitis of both legs     Benign essential hypertension     Need for hepatitis C screening test     Cellulitis and abscess of leg: Lt  Lat Malleolus  onset late 4-16      ACP (advance care planning)     Non morbid obesity due to excess calories with comorbid HTN     Bilateral leg edema     Incarcerated hernia     Visit for wound check     Obesity (BMI 35.0-39.9) with comorbidity (H)     Past Surgical History:   Procedure Laterality Date     LAPAROTOMY EXPLORATORY N/A 12/12/2017    Procedure: LAPAROTOMY EXPLORATORY;  Exploratory Laparotomy and  Gint Ventral Hernia Repair with Mesh;  Surgeon: Mina Parsons MD;  Location: [facility] OR     ORTHOPEDIC SURGERY Left 2010    ankle fusion       Social History     Tobacco Use     Smoking status: Never Smoker     Smokeless tobacco: Never Used   Substance Use Topics     Alcohol use: Yes     Comment: rare     Family History   Problem Relation Age of Onset     Alzheimer Disease Mother          Current Outpatient Medications   Medication Sig Dispense Refill     acetaminophen (TYLENOL) 500 MG tablet Take 1 tablet (500 mg) by mouth every 8 hours Take around the clock while taking narcotic pain medication, then can take as needed. 100 tablet 3     amLODIPine (NORVASC) 5  MG tablet Take 1 tablet (5 mg) by mouth daily 90 tablet 3     Ascorbic Acid (VITAMIN C PO) Take 500 mg by mouth daily + 500 mg po qHS PRN       carvedilol (COREG) 25 MG tablet TAKE ONE TABLET BY MOUTH TWICE DAILY WITH MEALS NEED  TO  BE  SEEN  FOR  MORE  REFILLS 1180 tablet 3     lisinopril (PRINIVIL/ZESTRIL) 40 MG tablet TAKE 1 TABLET BY MOUTH ONCE DAILY IN THE EVENING 90 tablet 3     multivitamin, therapeutic with minerals (THERA-VIT-M) TABS Take 1 tablet by mouth daily       ORDER FOR DME Equipment being ordered: stocking black closed toe  20- 25mm of mercury    3 pairs  Wash by hand daily  Please feet size to adjust probably large or extra large 3 Device prn     Zinc 50 MG CAPS Take 1 capsule by mouth daily       Blood Pressure Monitoring (BLOOD PRESSURE MONITOR/WRIST) LAURA 1 Device 2 times daily (Patient not taking: Reported on 2/9/2019) 1 Device 0     hydrochlorothiazide (HYDRODIURIL) 25 MG tablet TAKE ONE TABLET BY MOUTH ONCE DAILY (Patient not taking: Reported on 2/9/2019) 30 tablet 11     order for DME Equipment being ordered:  Right wrist carpal tunnel splint   right hand carpal tunnel syndrome   One*  Use as right wrist at night or when gripping walker (Patient not taking: Reported on 2/9/2019) 1 Device 1     order for DME Equipment being ordered:  Abdominal binder   One *  Ventral hernia * (Patient not taking: Reported on 2/9/2019) 1 Device 0     ORDER FOR DME Abdominal Binder - Large (Patient not taking: Reported on 2/9/2019) 2 each 1     oxyCODONE IR (ROXICODONE) 5 MG tablet Take 1 tablet (5 mg) by mouth every 4 hours as needed for pain maximum 12 tablet(s) per day (Patient not taking: Reported on 2/9/2019) 15 tablet 0     polyethylene glycol (MIRALAX) powder Take 17 g (1 capful) by mouth daily Take while taking narcotics to prevent constipation. (Patient not taking: Reported on 2/9/2019) 510 g 1     pyridOXINE (VITAMIN B6) 100 MG TABS Take 1 tablet (100 mg) by mouth daily (Patient not taking: Reported  on 2/9/2019) 90 tablet 11     No Known Allergies  Recent Labs   Lab Test 02/09/19  1124 12/15/17  0547  12/12/17  0107  02/27/17  1056  05/10/16  1752 09/18/15  1049 04/08/14  1129   A1C 5.6  --   --   --   --  5.6  --  5.8  --   --    *  --   --   --   --   --   --   --  104 100   HDL 35*  --   --   --   --   --   --   --  34* 26*   TRIG 217*  --   --   --   --   --   --   --  172* 177*   ALT 30  --   --  27  --  24  --   --  30 28   CR 0.70 0.66   < > 0.62*   < > 0.79   < > 0.73 1.00 0.80   GFRESTIMATED >90 >90   < > >90   < > >90  Non  GFR Calc     < > >90  Non  GFR Calc   76 >90   GFRESTBLACK >90 >90   < > >90   < > >90  African American GFR Calc     < > >90   GFR Calc   >90 >90   POTASSIUM 4.2 3.6   < > 3.2*   < > 3.8   < > 3.1* 3.9 3.8    < > = values in this interval not displayed.        Reviewed and updated as needed this visit by clinical staff  Tobacco  Allergies  Meds  Problems  Med Hx  Surg Hx  Fam Hx  Soc Hx          Reviewed and updated as needed this visit by Provider  Allergies  Meds  Problems  Med Hx  Surg Hx            Review of Systems  CONSTITUTIONAL: NEGATIVE for fever, chills, change in weight  INTEGUMENTARY/SKIN: NEGATIVE for worrisome rashes, moles or lesions  EYES: NEGATIVE for vision changes or irritation  ENT: NEGATIVE for ear, mouth and throat problems  RESP: NEGATIVE for significant cough or SOB  CV: NEGATIVE for chest pain, palpitations or peripheral edema  GI: NEGATIVE for nausea, abdominal pain, heartburn, or change in bowel habits   male: negative for dysuria, hematuria, decreased urinary stream, erectile dysfunction, urethral discharge  MUSCULOSKELETAL: NEGATIVE for significant arthralgias or myalgia  NEURO: NEGATIVE for weakness, dizziness or paresthesias  PSYCHIATRIC: NEGATIVE for changes in mood or affect    OBJECTIVE:   /84 (Patient Position: Sitting, Cuff Size: Adult Large)   Pulse 67   Temp 97.6  F  (36.4  C) (Tympanic)   Resp 14   Ht 1.829 m (6')   Wt 131.5 kg (290 lb)   SpO2 98%   BMI 39.33 kg/m      Physical Exam  GENERAL: healthy, alert and no distress  EYES: Eyes grossly normal to inspection, PERRL and conjunctivae and sclerae normal  HENT: ear canals and TM's normal, nose and mouth without ulcers or lesions  NECK: no adenopathy, no asymmetry, masses, or scars and thyroid normal to palpation  RESP: lungs clear to auscultation - no rales, rhonchi or wheezes  BREAST: normal without masses, tenderness or nipple discharge and no palpable axillary masses or adenopathy  CV: regular rate and rhythm, normal S1 S2, no S3 or S4, no murmur, click or rub, no peripheral edema and peripheral pulses strong  ABDOMEN: soft, nontender, no hepatosplenomegaly, no masses and bowel sounds normal   (male): normal male genitalia without lesions or urethral discharge, no hernia  RECTAL: normal sphincter tone, no rectal masses, prostate normal size, smooth, nontender without nodules or masses  MS: Bilateral leg swelling with interning of the right ankle velocity in the bottom of the right foot decreased range of motion of the ankle OA of both knees OA of the left ankle  SKIN: no suspicious lesions or rashes and 1.5 cm callosity right outer foot  NEURO: Normal strength and tone, mentation intact and speech normal  PSYCH: mentation appears normal, affect normal/bright  LYMPH: no cervical adenopathy       Diagnostic Test Results:  Results for orders placed or performed in visit on 02/09/19   Hemoglobin A1c   Result Value Ref Range    Hemoglobin A1C 5.6 0 - 5.6 %   ALT   Result Value Ref Range    ALT 30 0 - 70 U/L   Basic metabolic panel   Result Value Ref Range    Sodium 139 133 - 144 mmol/L    Potassium 4.2 3.4 - 5.3 mmol/L    Chloride 105 94 - 109 mmol/L    Carbon Dioxide 28 20 - 32 mmol/L    Anion Gap 6 3 - 14 mmol/L    Glucose 112 (H) 70 - 99 mg/dL    Urea Nitrogen 17 7 - 30 mg/dL    Creatinine 0.70 0.66 - 1.25 mg/dL    GFR  Estimate >90 >60 mL/min/[1.73_m2]    GFR Estimate If Black >90 >60 mL/min/[1.73_m2]    Calcium 9.1 8.5 - 10.1 mg/dL   Lipid panel reflex to direct LDL Fasting   Result Value Ref Range    Cholesterol 182 <200 mg/dL    Triglycerides 217 (H) <150 mg/dL    HDL Cholesterol 35 (L) >39 mg/dL    LDL Cholesterol Calculated 104 (H) <100 mg/dL    Non HDL Cholesterol 147 (H) <130 mg/dL   Albumin Random Urine Quantitative with Creat Ratio   Result Value Ref Range    Creatinine Urine 158 mg/dL    Albumin Urine mg/L 14 mg/L    Albumin Urine mg/g Cr 8.86 0 - 17 mg/g Cr   PSA, screen   Result Value Ref Range    PSA 2.59 0 - 4 ug/L       ASSESSMENT/PLAN:       ICD-10-CM    1. Routine history and physical examination of adult Z00.00    2. Benign essential hypertension I10 ALT     Basic metabolic panel     Albumin Random Urine Quantitative with Creat Ratio     carvedilol (COREG) 25 MG tablet     amLODIPine (NORVASC) 5 MG tablet   3. Screen for colon cancer Z12.11    4. Need for hepatitis C screening test Z11.59    5. Screening for HIV (human immunodeficiency virus) Z11.4    6. Morbid obesity (H) E66.01 Hemoglobin A1c     ALT   7. History of cellulitis Z87.2    8. Bilateral leg edema R60.0    9. Callus of foot L84 PODIATRY/FOOT & ANKLE SURGERY REFERRAL   10. Hyperlipidemia LDL goal <130 E78.5 Lipid panel reflex to direct LDL Fasting   11. Screening PSA (prostate specific antigen) Z12.5 PSA, screen   12. Incarcerated hernia K46.0 acetaminophen (TYLENOL) 500 MG tablet   13. Essential hypertension, benign I10 lisinopril (PRINIVIL/ZESTRIL) 40 MG tablet       COUNSELING:   Reviewed preventive health counseling, as reflected in patient instructions       Regular exercise       Healthy diet/nutrition       Vision screening       Hearing screening    BP Readings from Last 1 Encounters:   02/09/19 136/84     Estimated body mass index is 39.33 kg/m  as calculated from the following:    Height as of this encounter: 1.829 m (6').    Weight as of  this encounter: 131.5 kg (290 lb).      Weight management plan: Discussed healthy diet and exercise guidelines     reports that  has never smoked. he has never used smokeless tobacco.      Counseling Resources:  ATP IV Guidelines  Pooled Cohorts Equation Calculator  FRAX Risk Assessment  ICSI Preventive Guidelines  Dietary Guidelines for Americans, 2010  USDA's MyPlate  ASA Prophylaxis  Lung CA Screening    DAYSI GUIDRY MD  [facility]

## 2019-02-09 NOTE — PATIENT INSTRUCTIONS
"  Preventive Health Recommendations  Male Ages 50 - 64    Yearly exam:             See your health care provider every year in order to  o   Review health changes.   o   Discuss preventive care.    o   Review your medicines if your doctor has prescribed any.     Have a cholesterol test every 5 years, or more frequently if you are at risk for high cholesterol/heart disease.     Have a diabetes test (fasting glucose) every three years. If you are at risk for diabetes, you should have this test more often.     Have a colonoscopy at age 50, or have a yearly FIT test (stool test). These exams will check for colon cancer.      Talk with your health care provider about whether or not a prostate cancer screening test (PSA) is right for you.    You should be tested each year for STDs (sexually transmitted diseases), if you re at risk.     Shots: Get a flu shot each year. Get a tetanus shot every 10 years.     Nutrition:    Eat at least 5 servings of fruits and vegetables daily.     Eat whole-grain bread, whole-wheat pasta and brown rice instead of white grains and rice.     Get adequate Calcium and Vitamin D.     Lifestyle    Exercise for at least 150 minutes a week (30 minutes a day, 5 days a week). This will help you control your weight and prevent disease.     Limit alcohol to one drink per day.     No smoking.     Wear sunscreen to prevent skin cancer.     See your dentist every six months for an exam and cleaning.     See your eye doctor every 1 to 2 years.  Diabetes: Exchange List  What are the exchange lists?   The exchange lists show you portions of food that equal 1 exchange. Foods are divided into food lists. The foods on each list are called exchanges because they have a similar number of calories, protein, carbohydrate and fat content. Foods from each list can be traded or \"exchanged\" for any other food on the same list because they all have a similar exchange value. A dietitian will help you plan how much food " your child should eat at each meal and from what lists the foods should come from. At first you should measure your food until you are able to make good estimates about serving sizes. The following list is a sample of foods found on the exchange lists. For more information, you can buy the Exchange Lists for Meal Planning from: The American Diabetes Association [address] [place] [address] [phone] http://www.diabetes.org  Carbohydrate group   Starch List: One starch exchange contains about 15 grams of carbohydrate, 3 grams of protein, 0 to 1 grams of fat, and 80 calories. A starch exchange is sometimes called a carb exchange. Examples of one starch (carb) exchange are:   one slice of bread   1/2 hamburger or hot dog bun   3/4 cup of unsweetened cereal   1/3 cup pasta   3 cups popcorn   crackers (6 small saltines, 3 squares of kylah crackers, 3 of most other crackers)   1 pancake or waffle (5 inch)   15 to 20 fat-free or baked potato or corn chips.   The vegetables included in the starch exchanges include:   corn (1/2 cup or 1/2 cob)   white potato (1/4 large baked with skin or 1/2 cup mashed)   yam or sweet potato (1/2 cup)   green peas (1/2 cup)   squash, winter (1 cup)   lima beans (2/3 cup).   Fruit List: 1 fruit exchange contains about 15 grams of carbohydrate and 60 calories. Examples of one fruit exchange are:   grape juice (1/3 cup)   apple or pineapple juice (1/2 cup)   orange or grapefruit juice (1/2 cup)   1 small apple   orange or peach   1/2 banana   1 cup raspberries   1/3 of a small cantaloupe   1 slice of watermelon.   Milk List: 1 milk exchange contains about 8 grams of protein and 12 grams of carbohydrate. Items on the milk list are divided into fat-free, reduced fat, and whole milk depending on the number of fat grams in the exchange. Examples of one milk exchange are: Fat-Free (0 to 3 grams of fat)   1 cup of skim or non-fat milk   1 cup of 1% milk (also includes 1/2 fat exchange)   6  "ounces flavored fat-free yogurt   Reduced-Fat (5 grams of fat)   6 ounces of plain, low-fat yogurt   1 cup 2% milk (also includes one fat exchange).   Whole Milk (8 grams of fat)   8 ounces of plain yogurt (made from whole milk)   1 cup whole milk.   Vegetable List: One vegetable exchange has 5 grams of carbohydrate, 2 grams of protein, no fat, and 25 calories. One-half cup of cooked or a cup of raw vegetables is a good measure for 1 exchange of most vegetables. Raw lettuce may be taken in larger quantities, but salad dressing usually equals 1 fat exchange. Other Carbohydrates List: One \"other carbohydrate\" exchange has 15 grams of carbohydrate. Many of these foods count as a starch exchange and one or more fat exchanges.   brownie (2 inch square) = 1 carb, 1 fat exchange   fruit snack roll = 1 carb exchange   granola bar = 1 and 1/2 carb exchanges   ice cream (1/2 cup) = 1 carb, 2 fat exchanges   frozen yogurt (1/2 cup) = 1 carb, 0 to 1 fat exchanges   tortilla chips (6-12) = 1 carb, 2 fat exchanges.   Meat and Meat Substitute Group   Meats are divided into very lean meats, lean meats, medium-fat meats, and high-fat meats. People with diabetes should try to eat more lean and medium fat meats and stay away from the high fat choices. The Very Lean meat group includes foods that contain 7 grams of protein, 0 to 1 gram of fat, and 35 calories for 1 exchange. Examples include:   1 ounce chicken or turkey (white meat, no skin)   1 ounce fresh fish   1 ounce fat-free cheese   2 egg whites   The Lean meat group includes foods that contain 7 grams of protein, 3 grams of fat, and 55 calories for 1 meat exchange. Examples include:   1 ounce chicken or turkey (dark meat, no skin)   1 ounce fish   1 ounce lean pork   1 ounce USDA Select or Choice grades of lean beef   1 ounce cheese (with 3 grams of fat or less per ounce).   The Medium-Fat group includes foods that have 7 grams of protein, 5 grams of fat, and 75 calories for 1 " meat exchange. Examples include:   1 ounce of ground beef, most cuts of beef, pork, lamb or veal   1 ounce of cheese (5 grams of fat per ounce or less)   1 egg   1 ounce fried fish.   The High-Fat foods have 7 grams of protein, 8 grams of fat, and 100 calories for 1 meat exchange. This group includes:   1 ounce of pork sausage   1 ounce of spare ribs   1 oz of regular cheese (American, Swiss etc.)   1 oz of lunch meat   1 hot dog (turkey or chicken).   Fat Group   Fat List: Fat is necessary for the body and is particularly important during periods of fasting (overnight), when it is very slowly absorbed. 1 fat exchange contains 5 grams of fat and 45 calories. The monounsaturated fats and polyunsaturated fats are better for us than saturated fats. The fat list includes: 1 exchange of monounsaturated fats equals:   1/2 Tbsp peanut butter   6 almonds   1 tsp of oil (olive, peanut, canola).   1 exchange of polyunsaturated fats equals:   1 tsp margarine   1 tsp of any vegetable oil (except coconut).   1 exchange of saturated fat includes:   1 tsp butter   1 strip of lam   2 Tbsp of cream (half and half).   Free Foods   A free food contains less than 20 calories or less than 5 grams of carbohydrate per serving. If the food has a serving size listed on its package, it should be limited to 3 servings spread throughout the day. Examples of free foods include:   4 Tbsp fat-free margarine   1 Tbsp fat-free Miracle Whip   sugar-free gelatin   diet soft drinks   catsup   soy sauce   spices.   Combination Foods   Many foods, such as casseroles, are mixed together. Your dietitian can help you figure out how many exchanges to count for combination foods. For example:   lasagna (1 cup) = 2 carb exchanges and 2 medium-fat meat exchanges   spaghetti with meatballs (1 cup) = 2 carb exchanges and 2 medium-fat meat exchanges   pizza, cheese (1/4 of 12 in.) = 2 carbs, 2 medium-fat meats   chicken noodle soup (1 cup) = 1 carb exchange    frozen entrée (less than 300 calories) = 2 carbs, 3 lean meat exchanges   macaroni and cheese (1 cup) = 2 carb exchanges and 2 medium-fat meat exchanges.   1. MODIFIED FAST 250 CALORIES TWICE DAILY FEMALES  300 CALORIES TWICE DAILY FOR MALES   OATMEAL AND COTTAGE CHEESE WORK NICELY   COPIOUS WATER BETWEEN   5/2 PLAN DR BATISTA [facility]  NORMAL DIET 5 DAYS PER WEEK  SEMIFAST 2 DAYS PER WEEK  LOOK UP 5-2 PLAN ON THE INTERNET    Weight Loss Tips  1. Do not eat after 6 hrs before your expected bedtime  2. Have your heaviest meal for breakfast, a slightly lighter meal at lunch and a snack 6 hrs before bed  3. No sugar/calorie drinks except milk ie no fruit juice, pop, alcohol.  4. Drink milk OR WATER  30min before meals to decrease your hunger. Also it is excellent as part of your last meal of the day snack  5. Drink lots of water  6. Increase fiber in diet: all bran cereal, salads, popcorn etc  7. Have only one small serving of fruit a day about 1/2 cup (as this is high in sugar)  8. EXERCISE is the bottom line. Without it, you will gain weight even on a low calorie diet. Best if done 2-3X a day as can    2. The only way known to prevent diabetes or keep it from getting worse is exercise, 20-40 minutes 3 times a day around the time of meals    SLEEP 8 HOURS PER DAY  BLACK AND BLUEBERRIES EVERY DAY ANTINFLAMMATORY BENEFIT   PLAIN YOGURT SEVERAL TIMES DAILY AS TOLERATED IF NOT ALLERGIC   AVOID HIGH FRUCTOSE SYRUP AND OLENE IN YOUR DIET   GREEN TEA EXTRACT  PROBIOTIC CAPSULE DAILY  HIGH QUALITY   DON'T EAT LATE AT NIGHT  CARALLUMA FIMBRIATA HELPS WITH APPETITE  KEEP A BLESSINGS JOURNAL  888 BREATHER WHEN STRESSED OR COUNT BACK FROM 100 WITH SLOW BREATHING  POSITIVE AFFIRMATIONS       FIT BIT OR PEDOMETER 10,000 STEPS PER DAY   FIT BIT [facility]IO[facility] RECOMMENDED      Rodney Viveros Jr., MD

## 2019-02-09 NOTE — LETTER
February 11, 2019      Sav Sánchez Mendoza  [address] [id number]        Dear ,    We are writing to inform you of your test results.    Your 3-month sugar average was normal and you do not have diabetes   Your liver function test was normal   Your microalbumin test for kidney damage was normal   Have a normal total cholesterol at 182   Borderline high triglycerides at 217   Low HDL or good cholesterol I would recommend you exercise regularly to raise   Also eating fish or fish oil might be of some benefit   Your LDL or bad cholesterol is borderline at 104 exercise will also help that   Follow the Mediterranean diet that I gave you at the office visit and also I recommend a weight loss program   By taking 20 exchanges from the diabetes exchange list and stopping eating about 5 PM daily   Your fasting blood sugar was borderline but I do not think you are completely fasting     Was great to see you today   I want to see Dr. Murphy the podiatrist to make sure that foot ulcer will heal   Come back and see me for a follow-up of your blood pressure in approximately 6 months     Resulted Orders   Hemoglobin A1c   Result Value Ref Range    Hemoglobin A1C 5.6 0 - 5.6 %      Comment:      Normal <5.7% Prediabetes 5.7-6.4%  Diabetes 6.5% or higher - adopted from ADA   consensus guidelines.     ALT   Result Value Ref Range    ALT 30 0 - 70 U/L   Basic metabolic panel   Result Value Ref Range    Sodium 139 133 - 144 mmol/L    Potassium 4.2 3.4 - 5.3 mmol/L    Chloride 105 94 - 109 mmol/L    Carbon Dioxide 28 20 - 32 mmol/L    Anion Gap 6 3 - 14 mmol/L    Glucose 112 (H) 70 - 99 mg/dL      Comment:      Fasting specimen    Urea Nitrogen 17 7 - 30 mg/dL    Creatinine 0.70 0.66 - 1.25 mg/dL    GFR Estimate >90 >60 mL/min/[1.73_m2]      Comment:      Non  GFR Calc  Starting 12/18/2018, serum creatinine based estimated GFR (eGFR) will be   calculated using the Chronic Kidney Disease  Epidemiology Collaboration   (CKD-EPI) equation.      GFR Estimate If Black >90 >60 mL/min/[1.73_m2]      Comment:       GFR Calc  Starting 12/18/2018, serum creatinine based estimated GFR (eGFR) will be   calculated using the Chronic Kidney Disease Epidemiology Collaboration   (CKD-EPI) equation.      Calcium 9.1 8.5 - 10.1 mg/dL   Lipid panel reflex to direct LDL Fasting   Result Value Ref Range    Cholesterol 182 <200 mg/dL    Triglycerides 217 (H) <150 mg/dL      Comment:      Borderline high:  150-199 mg/dl  High:             200-499 mg/dl  Very high:       >499 mg/dl  Fasting specimen      HDL Cholesterol 35 (L) >39 mg/dL    LDL Cholesterol Calculated 104 (H) <100 mg/dL      Comment:      Above desirable:  100-129 mg/dl  Borderline High:  130-159 mg/dL  High:             160-189 mg/dL  Very high:       >189 mg/dl      Non HDL Cholesterol 147 (H) <130 mg/dL      Comment:      Above Desirable:  130-159 mg/dl  Borderline high:  160-189 mg/dl  High:             190-219 mg/dl  Very high:       >219 mg/dl     Albumin Random Urine Quantitative with Creat Ratio   Result Value Ref Range    Creatinine Urine 158 mg/dL    Albumin Urine mg/L 14 mg/L    Albumin Urine mg/g Cr 8.86 0 - 17 mg/g Cr       If you have any questions or concerns, please call the clinic at the number listed above.       Sincerely,        DAYSI GUIDRY MD

## 2019-02-11 LAB — PSA SERPL-ACNC: 2.59 UG/L (ref 0–4)

## 2019-02-15 ENCOUNTER — TELEPHONE (OUTPATIENT)
Dept: FAMILY MEDICINE | Facility: CLINIC | Age: 64
End: 2019-02-15

## 2019-02-15 DIAGNOSIS — I10 BENIGN ESSENTIAL HYPERTENSION: ICD-10-CM

## 2019-02-15 RX ORDER — CARVEDILOL 25 MG/1
TABLET ORAL
Qty: 180 TABLET | Refills: 3 | Status: SHIPPED | OUTPATIENT
Start: 2019-02-15 | End: 2019-08-24

## 2019-02-15 NOTE — TELEPHONE ENCOUNTER
carvedilol (COREG) 25 MG tablet  You prescribed 1180 tabs with 3 refills did you really mean to prescribe this many pills at once?

## 2019-02-18 ENCOUNTER — OFFICE VISIT (OUTPATIENT)
Dept: PODIATRY | Facility: CLINIC | Age: 64
End: 2019-02-18

## 2019-02-18 ENCOUNTER — ANCILLARY PROCEDURE (OUTPATIENT)
Dept: GENERAL RADIOLOGY | Facility: CLINIC | Age: 64
End: 2019-02-18
Attending: PODIATRIST

## 2019-02-18 VITALS
HEIGHT: 72 IN | BODY MASS INDEX: 39.01 KG/M2 | DIASTOLIC BLOOD PRESSURE: 85 MMHG | WEIGHT: 288 LBS | SYSTOLIC BLOOD PRESSURE: 136 MMHG | HEART RATE: 68 BPM

## 2019-02-18 DIAGNOSIS — S91.301A OPEN WOUND OF RIGHT FOOT, INITIAL ENCOUNTER: ICD-10-CM

## 2019-02-18 DIAGNOSIS — M21.961 ACQUIRED DEFORMITY OF RIGHT ANKLE AND FOOT: ICD-10-CM

## 2019-02-18 DIAGNOSIS — S91.301A OPEN WOUND OF RIGHT FOOT, INITIAL ENCOUNTER: Primary | ICD-10-CM

## 2019-02-18 PROCEDURE — 73630 X-RAY EXAM OF FOOT: CPT | Mod: RT

## 2019-02-18 PROCEDURE — 99214 OFFICE O/P EST MOD 30 MIN: CPT | Performed by: PODIATRIST

## 2019-02-18 ASSESSMENT — MIFFLIN-ST. JEOR: SCORE: 2139.36

## 2019-02-18 NOTE — PATIENT INSTRUCTIONS
Thank you for choosing [facility]!    Follow up in 1 month    DR. BANKS'S CLINIC LOCATIONS     MONDAY  [place] TUESDAY & FRIDAY AM  [place]   [address] [facility]   [address]   [place] [address] [place] [address]   [phone]  [phone]  [phone]       WEDNESDAY  [facility] [phone]   [address] [facility] APPOINTMENTS: [phone]   [place] [address] BILLING QUESTIONS: [phone]   [phone]       Calluses, Corns, IPKs, Porokeratosis    When there is excessive friction or pressure on the skin, the body responds by making the skin thicker to protect the deeper structures from becoming exposed.  While this works well to protect the deeper structures, the thickened skin can increase pressure and pain.    Flat, diffuse thickening are simple calluses and they are usually caused by friction.  Often these are the result of rubbing on a shoe or going barefoot.    Calluses with a central core between the toes are called corns.  These result from prominent joints on adjacent toes rubbing together.  Theses are a symptom of bone malalignment and will always recur unless the underlying bones are addressed surgically.    Calluses with a central core on the ball of the foot are usually IPKs (intractable plantar keratosis).  These are caused by excessive pressure from the metatarsals, the bones that make up the ball of the foot.  Often one of these bones is too long or too prominent.  Again, these will always recur unless the underlying bone issue is addressed.  There is no cure for these.  They will either go away by themselves, recur, or more could develop.    Regardless of the diagnosis, most of these lesions can be kept comfortable with routine maintenance.   1.This consists of filing them with a pumice stone or callus file a couple of times per week.    2. Lotion can be applied to soften the  callus. A urea based cream such as Kerasal or Vanicream or thicker cream with shea butter are good options.  3. Toe spacers or toe covers can be used for corns, gel pads can be used for other lesions on the bottom of the foot.   If there is a surgical pathology noted, such as a prominent bone, often this needs to be addressed surgically to minimize recurrence. However, sometimes the lesion simply migrates to another spot after surgery, so it is not a guaranteed cure.     Please call with any additional questions.       We discussed the potential costs of callus trimming including the possibility this may not be covered under insurance.  Cost of this can be around $150 if paying out of pocket.    Wound Care Recommendations:    1)  Keep the wound covered by a bandage when bathing.    2)  Gently clean the wound with soap water, separate from bath/shower water.      3)  Each day, apply a topical antibiotic ointment to the wound (Neosporin, Triple antibiotic, Bacitracin).   Cover with large band-aid or gauze.      4)  Please seek immediate medical attention if any increasing redness, swelling, drainage, smell, or pain related to the wound.     5)  Please return to clinic in the period of time requested.      Body Mass Index (BMI)  Many things can cause foot and ankle problems. Foot structure, activity level, foot mechanics and injuries are common causes of pain.  One very important issue that often goes unmentioned, is body weight. Extra weight can cause increased stress on muscles, ligaments, bones and tendons.  Sometimes just a few extra pounds is all it takes to put one over her/his threshold. Without reducing that stress, it can be difficult to alleviate pain. Some people are uncomfortable addressing this issue, but we feel it is important for you to think about it. As Foot &  Ankle specialists, our job is addressing the lower extremity problem and possible causes. Regarding extra body weight, we encourage patients  to discuss diet and weight management plans with their primary care doctors. It is this team approach that gives you the best opportunity for pain relief and getting you back on your feet.

## 2019-02-18 NOTE — LETTER
"    2/18/2019         RE: Sav Mendoza  [address] [id number]        Dear Colleague,    Thank you for referring your patient, Sav Mendoza, to the [facility]. Please see a copy of my visit note below.    PATIENT HISTORY:  Sav Mendoza is a 63 year old male who presents to clinic for R foot \"callus.\"  Reports bleeding from the area for about a month.  Hx of L ankle fusion per pt at [facility].  States he has had longstanding R ankle deformity.  Reports injury in the 70s/80s.  He states a brace has been attempted but didn't work.  Reports numbness in feet.  Denies DM.  He dresses the R foot daily.  4/10 pain.    Review of Systems:  Patient denies fever, chills, rash, wound, stiffness, limping, numbness, weakness, heart burn, blood in stool, chest pain with activity, calf pain when walking, shortness of breath with activity, chronic cough, easy bleeding/bruising, swelling of ankles, excessive thirst, fatigue, depression, anxiety.  Patient admits to limping.     PAST MEDICAL HISTORY:   Past Medical History:   Diagnosis Date     Hypertension      Ventral hernia 6/24/14        PAST SURGICAL HISTORY:   Past Surgical History:   Procedure Laterality Date     LAPAROTOMY EXPLORATORY N/A 12/12/2017    Procedure: LAPAROTOMY EXPLORATORY;  Exploratory Laparotomy and  Gint Ventral Hernia Repair with Mesh;  Surgeon: Mina Parsons MD;  Location: [facility] OR     ORTHOPEDIC SURGERY Left 2010    ankle fusion        MEDICATIONS:   Current Outpatient Medications:      amLODIPine (NORVASC) 5 MG tablet, Take 1 tablet (5 mg) by mouth daily, Disp: 90 tablet, Rfl: 3     Ascorbic Acid (VITAMIN C PO), Take 500 mg by mouth daily + 500 mg po qHS PRN, Disp: , Rfl:      carvedilol (COREG) 25 MG tablet, TAKE ONE TABLET BY MOUTH TWICE DAILY WITH MEALS NEED  TO  BE  SEEN  FOR  MORE  REFILLS, Disp: 180 tablet, Rfl: 3     lisinopril (PRINIVIL/ZESTRIL) 40 MG tablet, TAKE 1 TABLET BY MOUTH ONCE DAILY IN THE " EVENING, Disp: 90 tablet, Rfl: 3     multivitamin, therapeutic with minerals (THERA-VIT-M) TABS, Take 1 tablet by mouth daily, Disp: , Rfl:      ALLERGIES:  No Known Allergies     SOCIAL HISTORY:   Social History     Socioeconomic History     Marital status:      Spouse name: Not on file     Number of children: Not on file     Years of education: Not on file     Highest education level: Not on file   Social Needs     Financial resource strain: Not on file     Food insecurity - worry: Not on file     Food insecurity - inability: Not on file     Transportation needs - medical: Not on file     Transportation needs - non-medical: Not on file   Occupational History     Not on file   Tobacco Use     Smoking status: Never Smoker     Smokeless tobacco: Never Used   Substance and Sexual Activity     Alcohol use: Yes     Comment: rare     Drug use: No     Sexual activity: No   Other Topics Concern     Parent/sibling w/ CABG, MI or angioplasty before 65F 55M? No   Social History Narrative     Not on file        FAMILY HISTORY:   Family History   Problem Relation Age of Onset     Alzheimer Disease Mother         EXAM:Vitals: /85   Pulse 68   Ht 1.829 m (6')   Wt 130.6 kg (288 lb)   BMI 39.06 kg/m     BMI= Body mass index is 39.06 kg/m .    General appearance: Patient is alert and fully cooperative with history & exam.  No sign of distress is noted during the visit.     Psychiatric: Affect is pleasant & appropriate.  Patient appears motivated to improve health.     Respiratory: Breathing is regular & unlabored while sitting.     HEENT: Hearing is intact to spoken word.  Speech is clear.  No gross evidence of visual impairment that would impact ambulation.    Pt deferred L foot/ankle exam.     Dermatologic: L 5th met base laterally with hyperkeratotic tissue, local wound 5mm in diameter with granular base.  No deep probing.  No paronychia or evidence of soft tissue infection is noted.     Vascular: DP & PT pulses  are intact & regular on the R.  No significant edema or varicosities noted.  CFT and skin temperature are normal.     Neurologic: Lower extremity sensation is diminished to monofilament exam.     Musculoskeletal:  Severe varus deformity of R ankle.  Ankle ROM is basically absent.  Rigid deformity overall.  Pt walks on the side of his foot with ambulation. Patient is ambulatory without assistive device or brace.  Uses a cane at times.  No gross ankle deformity noted.  No foot or ankle joint effusion is noted.    XRs of R foot/ankle reviewed with pt.  Obliteration of ankle joint with hypertrophic bone formation.  Evidence of prior 5th met base fracture with callus/healing.  No erosions to suggest osteomyelitis.  Varus angulation of foot at level of the ankle.     ASSESSMENT:   R foot ulcer, callus  Severe R foot/ankle varus deformity  Peripheral neuropathy     PLAN:  Reviewed patient's chart in epic.  Discussed condition and treatment options including pros and cons.    Treatment alternatives for foot ulceration were discussed.  Local wound care options were explained.  Healing will be a significant challenge based on the weight bearing / pressure location of the ulcer.  I explained that the ulcer is likely to become complicated at some point.  Risk of deep infection will become greater if the wound becomes larger or deeper.    Potential for infection was described including the soft tissues, bone or joints.  Surgery would likely involve hospitalization and partial limb amputation.     Pt has a severe rigid contributory ankle deformity.  ? prior charcot event, more likely posttraumatic.  Pt with hx of neuropathy, but not diabetic.  I'm not sure this is reconstructable.  Below knee amputation may be the only option to address this deformity.  We discussed offloading options including inserts/padding.    Will send to Dr. Ortiz at [facility] to see if there are any reconstruction options for this patient.    Offloading options  discussed including padding, brace, inserts.  Pt has an offloading insert he will reinforce.  Minimize WB to allow for healing.    Wound Care Recommendations:    1)  Keep the wound covered by a bandage when bathing.    2)  Gently clean the wound with soap water, separate from bath/shower water.      3)  Each day, apply a topical antibiotic ointment to the wound (Neosporin, Triple antibiotic, Bacitracin).   Cover with large band-aid or gauze.      4)  Please seek immediate medical attention if any increasing redness, swelling, drainage, smell, or pain related to the wound.     5)  Please return to clinic in the period of time requested.    F/u in 4 wks.       Kamran Pineda DPM, FACFAS    Weight management plan: Patient was referred to their PCP to discuss a diet and exercise plan.      Again, thank you for allowing me to participate in the care of your patient.        Sincerely,        Kamran Pineda DPM

## 2019-02-20 ENCOUNTER — TELEPHONE (OUTPATIENT)
Dept: ORTHOPEDICS | Facility: CLINIC | Age: 64
End: 2019-02-20

## 2019-02-20 NOTE — TELEPHONE ENCOUNTER
[facility] Call Center    Phone Message    May a detailed message be left on voicemail: yes    Reason for Call: Other: pt has referral to Dr Ortiz for foot deformity and open wound, requested timeframe is 1-2 weeks, provider not available till 3/26. Can pt be worked in sooner? Thanks!     Action Taken: Message routed to:  [facility] ortho

## 2019-04-12 ENCOUNTER — HOSPITAL ENCOUNTER (EMERGENCY)
Facility: CLINIC | Age: 64
Discharge: HOME | End: 2019-04-12
Attending: EMERGENCY MEDICINE | Admitting: EMERGENCY MEDICINE

## 2019-04-12 ENCOUNTER — APPOINTMENT (OUTPATIENT)
Dept: GENERAL RADIOLOGY | Facility: CLINIC | Age: 64
End: 2019-04-12
Attending: EMERGENCY MEDICINE

## 2019-04-12 VITALS
SYSTOLIC BLOOD PRESSURE: 150 MMHG | RESPIRATION RATE: 18 BRPM | OXYGEN SATURATION: 98 % | HEART RATE: 67 BPM | DIASTOLIC BLOOD PRESSURE: 88 MMHG | BODY MASS INDEX: 38.65 KG/M2 | WEIGHT: 285 LBS | TEMPERATURE: 98.3 F

## 2019-04-12 DIAGNOSIS — L03.115 CELLULITIS OF RIGHT LOWER EXTREMITY: ICD-10-CM

## 2019-04-12 DIAGNOSIS — L03.115 CELLULITIS OF RIGHT ANKLE: ICD-10-CM

## 2019-04-12 LAB
BASOPHILS # BLD AUTO: 0 10E9/L (ref 0–0.2)
BASOPHILS NFR BLD AUTO: 0.3 %
CRP SERPL-MCNC: 36.6 MG/L (ref 0–8)
DIFFERENTIAL METHOD BLD: ABNORMAL
EOSINOPHIL # BLD AUTO: 0.3 10E9/L (ref 0–0.7)
EOSINOPHIL NFR BLD AUTO: 3.9 %
ERYTHROCYTE [DISTWIDTH] IN BLOOD BY AUTOMATED COUNT: 12.9 % (ref 10–15)
ERYTHROCYTE [SEDIMENTATION RATE] IN BLOOD BY WESTERGREN METHOD: 30 MM/H (ref 0–20)
HCT VFR BLD AUTO: 41.7 % (ref 40–53)
HGB BLD-MCNC: 13.8 G/DL (ref 13.3–17.7)
IMM GRANULOCYTES # BLD: 0 10E9/L (ref 0–0.4)
IMM GRANULOCYTES NFR BLD: 0.3 %
LYMPHOCYTES # BLD AUTO: 2 10E9/L (ref 0.8–5.3)
LYMPHOCYTES NFR BLD AUTO: 27.1 %
MCH RBC QN AUTO: 31.6 PG (ref 26.5–33)
MCHC RBC AUTO-ENTMCNC: 33.1 G/DL (ref 31.5–36.5)
MCV RBC AUTO: 95 FL (ref 78–100)
MONOCYTES # BLD AUTO: 0.5 10E9/L (ref 0–1.3)
MONOCYTES NFR BLD AUTO: 6.7 %
NEUTROPHILS # BLD AUTO: 4.5 10E9/L (ref 1.6–8.3)
NEUTROPHILS NFR BLD AUTO: 61.7 %
NRBC # BLD AUTO: 0 10*3/UL
NRBC BLD AUTO-RTO: 0 /100
PLATELET # BLD AUTO: 254 10E9/L (ref 150–450)
RBC # BLD AUTO: 4.37 10E12/L (ref 4.4–5.9)
WBC # BLD AUTO: 7.3 10E9/L (ref 4–11)

## 2019-04-12 PROCEDURE — 99284 EMERGENCY DEPT VISIT MOD MDM: CPT | Mod: Z6 | Performed by: EMERGENCY MEDICINE

## 2019-04-12 PROCEDURE — 85652 RBC SED RATE AUTOMATED: CPT | Performed by: EMERGENCY MEDICINE

## 2019-04-12 PROCEDURE — 73610 X-RAY EXAM OF ANKLE: CPT | Mod: RT

## 2019-04-12 PROCEDURE — 96365 THER/PROPH/DIAG IV INF INIT: CPT | Performed by: EMERGENCY MEDICINE

## 2019-04-12 PROCEDURE — 99284 EMERGENCY DEPT VISIT MOD MDM: CPT | Mod: 25 | Performed by: EMERGENCY MEDICINE

## 2019-04-12 PROCEDURE — 25000128 H RX IP 250 OP 636: Performed by: EMERGENCY MEDICINE

## 2019-04-12 PROCEDURE — 86140 C-REACTIVE PROTEIN: CPT | Performed by: EMERGENCY MEDICINE

## 2019-04-12 PROCEDURE — 85025 COMPLETE CBC W/AUTO DIFF WBC: CPT | Performed by: EMERGENCY MEDICINE

## 2019-04-12 RX ORDER — CEFTRIAXONE 1 G/1
1 INJECTION, POWDER, FOR SOLUTION INTRAMUSCULAR; INTRAVENOUS ONCE
Status: COMPLETED | OUTPATIENT
Start: 2019-04-12 | End: 2019-04-12

## 2019-04-12 RX ORDER — CLINDAMYCIN HCL 300 MG
300 CAPSULE ORAL 4 TIMES DAILY
Qty: 40 CAPSULE | Refills: 0 | Status: SHIPPED | OUTPATIENT
Start: 2019-04-12 | End: 2019-05-02

## 2019-04-12 RX ADMIN — CEFTRIAXONE SODIUM 1 G: 1 INJECTION, POWDER, FOR SOLUTION INTRAMUSCULAR; INTRAVENOUS at 13:52

## 2019-04-12 ASSESSMENT — ENCOUNTER SYMPTOMS
COLOR CHANGE: 1
FEVER: 0
ABDOMINAL PAIN: 1

## 2019-04-12 NOTE — ED PROVIDER NOTES
HPI  Sav Mendoza is a 63 year old male with a history of hypertension, cellulitis, and status post left ankle fusion (2010), who presents to the Emergency Department accompanied by daughter for evaluation of foot pain. Patient complains of bilateral foot pain, which is worse on the right foot, as well as lateral right foot redness that he states is progressing up the calf. Patient does report a chronic deformity of the right ankle from a previous injury, but reports that it has not been painful for him until about three days ago. He denies any fevers, chest pain or other symptoms. He denies any recent injuries, abrasions or lacerations. Patient does report previous history of cellulitis of the bilateral feet. Patient reports that his left ankle was operated on sometime in 2010.        I have reviewed the Medications, Allergies, Past Medical and Surgical History, and Social History in the [facility] system.     Past Medical History        Past Medical History:   Diagnosis Date     Hypertension       Ventral hernia 6/24/14            Past Surgical History   Past Surgical History:   Procedure Laterality Date     LAPAROTOMY EXPLORATORY N/A 12/12/2017     Procedure: LAPAROTOMY EXPLORATORY;  Exploratory Laparotomy and  Gint Ventral Hernia Repair with Mesh;  Surgeon: Mina Parsons MD;  Location: [facility] OR     ORTHOPEDIC SURGERY Left 2010     ankle fusion            Family History         Family History   Problem Relation Age of Onset     Alzheimer Disease Mother              Social History            Tobacco Use     Smoking status: Never Smoker     Smokeless tobacco: Never Used   Substance Use Topics     Alcohol use: Yes       Comment: rare      No current facility-administered medications for this encounter.           Current Outpatient Medications   Medication     amLODIPine (NORVASC) 5 MG tablet     Ascorbic Acid (VITAMIN C PO)     carvedilol (COREG) 25 MG tablet     lisinopril (PRINIVIL/ZESTRIL) 40 MG tablet      multivitamin, therapeutic with minerals (THERA-VIT-M) TABS       No Known Allergies     Review of Systems   Constitutional: Negative for fever.   Gastrointestinal: Positive for abdominal pain.   Musculoskeletal:        Positive for right foot pain.   Skin: Positive for color change (redness of lateral right foot).   All other systems reviewed and are negative.        Physical Exam   BP: (!) 166/110  Pulse: 79  Temp: 98.3  F (36.8  C)  Resp: 18  Weight: 129.3 kg (285 lb)  SpO2: 98 %        Physical Exam  Physical Exam   Constitutional:   well nourished, well developed, resting comfortably   HENT:   Head: Normocephalic and atraumatic.   Cardiovascular: regular rate and rhythm without murmurs or gallops  Pulmonary/Chest: bibasilar crackles, with no wheezes or retractions. No respiratory distress.  GI: Soft with good bowel sounds.  Non-tender, non-distended  Musculoskeletal: See photographs below.  The patient has a right ankle deformity, which is old,  He has a wound to the lateral aspect of the right foot, he has redness that extends up the lateral aspect of his leg, the area is hot to touch.  Left foot also has an ulcer without erythema  Skin: Skin is warm and dry. No rash noted.   Neurological: alert and oriented to person, place, and time. Nonfocal exam  Psychiatric:  normal mood and affect.                ED Course      Procedures              Critical Care time:  none                 Labs Ordered and Resulted from Time of ED Arrival Up to the Time of Departure from the ED   CBC WITH PLATELETS DIFFERENTIAL - Abnormal; Notable for the following components:       Result Value      RBC Count 4.37 (*)       All other components within normal limits   CRP INFLAMMATION - Abnormal; Notable for the following components:     CRP Inflammation 36.6 (*)       All other components within normal limits   ERYTHROCYTE SEDIMENTATION RATE AUTO - Abnormal; Notable for the following components:     Sed Rate 30 (*)       All other  components within normal limits            Results for orders placed or performed during the hospital encounter of 04/12/19   Ankle XR, G/E 3 views, right     Narrative     RIGHT ANKLE THREE OR MORE VIEWS   4/12/2019 2:29 PM      HISTORY: Deformity, possible cellulitis.     COMPARISON: X-ray from 2/18/2019.        Impression     IMPRESSION: There is severe deformity of the ankle with apparent  tibiotalar and subtalar joint effusion with some medial positioning of  the midfoot. No definite lytic or destructive lesion. No significant  change.   CBC with platelets differential   Result Value Ref Range     WBC 7.3 4.0 - 11.0 10e9/L     RBC Count 4.37 (L) 4.4 - 5.9 10e12/L     Hemoglobin 13.8 13.3 - 17.7 g/dL     Hematocrit 41.7 40.0 - 53.0 %     MCV 95 78 - 100 fl     MCH 31.6 26.5 - 33.0 pg     MCHC 33.1 31.5 - 36.5 g/dL     RDW 12.9 10.0 - 15.0 %     Platelet Count 254 150 - 450 10e9/L     Diff Method Automated Method       % Neutrophils 61.7 %     % Lymphocytes 27.1 %     % Monocytes 6.7 %     % Eosinophils 3.9 %     % Basophils 0.3 %     % Immature Granulocytes 0.3 %     Nucleated RBCs 0 0 /100     Absolute Neutrophil 4.5 1.6 - 8.3 10e9/L     Absolute Lymphocytes 2.0 0.8 - 5.3 10e9/L     Absolute Monocytes 0.5 0.0 - 1.3 10e9/L     Absolute Eosinophils 0.3 0.0 - 0.7 10e9/L     Absolute Basophils 0.0 0.0 - 0.2 10e9/L     Abs Immature Granulocytes 0.0 0 - 0.4 10e9/L     Absolute Nucleated RBC 0.0     CRP inflammation   Result Value Ref Range     CRP Inflammation 36.6 (H) 0.0 - 8.0 mg/L   Erythrocyte sedimentation rate auto   Result Value Ref Range     Sed Rate 30 (H) 0 - 20 mm/h         Assessments & Plan (with Medical Decision Making)      I have reviewed the nursing notes.  Emergency Department course:  The patient was seen and examined at 1315 pm.   Photographs of the patient's lower extremity are in the physical examination section above.  He has a chronic deformity of his right ankle with a wound and cellulitis of  the right lower extremity.       I treated the patient with IV ceftriaxone for presumed cellulitis.  Laboratory studies show a WBC of 7.3 and a slightly elevated ESR of 30.  CRP is elevated at 36.6.    The patient has a wound to his lateral right foot with cellulitis of the ankle and right lateral lower extremity.  He has no fevers or systemic symptoms.  I believe he can be discharged home with close outpatient follow-up.  I prescribed clindamycin for presumed cellulitis.  He does have a podiatrist and orthopedic physician.  He should follow-up within 2-3 days for recheck.  He should elevate his leg when resting.  I counseled the patient in my usual and standard fashion for cellulitis and reasons to return to the Emergency Department.   I have reviewed the findings, diagnosis, plan and need for follow up with the patient.         Medication List       Clindamycin 300 mg po qid for 10 rojas             Final diagnoses:   Cellulitis of right ankle  Cellulitis of right lower extremity      I, Francesca Pearce, am serving as a trained medical scribe to document services personally performed by Rashmi Tirado MD, based on the provider's statements to me.   I,Rashmi Tirado MD, was physically present and have reviewed and verified the accuracy of this note documented by Francesca Pearce.  This note was created in part by the use of Dragon voice recognition dictation system. Inadvertent grammatical errors and typographical errors may still exist.  Rashmi Tirado MD       4/12/2019   [facility] EMERGENCY DEPARTMENT           Rashmi Tirado MD  04/12/19 [id number]

## 2019-04-12 NOTE — ED PROVIDER NOTES
"DUPLICATE NOTE  History     Chief Complaint   Patient presents with     Foot Pain     has callouses on bilateral feet that possibly are infected. left foot \"crooked\" and loosing stability. denies diabetes. hx of cellulitis. symptoms today are chronic.      HPI  Sav Mendoza is a 63 year old male with a history of hypertension, cellulitis, and status post left ankle fusion (2010) and chronic right ankle dislocation, who presents to the Emergency Department accompanied by daughter for evaluation of foot pain. Patient complains of bilateral foot pain, which is worse on the right foot, as well as lateral right foot redness that he states is progressing up the calf. Patient does report a chronic deformity of the right ankle from a previous injury, but reports that it has not been painful for him until about three days ago. He denies any fevers, chest pain or other symptoms. He denies any recent injuries, abrasions or lacerations. Patient does report previous history of cellulitis of the bilateral feet. Patient reports that his left ankle was operated on sometime in 2010.      I have reviewed the Medications, Allergies, Past Medical and Surgical History, and Social History in the [facility] system.    Past Medical History:   Diagnosis Date     Hypertension      Ventral hernia 6/24/14       Past Surgical History:   Procedure Laterality Date     LAPAROTOMY EXPLORATORY N/A 12/12/2017    Procedure: LAPAROTOMY EXPLORATORY;  Exploratory Laparotomy and  Gint Ventral Hernia Repair with Mesh;  Surgeon: Mina Parsons MD;  Location: [facility] OR     ORTHOPEDIC SURGERY Left 2010    ankle fusion       Family History   Problem Relation Age of Onset     Alzheimer Disease Mother        Social History     Tobacco Use     Smoking status: Never Smoker     Smokeless tobacco: Never Used   Substance Use Topics     Alcohol use: Yes     Comment: rare     No current facility-administered medications for this encounter.      Current Outpatient " Medications   Medication     amLODIPine (NORVASC) 5 MG tablet     Ascorbic Acid (VITAMIN C PO)     carvedilol (COREG) 25 MG tablet     clindamycin (CLEOCIN) 300 MG capsule     lisinopril (PRINIVIL/ZESTRIL) 40 MG tablet     multivitamin, therapeutic with minerals (THERA-VIT-M) TABS      No Known Allergies    Review of Systems   Constitutional: Negative for fever.   Gastrointestinal: Positive for abdominal pain.   Musculoskeletal:        Positive for right foot pain.   Skin: Positive for color change (redness of lateral right foot).   All other systems reviewed and are negative.      Physical Exam   BP: (!) 166/110  Pulse: 79  Heart Rate: 72  Temp: 98.3  F (36.8  C)  Resp: 18  Weight: 129.3 kg (285 lb)  SpO2: 98 %      Physical Exam  Physical Exam   Constitutional:   well nourished, well developed, resting comfortably   HENT:   Head: Normocephalic and atraumatic.   Cardiovascular: regular rate and rhythm without murmurs or gallops  Pulmonary/Chest: bibasilar crackles, with no wheezes or retractions. No respiratory distress.  GI: Soft with good bowel sounds.  Non-tender, non-distended  Musculoskeletal: See photographs below.  The patient has a right ankle deformity, which is old,  He has a wound to the lateral aspect of the right foot, he has redness that extends up the lateral aspect of his leg, the area is hot to touch.  Left foot also has an ulcer without erythema  Skin: Skin is warm and dry. No rash noted.   Neurological: alert and oriented to person, place, and time. Nonfocal exam  Psychiatric:  normal mood and affect.             ED Course        Procedures             Critical Care time:  none             Labs Ordered and Resulted from Time of ED Arrival Up to the Time of Departure from the ED   CBC WITH PLATELETS DIFFERENTIAL - Abnormal; Notable for the following components:       Result Value    RBC Count 4.37 (*)     All other components within normal limits   CRP INFLAMMATION - Abnormal; Notable for the  following components:    CRP Inflammation 36.6 (*)     All other components within normal limits   ERYTHROCYTE SEDIMENTATION RATE AUTO - Abnormal; Notable for the following components:    Sed Rate 30 (*)     All other components within normal limits     Results for orders placed or performed during the hospital encounter of 04/12/19   Ankle XR, G/E 3 views, right    Narrative    RIGHT ANKLE THREE OR MORE VIEWS   4/12/2019 2:29 PM     HISTORY: Deformity, possible cellulitis.    COMPARISON: X-ray from 2/18/2019.      Impression    IMPRESSION: There is severe deformity of the ankle with apparent  tibiotalar and subtalar joint effusion with some medial positioning of  the midfoot. No definite lytic or destructive lesion. No significant  change.    YENI VELASCO MD   CBC with platelets differential   Result Value Ref Range    WBC 7.3 4.0 - 11.0 10e9/L    RBC Count 4.37 (L) 4.4 - 5.9 10e12/L    Hemoglobin 13.8 13.3 - 17.7 g/dL    Hematocrit 41.7 40.0 - 53.0 %    MCV 95 78 - 100 fl    MCH 31.6 26.5 - 33.0 pg    MCHC 33.1 31.5 - 36.5 g/dL    RDW 12.9 10.0 - 15.0 %    Platelet Count 254 150 - 450 10e9/L    Diff Method Automated Method     % Neutrophils 61.7 %    % Lymphocytes 27.1 %    % Monocytes 6.7 %    % Eosinophils 3.9 %    % Basophils 0.3 %    % Immature Granulocytes 0.3 %    Nucleated RBCs 0 0 /100    Absolute Neutrophil 4.5 1.6 - 8.3 10e9/L    Absolute Lymphocytes 2.0 0.8 - 5.3 10e9/L    Absolute Monocytes 0.5 0.0 - 1.3 10e9/L    Absolute Eosinophils 0.3 0.0 - 0.7 10e9/L    Absolute Basophils 0.0 0.0 - 0.2 10e9/L    Abs Immature Granulocytes 0.0 0 - 0.4 10e9/L    Absolute Nucleated RBC 0.0    CRP inflammation   Result Value Ref Range    CRP Inflammation 36.6 (H) 0.0 - 8.0 mg/L   Erythrocyte sedimentation rate auto   Result Value Ref Range    Sed Rate 30 (H) 0 - 20 mm/h           Assessments & Plan (with Medical Decision Making)     I have reviewed the nursing notes.  Emergency Department course:  The patient was seen  and examined at 1315 pm.   Photographs of the patient's lower extremity are in the physical examination section above.  He has a chronic deformity of his right ankle with a wound.     I treated the patient with IV ceftriaxone for presumed cellulitis.  Laboratory studies show a WBC of 7.3 and a slightly elevated ESR of 30.  CRP is elevated at 36.6.     The patient has a wound to his lateral right foot with cellulitis of the ankle and right lateral lower extremity.  He has no fevers or systemic symptoms.  I believe he can be discharged home with close outpatient follow-up.  I prescribed clindamycin for presumed cellulitis.  He does have a podiatrist and orthopedic physician.  He should follow-up within 2-3 days for recheck.  He should elevate his leg when resting.  I counseled the patient in my usual and standard fashion for cellulitis and reasons to return to the Emergency Department.   I have reviewed the findings, diagnosis, plan and need for follow up with the patient.         Medication List       Clindamycin 300 mg po qid for 10 rojas             Final diagnoses:   Cellulitis of right ankle  Cellulitis of right lower extremity      I, Francesca Pearce, am serving as a trained medical scribe to document services personally performed by Rashmi Tirado MD, based on the provider's statements to me.   I,Rashmi Tirado MD, was physically present and have reviewed and verified the accuracy of this note documented by Francesca Pearce.  This note was created in part by the use of Dragon voice recognition dictation system. Inadvertent grammatical errors and typographical errors may still exist.  Rashmi Tirado MD       4/12/2019   [facility], EMERGENCY DEPARTMENT              Rashmi Tirado MD  04/12/19 1540             Routing History                    I have reviewed the findings, diagnosis, plan and need for follow up with the patient.       Medication List      Started    clindamycin 300 MG capsule  Commonly known as:   CLEOCIN  300 mg, Oral, 4 TIMES DAILY            Final diagnoses:   Cellulitis of right ankle   Cellulitis of right lower extremity     I, Francesca Pearce, am serving as a trained medical scribe to document services personally performed by Rashmi Tirado MD, based on the provider's statements to me.   Rashmi NICHOLSON MD, was physically present and have reviewed and verified the accuracy of this note documented by Francesca Pearce.  This note was created in part by the use of Dragon voice recognition dictation system. Inadvertent grammatical errors and typographical errors may still exist.  Rashmi Tirado MD      4/12/2019   [facility], [place], EMERGENCY DEPARTMENT     Rashmi Tirado MD  04/12/19 [id number]

## 2019-04-12 NOTE — DISCHARGE INSTRUCTIONS
Please make an appointment to follow up with Your Primary Care Provider and Orthopedics (phone: [phone]) in 2-3 days.  Elevate your leg.  Take your antibiotics as directed.  Return for fevers, worsening redness, or other concerns

## 2019-04-12 NOTE — ED AVS SNAPSHOT
[facility], [place], Emergency Department  [address]  [facility] [address]  Phone:  [phone]  Fax:  [phone]                                    Sav Mendoza   MRN: [id number]    Department:  [facility], Emergency Department   Date of Visit:  4/12/2019           After Visit Summary Signature Page    I have received my discharge instructions, and my questions have been answered. I have discussed any challenges I see with this plan with the nurse or doctor.    ..........................................................................................................................................  Patient/Patient Representative Signature      ..........................................................................................................................................  Patient Representative Print Name and Relationship to Patient    ..................................................               ................................................  Date                                   Time    ..........................................................................................................................................  Reviewed by Signature/Title    ...................................................              ..............................................  Date                                               Time          22EPIC Rev 08/18

## 2019-04-13 NOTE — ED PROVIDER NOTES
Addendum:  Right ankle Xray 4/12 shows IMPRESSION: There is severe deformity of the ankle with apparent  tibiotalar and subtalar joint effusion with some medial positioning of  the midfoot. No definite lytic or destructive lesion. No significant  Change.    MD Celestino Robb Alda L, MD  04/13/19 1124

## 2019-05-02 ENCOUNTER — ANCILLARY PROCEDURE (OUTPATIENT)
Dept: GENERAL RADIOLOGY | Facility: CLINIC | Age: 64
End: 2019-05-02
Attending: PODIATRIST

## 2019-05-02 ENCOUNTER — OFFICE VISIT (OUTPATIENT)
Dept: PODIATRY | Facility: CLINIC | Age: 64
End: 2019-05-02

## 2019-05-02 VITALS
BODY MASS INDEX: 38.6 KG/M2 | HEIGHT: 72 IN | SYSTOLIC BLOOD PRESSURE: 138 MMHG | DIASTOLIC BLOOD PRESSURE: 70 MMHG | WEIGHT: 285 LBS

## 2019-05-02 DIAGNOSIS — M21.961 RIGHT ANKLE JOINT DEFORMITY: ICD-10-CM

## 2019-05-02 DIAGNOSIS — L97.512 SKIN ULCER OF RIGHT FOOT WITH FAT LAYER EXPOSED (H): ICD-10-CM

## 2019-05-02 DIAGNOSIS — L97.512 SKIN ULCER OF RIGHT FOOT WITH FAT LAYER EXPOSED (H): Primary | ICD-10-CM

## 2019-05-02 PROCEDURE — 11042 DBRDMT SUBQ TIS 1ST 20SQCM/<: CPT | Performed by: PODIATRIST

## 2019-05-02 PROCEDURE — 73630 X-RAY EXAM OF FOOT: CPT | Mod: RT

## 2019-05-02 PROCEDURE — 99213 OFFICE O/P EST LOW 20 MIN: CPT | Mod: 25 | Performed by: PODIATRIST

## 2019-05-02 ASSESSMENT — MIFFLIN-ST. JEOR: SCORE: 2120.75

## 2019-05-02 NOTE — PATIENT INSTRUCTIONS
Thank you for choosing [facility]!    DR. MELISSA'S CLINIC LOCATIONS     MONDAY - [place] WEDNESDAY - [place]   [address] [place] [address] [address] [place] [address]   [phone] / [phone] / [phone]       THURSDAY - [place] SCHEDULE SURGERY: [phone]   [address] APPOINTMENTS: [phone]   [place] [address] BILLING QUESTIONS: [phone] / [phone]       Follow up in 1 month    Wound Care Recommendations:    1)  Keep the wound covered by a bandage when bathing.    2)  Gently clean the wound with soap water, separate from bath/shower water.      3)  Each day, apply a topical antibiotic ointment to the wound (Neosporin, Triple antibiotic, Bacitracin).   Cover with large band-aid or gauze.      5)  Please seek immediate medical attention if any increasing redness, drainage, smell, or pain related to the wound.     6)  Please return to clinic in the period of time requested by Dr. Melissa.          SIGNS OF INFECTION  expanding redness around the wound   yellow or greenish-colored pus or cloudy wound drainage   red streaking spreading from the wound   increased swelling, tenderness, or pain around the wound   fever  *If you notice any of these signs of infection, call us right away!      BODY WEIGHT AND YOUR FEET  The following information is included in the after visit summary for all patients. Body weight can be a sensitive issue to discuss in clinic, but we think the following information is very important. Although we focus on the feet and ankles, we do support the overall health of our patients.     Many things can cause foot and ankle problems. Foot structure, activity level, foot mechanics and injuries are common causes of pain. One very important issue that often goes unmentioned, is body weight. Extra weight can cause increased stress on muscles, ligaments, bones and tendons. Sometimes  just a few extra pounds is all it takes to put one over her/his threshold. Without reducing that stress, it can be difficult to alleviate pain. As Foot & Ankle specialists, our job is addressing the lower extremity problem and possible causes. Regarding extra body weight, we encourage patients to discuss diet and weight management plans with their primary care doctors. It is this team approach that gives you the best opportunity for pain relief and getting you back on your feet.      [facility] has a Comprehensive Weight Management Program. This program includes counseling, education, non-surgical and surgical approaches to weight loss. If you are interested in learning more either talk to you primary care provider or call [phone].    Sav to follow up with Primary Care provider regarding elevated blood pressure.

## 2019-05-02 NOTE — LETTER
"    5/2/2019         RE: Sav Mendoza  [address] [facility] [address]        Dear Colleague,    Thank you for referring your patient, Sav Mendoza, to the [facility]. Please see a copy of my visit note below.    ASSESSMENT/PLAN:    Encounter Diagnoses   Name Primary?     Skin ulcer of right foot with fat layer exposed (H) Yes     Right ankle joint deformity      Recent cellulitis. It appears to have resolved. I do not think any additional antibiotic is warranted at this time.    I reviewed the XR images with the patient.  The relevant anatomy and function was discussed, in relation to the problem.      Clinical signs of infection were reviewed.     Offloading via cane and crutch.     Wound Care Recommendations:    1)  Keep the wound covered by a bandage when bathing.    2)  Gently clean the wound with soap water, separate from bath/shower water.      3)  Each day, apply a topical antibiotic ointment to the wound (Neosporin, Triple antibiotic, Bacitracin).   Cover with large band-aid or gauze.      5)  Please seek immediate medical attention if any increasing redness, drainage, smell, or pain related to the wound.     6)  Please return to clinic in the period of time requested by Dr. Morris.        Excisional Debridement    The excisional debrident procedure discussed.  This included the goals of removing non-viable tissue, evaluating the full extent of wound, and promoting wound healing.  Sav Mendoza  provided verbal and written consent.  The \"Time Out\" was called.     Using a sterile #15 blade and tissue nippers, excisional debridment of the right foot ulcer was performed, full-thickness to the level of, and including, the fat layer.  The hyperkeratotic eschar surrounding the wound was removed, by excising skin edges back to healthy, bleeding tissue .  Other non-viable tissue was excised. The ulcer base was scraped to remove bioburden and promote healing.  The area debrided " was less than 20 square cm.   There was moderate bleeding with the procedure. No anesthesia was needed due to peripheral neuropathy.   A sterile dressing was applied.      Follow up in 1 month.     Body mass index is 38.65 kg/m .    Weight management plan: Patient was referred to their PCP to discuss a diet and exercise plan.      Grant Morris DPM, FACFAS, MS    [facility] of Pod[facility]/Foot & Ankle Surgery      ____________________________________________________________________    HPI:         Chief Complaint: follow up for a chronic ulceration, lateral right foot.    Was last evaluated by Dr. Pineda 2/18/19.   Onset of problem: 1.5 months  Pain when walking.  He offloads with a cane and a crutch  He was in the ED on 4/12/19 due to cellulitis.  He received IV antibiotic, Rocephin and was discharged on clindamycin.  He reports improvement.  Less redness and less pain.  Inflammatory measures were not elevated to the point to raise concern for cellulitis.   I have referred him to orthopedic surgery in the past, due to a sever, rigid right ankle deformity.    Patient Active Problem List   Diagnosis     Cellulitis of Lt lower leg since 4-52-uohtabid since last saw ID  5-7-14     Baker's cyst     Ventral hernia     Left inguinal hernia     Diverticulosis of large intestine     Edema of both legs     Stasis dermatitis of both legs     Benign essential hypertension     Need for hepatitis C screening test     Cellulitis and abscess of leg: Lt  Lat Malleolus  onset late 4-16      ACP (advance care planning)     Non morbid obesity due to excess calories with comorbid HTN     Bilateral leg edema     Incarcerated hernia     Visit for wound check     Obesity (BMI 35.0-39.9) with comorbidity (H)     Past Surgical History:   Procedure Laterality Date     LAPAROTOMY EXPLORATORY N/A 12/12/2017    Procedure: LAPAROTOMY EXPLORATORY;  Exploratory Laparotomy and  Gint Ventral Hernia Repair with Mesh;  Surgeon: Mina Parsons  "MD Madai;  Location: [facility] OR     ORTHOPEDIC SURGERY Left 2010    ankle fusion     Current Outpatient Medications   Medication Sig Dispense Refill     amLODIPine (NORVASC) 5 MG tablet Take 1 tablet (5 mg) by mouth daily 90 tablet 3     Ascorbic Acid (VITAMIN C PO) Take 500 mg by mouth daily + 500 mg po qHS PRN       carvedilol (COREG) 25 MG tablet TAKE ONE TABLET BY MOUTH TWICE DAILY WITH MEALS NEED  TO  BE  SEEN  FOR  MORE  REFILLS 180 tablet 3     lisinopril (PRINIVIL/ZESTRIL) 40 MG tablet TAKE 1 TABLET BY MOUTH ONCE DAILY IN THE EVENING 90 tablet 3     multivitamin, therapeutic with minerals (THERA-VIT-M) TABS Take 1 tablet by mouth daily         ROS:    A 10-point review of systems was performed.  It is positive for that noted in the HPI and as seen below.  All other systems found to be negative.     Numbness in feet?  some   Calf pain with walking? no  Recent foot/ankle injury? no  Weight change  over past 12 months? no  Self perception as overweight? yes  Recent flu-like symptoms? no  Joint pain other than feet ? no    EXAM:    Vitals: /70   Ht 1.829 m (6')   Wt 129.3 kg (285 lb)   BMI 38.65 kg/m     BMI: Body mass index is 38.65 kg/m .  Height: 6' 0\"    Constitutional/ general:  Pt is in no apparent distress, appears well-nourished.  Cooperative with history and physical exam.   Vascular:  Pedal pulses are palpable bilaterally for both the DP and PT arteries.  CFT < 3 sec.  No edema.  Pedal hair growth noted.     Neuro:  Alert and oriented x 3. Coordinated gait.  Light touch sensation is intact to the L4, L5, S1 distributions. No obvious deficits.  No evidence of neurological-based weakness, spasticity, or contracture in the lower extremities.     Derm: Ulceration to the level of fat layer, lateral right foot at base of the 5th metatarsal.   1.2cm x 0.8cm x 0.3cm deep. It does not probe to a hard endpoint. periwound erythema dorsally. No malodor. No purulence. Wound base with some maceration and some " granulation.    Musculoskeletal: Lower extremity muscle strength is normal.  No motion around the left ankle. Right ankle is without motion.  Foot rests in slight plantarflexion with significant varus tilt.  The right ankle bony architecture is very thick. Prominent lateral malleolus.    Radiographic Exam:  X-Ray Findings:  I personally reviewed the right foot images images.  No radiographic evidence of osteomyelitis, right 5th metatarsal base.       Grant Morris DPM, FACTATI, MS    [place] of Podiatry/Foot & Ankle Surgery              Again, thank you for allowing me to participate in the care of your patient.        Sincerely,        Grant Morris DPM

## 2019-05-02 NOTE — PROGRESS NOTES
"ASSESSMENT/PLAN:    Encounter Diagnoses   Name Primary?     Skin ulcer of right foot with fat layer exposed (H) Yes     Right ankle joint deformity      Recent cellulitis. It appears to have resolved. I do not think any additional antibiotic is warranted at this time.    I reviewed the XR images with the patient.  The relevant anatomy and function was discussed, in relation to the problem.      Clinical signs of infection were reviewed.     Offloading via cane and crutch.     Wound Care Recommendations:    1)  Keep the wound covered by a bandage when bathing.    2)  Gently clean the wound with soap water, separate from bath/shower water.      3)  Each day, apply a topical antibiotic ointment to the wound (Neosporin, Triple antibiotic, Bacitracin).   Cover with large band-aid or gauze.      5)  Please seek immediate medical attention if any increasing redness, drainage, smell, or pain related to the wound.     6)  Please return to clinic in the period of time requested by Dr. Morris.        Excisional Debridement    The excisional debrident procedure discussed.  This included the goals of removing non-viable tissue, evaluating the full extent of wound, and promoting wound healing.  Sav Mendoza  provided verbal and written consent.  The \"Time Out\" was called.     Using a sterile #15 blade and tissue nippers, excisional debridment of the right foot ulcer was performed, full-thickness to the level of, and including, the fat layer.  The hyperkeratotic eschar surrounding the wound was removed, by excising skin edges back to healthy, bleeding tissue .  Other non-viable tissue was excised. The ulcer base was scraped to remove bioburden and promote healing.  The area debrided was less than 20 square cm.   There was moderate bleeding with the procedure. No anesthesia was needed due to peripheral neuropathy.   A sterile dressing was applied.      Follow up in 1 month.     Body mass index is 38.65 kg/m .    Weight " management plan: Patient was referred to their PCP to discuss a diet and exercise plan.      Grant Morris DPM, FACFAS, MS    [facility] of Pod[facility]ry/Foot & Ankle Surgery      ____________________________________________________________________    HPI:         Chief Complaint: follow up for a chronic ulceration, lateral right foot.    Was last evaluated by Dr. Pineda 2/18/19.   Onset of problem: 1.5 months  Pain when walking.  He offloads with a cane and a crutch  He was in the ED on 4/12/19 due to cellulitis.  He received IV antibiotic, Rocephin and was discharged on clindamycin.  He reports improvement.  Less redness and less pain.  Inflammatory measures were not elevated to the point to raise concern for cellulitis.   I have referred him to orthopedic surgery in the past, due to a sever, rigid right ankle deformity.    Patient Active Problem List   Diagnosis     Cellulitis of Lt lower leg since 3-51-ychljwoi since last saw ID  5-7-14     Baker's cyst     Ventral hernia     Left inguinal hernia     Diverticulosis of large intestine     Edema of both legs     Stasis dermatitis of both legs     Benign essential hypertension     Need for hepatitis C screening test     Cellulitis and abscess of leg: Lt  Lat Malleolus  onset late 4-16      ACP (advance care planning)     Non morbid obesity due to excess calories with comorbid HTN     Bilateral leg edema     Incarcerated hernia     Visit for wound check     Obesity (BMI 35.0-39.9) with comorbidity (H)     Past Surgical History:   Procedure Laterality Date     LAPAROTOMY EXPLORATORY N/A 12/12/2017    Procedure: LAPAROTOMY EXPLORATORY;  Exploratory Laparotomy and  Gint Ventral Hernia Repair with Mesh;  Surgeon: Mina Parsons MD;  Location: [facility] OR     ORTHOPEDIC SURGERY Left 2010    ankle fusion     Current Outpatient Medications   Medication Sig Dispense Refill     amLODIPine (NORVASC) 5 MG tablet Take 1 tablet (5 mg) by mouth daily 90 tablet 3     Ascorbic  "Acid (VITAMIN C PO) Take 500 mg by mouth daily + 500 mg po qHS PRN       carvedilol (COREG) 25 MG tablet TAKE ONE TABLET BY MOUTH TWICE DAILY WITH MEALS NEED  TO  BE  SEEN  FOR  MORE  REFILLS 180 tablet 3     lisinopril (PRINIVIL/ZESTRIL) 40 MG tablet TAKE 1 TABLET BY MOUTH ONCE DAILY IN THE EVENING 90 tablet 3     multivitamin, therapeutic with minerals (THERA-VIT-M) TABS Take 1 tablet by mouth daily         ROS:    A 10-point review of systems was performed.  It is positive for that noted in the HPI and as seen below.  All other systems found to be negative.     Numbness in feet?  some   Calf pain with walking? no  Recent foot/ankle injury? no  Weight change  over past 12 months? no  Self perception as overweight? yes  Recent flu-like symptoms? no  Joint pain other than feet ? no    EXAM:    Vitals: /70   Ht 1.829 m (6')   Wt 129.3 kg (285 lb)   BMI 38.65 kg/m    BMI: Body mass index is 38.65 kg/m .  Height: 6' 0\"    Constitutional/ general:  Pt is in no apparent distress, appears well-nourished.  Cooperative with history and physical exam.   Vascular:  Pedal pulses are palpable bilaterally for both the DP and PT arteries.  CFT < 3 sec.  No edema.  Pedal hair growth noted.     Neuro:  Alert and oriented x 3. Coordinated gait.  Light touch sensation is intact to the L4, L5, S1 distributions. No obvious deficits.  No evidence of neurological-based weakness, spasticity, or contracture in the lower extremities.     Derm: Ulceration to the level of fat layer, lateral right foot at base of the 5th metatarsal.   1.2cm x 0.8cm x 0.3cm deep. It does not probe to a hard endpoint. periwound erythema dorsally. No malodor. No purulence. Wound base with some maceration and some granulation.    Musculoskeletal: Lower extremity muscle strength is normal.  No motion around the left ankle. Right ankle is without motion.  Foot rests in slight plantarflexion with significant varus tilt.  The right ankle bony architecture is " very thick. Prominent lateral malleolus.    Radiographic Exam:  X-Ray Findings:  I personally reviewed the right foot images images.  No radiographic evidence of osteomyelitis, right 5th metatarsal base.       Grant Morris DPM, [place], MS    [place] Department of Podiatry/Foot & Ankle Surgery

## 2019-08-24 ENCOUNTER — HOSPITAL ENCOUNTER (EMERGENCY)
Facility: CLINIC | Age: 64
Discharge: HOME | End: 2019-08-24
Attending: FAMILY MEDICINE | Admitting: FAMILY MEDICINE

## 2019-08-24 VITALS
WEIGHT: 285 LBS | TEMPERATURE: 98.4 F | DIASTOLIC BLOOD PRESSURE: 99 MMHG | HEIGHT: 73 IN | SYSTOLIC BLOOD PRESSURE: 158 MMHG | RESPIRATION RATE: 18 BRPM | BODY MASS INDEX: 37.77 KG/M2 | OXYGEN SATURATION: 97 % | HEART RATE: 85 BPM

## 2019-08-24 DIAGNOSIS — L08.9 LEFT FOOT INFECTION: ICD-10-CM

## 2019-08-24 PROCEDURE — 99282 EMERGENCY DEPT VISIT SF MDM: CPT | Performed by: FAMILY MEDICINE

## 2019-08-24 PROCEDURE — 99284 EMERGENCY DEPT VISIT MOD MDM: CPT | Mod: Z6 | Performed by: FAMILY MEDICINE

## 2019-08-24 RX ORDER — CEPHALEXIN 500 MG/1
500 CAPSULE ORAL 4 TIMES DAILY
Qty: 28 CAPSULE | Refills: 0 | Status: SHIPPED | OUTPATIENT
Start: 2019-08-24 | End: 2019-08-31

## 2019-08-24 ASSESSMENT — MIFFLIN-ST. JEOR: SCORE: 2136.63

## 2019-08-24 NOTE — ED AVS SNAPSHOT
[facility], [place], Emergency Department  [address]  [facility] [address]  Phone:  [phone]  Fax:  [phone]                                    Sav Mendoza   MRN: [id number]    Department:  [facility], Emergency Department   Date of Visit:  8/24/2019           After Visit Summary Signature Page    I have received my discharge instructions, and my questions have been answered. I have discussed any challenges I see with this plan with the nurse or doctor.    ..........................................................................................................................................  Patient/Patient Representative Signature      ..........................................................................................................................................  Patient Representative Print Name and Relationship to Patient    ..................................................               ................................................  Date                                   Time    ..........................................................................................................................................  Reviewed by Signature/Title    ...................................................              ..............................................  Date                                               Time          22EPIC Rev 08/18

## 2019-08-24 NOTE — DISCHARGE INSTRUCTIONS
Discharged home with prescription for antibiotic and plan on close follow-up Dr. Murphy's office will call you about Monday appointment return to the emergency room if any increase in redness swelling or fevers.

## 2019-08-24 NOTE — ED PROVIDER NOTES
[facility] EMERGENCY DEPARTMENT ([facility])    8/24/19       History     Chief Complaint   Patient presents with     Ankle/Foot left     possible infection of callus on left foot. ongoing callus for years that pt reports saw red streaking from callus last night. denies any sepsis symptoms. hx of cellulitis of other areas. denies DM     HPI  Sav Mendoza is a 64 year old male who has a PMHx of HTN, left foot cellulitis, and status post left ankle fusion (2010), who presents to the Emergency Department for evaluation of possible left foot infection.  Patient has significant callus on his left foot and had some inflammation of the region with erythema that had been spreading up to the foot that has now since improved to some degree.  He denies any systemic complaints no fevers chills or weakness.    I have reviewed the Medications, Allergies, Past Medical and Surgical History, and Social History in the Epic system.    PERSONAL MEDICAL HISTORY  Past Medical History:   Diagnosis Date     Hypertension      Ventral hernia 6/24/14     PAST SURGICAL HISTORY  Past Surgical History:   Procedure Laterality Date     LAPAROTOMY EXPLORATORY N/A 12/12/2017    Procedure: LAPAROTOMY EXPLORATORY;  Exploratory Laparotomy and  Gint Ventral Hernia Repair with Mesh;  Surgeon: Mina Parsons MD;  Location: [facility] OR     ORTHOPEDIC SURGERY Left 2010    ankle fusion     FAMILY HISTORY  Family History   Problem Relation Age of Onset     Alzheimer Disease Mother      SOCIAL HISTORY  Social History     Tobacco Use     Smoking status: Never Smoker     Smokeless tobacco: Never Used   Substance Use Topics     Alcohol use: Yes     Comment: rare     MEDICATIONS  No current facility-administered medications for this encounter.      Current Outpatient Medications   Medication     amLODIPine (NORVASC) 5 MG tablet     Ascorbic Acid (VITAMIN C PO)     cephALEXin (KEFLEX) 500 MG capsule     lisinopril (PRINIVIL/ZESTRIL) 40 MG tablet      "multivitamin, therapeutic with minerals (THERA-VIT-M) TABS     ALLERGIES  No Known Allergies      Past Medical History:   Diagnosis Date     Hypertension      Ventral hernia 6/24/14       Past Surgical History:   Procedure Laterality Date     LAPAROTOMY EXPLORATORY N/A 12/12/2017    Procedure: LAPAROTOMY EXPLORATORY;  Exploratory Laparotomy and  Gint Ventral Hernia Repair with Mesh;  Surgeon: Mina Parsons MD;  Location: [facility] OR     ORTHOPEDIC SURGERY Left 2010    ankle fusion       Family History   Problem Relation Age of Onset     Alzheimer Disease Mother        Social History     Tobacco Use     Smoking status: Never Smoker     Smokeless tobacco: Never Used   Substance Use Topics     Alcohol use: Yes     Comment: rare       No current facility-administered medications for this encounter.      Current Outpatient Medications   Medication     amLODIPine (NORVASC) 5 MG tablet     Ascorbic Acid (VITAMIN C PO)     cephALEXin (KEFLEX) 500 MG capsule     lisinopril (PRINIVIL/ZESTRIL) 40 MG tablet     multivitamin, therapeutic with minerals (THERA-VIT-M) TABS      No Known Allergies     Review of Systems   Constitutional: Negative for fever.   Respiratory: Negative for shortness of breath.    Cardiovascular: Negative for chest pain.   Gastrointestinal: Negative for abdominal pain.   Musculoskeletal:        Foot pain and some erythema noted   All other systems reviewed and are negative.      Physical Exam   BP: (!) 186/107(has only taken 1 pill today. takes other later this evening.)  Pulse: 87  Temp: 98.3  F (36.8  C)  Resp: 18  Height: 185.4 cm (6' 1\")  Weight: 129.3 kg (285 lb)  SpO2: 96 %      Physical Exam   Constitutional: No distress.   HENT:   Head: Atraumatic.   Mouth/Throat: Oropharynx is clear and moist.   Eyes: Pupils are equal, round, and reactive to light. No scleral icterus.   Cardiovascular: Normal heart sounds and intact distal pulses.   Pulmonary/Chest: Breath sounds normal. No respiratory distress. "   Abdominal: Soft. Bowel sounds are normal. There is no tenderness.   Musculoskeletal:   Patient has a callus which is seems to be inflamed on his left foot with slight erythema there is no evidence of clear cellulitis.   Skin: Skin is warm. No rash noted. He is not diaphoretic.       ED Course     Procedures             Critical Care time:  none       Assessments & Plan (with Medical Decision Making)       I have reviewed the nursing notes.    I have reviewed the findings, diagnosis, plan and need for follow up with the patient.  Patient with infection of callus on left foot there does not appear to be a cellulitis at this point patient is well aware of what the signs are I will however start him on antibiotics Keflex 500 4 times daily and he will need close follow-up with his primary clinic as well as podiatry I have made effort for him to be seen by podiatry as early as Monday for further intervention of his likely an infected callus.      Final diagnoses:   Left foot infection       8/24/2019   [facility] [place], EMERGENCY DEPARTMENT     Hector Rdz MD  08/25/19 8218

## 2019-08-25 ASSESSMENT — ENCOUNTER SYMPTOMS
FEVER: 0
ABDOMINAL PAIN: 0
SHORTNESS OF BREATH: 0

## 2019-08-29 ENCOUNTER — ANCILLARY PROCEDURE (OUTPATIENT)
Dept: GENERAL RADIOLOGY | Facility: CLINIC | Age: 64
End: 2019-08-29
Attending: PODIATRIST

## 2019-08-29 ENCOUNTER — OFFICE VISIT (OUTPATIENT)
Dept: PODIATRY | Facility: CLINIC | Age: 64
End: 2019-08-29

## 2019-08-29 VITALS
DIASTOLIC BLOOD PRESSURE: 84 MMHG | WEIGHT: 276 LBS | BODY MASS INDEX: 36.58 KG/M2 | HEIGHT: 73 IN | SYSTOLIC BLOOD PRESSURE: 142 MMHG

## 2019-08-29 DIAGNOSIS — L97.522 SKIN ULCER OF LEFT FOOT WITH FAT LAYER EXPOSED (H): Primary | ICD-10-CM

## 2019-08-29 DIAGNOSIS — L84 PRE-ULCERATIVE CORN OR CALLOUS: ICD-10-CM

## 2019-08-29 PROCEDURE — 73630 X-RAY EXAM OF FOOT: CPT | Mod: LT

## 2019-08-29 PROCEDURE — 99213 OFFICE O/P EST LOW 20 MIN: CPT | Mod: 25 | Performed by: PODIATRIST

## 2019-08-29 PROCEDURE — 11042 DBRDMT SUBQ TIS 1ST 20SQCM/<: CPT | Mod: 59 | Performed by: PODIATRIST

## 2019-08-29 PROCEDURE — 11055 PARING/CUTG B9 HYPRKER LES 1: CPT | Mod: 51 | Performed by: PODIATRIST

## 2019-08-29 ASSESSMENT — MIFFLIN-ST. JEOR: SCORE: 2095.81

## 2019-08-29 NOTE — LETTER
"    8/29/2019         RE: Sav Mendoza  [address] [address]        Dear Colleague,    Thank you for referring your patient, Sav Mendoza, to the [facility]. Please see a copy of my visit note below.    ASSESSMENT/PLAN:    Encounter Diagnoses   Name Primary?     Skin ulcer of left foot with fat layer exposed (H) Yes     Pre-ulcerative corn or callous, right foot      He must have some degree of peripheral neuropathy.     No clinical sign of infection. I do not have concern for deeper infection.      I discussed his risk of infection. The wound on the left foot will likely not heal easily, with ongoing weight bearing.      Short CAM walker provided  He will use his crutches.    He is to complete the cephalexin    Wound Care Recommendations:    1)  Keep the wound covered by a bandage when bathing.    2)  Gently clean the wound with soap water, separate from bath/shower water.      3)  Each day, apply a topical antibiotic ointment to the wound (Neosporin, Triple antibiotic, Bacitracin).   Cover with large band-aid or gauze.      5)  Please seek immediate medical attention if any increasing redness, drainage, smell, or pain related to the wound.     6)  Please return to clinic in the period of time requested by Dr. Morris.      Excisional Debridement    The excisional debrident procedure discussed.  This included the goals of removing non-viable tissue, evaluating the full extent of wound, and promoting wound healing.  Sav Mendoza  provided verbal and written consent.  The \"Time Out\" was called.     Using a sterile #15 blade and tissue nippers, excisional debridment of the left foot ulcer was performed, full-thickness to the level of, and including, the fat layer.  The hyperkeratotic eschar surrounding the wound was removed, by excising skin edges back to healthy, bleeding tissue .  Other non-viable tissue was excised. The ulcer base was scraped to remove bioburden and " "promote healing.  The area debrided was less than 20 square cm.   There was moderate bleeding with the procedure. No anesthesia was needed due to peripheral neuropathy.   A sterile dressing was applied.      Using a #15 scalpel, I trimmed down the prominent hyperkeratotic lesion, lateral right foot. No ulceration today.    Follow up in 3 weeks      Body mass index is 36.41 kg/m .    Weight management plan: Patient was referred to their PCP to discuss a diet and exercise plan.      Grant Morris DPM, FACFAS, MS    [facility] Department of Podiatry/Foot & Ankle Surgery      ____________________________________________________________________    HPI:         Chief Complaint: \"callus on left foot that needs to be looked at\"  Onset of problem: 6 months  Pain/ discomfort is described as:  burning  Pain Ratin/10 at worst.  Frequency:  intermittent    The pain is exacerbated by prolonged walking.  Previous treatment: ED 19;  cephalexin  He reports improvement. Red streak has resolved.    Patient Active Problem List   Diagnosis     Cellulitis of Lt lower leg since 3-62-rtoaeuml since last saw ID  -     Baker's cyst     Ventral hernia     Left inguinal hernia     Diverticulosis of large intestine     Edema of both legs     Stasis dermatitis of both legs     Benign essential hypertension     Need for hepatitis C screening test     Cellulitis and abscess of leg: Lt  Lat Malleolus  onset late 4-16      ACP (advance care planning)     Non morbid obesity due to excess calories with comorbid HTN     Bilateral leg edema     Incarcerated hernia     Visit for wound check     Obesity (BMI 35.0-39.9) with comorbidity (H)     Past Surgical History:   Procedure Laterality Date     LAPAROTOMY EXPLORATORY N/A 2017    Procedure: LAPAROTOMY EXPLORATORY;  Exploratory Laparotomy and  Gint Ventral Hernia Repair with Mesh;  Surgeon: Mina Parsons MD;  Location: [facility] OR     ORTHOPEDIC SURGERY Left     ankle fusion " "    Current Outpatient Medications   Medication Sig Dispense Refill     amLODIPine (NORVASC) 5 MG tablet Take 1 tablet (5 mg) by mouth daily 90 tablet 3     Ascorbic Acid (VITAMIN C PO) Take 500 mg by mouth daily + 500 mg po qHS PRN       cephALEXin (KEFLEX) 500 MG capsule Take 1 capsule (500 mg) by mouth 4 times daily for 7 days 28 capsule 0     lisinopril (PRINIVIL/ZESTRIL) 40 MG tablet TAKE 1 TABLET BY MOUTH ONCE DAILY IN THE EVENING 90 tablet 3     multivitamin, therapeutic with minerals (THERA-VIT-M) TABS Take 1 tablet by mouth daily         ROS:    A 10-point review of systems was performed.  It is positive for that noted in the HPI and as seen below.  All other systems found to be negative.     Numbness in feet?  yes   Calf pain with walking? no  Recent foot/ankle injury? no  Weight change  over past 12 months? no  Self perception as overweight? yes  Recent flu-like symptoms? no  Joint pain other than feet ? Pain and stiffness in both knees    EXAM:    Vitals: BP (!) 142/84   Ht 1.854 m (6' 1\")   Wt 125.2 kg (276 lb)   BMI 36.41 kg/m     BMI: Body mass index is 36.41 kg/m .  Height: 6' 1\"    CConstitutional/ general:  Pt is in no apparent distress, appears well-nourished.  Cooperative with history and physical exam.     Vascular:  Pedal pulses are palpable bilaterally for both the DP and PT arteries.  CFT < 3 sec.  No edema.  Pedal hair growth noted.      Neuro:  Alert and oriented x 3. Coordinated gait.  Light touch sensation is intact to the L4, L5, S1 distributions. No obvious deficits.  No evidence of neurological-based weakness, spasticity, or contracture in the lower extremities.      Derm:   1) ulceration, 0.5cm diameter, to depth of fat layer, plantar lateral left 5th metatarsal head.  It does not probe to a hard endpoint. Prolific hyperkeratotic eschar. No erythema, malodor, or purulence.    2) prominent callus with evidence of intra-epidermal bleeding at site of previous right foot ulceration. " Post pairing, no ulceration.      Musculoskeletal: Lower extremity muscle strength is normal.  No motion around the left ankle. Right ankle is without motion.  Foot rests in slight plantarflexion with significant varus tilt.  The right ankle bony architecture is very thick. Prominent lateral malleolus.     Radiographic Exam:  X-Ray Findings:  I personally reviewed the right foot images images.    No evidence of bony lysis or erosion, left 5th metatarsal head.         Again, thank you for allowing me to participate in the care of your patient.        Sincerely,        Grant Morris DPM

## 2019-08-29 NOTE — PROGRESS NOTES
"ASSESSMENT/PLAN:    Encounter Diagnoses   Name Primary?     Skin ulcer of left foot with fat layer exposed (H) Yes     Pre-ulcerative corn or callous, right foot      He must have some degree of peripheral neuropathy.     No clinical sign of infection. I do not have concern for deeper infection.      I discussed his risk of infection. The wound on the left foot will likely not heal easily, with ongoing weight bearing.      Short CAM walker provided  He will use his crutches.    He is to complete the cephalexin    Wound Care Recommendations:    1)  Keep the wound covered by a bandage when bathing.    2)  Gently clean the wound with soap water, separate from bath/shower water.      3)  Each day, apply a topical antibiotic ointment to the wound (Neosporin, Triple antibiotic, Bacitracin).   Cover with large band-aid or gauze.      5)  Please seek immediate medical attention if any increasing redness, drainage, smell, or pain related to the wound.     6)  Please return to clinic in the period of time requested by Dr. Morris.      Excisional Debridement    The excisional debrident procedure discussed.  This included the goals of removing non-viable tissue, evaluating the full extent of wound, and promoting wound healing.  Sav Mendoza  provided verbal and written consent.  The \"Time Out\" was called.     Using a sterile #15 blade and tissue nippers, excisional debridment of the left foot ulcer was performed, full-thickness to the level of, and including, the fat layer.  The hyperkeratotic eschar surrounding the wound was removed, by excising skin edges back to healthy, bleeding tissue .  Other non-viable tissue was excised. The ulcer base was scraped to remove bioburden and promote healing.  The area debrided was less than 20 square cm.   There was moderate bleeding with the procedure. No anesthesia was needed due to peripheral neuropathy.   A sterile dressing was applied.      Using a #15 scalpel, I trimmed down the " "prominent hyperkeratotic lesion, lateral right foot. No ulceration today.    Follow up in 3 weeks      Body mass index is 36.41 kg/m .    Weight management plan: Patient was referred to their PCP to discuss a diet and exercise plan.      Grant Morris DPM, FACFAS, MS    [facility] Department of Podiatry/Foot & Ankle Surgery      ____________________________________________________________________    HPI:         Chief Complaint: \"callus on left foot that needs to be looked at\"  Onset of problem: 6 months  Pain/ discomfort is described as:  burning  Pain Ratin/10 at worst.  Frequency:  intermittent    The pain is exacerbated by prolonged walking.  Previous treatment: ED 19;  cephalexin  He reports improvement. Red streak has resolved.    Patient Active Problem List   Diagnosis     Cellulitis of Lt lower leg since 3-70-woxlajfq since last saw ID  14     Baker's cyst     Ventral hernia     Left inguinal hernia     Diverticulosis of large intestine     Edema of both legs     Stasis dermatitis of both legs     Benign essential hypertension     Need for hepatitis C screening test     Cellulitis and abscess of leg: Lt  Lat Malleolus  onset late 4-16      ACP (advance care planning)     Non morbid obesity due to excess calories with comorbid HTN     Bilateral leg edema     Incarcerated hernia     Visit for wound check     Obesity (BMI 35.0-39.9) with comorbidity (H)     Past Surgical History:   Procedure Laterality Date     LAPAROTOMY EXPLORATORY N/A 2017    Procedure: LAPAROTOMY EXPLORATORY;  Exploratory Laparotomy and  Gint Ventral Hernia Repair with Mesh;  Surgeon: Mina Parsons MD;  Location: [facility] OR     ORTHOPEDIC SURGERY Left     ankle fusion     Current Outpatient Medications   Medication Sig Dispense Refill     amLODIPine (NORVASC) 5 MG tablet Take 1 tablet (5 mg) by mouth daily 90 tablet 3     Ascorbic Acid (VITAMIN C PO) Take 500 mg by mouth daily + 500 mg po qHS PRN       cephALEXin " "(KEFLEX) 500 MG capsule Take 1 capsule (500 mg) by mouth 4 times daily for 7 days 28 capsule 0     lisinopril (PRINIVIL/ZESTRIL) 40 MG tablet TAKE 1 TABLET BY MOUTH ONCE DAILY IN THE EVENING 90 tablet 3     multivitamin, therapeutic with minerals (THERA-VIT-M) TABS Take 1 tablet by mouth daily         ROS:    A 10-point review of systems was performed.  It is positive for that noted in the HPI and as seen below.  All other systems found to be negative.     Numbness in feet?  yes   Calf pain with walking? no  Recent foot/ankle injury? no  Weight change  over past 12 months? no  Self perception as overweight? yes  Recent flu-like symptoms? no  Joint pain other than feet ? Pain and stiffness in both knees    EXAM:    Vitals: BP (!) 142/84   Ht 1.854 m (6' 1\")   Wt 125.2 kg (276 lb)   BMI 36.41 kg/m    BMI: Body mass index is 36.41 kg/m .  Height: 6' 1\"    CConstitutional/ general:  Pt is in no apparent distress, appears well-nourished.  Cooperative with history and physical exam.     Vascular:  Pedal pulses are palpable bilaterally for both the DP and PT arteries.  CFT < 3 sec.  No edema.  Pedal hair growth noted.      Neuro:  Alert and oriented x 3. Coordinated gait.  Light touch sensation is intact to the L4, L5, S1 distributions. No obvious deficits.  No evidence of neurological-based weakness, spasticity, or contracture in the lower extremities.      Derm:   1) ulceration, 0.5cm diameter, to depth of fat layer, plantar lateral left 5th metatarsal head.  It does not probe to a hard endpoint. Prolific hyperkeratotic eschar. No erythema, malodor, or purulence.    2) prominent callus with evidence of intra-epidermal bleeding at site of previous right foot ulceration. Post pairing, no ulceration.      Musculoskeletal: Lower extremity muscle strength is normal.  No motion around the left ankle. Right ankle is without motion.  Foot rests in slight plantarflexion with significant varus tilt.  The right ankle bony " architecture is very thick. Prominent lateral malleolus.     Radiographic Exam:  X-Ray Findings:  I personally reviewed the right foot images images.  No evidence of bony lysis or erosion, left 5th metatarsal head.

## 2019-08-29 NOTE — PATIENT INSTRUCTIONS
Thank you for choosing [facility]!    DR. MELISSA'S CLINIC LOCATIONS     MONDAY - [place] WEDNESDAY (AM ONLY) - [place]   [address] [place] [address] [address] [address]   [phone] / [phone] / [phone]       THURSDAY - [place] SCHEDULE SURGERY: [phone]   [address] APPOINTMENTS: [phone]   [place] [address] BILLING QUESTIONS: [phone] / [phone]         SIGNS OF INFECTION    expanding redness around the wound     yellow or greenish-colored pus or cloudy wound drainage     red streaking spreading from the wound     increased swelling, tenderness, or pain around the wound     fever  *If you notice any of these signs of infection, call us right away!    WOUND CARE INSTRUCTIONS    1)  Keep the wound covered by a bandage when bathing.    2)  Gently clean the wound with soap water, separate from bath/shower water.      3)  Each day, apply a topical antibiotic ointment to the wound (Neosporin, Triple antibiotic, Bacitracin). Cover with large band-aid or gauze.      5)  Please seek immediate medical attention if any increasing redness, drainage, smell, or pain related to the wound.     6)  Please return to clinic in the period of time requested by Dr. Melissa.            [employer] / CAM WALKING BOOT INSTRUCTIONS  - Do NOT drive with CAM walker on. This is due to safety and legal issues.   - Do NOT wear the CAM walker on long car/train rides or on an airplane.  - Remove the CAM walker several times a day and do ankle range of motion (ROM) exercises/wiggle toes.  - It is recommended that a thick-soled shoe be worn on the other foot to offset any created leg length issue.   - The boot does not have to be worn at night.   - There is an increased risk of developing a blood clot with lower extremity immobilization. ROM exercises and knee-high compression (tenso /ACE wrap) is recommended to  lower that risk.   - You should seek medical attention if you experience calf swelling and/or pain, chest pain, or shortness of breath.           FYI: BODY WEIGHT AND YOUR FEET  The following information is included in the after visit summary for all patients. Body weight can be a sensitive issue to discuss in clinic, but we think the following information is very important. Although we focus on the feet and ankles, we do support the overall health of our patients.     Many things can cause foot and ankle problems. Foot structure, activity level, foot mechanics and injuries are common causes of pain. One very important issue that often goes unmentioned, is body weight. Extra weight can cause increased stress on muscles, ligaments, bones and tendons. Sometimes just a few extra pounds is all it takes to put one over her/his threshold. Without reducing that stress, it can be difficult to alleviate pain. As Foot & Ankle specialists, our job is addressing the lower extremity problem and possible causes. Regarding extra body weight, we encourage patients to discuss diet and weight management plans with their primary care doctors. It is this team approach that gives you the best opportunity for pain relief and getting you back on your feet.      [facility] has a Comprehensive Weight Management Program. This program includes counseling, education, non-surgical and surgical approaches to weight loss. If you are interested in learning more either talk to you primary care provider or call [phone].    Sav to follow up with Primary Care provider regarding elevated blood pressure.

## 2020-01-18 ENCOUNTER — OFFICE VISIT (OUTPATIENT)
Dept: URGENT CARE | Facility: URGENT CARE | Age: 65
End: 2020-01-18

## 2020-01-18 VITALS
RESPIRATION RATE: 18 BRPM | TEMPERATURE: 99.1 F | DIASTOLIC BLOOD PRESSURE: 96 MMHG | WEIGHT: 281 LBS | OXYGEN SATURATION: 95 % | HEIGHT: 73 IN | BODY MASS INDEX: 37.24 KG/M2 | HEART RATE: 102 BPM | SYSTOLIC BLOOD PRESSURE: 156 MMHG

## 2020-01-18 DIAGNOSIS — R53.83 FATIGUE, UNSPECIFIED TYPE: ICD-10-CM

## 2020-01-18 DIAGNOSIS — J20.6 ACUTE BRONCHITIS DUE TO RHINOVIRUS: Primary | ICD-10-CM

## 2020-01-18 DIAGNOSIS — R07.0 THROAT PAIN: ICD-10-CM

## 2020-01-18 DIAGNOSIS — R06.2 INSPIRATORY WHEEZE ON EXAMINATION: ICD-10-CM

## 2020-01-18 LAB
DEPRECATED S PYO AG THROAT QL EIA: NORMAL
FLUAV+FLUBV AG SPEC QL: NEGATIVE
FLUAV+FLUBV AG SPEC QL: NEGATIVE
SPECIMEN SOURCE: NORMAL
SPECIMEN SOURCE: NORMAL

## 2020-01-18 PROCEDURE — 87880 STREP A ASSAY W/OPTIC: CPT | Performed by: NURSE PRACTITIONER

## 2020-01-18 PROCEDURE — 99214 OFFICE O/P EST MOD 30 MIN: CPT | Mod: 25 | Performed by: NURSE PRACTITIONER

## 2020-01-18 PROCEDURE — 87804 INFLUENZA ASSAY W/OPTIC: CPT | Performed by: NURSE PRACTITIONER

## 2020-01-18 PROCEDURE — 87081 CULTURE SCREEN ONLY: CPT | Performed by: NURSE PRACTITIONER

## 2020-01-18 RX ORDER — PREDNISONE 20 MG/1
20 TABLET ORAL 2 TIMES DAILY
Qty: 10 TABLET | Refills: 0 | Status: SHIPPED | OUTPATIENT
Start: 2020-01-18 | End: 2021-02-21

## 2020-01-18 RX ORDER — BENZONATATE 200 MG/1
200 CAPSULE ORAL 3 TIMES DAILY PRN
Qty: 21 CAPSULE | Refills: 0 | Status: SHIPPED | OUTPATIENT
Start: 2020-01-18 | End: 2021-02-21

## 2020-01-18 RX ORDER — ALBUTEROL SULFATE 90 UG/1
2 AEROSOL, METERED RESPIRATORY (INHALATION) EVERY 4 HOURS PRN
Qty: 1 INHALER | Refills: 0 | Status: SHIPPED | OUTPATIENT
Start: 2020-01-18 | End: 2021-02-21

## 2020-01-18 RX ORDER — ALBUTEROL SULFATE 0.83 MG/ML
2.5 SOLUTION RESPIRATORY (INHALATION) ONCE
Status: COMPLETED | OUTPATIENT
Start: 2020-01-18 | End: 2020-01-18

## 2020-01-18 RX ADMIN — ALBUTEROL SULFATE 2.5 MG: 0.83 SOLUTION RESPIRATORY (INHALATION) at 18:30

## 2020-01-18 ASSESSMENT — ENCOUNTER SYMPTOMS
DIARRHEA: 0
HEADACHES: 0
COUGH: 1
NAUSEA: 0
SORE THROAT: 1
ACTIVITY CHANGE: 1
APPETITE CHANGE: 0
SHORTNESS OF BREATH: 1
MYALGIAS: 0
DYSURIA: 0
FATIGUE: 1
FEVER: 0
WHEEZING: 1
CHILLS: 1

## 2020-01-18 ASSESSMENT — MIFFLIN-ST. JEOR: SCORE: 2118.49

## 2020-01-18 NOTE — PROGRESS NOTES
SUBJECTIVE:   Sav Mendoza is a 64 year old male presenting with a chief complaint of   Chief Complaint   Patient presents with     Urgent Care     Pharyngitis     Pt states he has been exposed to pneumonia and strep states his symptoms started beg. of the week sore thraot and productive cough with wheezing        He is an established patient of [facility].    URI Adult    Onset of symptoms was 1 week ago.  Course of illness is worsening with wheezing over the past 3 days  Current and Associated symptoms: chills, stuffy nose, cough - non-productive, wheezing, shortness of breath and fatigue  Treatment measures tried include Tylenol/Ibuprofen.  Predisposing factors include ill contact: Work and exposure to influenza/strep and pneumonia      Review of Systems   Constitutional: Positive for activity change, chills and fatigue. Negative for appetite change and fever.   HENT: Positive for sore throat. Negative for ear pain.    Respiratory: Positive for cough, shortness of breath and wheezing.    Gastrointestinal: Negative for diarrhea and nausea.   Genitourinary: Negative for dysuria.   Musculoskeletal: Negative for myalgias.   Neurological: Negative for headaches.   All other systems reviewed and are negative.      Past Medical History:   Diagnosis Date     Hypertension      Ventral hernia 6/24/14     Family History   Problem Relation Age of Onset     Alzheimer Disease Mother      Current Outpatient Medications   Medication Sig Dispense Refill     albuterol (PROAIR HFA/PROVENTIL HFA/VENTOLIN HFA) 108 (90 Base) MCG/ACT inhaler Inhale 2 puffs into the lungs every 4 hours as needed for shortness of breath / dyspnea or wheezing 1 Inhaler 0     amLODIPine (NORVASC) 5 MG tablet Take 1 tablet (5 mg) by mouth daily 90 tablet 3     Ascorbic Acid (VITAMIN C PO) Take 500 mg by mouth daily + 500 mg po qHS PRN       benzonatate (TESSALON) 200 MG capsule Take 1 capsule (200 mg) by mouth 3 times daily as needed for cough 21 capsule  "0     lisinopril (PRINIVIL/ZESTRIL) 40 MG tablet TAKE 1 TABLET BY MOUTH ONCE DAILY IN THE EVENING 90 tablet 3     multivitamin, therapeutic with minerals (THERA-VIT-M) TABS Take 1 tablet by mouth daily       order for DME Equipment being ordered: short CAM walker 1 Device 0     predniSONE (DELTASONE) 20 MG tablet Take 1 tablet (20 mg) by mouth 2 times daily 10 tablet 0     Social History     Tobacco Use     Smoking status: Never Smoker     Smokeless tobacco: Never Used   Substance Use Topics     Alcohol use: Yes     Comment: rare       OBJECTIVE  BP (!) 156/96   Pulse 102   Temp 99.1  F (37.3  C) (Oral)   Resp 18   Ht 1.854 m (6' 1\")   Wt 127.5 kg (281 lb)   SpO2 95%   BMI 37.07 kg/m      Physical Exam  Constitutional:       Appearance: He is ill-appearing. He is not diaphoretic.   HENT:      Right Ear: Tympanic membrane, ear canal and external ear normal.      Left Ear: Tympanic membrane, ear canal and external ear normal.      Nose: Congestion present. No rhinorrhea.      Mouth/Throat:      Mouth: Mucous membranes are dry.      Pharynx: Posterior oropharyngeal erythema present.   Neck:      Musculoskeletal: Neck supple.   Cardiovascular:      Rate and Rhythm: Normal rate and regular rhythm.      Pulses: Normal pulses.      Heart sounds: Normal heart sounds. No murmur. No friction rub. No gallop.    Pulmonary:      Effort: Pulmonary effort is normal. No respiratory distress.      Breath sounds: No stridor. Wheezing present. No rhonchi or rales.      Comments: Anterior upper lobe inspiratory wheezes noted    POST NEB: Improved aeration throughout   Lymphadenopathy:      Cervical: No cervical adenopathy.   Skin:     General: Skin is warm.      Capillary Refill: Capillary refill takes less than 2 seconds.      Findings: No rash.   Neurological:      General: No focal deficit present.      Mental Status: He is alert and oriented to person, place, and time. Mental status is at baseline.   Psychiatric:         " Mood and Affect: Mood normal.         Behavior: Behavior normal.         Labs:  Results for orders placed or performed in visit on 01/18/20 (from the past 24 hour(s))   Strep, Rapid Screen   Result Value Ref Range    Specimen Description Throat     Rapid Strep A Screen       NEGATIVE: No Group A streptococcal antigen detected by immunoassay, await culture report.   Influenza A/B antigen   Result Value Ref Range    Influenza A/B Agn Specimen Nasopharyngeal     Influenza A Negative NEG^Negative    Influenza B Negative NEG^Negative       ASSESSMENT:    ICD-10-CM    1. Acute bronchitis due to Rhinovirus J20.6 predniSONE (DELTASONE) 20 MG tablet     albuterol (PROAIR HFA/PROVENTIL HFA/VENTOLIN HFA) 108 (90 Base) MCG/ACT inhaler     benzonatate (TESSALON) 200 MG capsule   2. Throat pain R07.0 Strep, Rapid Screen     Beta strep group A culture   3. Fatigue, unspecified type R53.83 Influenza A/B antigen   4. Inspiratory wheeze on examination R06.2 albuterol (PROVENTIL) neb solution 2.5 mg        Medical Decision Making:    Differential Diagnosis:  URI Adult/Peds:  Bronchitis-viral, Influenza, Viral pharyngitis, Viral syndrome and Viral upper respiratory illness    Serious Comorbid Conditions:  Adult:  HTN    PLAN: Discussed treatment options and given clinical presentation need for course of prednisonse burst and albuterol inhaler as indicated every 4 hours, and symptomatic measures encouraged, humidified air, plenty of fluids and rest. Discussed negative rapid flu and strep results, culture process. Education provided. Advised to return to follow up with PCP if symptoms persist. Patient agreed to the plan of care.     Geremias Abraham, APRN, CNP      Patient Instructions     Patient Education     Viral or Bacterial Bronchitis with Wheezing (Adult)    Bronchitis is an infection of the air passages. It often occurs during a cold and is usually caused by a virus. Symptoms include cough with mucus (phlegm) and low-grade  fever. This illness is contagious during the first few days and is spread through the air by coughing and sneezing, or by direct contact (touching the sick person and then touching your own eyes, nose, or mouth).  If there is a lot of inflammation, air flow is restricted. The air passages may also go into spasm, especially if you have asthma. This causes wheezing and difficulty breathing even in people who do not have asthma.  Bronchitis usually lasts 7 to 14 days. The wheezing should improve with treatment during the first week. An inhaler is often prescribed to relax the air passages and stop wheezing. Antibiotics will be prescribed if your doctor thinks there is also a secondary bacterial infection.  Home care    If symptoms are severe, rest at home for the first 2 to 3 days. When you go back to your usual activities, don't let yourself get too tired.    Dont s'moke. Also avoid being exposed to secondhand smoke.    You may use over-the-counter medicine to control fever or pain, unless another medicine was prescribed. Note: If you have chronic liver or kidney disease or have ever had a stomach ulcer or gastrointestinal bleeding, talk with your healthcare provider before using these medicines. Also talk to your provider if you are taking medicine to prevent blood clots.) Aspirin should never be given to anyone younger than 18 years of age who is ill with a viral infection or fever. It may cause severe liver or brain damage.    Your appetite may be poor, so a light diet is fine. Stay well hydrated by drinking 6 to 8 glasses of fluids per day (such as water, soft drinks, sports drinks, juices, tea, or soup). Extra fluids will help loosen secretions in the nose and lungs.    Over-the-counter cough, cold, and sore-throat medicines will not shorten the length of the illness, but they may be helpful to reduce symptoms. (Note: Don't use decongestants if you have high blood pressure.)    If you were given an inhaler, use  it exactly as directed. If you need to use it more often than prescribed, your condition may be worsening. If this happens, contact your healthcare provider.    If prescribed, finish all antibiotic medicine, even if you are feeling better after only a few days.  Follow-up care  Follow up with your healthcare provider, or as advised. If you had an X-ray or ECG (electrocardiogram), a specialist will review it. You will be notified of any new findings that may affect your care.  If you are age 65 or older, or if you have a chronic lung disease or condition that affects your immune system, or you smoke, ask your healthcare provider about getting a pneumococcal vaccine and a yearly flu shot (influenza vaccine).  When to seek medical advice  Call your healthcare provider right away if any of these occur:    Fever of 100.4 F (38 C) or higher, or as directed by your healthcare provider    Coughing up increasing amounts of colored sputum    Weakness, drowsiness, headache, facial pain, ear pain, or a stiff neck  Call 911  Call 911 if any of these occur.    Coughing up blood    Worsening weakness, drowsiness, headache, or stiff neck    Increased wheezing not helped with medication, shortness of breath, or pain with breathing  Date Last Reviewed: 6/1/2018 2000-2019 [employer]. [address] [place] [address], [place] [address]. All rights reserved. This information is not intended as a substitute for professional medical care. Always follow your healthcare professional's instructions.

## 2020-01-19 LAB
BACTERIA SPEC CULT: NORMAL
SPECIMEN SOURCE: NORMAL

## 2020-01-19 NOTE — PATIENT INSTRUCTIONS
Patient Education     Viral or Bacterial Bronchitis with Wheezing (Adult)    Bronchitis is an infection of the air passages. It often occurs during a cold and is usually caused by a virus. Symptoms include cough with mucus (phlegm) and low-grade fever. This illness is contagious during the first few days and is spread through the air by coughing and sneezing, or by direct contact (touching the sick person and then touching your own eyes, nose, or mouth).  If there is a lot of inflammation, air flow is restricted. The air passages may also go into spasm, especially if you have asthma. This causes wheezing and difficulty breathing even in people who do not have asthma.  Bronchitis usually lasts 7 to 14 days. The wheezing should improve with treatment during the first week. An inhaler is often prescribed to relax the air passages and stop wheezing. Antibiotics will be prescribed if your doctor thinks there is also a secondary bacterial infection.  Home care    If symptoms are severe, rest at home for the first 2 to 3 days. When you go back to your usual activities, don't let yourself get too tired.    Dont s'moke. Also avoid being exposed to secondhand smoke.    You may use over-the-counter medicine to control fever or pain, unless another medicine was prescribed. Note: If you have chronic liver or kidney disease or have ever had a stomach ulcer or gastrointestinal bleeding, talk with your healthcare provider before using these medicines. Also talk to your provider if you are taking medicine to prevent blood clots.) Aspirin should never be given to anyone younger than 18 years of age who is ill with a viral infection or fever. It may cause severe liver or brain damage.    Your appetite may be poor, so a light diet is fine. Stay well hydrated by drinking 6 to 8 glasses of fluids per day (such as water, soft drinks, sports drinks, juices, tea, or soup). Extra fluids will help loosen secretions in the nose and  lungs.    Over-the-counter cough, cold, and sore-throat medicines will not shorten the length of the illness, but they may be helpful to reduce symptoms. (Note: Don't use decongestants if you have high blood pressure.)    If you were given an inhaler, use it exactly as directed. If you need to use it more often than prescribed, your condition may be worsening. If this happens, contact your healthcare provider.    If prescribed, finish all antibiotic medicine, even if you are feeling better after only a few days.  Follow-up care  Follow up with your healthcare provider, or as advised. If you had an X-ray or ECG (electrocardiogram), a specialist will review it. You will be notified of any new findings that may affect your care.  If you are age 65 or older, or if you have a chronic lung disease or condition that affects your immune system, or you smoke, ask your healthcare provider about getting a pneumococcal vaccine and a yearly flu shot (influenza vaccine).  When to seek medical advice  Call your healthcare provider right away if any of these occur:    Fever of 100.4 F (38 C) or higher, or as directed by your healthcare provider    Coughing up increasing amounts of colored sputum    Weakness, drowsiness, headache, facial pain, ear pain, or a stiff neck  Call 911  Call 911 if any of these occur.    Coughing up blood    Worsening weakness, drowsiness, headache, or stiff neck    Increased wheezing not helped with medication, shortness of breath, or pain with breathing  Date Last Reviewed: 6/1/2018 2000-2019 [employer]. [address] [place] [address], [place] [address]. All rights reserved. This information is not intended as a substitute for professional medical care. Always follow your healthcare professional's instructions.

## 2020-04-18 DIAGNOSIS — I10 BENIGN ESSENTIAL HYPERTENSION: ICD-10-CM

## 2020-04-18 NOTE — LETTER
April 28, 2020      Sav Mendoza  [address] [id number]        Dear Sav,              Your BP medication refill was sent over to your pharmacy today(4/28/2020), but according to our records you are overdue for BP check with us. Please call our office at # [phone] to schedule a Telephone visit with . Thank you.      Sincerely,        DAYSI GUIDRY MD

## 2020-04-20 RX ORDER — AMLODIPINE BESYLATE 5 MG/1
TABLET ORAL
Qty: 90 TABLET | Refills: 0 | Status: SHIPPED | OUTPATIENT
Start: 2020-04-20 | End: 2020-04-30

## 2020-04-28 DIAGNOSIS — I10 ESSENTIAL HYPERTENSION: ICD-10-CM

## 2020-04-28 NOTE — TELEPHONE ENCOUNTER
"lisinopril (ZESTRIL) 40 MG tablet   Last Written Prescription Date:  03/29/2020  Last Fill Quantity: 30,  # refills: 0   Last office visit: 2/9/2019 with prescribing provider:  02/09/2019   Future Office Visit:    Requested Prescriptions   Pending Prescriptions Disp Refills     lisinopril (ZESTRIL) 40 MG tablet 30 tablet 0     Sig: TAKE 1 TABLET BY MOUTH ONCE DAILY IN THE EVENING. Please call the clinic to schedule a follow up visit to re-check this medication.       ACE Inhibitors (Including Combos) Protocol Failed - 4/28/2020 11:14 AM        Failed - Blood pressure under 140/90 in past 12 months     BP Readings from Last 3 Encounters:   01/18/20 (!) 156/96   08/29/19 (!) 142/84   08/24/19 (!) 158/99                 Failed - Recent (12 mo) or future (30 days) visit within the authorizing provider's specialty     Patient has had an office visit with the authorizing provider or a provider within the authorizing providers department within the previous 12 mos or has a future within next 30 days. See \"Patient Info\" tab in inbasket, or \"Choose Columns\" in Meds & Orders section of the refill encounter.              Failed - Normal serum creatinine on file in past 12 months     Recent Labs   Lab Test 02/09/19  1124   CR 0.70       Ok to refill medication if creatinine is low          Failed - Normal serum potassium on file in past 12 months     Recent Labs   Lab Test 02/09/19  1124   POTASSIUM 4.2             Passed - Medication is active on med list        Passed - Patient is age 18 or older             "

## 2020-04-29 RX ORDER — LISINOPRIL 40 MG/1
TABLET ORAL
Qty: 30 TABLET | Refills: 0 | Status: SHIPPED | OUTPATIENT
Start: 2020-04-29 | End: 2020-04-30

## 2020-04-30 ENCOUNTER — VIRTUAL VISIT (OUTPATIENT)
Dept: FAMILY MEDICINE | Facility: CLINIC | Age: 65
End: 2020-04-30

## 2020-04-30 DIAGNOSIS — Z13.220 SCREENING FOR HYPERLIPIDEMIA: ICD-10-CM

## 2020-04-30 DIAGNOSIS — Z12.5 SCREENING PSA (PROSTATE SPECIFIC ANTIGEN): ICD-10-CM

## 2020-04-30 DIAGNOSIS — I10 ESSENTIAL HYPERTENSION: ICD-10-CM

## 2020-04-30 DIAGNOSIS — I10 BENIGN ESSENTIAL HYPERTENSION: Primary | ICD-10-CM

## 2020-04-30 DIAGNOSIS — E66.09 NON MORBID OBESITY DUE TO EXCESS CALORIES: ICD-10-CM

## 2020-04-30 PROCEDURE — 99214 OFFICE O/P EST MOD 30 MIN: CPT | Mod: 95 | Performed by: FAMILY MEDICINE

## 2020-04-30 RX ORDER — LISINOPRIL 40 MG/1
TABLET ORAL
Qty: 90 TABLET | Refills: 3 | Status: SHIPPED | OUTPATIENT
Start: 2020-04-30 | End: 2021-06-21

## 2020-04-30 RX ORDER — AMLODIPINE BESYLATE 5 MG/1
5 TABLET ORAL DAILY
Qty: 90 TABLET | Refills: 3 | Status: SHIPPED | OUTPATIENT
Start: 2020-04-30 | End: 2021-06-21

## 2020-04-30 NOTE — PROGRESS NOTES
"Sav Mendoza is a 65 year old male who is being evaluated via a billable telephone visit.      The patient has been notified of following:     \"This telephone visit will be conducted via a call between you and your physician/provider. We have found that certain health care needs can be provided without the need for a physical exam.  This service lets us provide the care you need with a short phone conversation.  If a prescription is necessary we can send it directly to your pharmacy.  If lab work is needed we can place an order for that and you can then stop by our lab to have the test done at a later time.    Telephone visits are billed at different rates depending on your insurance coverage. During this emergency period, for some insurers they may be billed the same as an in-person visit.  Please reach out to your insurance provider with any questions.    If during the course of the call the physician/provider feels a telephone visit is not appropriate, you will not be charged for this service.\"    Patient has given verbal consent for Telephone visit?  Yes    How would you like to obtain your AVS? Mail a copy    Subjective     Sav Mendoza is a 65 year old male who presents to clinic today for the following health issues:    HPI  Hypertension Follow-up      Do you check your blood pressure regularly outside of the clinic? No     Are you following a low salt diet? Yes    Are your blood pressures ever more than 140 on the top number (systolic) OR more   than 90 on the bottom number (diastolic), for example 140/90? No      How many servings of fruits and vegetables do you eat daily?  2-3    On average, how many sweetened beverages do you drink each day (Examples: soda, juice, sweet tea, etc.  Do NOT count diet or artificially sweetened beverages)?   0    How many days per week do you exercise enough to make your heart beat faster? 3 or less    How many minutes a day do you exercise enough to make your heart " beat faster? unknown    How many days per week do you miss taking your medication? 0    NO BLOOD PRESSURE CUFF AT HOME     GETTING PAID NOT TO WORK     BUS DRIVERS AND AIDS     BORED AT HOME     FEELING OVERALL WELL     RIGHT LEG SCIATICA     BIKING IN EASY CHAIR     FLEXION IN SUPINE POSITION  X30 EACH    PRASANNA FLEXION EXERCISES     CRUNCHES HELP     NOT DO EXTENSION EXERCISES     LEG SWELLING IMPROVED     LESS SWELLING     COMPRESSION STOCKINGS   .DAYSI GUIDRY MD .4/30/2020 9:34 AM .April 30, 2020    Sav Mendoza is a 65 year old male who is who presents with FOLLOW UP  HYPERTENSION WITH GOAL OF LESS THAN 140/80   REFILL OF MEDICATIONS   PROSTATE SPECIFIC ANTIGEN FOLLOW UP   LEG EDEMA AND CELLULITIS RESOLVED BUT AT RISK   BRONCHITIS IN January    Onset :  MANY YEARS   Severity: MODERATE     Home treatments  ELEVATION AND ACE WRAP    Additional Symptoms:  SWELLING IMPROVED    Course ONGOING   RECOMMENDED GETTING BLOOD PRESSURE CUFF  AND CHECK TWICE DAILY       There are no preventive care reminders to display for this patient.      .  Current Outpatient Medications   Medication Sig Dispense Refill     albuterol (PROAIR HFA/PROVENTIL HFA/VENTOLIN HFA) 108 (90 Base) MCG/ACT inhaler Inhale 2 puffs into the lungs every 4 hours as needed for shortness of breath / dyspnea or wheezing 1 Inhaler 0     amLODIPine (NORVASC) 5 MG tablet Take 1 tablet (5 mg) by mouth daily 90 tablet 3     Ascorbic Acid (VITAMIN C PO) Take 500 mg by mouth daily + 500 mg po qHS PRN       Blood Pressure Monitoring (BLOOD PRESSURE MONITOR/WRIST) LAURA 1 Application 2 times daily as needed (HYPERTENSION) 1 each 0     lisinopril (ZESTRIL) 40 MG tablet TAKE 1 TABLET BY MOUTH ONCE DAILY IN THE EVENING. Please call the clinic to schedule a follow up visit to re-check this medication. 90 tablet 3     multivitamin, therapeutic with minerals (THERA-VIT-M) TABS Take 1 tablet by mouth daily       benzonatate (TESSALON) 200 MG capsule Take 1  capsule (200 mg) by mouth 3 times daily as needed for cough (Patient not taking: Reported on 2020) 21 capsule 0     order for DME Equipment being ordered: short CAM walker (Patient not taking: Reported on 2020) 1 Device 0     predniSONE (DELTASONE) 20 MG tablet Take 1 tablet (20 mg) by mouth 2 times daily (Patient not taking: Reported on 2020) 10 tablet 0        No Known Allergies    Immunization History   Administered Date(s) Administered     TDAP Vaccine (Adacel) 05/10/2016         reports current alcohol use.      reports no history of drug use.    family history includes Alzheimer Disease in his mother.    He indicated that his mother is . He indicated that his father is . He indicated that his daughter is alive. He indicated that both of his sons are alive.       has a past surgical history that includes orthopedic surgery (Left, ) and Laparotomy exploratory (N/A, 2017).     reports never being sexually active.  .  Pediatric History   Patient Parents     Not on file     Other Topics Concern     Parent/sibling w/ CABG, MI or angioplasty before 65F 55M? No   Social History Narrative     Not on file         reports that he has never smoked. He has never used smokeless tobacco.    Medical, social, surgical, and family histories reviewed.    Labs reviewed in EPIC  Patient Active Problem List   Diagnosis     Cellulitis of Lt lower leg since 4-73-hovzvyms since last saw ID  [id number]     Baker's cyst     Ventral hernia     Left inguinal hernia     Diverticulosis of large intestine     Edema of both legs     Stasis dermatitis of both legs     Benign essential hypertension     Need for hepatitis C screening test     Cellulitis and abscess of leg: Lt  Lat Malleolus  onset late 4-16      ACP (advance care planning)     Non morbid obesity due to excess calories with comorbid HTN     Bilateral leg edema     Incarcerated hernia     Visit for wound check     Obesity (BMI 35.0-39.9) with  comorbidity (H)     Past Surgical History:   Procedure Laterality Date     LAPAROTOMY EXPLORATORY N/A 12/12/2017    Procedure: LAPAROTOMY EXPLORATORY;  Exploratory Laparotomy and  Gint Ventral Hernia Repair with Mesh;  Surgeon: Mina Parsons MD;  Location: [facility] OR     ORTHOPEDIC SURGERY Left 2010    ankle fusion       Social History     Tobacco Use     Smoking status: Never Smoker     Smokeless tobacco: Never Used   Substance Use Topics     Alcohol use: Yes     Comment: rare     Family History   Problem Relation Age of Onset     Alzheimer Disease Mother          Current Outpatient Medications   Medication Sig Dispense Refill     albuterol (PROAIR HFA/PROVENTIL HFA/VENTOLIN HFA) 108 (90 Base) MCG/ACT inhaler Inhale 2 puffs into the lungs every 4 hours as needed for shortness of breath / dyspnea or wheezing 1 Inhaler 0     amLODIPine (NORVASC) 5 MG tablet Take 1 tablet (5 mg) by mouth daily 90 tablet 3     Ascorbic Acid (VITAMIN C PO) Take 500 mg by mouth daily + 500 mg po qHS PRN       Blood Pressure Monitoring (BLOOD PRESSURE MONITOR/WRIST) LAURA 1 Application 2 times daily as needed (HYPERTENSION) 1 each 0     lisinopril (ZESTRIL) 40 MG tablet TAKE 1 TABLET BY MOUTH ONCE DAILY IN THE EVENING. Please call the clinic to schedule a follow up visit to re-check this medication. 90 tablet 3     multivitamin, therapeutic with minerals (THERA-VIT-M) TABS Take 1 tablet by mouth daily       benzonatate (TESSALON) 200 MG capsule Take 1 capsule (200 mg) by mouth 3 times daily as needed for cough (Patient not taking: Reported on 4/30/2020) 21 capsule 0     order for DME Equipment being ordered: short CAM walker (Patient not taking: Reported on 4/30/2020) 1 Device 0     predniSONE (DELTASONE) 20 MG tablet Take 1 tablet (20 mg) by mouth 2 times daily (Patient not taking: Reported on 4/30/2020) 10 tablet 0       Recent Labs   Lab Test 02/09/19  1124 12/15/17  0547  12/12/17  0107  02/27/17  1056  05/10/16  1752 09/18/15  1049  04/08/14  1129   A1C 5.6  --   --   --   --  5.6  --  5.8  --   --    *  --   --   --   --   --   --   --  104 100   HDL 35*  --   --   --   --   --   --   --  34* 26*   TRIG 217*  --   --   --   --   --   --   --  172* 177*   ALT 30  --   --  27  --  24  --   --  30 28   CR 0.70 0.66   < > 0.62*   < > 0.79   < > 0.73 1.00 0.80   GFRESTIMATED >90 >90   < > >90   < > >90  Non  GFR Calc     < > >90  Non  GFR Calc   76 >90   GFRESTBLACK >90 >90   < > >90   < > >90  African American GFR Calc     < > >90   GFR Calc   >90 >90   POTASSIUM 4.2 3.6   < > 3.2*   < > 3.8   < > 3.1* 3.9 3.8    < > = values in this interval not displayed.        BP Readings from Last 6 Encounters:   01/18/20 (!) 156/96   08/29/19 (!) 142/84   08/24/19 (!) 158/99   05/02/19 138/70   04/12/19 150/88   02/18/19 136/85       Wt Readings from Last 3 Encounters:   01/18/20 127.5 kg (281 lb)   08/29/19 125.2 kg (276 lb)   08/24/19 129.3 kg (285 lb)         Positive symptoms or findings indicated by bold designation:     ROS: 10 point ROS neg other than the symptoms noted above in the HPI.except  has Cellulitis of Lt lower leg since 5-68-bmvnrfaf since last saw ID  5-7-14; Baker's cyst; Ventral hernia; Left inguinal hernia; Diverticulosis of large intestine; Edema of both legs; Stasis dermatitis of both legs; Benign essential hypertension; Need for hepatitis C screening test; Cellulitis and abscess of leg: Lt  Lat Malleolus  onset late 4-16 ; ACP (advance care planning); Non morbid obesity due to excess calories with comorbid HTN; Bilateral leg edema; Incarcerated hernia; Visit for wound check; and Obesity (BMI 35.0-39.9) with comorbidity (H) on their problem list.  Review Of Systems  Skin:  HISTORY OF LEG AND FOOT ULCERS   Eyes: glasses  Ears/Nose/Throat: negative  Respiratory: No shortness of breath, dyspnea on exertion, cough, or hemoptysis  Cardiovascular: negative  Gastrointestinal:  negative  Genitourinary: negative  Musculoskeletal: negative  Neurologic: negative  Psychiatric: negative  Hematologic/Lymphatic/Immunologic: negative  Endocrine: negative        PE:  There were no vitals taken for this visit. There is no height or weight on file to calculate BMI.   HISTORY OF OBEISTY      Psychiatric: orientation/consciousness, overall: oriented to person, place and time;  speech, overall: normal quality, no aphasia and normal quality, quantity, intact.      Diagnostic Test Results:  none       ICD-10-CM    1. Benign essential hypertension  I10 Blood Pressure Monitoring (BLOOD PRESSURE MONITOR/WRIST) LAURA     Basic metabolic panel     Basic metabolic panel     amLODIPine (NORVASC) 5 MG tablet   2. Non morbid obesity due to excess calories with comorbid HTN  E66.09    3. Screening for hyperlipidemia  Z13.220 Lipid panel reflex to direct LDL Fasting     ALT     ALT     Lipid panel reflex to direct LDL Fasting   4. Screening PSA (prostate specific antigen)  Z12.5 Prostate spec antigen screen     Prostate spec antigen screen   5. Essential hypertension  I10 lisinopril (ZESTRIL) 40 MG tablet        .  Side effects benefits and risks thoroughly discussed. .he may come in early if unimproved or getting worse      Please drink 2 glasses of water prior to meals and walk 15-30 minutes after meals    I spent  25 MINUTES SPENT  with patient discussing the following issues   The primary encounter diagnosis was Benign essential hypertension. Diagnoses of Non morbid obesity due to excess calories with comorbid HTN, Screening for hyperlipidemia, Screening PSA (prostate specific antigen), and Essential hypertension were also pertinent to this visit. over half of which involved counseling and coordination of care.    There are no Patient Instructions on file for this visit.    ALL THE ABOVE PROBLEMS ARE STABLE AND MED CHANGES AS NOTED    Diet: MEDITERRANEAN DIET  AND WEIGHT LOSS     Exercise:  PRASANNA FLEXION FOR  LOWER BACK PAIN WALK IN PLACE 15 MINUTES WITH EACH MEAL    Exercises Range of motion, balance, isometric, and strengthening exercises 30 repetitions twice daily of involved joints      .DAYSI GUIDRY MD 4/30/2020 9:34 AM  April 30, 2020

## 2020-04-30 NOTE — PATIENT INSTRUCTIONS
Jeovany' Flexion Versus Pearl   Extension Exercises For   Low Back Pain   Examples of Jeovany' Flexion Exercises  1. Pelvic tilt.  Please press the small of your back against the floor.  Start with 5-10  and increase to 100 count over one month   2. Single Knee to chest. Lie on your back with legs in bent position. Alternate one leg and the other very slowly bringing the knee to chest.  Start with a count of 5-10   Over one month work up to 100  3. Double knee to chest.  Lie on your back with knees in bent position.  Bring both knees to the chest slowly hold for a count of 5-to 10. Over one month work to 100  4. Partial sit-up or crunch.  Lie on your back in bent leg position.  Please bring your body with arms crossed in front  To 30 degrees of flexion. Start with 5-10 over one month work up to 100 or more  5. Sit back.  Please sit on the side of the bed or a stair landing  And lie backwards until the abdominal muscles start to quiver.  Hold for a count of 5-10 and over a month work up to 100.  5. Hamstring stretch.  Please extend your legs while sitting on the floor as tolerated for 5-10 count.  Gradually increase to count of 30 over one month      Alternately find a stair landing or sturdy chair and place heel  In a comfortable level of extension  And stretch one hamstring at a time for 5-10 seconds.  Increase to count of 30 over one month                         Squat.  Stand with legs comfortably apart and lower the body slowly by flexing the knees  for count of 5-10 over one month increase to 30.  Useful for anterior disc protrusion, facette joint arthritis spond-10ylolysis, spondylolisthesis and spinal stenosis   ROTATION AND LATERAL BENDING AS TOLERATED RIGHT OR LEFT     PILLOWS UNDER KNEES AT BED TIME    STRETCHING FORWARDS AND DOWN WHEN SITTING ON CHAIR    MAY SIT IN RELAXED MANNER     WHEN IN PREVENTION PHASE START WITH WITH JEOVANY EXERCISES AND FOLLOW UP  WITH PEARL EXERCISES AS TOLERATED    ADYSI GUIDRY JR., MD   (I10) Benign essential hypertension  (primary encounter diagnosis)  Comment:    Plan: Blood Pressure Monitoring (BLOOD PRESSURE         MONITOR/WRIST) LAURA, Basic metabolic panel,         Basic metabolic panel, amLODIPine (NORVASC) 5         MG tablet         REFILLED 90 DAY     (E66.09) Non morbid obesity due to excess calories with comorbid HTN  Comment:    Plan:  WEIGHT LOSS DIET AND EXERCISE     (Z13.220) Screening for hyperlipidemia  Comment:  SCREENING AND FOLLOW UP    Plan: Lipid panel reflex to direct LDL Fasting, ALT,         ALT, Lipid panel reflex to direct LDL Fasting             (Z12.5) Screening PSA (prostate specific antigen)  Comment:    Plan: Prostate spec antigen screen, Prostate spec         antigen screen             (I10) Essential hypertension  Comment:    Plan: lisinopril (ZESTRIL) 40 MG tablet         ONE DAILY   DAYSI GUIDRY JR., MD

## 2021-02-21 ENCOUNTER — HOSPITAL ENCOUNTER (EMERGENCY)
Facility: CLINIC | Age: 66
Discharge: HOME | End: 2021-02-21
Attending: EMERGENCY MEDICINE | Admitting: EMERGENCY MEDICINE
Payer: MEDICARE

## 2021-02-21 ENCOUNTER — APPOINTMENT (OUTPATIENT)
Dept: MRI IMAGING | Facility: CLINIC | Age: 66
End: 2021-02-21
Attending: EMERGENCY MEDICINE

## 2021-02-21 ENCOUNTER — APPOINTMENT (OUTPATIENT)
Dept: CT IMAGING | Facility: CLINIC | Age: 66
End: 2021-02-21
Attending: EMERGENCY MEDICINE

## 2021-02-21 VITALS
TEMPERATURE: 97.2 F | RESPIRATION RATE: 14 BRPM | HEART RATE: 80 BPM | OXYGEN SATURATION: 95 % | HEIGHT: 73 IN | SYSTOLIC BLOOD PRESSURE: 149 MMHG | DIASTOLIC BLOOD PRESSURE: 77 MMHG | BODY MASS INDEX: 37.51 KG/M2 | WEIGHT: 283 LBS

## 2021-02-21 DIAGNOSIS — R42 LIGHTHEADEDNESS: ICD-10-CM

## 2021-02-21 DIAGNOSIS — H53.2 DOUBLE VISION WITH BOTH EYES OPEN: ICD-10-CM

## 2021-02-21 LAB
ALBUMIN SERPL-MCNC: 3.6 G/DL (ref 3.4–5)
ALP SERPL-CCNC: 60 U/L (ref 40–150)
ALT SERPL W P-5'-P-CCNC: 31 U/L (ref 0–70)
ANION GAP SERPL CALCULATED.3IONS-SCNC: 6 MMOL/L (ref 3–14)
APTT PPP: 30 SEC (ref 22–37)
AST SERPL W P-5'-P-CCNC: 17 U/L (ref 0–45)
BASOPHILS # BLD AUTO: 0 10E9/L (ref 0–0.2)
BASOPHILS NFR BLD AUTO: 0.5 %
BILIRUB SERPL-MCNC: 0.4 MG/DL (ref 0.2–1.3)
BUN SERPL-MCNC: 14 MG/DL (ref 7–30)
CALCIUM SERPL-MCNC: 8.6 MG/DL (ref 8.5–10.1)
CHLORIDE SERPL-SCNC: 104 MMOL/L (ref 94–109)
CO2 SERPL-SCNC: 27 MMOL/L (ref 20–32)
CREAT SERPL-MCNC: 0.69 MG/DL (ref 0.66–1.25)
CRP SERPL-MCNC: <2.9 MG/L (ref 0–8)
DIFFERENTIAL METHOD BLD: NORMAL
EOSINOPHIL # BLD AUTO: 0.1 10E9/L (ref 0–0.7)
EOSINOPHIL NFR BLD AUTO: 1.4 %
ERYTHROCYTE [DISTWIDTH] IN BLOOD BY AUTOMATED COUNT: 12.4 % (ref 10–15)
ERYTHROCYTE [SEDIMENTATION RATE] IN BLOOD BY WESTERGREN METHOD: 14 MM/H (ref 0–20)
GFR SERPL CREATININE-BSD FRML MDRD: >90 ML/MIN/{1.73_M2}
GLUCOSE BLDC GLUCOMTR-MCNC: 114 MG/DL (ref 70–99)
GLUCOSE SERPL-MCNC: 109 MG/DL (ref 70–99)
HCT VFR BLD AUTO: 42.6 % (ref 40–53)
HGB BLD-MCNC: 14.3 G/DL (ref 13.3–17.7)
IMM GRANULOCYTES # BLD: 0 10E9/L (ref 0–0.4)
IMM GRANULOCYTES NFR BLD: 0.4 %
INR PPP: 1.01 (ref 0.86–1.14)
LYMPHOCYTES # BLD AUTO: 1.9 10E9/L (ref 0.8–5.3)
LYMPHOCYTES NFR BLD AUTO: 22.1 %
MCH RBC QN AUTO: 31.9 PG (ref 26.5–33)
MCHC RBC AUTO-ENTMCNC: 33.6 G/DL (ref 31.5–36.5)
MCV RBC AUTO: 95 FL (ref 78–100)
MONOCYTES # BLD AUTO: 0.6 10E9/L (ref 0–1.3)
MONOCYTES NFR BLD AUTO: 6.5 %
NEUTROPHILS # BLD AUTO: 5.8 10E9/L (ref 1.6–8.3)
NEUTROPHILS NFR BLD AUTO: 69.1 %
NRBC # BLD AUTO: 0 10*3/UL
NRBC BLD AUTO-RTO: 0 /100
PLATELET # BLD AUTO: 257 10E9/L (ref 150–450)
POTASSIUM SERPL-SCNC: 3.5 MMOL/L (ref 3.4–5.3)
PROT SERPL-MCNC: 7.4 G/DL (ref 6.8–8.8)
RBC # BLD AUTO: 4.48 10E12/L (ref 4.4–5.9)
SODIUM SERPL-SCNC: 137 MMOL/L (ref 133–144)
WBC # BLD AUTO: 8.4 10E9/L (ref 4–11)

## 2021-02-21 PROCEDURE — 86140 C-REACTIVE PROTEIN: CPT | Performed by: EMERGENCY MEDICINE

## 2021-02-21 PROCEDURE — 96361 HYDRATE IV INFUSION ADD-ON: CPT | Performed by: EMERGENCY MEDICINE

## 2021-02-21 PROCEDURE — 255N000002 HC RX 255 OP 636: Performed by: EMERGENCY MEDICINE

## 2021-02-21 PROCEDURE — 85025 COMPLETE CBC W/AUTO DIFF WBC: CPT | Performed by: EMERGENCY MEDICINE

## 2021-02-21 PROCEDURE — 85730 THROMBOPLASTIN TIME PARTIAL: CPT | Performed by: EMERGENCY MEDICINE

## 2021-02-21 PROCEDURE — 70548 MR ANGIOGRAPHY NECK W/DYE: CPT

## 2021-02-21 PROCEDURE — 93005 ELECTROCARDIOGRAM TRACING: CPT | Performed by: EMERGENCY MEDICINE

## 2021-02-21 PROCEDURE — 70544 MR ANGIOGRAPHY HEAD W/O DYE: CPT

## 2021-02-21 PROCEDURE — 80053 COMPREHEN METABOLIC PANEL: CPT | Performed by: EMERGENCY MEDICINE

## 2021-02-21 PROCEDURE — 85610 PROTHROMBIN TIME: CPT | Performed by: EMERGENCY MEDICINE

## 2021-02-21 PROCEDURE — 70450 CT HEAD/BRAIN W/O DYE: CPT

## 2021-02-21 PROCEDURE — 85652 RBC SED RATE AUTOMATED: CPT | Performed by: EMERGENCY MEDICINE

## 2021-02-21 PROCEDURE — 99285 EMERGENCY DEPT VISIT HI MDM: CPT | Mod: 25 | Performed by: EMERGENCY MEDICINE

## 2021-02-21 PROCEDURE — 999N001017 HC STATISTIC GLUCOSE BY METER IP

## 2021-02-21 PROCEDURE — 258N000003 HC RX IP 258 OP 636: Performed by: EMERGENCY MEDICINE

## 2021-02-21 PROCEDURE — 93010 ELECTROCARDIOGRAM REPORT: CPT | Performed by: EMERGENCY MEDICINE

## 2021-02-21 PROCEDURE — 70553 MRI BRAIN STEM W/O & W/DYE: CPT

## 2021-02-21 PROCEDURE — 96360 HYDRATION IV INFUSION INIT: CPT | Performed by: EMERGENCY MEDICINE

## 2021-02-21 PROCEDURE — A9585 GADOBUTROL INJECTION: HCPCS | Performed by: EMERGENCY MEDICINE

## 2021-02-21 PROCEDURE — 250N000013 HC RX MED GY IP 250 OP 250 PS 637: Performed by: EMERGENCY MEDICINE

## 2021-02-21 RX ORDER — ASPIRIN 325 MG
325 TABLET ORAL ONCE
Status: COMPLETED | OUTPATIENT
Start: 2021-02-21 | End: 2021-02-21

## 2021-02-21 RX ORDER — GADOBUTROL 604.72 MG/ML
15 INJECTION INTRAVENOUS ONCE
Status: COMPLETED | OUTPATIENT
Start: 2021-02-21 | End: 2021-02-21

## 2021-02-21 RX ORDER — PROPARACAINE HYDROCHLORIDE 5 MG/ML
1 SOLUTION/ DROPS OPHTHALMIC ONCE
Status: DISCONTINUED | OUTPATIENT
Start: 2021-02-21 | End: 2021-02-22 | Stop reason: HOSPADM

## 2021-02-21 RX ADMIN — GADOBUTROL 13 ML: 604.72 INJECTION INTRAVENOUS at 19:31

## 2021-02-21 RX ADMIN — SODIUM CHLORIDE 65 ML: 9 INJECTION, SOLUTION INTRAVENOUS at 19:31

## 2021-02-21 RX ADMIN — ASPIRIN 325 MG ORAL TABLET 325 MG: 325 PILL ORAL at 19:32

## 2021-02-21 ASSESSMENT — ENCOUNTER SYMPTOMS
DIARRHEA: 0
HEADACHES: 1
NAUSEA: 0
FEVER: 0
DYSURIA: 0
WEAKNESS: 0
LIGHT-HEADEDNESS: 1
COUGH: 0
ABDOMINAL PAIN: 0
VOMITING: 0
SHORTNESS OF BREATH: 0
APPETITE CHANGE: 0

## 2021-02-21 ASSESSMENT — VISUAL ACUITY
OD: 20/40
OS: 20/30

## 2021-02-21 ASSESSMENT — MIFFLIN-ST. JEOR: SCORE: 2122.56

## 2021-02-21 NOTE — CONSULTS
"[facility]    Stroke Telephone Note    I was called by Dr. Abigail Rosario on 02/21/21 at 1628 regarding patient Sav Mendoza. The patient is a 65 year old male with PMH of HTN, HLD seen as a stroke alert for diplopia.     Patient reports binocular diplopia  Developing suddenly at noon on 2/21/21. He came in to the ED where on arrival SBP in 150smmHg.Neuroexam reported to be non focal, other than binocular diplopia.  Head CT was unremarkable for acute parenchymal pathology.      Stroke Code Data  (for stroke code without tele)  Stroke code activated 02/21/21   1628   First stroke provider response 02/21/21   1628   Last known normal 02/21/21   1200   Time of discovery   (or onset of symptoms) 02/21/21   1200   Head CT read by me 02/21/21   1650   Was stroke code de-escalated? Yes 02/21/21 1702  (low NIHSS)       TPA Treatment   Not given due to low NIH.    Endovascular Treatment  Not performed, because of low NIHH    Impression  Binocular diplopia    Recommendations  [] ASA 325mg   [] MRI brain, MRA head and neck    My recommendations are based on the information provided over the phone by Sav Mendoza's in-person providers. They are not intended to replace the clinical judgment of his in-person providers. I was not requested to personally see or examine the patient at this time.    The Stroke Staff is Dr. Mcdaniel.    SUSAN GARDNER MD  Vascular Neurology Fellow  To page me or covering stroke neurology team member, click here: [facility]   Choose \"On Call\" tab at top, then search dropdown box for \"Neurology Adult\", select location, press Enter, then look for stroke/neuro ICU/telestroke.         "

## 2021-02-21 NOTE — ED PROVIDER NOTES
[facility] EMERGENCY DEPARTMENT ([facility])     February 21, 2021    History     Chief Complaint   Patient presents with     Eye Problem     States he had double vision that started @ 12 noon.  BG has been 172.  Told he was boarderline for DM.  Had a lot of sugar this morning.  States his vision is better now.     The history is provided by the patient, medical records and a relative (daughter).     Sav Mendoza is a 65 year old male with a medical history significant for hypertension and ventral hernia (2014) who presents to the ED today accompanied by his daugher for evaluation of double vision which began at noon today.  Patient also reports feeling lightheaded.  He reports that he did not have a headache initially, but slowly developed a mild headache focused in the forehead area which he rates as 2/10 in severity. He states his double vision is only present with both eyes open.  If he closes one eye or the other, he no longer sees double.  He primarily noticed the double vision with forward-looking gaze.  According to his daughter, his left eyelid appeared droopy at the time of onset of his double vision.  She did not notice any other facial droop and patient denies ever having any focal numbness or weakness.  No speech changes or change in his gait.  He states that his double vision has become less severe here in the ED. He reports he has drank 4 glasses of water since the time of onset of his symptoms. Patient denies feeling any weakness or hitting his head. He states he is eating and drinking okay.  He denies fever, cough, chest pain, shortness of breath, vomiting, diarrhea, or urinary symptoms.  He denies eye pain or drainage. Patient reports that he is ambulating normally. He states he normally walks with a cane because of chronic right ankle deformity.  Patient wears glasses and noticed the vision changes with his glasses on.    PAST MEDICAL HISTORY:   Past Medical History:   Diagnosis Date      Hypertension      Ventral hernia 6/24/14       PAST SURGICAL HISTORY:   Past Surgical History:   Procedure Laterality Date     LAPAROTOMY EXPLORATORY N/A 12/12/2017    Procedure: LAPAROTOMY EXPLORATORY;  Exploratory Laparotomy and  Gint Ventral Hernia Repair with Mesh;  Surgeon: Mina Parsons MD;  Location: [facility] OR     ORTHOPEDIC SURGERY Left 2010    ankle fusion       Past medical history, past surgical history, medications, and allergies were reviewed with the patient. Additional pertinent items: None    FAMILY HISTORY:   Family History   Problem Relation Age of Onset     Alzheimer Disease Mother        SOCIAL HISTORY:   Social History     Tobacco Use     Smoking status: Never Smoker     Smokeless tobacco: Never Used   Substance Use Topics     Alcohol use: Yes     Comment: rare     Social history was reviewed with the patient. Additional pertinent items: None      Discharge Medication List as of 2/21/2021 11:02 PM      CONTINUE these medications which have NOT CHANGED    Details   amLODIPine (NORVASC) 5 MG tablet Take 1 tablet (5 mg) by mouth daily, Disp-90 tablet,R-3, E-Prescribe      Ascorbic Acid (VITAMIN C PO) Take 500 mg by mouth daily + 500 mg po qHS PRN, Historical      Blood Pressure Monitoring (BLOOD PRESSURE MONITOR/WRIST) LAURA 1 Application 2 times daily as needed (HYPERTENSION), Disp-1 each,R-0, E-Prescribe      lisinopril (ZESTRIL) 40 MG tablet TAKE 1 TABLET BY MOUTH ONCE DAILY IN THE EVENING. Please call the clinic to schedule a follow up visit to re-check this medication., Disp-90 tablet,R-3, E-Prescribe      multivitamin, therapeutic with minerals (THERA-VIT-M) TABS Take 1 tablet by mouth daily, Historical              No Known Allergies     Review of Systems   Constitutional: Negative for appetite change and fever.   Eyes: Positive for visual disturbance (double vision).   Respiratory: Negative for cough and shortness of breath.    Cardiovascular: Negative for chest pain.  "  Gastrointestinal: Negative for abdominal pain, diarrhea, nausea and vomiting.   Genitourinary: Negative.  Negative for dysuria.   Neurological: Positive for light-headedness and headaches. Negative for weakness.   All other systems reviewed and are negative.    A complete review of systems was performed with pertinent positives and negatives noted in the HPI, and all other systems negative.    Physical Exam   BP: (!) 151/84  Pulse: 73  Temp: 97.7  F (36.5  C)  Resp: 18  Height: 185.4 cm (6' 1\")  Weight: 128.4 kg (283 lb)  SpO2: 97 %      Physical Exam    GEN:  Well developed, no acute distress  HEENT:  PERRL, EOMI, Mucous membranes are moist.  No nystagmus  Cardio:  RRR, no murmur, radial pulses equal bilaterally  PULM:  Lungs clear, good air movement, no wheezes, rales  Abd:  Soft, normal bowel sounds, no focal tenderness  Musculoskeletal: Extremities have normal range of motion, no lower extremity swelling or calf tenderness  Neuro:  Alert and oriented X3, Follows commands, CN exam is normal, finger to nose is normal, sensation and strength is normal throughout, patellar reflexes are normal, no pronator drift   Skin:  Warm, dry    ED Course   4:59 PM  The patient was seen and examined by Abigail Rosario MD in Room ED19.        Procedures             EKG Interpretation:      Interpreted by Abigail Rosario MD  Time reviewed: 16:34  Symptoms at time of EKG: double vision   Rhythm: normal sinus   Rate: normal  Axis: normal  Ectopy: none  Conduction: normal  ST Segments/ T Waves: No ST-T wave changes  Q Waves: III, unchanged  Comparison to prior: Unchanged from February 18, 2014    Clinical Impression: Sinus rhythm, unchanged EKG.    On physical exam, patient has double version with leftward gaze and slightly was forward gaze, but it is much improved with forward gaze compared to leftward gaze.  I do not find any extraocular movement deficit on exam.  There is no double vision with rightward or upward gaze " or downward gaze to the right.    Stroke code was called after my initial evaluation with the patient.  Noncontrast head CT was done and results are shown below.  After discussing the patient's symptoms with the stroke code attending and fellow, decision was made to do noncontrast head CT followed by MRI of the brain with MRA of the brain and neck.    Labs are shown below and were reviewed by me.          The patient has stroke symptoms:         ED Stroke specific documentation           NIHSS PDF     Patient last known well time: 12:00 NOON  ED Provider first to bedside at: I don't know, but the EKG was done after I saw the patient and the time on the EKG is 16:33  CT Results received at: stroke fellow called with CT results just after the CT was done.     tPA:   Not given due to minor/isolated/quickly resolving symptoms.    If treating with tPA: Ensure SBP<180 and DBP<105 prior to treatment with IV tPA.  Administering IV tPA after treatment with IV labetalol, hydralazine, or nicardipine is reasonable once BP control is established.    Endovascular Retrieval:  Not initiated due to absence of proximal vessel occlusion    National [employer] Stroke Scale (Baseline)  Time Performed: At initial evaluation     Score    Level of consciousness: (0)   Alert, keenly responsive    LOC questions: (0)   Answers both questions correctly    LOC commands: (0)   Performs both tasks correctly    Best gaze: (0)   Normal    Visual: (0)   No visual loss    Facial palsy: (0)   Normal symmetrical movements    Motor arm (left): (0)   No drift    Motor arm (right): (0)   No drift    Motor leg (left): (0)   No drift    Motor leg (right): (0)   No drift    Limb ataxia: (0)   Absent    Sensory: (0)   Normal- no sensory loss    Best language: (0)   Normal- no aphasia    Dysarthria: (0)   Normal    Extinction and inattention: (0)   No abnormality        Total Score:  0        Stroke Mimics were considered (including migraine  headache, seizure disorder, hypoglycemia (or hyperglycemia), head or spinal trauma, CNS infection, Toxin ingestion and shock state (e.g. sepsis) .    Evaluation/Treatement was delayed due to: nursing reported that symptoms had resolved at the time of ED arrival.  When I saw the patient, he still had some double vision     Repeat exam is unchanged, stroke code is still 0.  Repeat exam of extraocular movement testing is still normal.  Patient symptoms improved throughout the ED course.  I spoke with the stroke fellow again after all the patient's imaging was done.  At the time of the MRI of the brain and MRA of the brain and neck, patient still had double vision with leftward gaze.  The stroke team is reporting that the findings on the MRI/MRA do not explain the patient's symptoms.  There is no evidence of stroke on the MRI.  They do not think this was a TIA, however, they do recommend daily aspirin because of the atherosclerosis seen on the left anterior cerebral artery.    Patient was also discussed with ophthalmology.  Visual acuity was checked and was 20/30 in the left eye, no 20/40 in the right eye.  Intraocular pressure was checked in the left eye after using proparacaine drops.  The left eye intraocular pressure was 17 and 15 (checked twice).  Ophthalmology advised checking CRP and sed rate; this was done and they are normal.  Patient is denying jaw pain or jaw claudication, fatigue.  He does report some joint aches.    After several hours of work-up and observation in the emergency department, the patient's double vision did resolve prior to discharge.         Results for orders placed or performed during the hospital encounter of 02/21/21 (from the past 24 hour(s))   EKG 12-lead, tracing only   Result Value Ref Range    Interpretation ECG Click View Image link to view waveform and result    Glucose by meter   Result Value Ref Range    Glucose 114 (H) 70 - 99 mg/dL   CBC with platelets differential   Result  Value Ref Range    WBC 8.4 4.0 - 11.0 10e9/L    RBC Count 4.48 4.4 - 5.9 10e12/L    Hemoglobin 14.3 13.3 - 17.7 g/dL    Hematocrit 42.6 40.0 - 53.0 %    MCV 95 78 - 100 fl    MCH 31.9 26.5 - 33.0 pg    MCHC 33.6 31.5 - 36.5 g/dL    RDW 12.4 10.0 - 15.0 %    Platelet Count 257 150 - 450 10e9/L    Diff Method Automated Method     % Neutrophils 69.1 %    % Lymphocytes 22.1 %    % Monocytes 6.5 %    % Eosinophils 1.4 %    % Basophils 0.5 %    % Immature Granulocytes 0.4 %    Nucleated RBCs 0 0 /100    Absolute Neutrophil 5.8 1.6 - 8.3 10e9/L    Absolute Lymphocytes 1.9 0.8 - 5.3 10e9/L    Absolute Monocytes 0.6 0.0 - 1.3 10e9/L    Absolute Eosinophils 0.1 0.0 - 0.7 10e9/L    Absolute Basophils 0.0 0.0 - 0.2 10e9/L    Abs Immature Granulocytes 0.0 0 - 0.4 10e9/L    Absolute Nucleated RBC 0.0    Comprehensive metabolic panel   Result Value Ref Range    Sodium 137 133 - 144 mmol/L    Potassium 3.5 3.4 - 5.3 mmol/L    Chloride 104 94 - 109 mmol/L    Carbon Dioxide 27 20 - 32 mmol/L    Anion Gap 6 3 - 14 mmol/L    Glucose 109 (H) 70 - 99 mg/dL    Urea Nitrogen 14 7 - 30 mg/dL    Creatinine 0.69 0.66 - 1.25 mg/dL    GFR Estimate >90 >60 mL/min/[1.73_m2]    GFR Estimate If Black >90 >60 mL/min/[1.73_m2]    Calcium 8.6 8.5 - 10.1 mg/dL    Bilirubin Total 0.4 0.2 - 1.3 mg/dL    Albumin 3.6 3.4 - 5.0 g/dL    Protein Total 7.4 6.8 - 8.8 g/dL    Alkaline Phosphatase 60 40 - 150 U/L    ALT 31 0 - 70 U/L    AST 17 0 - 45 U/L   INR   Result Value Ref Range    INR 1.01 0.86 - 1.14   Partial thromboplastin time   Result Value Ref Range    PTT 30 22 - 37 sec   CRP inflammation   Result Value Ref Range    CRP Inflammation <2.9 0.0 - 8.0 mg/L   Erythrocyte sedimentation rate auto   Result Value Ref Range    Sed Rate 14 0 - 20 mm/h   CT Head w/o Contrast    Narrative    CT OF THE HEAD WITHOUT CONTRAST 2/21/2021 4:54 PM     COMPARISON: None.    HISTORY: Diplopia    TECHNIQUE: 5 mm thick axial CT images of the head were acquired  without IV  contrast material.    FINDINGS: There is moderate diffuse cerebral volume loss. There are  subtle patchy areas of decreased density in the cerebral white matter  bilaterally that are consistent with sequela of chronic small vessel  ischemic disease.    The ventricles and basal cisterns are within normal limits in  configuration given the degree of cerebral volume loss.  There is no  midline shift. There are no extra-axial fluid collections.    No intracranial hemorrhage, mass or recent infarct. No evidence for  mass lesion in the suprasellar cistern. No evidence for abnormality of  the cavernous sinus on either side.    The visualized paranasal sinuses are well-aerated. There is no  mastoiditis. There are no fractures of the visualized bones.      Impression    IMPRESSION: Diffuse cerebral volume loss and cerebral white matter  changes consistent with chronic small vessel ischemic disease. No  evidence for acute intracranial pathology.      Radiation dose for this scan was reduced using automated exposure  control, adjustment of the mA and/or kV according to patient size, or  iterative reconstruction technique    GORDON DEL ROSARIO MD   MRA Brain ([place]) wo Contrast    Narrative    EXAM: MRA BRAIN ([place]) WO CONTRAST  LOCATION: [facility]  DATE/TIME: 2/21/2021 6:41 PM    INDICATION: Altered mental status. Double vision. Headache.  COMPARISON: None.  TECHNIQUE: 3D time-of-flight head MRA without intravenous contrast.    FINDINGS:  ANTERIOR CIRCULATION: There is focal irregularity in contour and mild narrowing of the proximal A2 segment of the left anterior cerebral artery. The A1 segment of the right anterior cerebral artery is not visualized and may be tiny or congenitally   absent. The A2 segment of the right anterior cerebral artery is supplied by the anterior communicating artery. The bilateral middle cerebral and bilateral posterior cerebral arteries are patent and  unremarkable.    POSTERIOR CIRCULATION: No stenosis/occlusion, aneurysm, or high flow vascular malformation. Balanced vertebral arteries supply a normal basilar artery.       Impression    IMPRESSION:  1.  Focal irregularity in contour and moderate narrowing of the proximal A2 segment of the left anterior cerebral artery that may be atherosclerotic in nature.  2.  Otherwise, normal [place] MRA.   MR Brain w/o & w Contrast    Narrative    EXAM: MR BRAIN W/O AND W CONTRAST  LOCATION: [facility]  DATE/TIME: 2/21/2021 5:58 PM    INDICATION: Double vision. Headache.  COMPARISON: Head CT 02/21/2021.  CONTRAST: 13 mL Gadavist IV.  TECHNIQUE: Routine multiplanar multisequence head MRI without and with intravenous contrast.    FINDINGS:  INTRACRANIAL CONTENTS: No acute or subacute infarct. No mass, acute hemorrhage, or extra-axial fluid collections. Patchy and confluent nonspecific T2/FLAIR hyperintensities within the cerebral white matter most consistent with moderate chronic   microvascular ischemic change. Numerous scattered focal lacunar infarcts in the cerebral white matter bilaterally are noted. Moderate generalized cerebral atrophy. No hydrocephalus. Normal position of the cerebellar tonsils. No pathologic contrast   enhancement.    SELLA: No abnormality accounting for technique.    OSSEOUS STRUCTURES/SOFT TISSUES: Normal marrow signal. The major intracranial vascular flow voids are maintained.     ORBITS: No abnormality accounting for technique.     SINUSES/MASTOIDS: No paranasal sinus mucosal disease. No middle ear or mastoid effusion.       Impression    IMPRESSION:  1.  No acute intracranial process.  2.  Generalized brain atrophy and presumed microvascular ischemic changes as detailed above.       MRA Neck (Carotids) w Contrast    Narrative    EXAM: MRA NECK (CAROTIDS) W CONTRAST  LOCATION: [facility]  DATE/TIME: 2/21/2021 6:41 PM    INDICATION: Altered mental status. Double  vision. Headache.  COMPARISON: None.  TECHNIQUE: Neck MRA without IV contrast. Stenosis measurements made according to NASCET criteria unless otherwise specified.    FINDINGS:  RIGHT CAROTID: No measurable stenosis or dissection.    LEFT CAROTID: No measurable stenosis or dissection.    VERTEBRAL ARTERIES: No focal stenosis or dissection. Balanced vertebral arteries.    AORTIC ARCH: Classic aortic arch anatomy with no significant stenosis at the origin of the great vessels.      Impression    IMPRESSION:  1.  Normal neck MRA.     Medications   proparacaine (ALCAINE) 0.5 % ophthalmic solution 1 drop (has no administration in time range)   0.9% sodium chloride BOLUS (0 mLs Intravenous Stopped 2/21/21 2302)   gadobutrol (GADAVIST) injection 15 mL (13 mLs Intravenous Given 2/21/21 1931)   aspirin (ASA) tablet 325 mg (325 mg Oral Given 2/21/21 1932)             Assessments & Plan (with Medical Decision Making)   Patient presents with double vision and lightheadedness starting around noon today.  Emergency department work-up has been negative for determining a specific cause of his symptoms.  MRI is negative for stroke, acute intracranial hemorrhage, any specific eye findings that would explain his symptoms, tumor, signs of demyelinating disease.  Ophthalmology advised that the patient follow-up with neuro-ophthalmology in the next 1 to 2 days in the eye clinic.  Patient is agreeable with this recommendation.  He was advised to take a daily aspirin as well.  Patient was advised to return if he has any new symptoms including eye pain, drainage, redness, any focal numbness, weakness, difficulty with gait, speech, any other concerns.  The eye clinic will contact the patient tomorrow to schedule follow-up in the next 1 to 2 days with neuro-ophthalmology.    I have reviewed the nursing notes.    I have reviewed the findings, diagnosis, plan and need for follow up with the patient.    Discharge Medication List as of 2/21/2021  11:02 PM          Final diagnoses:   Double vision with both eyes open     I, Jayda Abdi, am serving as a trained medical scribe to document services personally performed by Abigail Rosario MD based on the provider's statements to me on February 21, 2021.  This document has been checked and approved by the attending provider.    IAbigail MD, was physically present and have reviewed and verified the accuracy of this note documented by Jayda Abdi, medical scribe.    --  Abigail Rosario MD  2/21/2021   [facility] EMERGENCY DEPARTMENT     Abigail Rosario MD  02/22/21 0038

## 2021-02-22 ENCOUNTER — TELEPHONE (OUTPATIENT)
Dept: OPHTHALMOLOGY | Facility: CLINIC | Age: 66
End: 2021-02-22

## 2021-02-22 LAB — INTERPRETATION ECG - MUSE: NORMAL

## 2021-02-22 NOTE — PROGRESS NOTES
"[facility]    Stroke Telephone Note    I was called by Dr. Abigail Rosario in follow up re: stroke code handled by day team. MRI negative, symptoms ongoing    Impression and Recommendations  1. Sudden onset diplopia, largely resolved but still ongoing- not caused by stroke due to negative MRI. Further evaluation by gen neuro/ophtho warranted.    2. Incidentally noted intracranial atherosclerosis- symptoms do not localize to this vessel territory, but continued ASA daily for primary stroke prevention is advised.    My recommendations are based on the information provided over the phone by Sav Mendoza's in-person providers. They are not intended to replace the clinical judgment of his in-person providers. I was not requested to personally see or examine the patient at this time.    The Stroke Staff is Dr. Adan.    Kristie Arauz MD  Vascular Neurology Fellow  To page me or covering stroke neurology team member, click here: AMCOM   Choose \"On Call\" tab at top, then search dropdown box for \"Neurology Adult\", select location, press Enter, then look for stroke/neuro ICU/telestroke.           "

## 2021-02-22 NOTE — DISCHARGE INSTRUCTIONS
Return to the emergency department if you have new or worsening symptoms, any eye pain, complete loss of vision from one eye or both eyes, fever, severe headache, vomiting, any other concerns.  You should start taking an aspirin every day.    Please make an appointment to follow up with Eye Clinic (phone: [phone]) as soon as possible for further eye evaluation. The eye clinic should be calling you to schedule an appointment in the next 1-2 days.  The recommendation is that you see the neuro-ophthalmologist.

## 2021-02-24 ENCOUNTER — OFFICE VISIT (OUTPATIENT)
Dept: OPHTHALMOLOGY | Facility: CLINIC | Age: 66
End: 2021-02-24
Attending: OPHTHALMOLOGY

## 2021-02-24 DIAGNOSIS — H35.371 EPIRETINAL MEMBRANE (ERM) OF RIGHT EYE: ICD-10-CM

## 2021-02-24 DIAGNOSIS — H43.813 POSTERIOR VITREOUS DETACHMENT OF BOTH EYES: ICD-10-CM

## 2021-02-24 DIAGNOSIS — H25.13 AGE-RELATED NUCLEAR CATARACT OF BOTH EYES: ICD-10-CM

## 2021-02-24 DIAGNOSIS — H52.203 MYOPIA OF BOTH EYES WITH ASTIGMATISM AND PRESBYOPIA: ICD-10-CM

## 2021-02-24 DIAGNOSIS — Q14.1 CONGENITAL HYPERTROPHY OF RETINAL PIGMENT EPITHELIUM: ICD-10-CM

## 2021-02-24 DIAGNOSIS — H53.2 DIPLOPIA: Primary | ICD-10-CM

## 2021-02-24 DIAGNOSIS — H52.4 MYOPIA OF BOTH EYES WITH ASTIGMATISM AND PRESBYOPIA: ICD-10-CM

## 2021-02-24 DIAGNOSIS — H52.13 MYOPIA OF BOTH EYES WITH ASTIGMATISM AND PRESBYOPIA: ICD-10-CM

## 2021-02-24 DIAGNOSIS — Z83.511 FAMILY HISTORY OF GLAUCOMA: ICD-10-CM

## 2021-02-24 PROCEDURE — 92004 COMPRE OPH EXAM NEW PT 1/>: CPT | Mod: GC | Performed by: STUDENT IN AN ORGANIZED HEALTH CARE EDUCATION/TRAINING PROGRAM

## 2021-02-24 PROCEDURE — G0463 HOSPITAL OUTPT CLINIC VISIT: HCPCS

## 2021-02-24 PROCEDURE — 92015 DETERMINE REFRACTIVE STATE: CPT

## 2021-02-24 ASSESSMENT — REFRACTION_MANIFEST
OS_ADD: +2.50
OS_AXIS: 105
OS_SPHERE: -2.00
OD_SPHERE: -2.00
OS_CYLINDER: +0.75
OD_CYLINDER: +0.75
OD_AXIS: 121
OD_ADD: +2.50

## 2021-02-24 ASSESSMENT — VISUAL ACUITY
OS_CC+: -1
OD_CC+: -2
CORRECTION_TYPE: GLASSES
METHOD: SNELLEN - LINEAR
OS_CC: 20/20
OD_CC: 20/20
METHOD_MR: PT REQUESTED MR TODAY

## 2021-02-24 ASSESSMENT — CONF VISUAL FIELD
OD_NORMAL: 1
METHOD: COUNTING FINGERS
OS_NORMAL: 1

## 2021-02-24 ASSESSMENT — REFRACTION_WEARINGRX
OS_CYLINDER: +1.00
OD_AXIS: 048
OD_SPHERE: -1.75
OD_CYLINDER: +0.75
OS_SPHERE: -2.25
SPECS_TYPE: PAL
OS_AXIS: 102
OD_ADD: +2.25
OS_ADD: +2.25

## 2021-02-24 ASSESSMENT — TONOMETRY
IOP_METHOD: ICARE
OD_IOP_MMHG: 15
OS_IOP_MMHG: 19

## 2021-02-24 ASSESSMENT — SLIT LAMP EXAM - LIDS
COMMENTS: NORMAL
COMMENTS: NORMAL

## 2021-02-24 ASSESSMENT — CUP TO DISC RATIO
OD_RATIO: 0.4
OS_RATIO: 0.5

## 2021-02-24 ASSESSMENT — EXTERNAL EXAM - LEFT EYE: OS_EXAM: NORMAL

## 2021-02-24 ASSESSMENT — EXTERNAL EXAM - RIGHT EYE: OD_EXAM: NORMAL

## 2021-02-24 NOTE — NURSING NOTE
Chief Complaints and History of Present Illnesses   Patient presents with     Diplopia Evaluation     Chief Complaint(s) and History of Present Illness(es)     Diplopia Evaluation     Laterality: both eyes    Onset: new    Course: resolved    Associated symptoms: Negative for eye pain, headaches and dizziness    Pain scale: 0/10              Comments     Pt notes on Sunday he developed double vision that did not go away with blinking.  Notes it went away when he covered an eye.  Noted in Left gaze the double was worse.  Notes he had pressure feeling BE on Sunday.  Notes all symptoms have gone away since.  Denies any pain, irritation, discharge, or tearing.  Ocular meds: Visine daily BE    Vi Bush OT 10:51 AM February 24, 2021

## 2021-02-24 NOTE — PROGRESS NOTES
CC - Diplopia    INTERVAL HISTORY - Initial visit. Patient reports new onset of diplopia on 2/21/21 that started suddenly, resolved with monocular occlusion. Evaluated at [facility] with negative CT head w/o contrast, MRA brain w/o contrast, MRA neck w/ contrast, and MRI brain w/o and w/ contrast. He was subsequently referred to ophthalmology for further workup.     Patient describes horizontal binocular diplopia worst in left gaze. He does not recall any difference in the diplopia in near vs far. Associated pressure feeling behind both eyes and a mild headache along the front of his skull. His symptoms persisted throughout the entire day and ED visit. He went to bed that night and woke up Monday morning (2/22/21) and the diplopia was resolved. Of note, he drives a school bus.   No recent viral illness. No fall or head trauma.     Denies any eye pain, irritation, discharge, or tearing.     GAIL Erickson   Sav Mendoza is a 65 year old year-old patient with history of new diplopia on 2/21/2021 who presents for ophthalmology evaluation. Symptoms have resolved.   Last eye exam ~3 years ago.   PMHx: prediabetes (A1c 5.6% (2/2019), HTN   No history of cancer.   FHx: glaucoma (mother)    PAST OCULAR SURGERY  None    IMAGING:  MRI brain w/o & w/ contrast (2/21/2021):  1.  No acute intracranial process.  2.  Generalized brain atrophy and presumed microvascular ischemic changes as detailed above.    MRA brain w/o contrast (2/21/2021):  1.  Focal irregularity in contour and moderate narrowing of the proximal A2 segment of the left anterior cerebral artery that may be atherosclerotic in nature.  2.  Otherwise, normal [place] MRA.    MRA neck (2/21/2021):  1.  Normal neck MRA.    CT head w/o contrast (2/21/2021):  Diffuse cerebral volume loss and cerebral white matter  changes consistent with chronic small vessel ischemic disease. No  evidence for acute intracranial pathology.      ASSESSMENT & PLAN    # Horizontal  binocular diplopia    - Abrupt onset 2/21/2021 lasting <24 hours, now resolved   - s/p thorough stroke workup (CT, MRI/MRA) in ED --> negative   - Exam reveals flick left XT not subjectively worse in right or left gaze with islas fausto.    - EOMs full      - DDx: CN VI palsy, decompensated exophoria; less likely dry eyes   - Negative stroke workup and now asymptomatic. Will reassess in 3 months, sooner if recurrence.     # Myopia of both eyes with astigmatism and presbyopia   - Discussed results of refraction   - New glasses Rx given    # Age-related nuclear cataracts, BOTH eyes   - Mild   - No visually significant    # Posterior vitreous detachment, BOTH eyes   - Discussed s/sxs of RT/RD that would warrant urgent evaluation.    # ERM, RIGHT eye   - No blurry vision. No distortion.    - Monitor    # CHRPE, RIGHT eye    # Family history of glaucoma   - IOP appropriate   - No significant cupping or asymmetry.        return to clinic: 3 months for sensorimotor exam, VT.         Patient seen and discussed with Dr. Yo Earl MD  Ophthalmology PGY-3  [facility]     Teaching statement:  Complete documentation of historical and exam elements from today's encounter can be found in the full encounter summary report (not reduplicated in this progress note). I personally obtained the chief complaint(s) and history of present illness.  I confirmed and edited as necessary the review of systems, past medical/surgical history, family history, social history, and examination findings as documented by others; and I examined the patient myself. I personally reviewed the relevant tests, images, and reports as documented above.     I formulated and edited as necessary the assessment and plan and discussed the findings and management plan with the patient and family.    Jacqueline Ram MD  Comprehensive Ophthalmology & Ocular Pathology  Department of Ophthalmology and Visual  [email or web address]  Pager [phone]

## 2021-06-04 DIAGNOSIS — I10 BENIGN ESSENTIAL HYPERTENSION: ICD-10-CM

## 2021-06-07 RX ORDER — AMLODIPINE BESYLATE 5 MG/1
5 TABLET ORAL DAILY
Qty: 90 TABLET | Refills: 3 | OUTPATIENT
Start: 2021-06-07

## 2021-06-07 NOTE — TELEPHONE ENCOUNTER
Failed protocol.  please route to  team if patient needs an appointment     Mili GABRIELRN BSN  [facility]  [phone]

## 2021-06-11 NOTE — TELEPHONE ENCOUNTER
Left detailed message on personal VM re: RX status and message below. Gave clinic number to call to schedule.   Isi Cramer, CMA

## 2021-06-15 ENCOUNTER — HOSPITAL ENCOUNTER (INPATIENT)
Facility: CLINIC | Age: 66
LOS: 1 days | Discharge: HOME | DRG: 378 | End: 2021-06-18
Attending: EMERGENCY MEDICINE | Admitting: FAMILY MEDICINE
Payer: MEDICARE

## 2021-06-15 DIAGNOSIS — K57.31 DIVERTICULAR HEMORRHAGE: ICD-10-CM

## 2021-06-15 DIAGNOSIS — K92.1 BLOOD IN STOOL: ICD-10-CM

## 2021-06-15 DIAGNOSIS — D62 ANEMIA DUE TO BLOOD LOSS, ACUTE: Primary | ICD-10-CM

## 2021-06-15 DIAGNOSIS — K59.03 DRUG-INDUCED CONSTIPATION: ICD-10-CM

## 2021-06-15 LAB
ABO + RH BLD: NORMAL
ABO + RH BLD: NORMAL
ALBUMIN SERPL-MCNC: 3.6 G/DL (ref 3.4–5)
ALP SERPL-CCNC: 48 U/L (ref 40–150)
ALT SERPL W P-5'-P-CCNC: 31 U/L (ref 0–70)
ANION GAP SERPL CALCULATED.3IONS-SCNC: 6 MMOL/L (ref 3–14)
AST SERPL W P-5'-P-CCNC: 26 U/L (ref 0–45)
BASOPHILS # BLD AUTO: 0.1 10E9/L (ref 0–0.2)
BASOPHILS NFR BLD AUTO: 0.7 %
BILIRUB SERPL-MCNC: 1.1 MG/DL (ref 0.2–1.3)
BLD GP AB SCN SERPL QL: NORMAL
BLOOD BANK CMNT PATIENT-IMP: NORMAL
BUN SERPL-MCNC: 20 MG/DL (ref 7–30)
CALCIUM SERPL-MCNC: 9 MG/DL (ref 8.5–10.1)
CHLORIDE SERPL-SCNC: 107 MMOL/L (ref 94–109)
CO2 SERPL-SCNC: 27 MMOL/L (ref 20–32)
CREAT SERPL-MCNC: 0.73 MG/DL (ref 0.66–1.25)
DIFFERENTIAL METHOD BLD: NORMAL
EOSINOPHIL # BLD AUTO: 0.2 10E9/L (ref 0–0.7)
EOSINOPHIL NFR BLD AUTO: 2.1 %
ERYTHROCYTE [DISTWIDTH] IN BLOOD BY AUTOMATED COUNT: 12.8 % (ref 10–15)
ERYTHROCYTE [DISTWIDTH] IN BLOOD BY AUTOMATED COUNT: 13.1 % (ref 10–15)
GFR SERPL CREATININE-BSD FRML MDRD: >90 ML/MIN/{1.73_M2}
GLUCOSE SERPL-MCNC: 105 MG/DL (ref 70–99)
HCT VFR BLD AUTO: 39.7 % (ref 40–53)
HCT VFR BLD AUTO: 44.8 % (ref 40–53)
HGB BLD-MCNC: 13.1 G/DL (ref 13.3–17.7)
HGB BLD-MCNC: 14.5 G/DL (ref 13.3–17.7)
IMM GRANULOCYTES # BLD: 0 10E9/L (ref 0–0.4)
IMM GRANULOCYTES NFR BLD: 0.1 %
INR PPP: 0.96 (ref 0.86–1.14)
LABORATORY COMMENT REPORT: NORMAL
LYMPHOCYTES # BLD AUTO: 2 10E9/L (ref 0.8–5.3)
LYMPHOCYTES NFR BLD AUTO: 26 %
MCH RBC QN AUTO: 30.9 PG (ref 26.5–33)
MCH RBC QN AUTO: 31.7 PG (ref 26.5–33)
MCHC RBC AUTO-ENTMCNC: 32.4 G/DL (ref 31.5–36.5)
MCHC RBC AUTO-ENTMCNC: 33 G/DL (ref 31.5–36.5)
MCV RBC AUTO: 96 FL (ref 78–100)
MCV RBC AUTO: 96 FL (ref 78–100)
MONOCYTES # BLD AUTO: 0.5 10E9/L (ref 0–1.3)
MONOCYTES NFR BLD AUTO: 7.1 %
NEUTROPHILS # BLD AUTO: 4.8 10E9/L (ref 1.6–8.3)
NEUTROPHILS NFR BLD AUTO: 64 %
NRBC # BLD AUTO: 0 10*3/UL
NRBC BLD AUTO-RTO: 0 /100
PLATELET # BLD AUTO: 220 10E9/L (ref 150–450)
PLATELET # BLD AUTO: 235 10E9/L (ref 150–450)
POTASSIUM SERPL-SCNC: 4.4 MMOL/L (ref 3.4–5.3)
PROT SERPL-MCNC: 7.5 G/DL (ref 6.8–8.8)
RBC # BLD AUTO: 4.13 10E12/L (ref 4.4–5.9)
RBC # BLD AUTO: 4.69 10E12/L (ref 4.4–5.9)
SARS-COV-2 RNA RESP QL NAA+PROBE: NEGATIVE
SODIUM SERPL-SCNC: 140 MMOL/L (ref 133–144)
SPECIMEN EXP DATE BLD: NORMAL
SPECIMEN SOURCE: NORMAL
WBC # BLD AUTO: 7.1 10E9/L (ref 4–11)
WBC # BLD AUTO: 7.5 10E9/L (ref 4–11)

## 2021-06-15 PROCEDURE — 80053 COMPREHEN METABOLIC PANEL: CPT | Performed by: STUDENT IN AN ORGANIZED HEALTH CARE EDUCATION/TRAINING PROGRAM

## 2021-06-15 PROCEDURE — 36415 COLL VENOUS BLD VENIPUNCTURE: CPT | Performed by: STUDENT IN AN ORGANIZED HEALTH CARE EDUCATION/TRAINING PROGRAM

## 2021-06-15 PROCEDURE — 86900 BLOOD TYPING SEROLOGIC ABO: CPT | Performed by: STUDENT IN AN ORGANIZED HEALTH CARE EDUCATION/TRAINING PROGRAM

## 2021-06-15 PROCEDURE — 85025 COMPLETE CBC W/AUTO DIFF WBC: CPT | Performed by: STUDENT IN AN ORGANIZED HEALTH CARE EDUCATION/TRAINING PROGRAM

## 2021-06-15 PROCEDURE — 85610 PROTHROMBIN TIME: CPT | Performed by: STUDENT IN AN ORGANIZED HEALTH CARE EDUCATION/TRAINING PROGRAM

## 2021-06-15 PROCEDURE — 250N000013 HC RX MED GY IP 250 OP 250 PS 637: Performed by: NURSE PRACTITIONER

## 2021-06-15 PROCEDURE — 96361 HYDRATE IV INFUSION ADD-ON: CPT

## 2021-06-15 PROCEDURE — 99285 EMERGENCY DEPT VISIT HI MDM: CPT | Mod: GC | Performed by: EMERGENCY MEDICINE

## 2021-06-15 PROCEDURE — C9803 HOPD COVID-19 SPEC COLLECT: HCPCS

## 2021-06-15 PROCEDURE — 99285 EMERGENCY DEPT VISIT HI MDM: CPT | Mod: 25

## 2021-06-15 PROCEDURE — 86901 BLOOD TYPING SEROLOGIC RH(D): CPT | Performed by: STUDENT IN AN ORGANIZED HEALTH CARE EDUCATION/TRAINING PROGRAM

## 2021-06-15 PROCEDURE — 36415 COLL VENOUS BLD VENIPUNCTURE: CPT | Performed by: NURSE PRACTITIONER

## 2021-06-15 PROCEDURE — 85027 COMPLETE CBC AUTOMATED: CPT | Performed by: NURSE PRACTITIONER

## 2021-06-15 PROCEDURE — C9113 INJ PANTOPRAZOLE SODIUM, VIA: HCPCS | Performed by: NURSE PRACTITIONER

## 2021-06-15 PROCEDURE — 258N000003 HC RX IP 258 OP 636: Performed by: STUDENT IN AN ORGANIZED HEALTH CARE EDUCATION/TRAINING PROGRAM

## 2021-06-15 PROCEDURE — 0DJD8ZZ INSPECTION OF LOWER INTESTINAL TRACT, VIA NATURAL OR ARTIFICIAL OPENING ENDOSCOPIC: ICD-10-PCS | Performed by: EMERGENCY MEDICINE

## 2021-06-15 PROCEDURE — G0378 HOSPITAL OBSERVATION PER HR: HCPCS

## 2021-06-15 PROCEDURE — 86850 RBC ANTIBODY SCREEN: CPT | Performed by: STUDENT IN AN ORGANIZED HEALTH CARE EDUCATION/TRAINING PROGRAM

## 2021-06-15 PROCEDURE — 96374 THER/PROPH/DIAG INJ IV PUSH: CPT

## 2021-06-15 PROCEDURE — 87635 SARS-COV-2 COVID-19 AMP PRB: CPT | Performed by: EMERGENCY MEDICINE

## 2021-06-15 PROCEDURE — 250N000011 HC RX IP 250 OP 636: Performed by: NURSE PRACTITIONER

## 2021-06-15 PROCEDURE — 96360 HYDRATION IV INFUSION INIT: CPT

## 2021-06-15 RX ORDER — LISINOPRIL 20 MG/1
40 TABLET ORAL 2 TIMES DAILY
Status: DISCONTINUED | OUTPATIENT
Start: 2021-06-15 | End: 2021-06-15 | Stop reason: DRUGHIGH

## 2021-06-15 RX ORDER — LISINOPRIL 40 MG/1
40 TABLET ORAL EVERY EVENING
Status: DISCONTINUED | OUTPATIENT
Start: 2021-06-15 | End: 2021-06-18 | Stop reason: HOSPADM

## 2021-06-15 RX ORDER — AMLODIPINE BESYLATE 5 MG/1
5 TABLET ORAL DAILY
Status: DISCONTINUED | OUTPATIENT
Start: 2021-06-16 | End: 2021-06-18 | Stop reason: HOSPADM

## 2021-06-15 RX ORDER — SODIUM CHLORIDE 9 MG/ML
INJECTION, SOLUTION INTRAVENOUS CONTINUOUS
Status: DISCONTINUED | OUTPATIENT
Start: 2021-06-15 | End: 2021-06-17

## 2021-06-15 RX ORDER — ACETAMINOPHEN 650 MG/1
650 SUPPOSITORY RECTAL EVERY 4 HOURS PRN
Status: DISCONTINUED | OUTPATIENT
Start: 2021-06-15 | End: 2021-06-18 | Stop reason: HOSPADM

## 2021-06-15 RX ORDER — ONDANSETRON 4 MG/1
4 TABLET, ORALLY DISINTEGRATING ORAL EVERY 6 HOURS PRN
Status: DISCONTINUED | OUTPATIENT
Start: 2021-06-15 | End: 2021-06-18 | Stop reason: HOSPADM

## 2021-06-15 RX ORDER — LIDOCAINE 40 MG/G
CREAM TOPICAL
Status: DISCONTINUED | OUTPATIENT
Start: 2021-06-15 | End: 2021-06-18 | Stop reason: HOSPADM

## 2021-06-15 RX ORDER — ACETAMINOPHEN 325 MG/1
650 TABLET ORAL EVERY 4 HOURS PRN
Status: DISCONTINUED | OUTPATIENT
Start: 2021-06-15 | End: 2021-06-18 | Stop reason: HOSPADM

## 2021-06-15 RX ORDER — ONDANSETRON 2 MG/ML
4 INJECTION INTRAMUSCULAR; INTRAVENOUS EVERY 6 HOURS PRN
Status: DISCONTINUED | OUTPATIENT
Start: 2021-06-15 | End: 2021-06-18 | Stop reason: HOSPADM

## 2021-06-15 RX ADMIN — POLYETHYLENE GLYCOL 3350, SODIUM SULFATE ANHYDROUS, SODIUM BICARBONATE, SODIUM CHLORIDE, POTASSIUM CHLORIDE 4000 ML: 236; 22.74; 6.74; 5.86; 2.97 POWDER, FOR SOLUTION ORAL at 23:17

## 2021-06-15 RX ADMIN — PANTOPRAZOLE SODIUM 40 MG: 40 INJECTION, POWDER, FOR SOLUTION INTRAVENOUS at 19:48

## 2021-06-15 RX ADMIN — LISINOPRIL 40 MG: 20 TABLET ORAL at 19:48

## 2021-06-15 RX ADMIN — SODIUM CHLORIDE 1000 ML: 9 INJECTION, SOLUTION INTRAVENOUS at 13:20

## 2021-06-15 RX ADMIN — SODIUM CHLORIDE: 9 INJECTION, SOLUTION INTRAVENOUS at 19:48

## 2021-06-15 NOTE — ED NOTES
[facility]   ED Nurse to Floor Handoff     Sav Mendoza is a 66 year old male who speaks English and lives with family members,  in a home  They arrived in the ED by car from home    ED Chief Complaint: No chief complaint on file.    ED Dx;   Final diagnoses:   None         Needed?: No    Allergies: No Known Allergies.  Past Medical Hx:   Past Medical History:   Diagnosis Date     Hypertension      Ventral hernia 6/24/14      Baseline Mental status: WDL  Current Mental Status changes: at basesline    Infection present or suspected this encounter: no  Sepsis suspected: No  Isolation type: No active isolations  Patient tested for COVID 19 prior to admission: YES     Activity level - Baseline/Home:  Independent  Activity Level - Current:   Independent    Bariatric equipment needed?: No    In the ED these meds were given:   Medications   0.9% sodium chloride BOLUS (1,000 mLs Intravenous New Bag 6/15/21 1320)     Followed by   sodium chloride 0.9% infusion (has no administration in time range)       Drips running?  No    Home pump  No    Current LDAs  Peripheral IV 06/15/21 Left Hand (Active)   Site Assessment WDL 06/15/21 0946   Line Status Saline locked 06/15/21 0946   Phlebitis Scale 0-->no symptoms 06/15/21 0946   Infiltration Scale 0 06/15/21 0946   Number of days: 0       Labs results:   Labs Ordered and Resulted from Time of ED Arrival Up to the Time of Departure from the ED   COMPREHENSIVE METABOLIC PANEL - Abnormal; Notable for the following components:       Result Value    Glucose 105 (*)     All other components within normal limits   CBC WITH PLATELETS DIFFERENTIAL   INR   SARS-COV-2 (COVID-19) VIRUS RT-PCR   ABO/RH TYPE AND SCREEN       Imaging Studies: No results found for this or any previous visit (from the past 24 hour(s)).    Recent vital signs:   /77   Pulse 82   Temp 98.6  F (37  C)   Resp 18   Wt 124 kg (273 lb 4.8 oz)   SpO2  96%   BMI 36.06 kg/m              Cardiac Rhythm: Normal Sinus  Pt needs tele? No  Skin/wound Issues: None    Code Status: Full Code    Pain control: pt had none    Nausea control: pt had none    Abnormal labs/tests/findings requiring intervention: see epic    Family present during ED course? Yes   Family Comments/Social Situation comments: N/A    Tasks needing completion: None    ISAURA DELEON RN  [facility] -- [phone] [facility]  [phone] [facility]

## 2021-06-15 NOTE — ED NOTES
Observation Brochure and Video    Patient informed of observation status based on provider's order.  Observation brochure was given and the video watched. Patient/Family stated understanding. Questions answered.  Kelly Reyes RN

## 2021-06-15 NOTE — PROGRESS NOTES
Care Management Follow Up    Length of Stay (days): 0    Expected Discharge Date:  TBD     Concerns to be Addressed:       Patient plan of care discussed at interdisciplinary rounds: No    Anticipated Discharge Disposition:       Anticipated Discharge Services:    Anticipated Discharge DME:      Patient/family educated on Medicare website which has current facility and service quality ratings:    Education Provided on the Discharge Plan:    Patient/Family in Agreement with the Plan:      Referrals Placed by CM/SW:    Private pay costs discussed: insurance costs out of pocket expenses, co-pays and deductibles    Additional Information:    At 1908SW met with Sav at his at bedside due to his Observation status. SW explained the Medicare Outpatient Observation Notice (MOON) and why he was receiving it. SW asked Danial to sign document acknowledging its receipt. Pt signed GRANDE at 1910. SW placed GRANDE in Danial's chart, and faxed GRANDE to [facility] ([phone]) at 1917.    SW will continue to follow as needed.      WALTER Diaz, LGSW  ED/OBS   [facility]  Phone: [phone]  Pager: [phone]

## 2021-06-15 NOTE — ED NOTES
Pt laying in bed, reported feeling lightheaded since returning from the bathroom. Pt said he just had gone to the bathroom and had large amount of blood pass per rectum. Pt stated it was less blood than earlier today but toilet water was maroon. Pt remains alert, in NAD. VS WNL. MD updated.

## 2021-06-15 NOTE — H&P
[facility]    History and Physical - Emergency Department Observation Unit       Date of Admission:  6/15/2021    Assessment & Plan   Sav Mendoza is a 66 year old male admitted on 6/15/2021. He has a history of hypertension, ventral hernia status post surgical repair, and lower extremity edema who presents with BRBPR.        ## BRBPR: At 5 am patient reported feeling as though he had to urinate, but found he had defecated a small amount of bowel movement with blood in the bed.  Diarrhea was large volume with red/maroon color and without pain.  Patient got up and went to the restroom and notified his daughter, he finished voiding his bowels and took a shower and his daughter brought him to the hospital.   He felt light headed once this morning. Patient reports no abdominal pain or lightheadedness currently. He has never had blood in his stool. He has never had a colonoscopy. Patient took a baby aspirin this morning. Hgb trend: 14.5 -> 13.1 In the ED: Vitals:BP:153/97  Pulse: 80 Temp: 98.9 Resp: 19 SP02:95 % Labs:Na 140, K 4.4, Cr 0.73, GFR >90. BUN 20, LFTS normal, , WBC 7.5, Hgb 14.5, 13.1,Plt 235,  INR 0.96, O positive, COVID negative.  Medications: NS bolus x 1 L Imaging: none Consults: GI Plan: Admit to ED observation for GI consult and serial hgbs.   - Monitor VS q 4 hours  - Monitor I and O's   - Document stool output   - Serial hemoglobins q 6 hours     ##. 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  Patient has had previous ankle fusion on the right.  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  Lower extremity edema is worse this morning the patient is not wearing his normal compression socks        Diet:   Regular/ NPO at midnight   DVT Prophylaxis: Ambulate every shift  Love Catheter: not present  Code  "Status:  Full         Disposition Plan   Expected discharge: Tomorrow, recommended to prior living arrangement once hemoglobin stable.  Entered: JS Mac CNP 06/15/2021, 4:38 PM     The patient's care was discussed with the Bedside Nurse, Patient and ED Physician  .    JS Mac CNP    ______________________________________________________________________    Chief Complaint   BRBPR    History is obtained from the patient    History of Present Illness   Per ED Note, \" Sav Mendoza is a 66 year old male with past medical history of hypertension, ventral hernia status post surgical repair, and lower extremity edema who presents with \" blood in stool.\"     History obtained from patient and daughter who is present for interview.     Patient states that around 5 AM this morning he woke up and had sudden urge to void bowels.  Before he was not able to get up to go to the bathroom before he had diarrhea on the bed.  Diarrhea was large volume with red/maroon color and without pain.  Patient got up and went to the restroom and notified his daughter, he finished voiding his bowels and cleaned himself up and his daughter brought him to the hospital.  Other than feeling slightly lightheaded during diarrhea this morning, he has had no other symptoms either this morning or days leading up to this event.  Patient has had no abdominal pain, cramping, or mass/hernia. No changes in bowel habits with exception of this morning. Patient usually has well formed to firm bowel movements that are light brown in color about every other day. No nausea, vomiting, fatigue, near syncope, headaches, fever, chills, or constipation.       Notably patient had CT of abdomen pelvis done in 2017 prior to repair of ventral hernia which noted incidental finding of diverticuli in sigmoid colon.  Patient does not remember being made aware of this finding.     Patient has no family history of bleeding disorders or colon cancer.  " Patient daughter both had concerns for possible cancer.  Patient has never had a colonoscopy that he knows given his age he is due.  Patient takes occasional ibuprofen for ankle or any aches and pains, takes daily baby aspirin.  No anticoagulation.      Patient has chronic lower extremity swelling.  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  Patient has had previous ankle fusion on the right.  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  Lower extremity edema is worse this morning the patient is not wearing his normal compression socks as him and his daughter left the house in a hurry though he describes edema is stable and at baseline.  Dry skin and mottling of bilateral lower extremities, lower tibia also chronic stable.    Review of Systems    The 10 point Review of Systems is negative other than noted in the HPI or here. BRBPR, no abdominal pain    Past Medical History    I have reviewed this patient's medical history and updated it with pertinent information if needed.   Past Medical History:   Diagnosis Date     Hypertension      Ventral hernia 6/24/14       Past Surgical History   I have reviewed this patient's surgical history and updated it with pertinent information if needed.  Past Surgical History:   Procedure Laterality Date     LAPAROTOMY EXPLORATORY N/A 12/12/2017    Procedure: LAPAROTOMY EXPLORATORY;  Exploratory Laparotomy and  Gint Ventral Hernia Repair with Mesh;  Surgeon: Mina Parsons MD;  Location: [facility] OR     ORTHOPEDIC SURGERY Left 2010    ankle fusion       Social History   I have reviewed this patient's social history and updated it with pertinent information if needed.  Social History     Tobacco Use     Smoking status: Never Smoker     Smokeless tobacco: Never Used   Substance Use Topics     Alcohol use: Yes     Comment: rare     Drug use: No       Family History   I have reviewed this patient's family history and  updated it with pertinent information if needed.  Family History   Problem Relation Age of Onset     Alzheimer Disease Mother      Glaucoma Mother      Macular Degeneration No family hx of        Prior to Admission Medications   Prior to Admission Medications   Prescriptions Last Dose Informant Patient Reported? Taking?   Ascorbic Acid (VITAMIN C PO) Unknown at Unknown time Self Yes No   Sig: Take 500 mg by mouth daily + 500 mg po qHS PRN   Blood Pressure Monitoring (BLOOD PRESSURE MONITOR/WRIST) LAURA Unknown at Unknown time  No No   Si Application 2 times daily as needed (HYPERTENSION)   amLODIPine (NORVASC) 5 MG tablet 6/15/2021 at Unknown time  No Yes   Sig: Take 1 tablet (5 mg) by mouth daily   lisinopril (ZESTRIL) 40 MG tablet 2021 at Unknown time  No Yes   Sig: TAKE 1 TABLET BY MOUTH ONCE DAILY IN THE EVENING. Please call the clinic to schedule a follow up visit to re-check this medication.   multivitamin, therapeutic with minerals (THERA-VIT-M) TABS 2021 at Unknown time Self Yes Yes   Sig: Take 1 tablet by mouth daily      Facility-Administered Medications: None     Allergies   No Known Allergies    Physical Exam   Vital Signs: Temp: 98.9  F (37.2  C) Temp src: Oral BP: (!) 153/97 Pulse: 80   Resp: 19 SpO2: 95 % O2 Device: None (Room air)    Weight: 273 lbs 4.8 oz    Constitutional: healthy, alert and no distress   Head: Normocephalic. No masses, lesions, tenderness or abnormalities   Neck: Neck supple. No adenopathy. Thyroid symmetric, normal size,, Carotids without bruits.   ENT: ENT exam normal, no neck nodes or sinus tenderness   Cardiovascular: RRR. No murmurs, clicks gallops or rub   Respiratory: . Good diaphragmatic excursion. Lungs clear   Gastrointestinal: Abdomen soft, non tender . BS normal. No masses, organomegaly.   :  Per ED physician, On anoscope, there was apparent maroon stool in the rectum.  Was determined to be blood with fecal occult blood test.  Musculoskeletal: Edema in  legs bilaterally.  Skin: bilateral mottling skin in legs bilaterally.   Neurologic: Gait normal. Reflexes normal and symmetric. Sensation grossly WNL.   Psychiatric: mentation appears normal and affect normal/bright   Hematologic/Lymphatic/Immunologic: normal ant/post cervical, axillary, supraclavicular and inguinal     Data   Data reviewed today: I reviewed all medications, new labs and imaging results over the last 24 hours.     Recent Labs   Lab 06/15/21  1722 06/15/21  0940   WBC 7.1 7.5   HGB 13.1* 14.5   MCV 96 96    235   INR  --  0.96   NA  --  140   POTASSIUM  --  4.4   CHLORIDE  --  107   CO2  --  27   BUN  --  20   CR  --  0.73   ANIONGAP  --  6   RAJAT  --  9.0   GLC  --  105*   ALBUMIN  --  3.6   PROTTOTAL  --  7.5   BILITOTAL  --  1.1   ALKPHOS  --  48   ALT  --  31   AST  --  26     Most Recent 3 CBC's:  Recent Labs   Lab Test 06/15/21  1722 06/15/21  0940 02/21/21  1641   WBC 7.1 7.5 8.4   HGB 13.1* 14.5 14.3   MCV 96 96 95    235 257     Most Recent 3 BMP's:  Recent Labs   Lab Test 06/15/21  0940 02/21/21  1641 02/09/19  1124    137 139   POTASSIUM 4.4 3.5 4.2   CHLORIDE 107 104 105   CO2 27 27 28   BUN 20 14 17   CR 0.73 0.69 0.70   ANIONGAP 6 6 6   RAJAT 9.0 8.6 9.1   * 109* 112*     Most Recent 2 LFT's:  Recent Labs   Lab Test 06/15/21  0940 02/21/21  1641   AST 26 17   ALT 31 31   ALKPHOS 48 60   BILITOTAL 1.1 0.4     No results found for this or any previous visit (from the past 24 hour(s)).

## 2021-06-15 NOTE — ED PROVIDER NOTES
"ED Provider Note  [facility]      History   No chief complaint on file.    HPI  Sav Mendoza is a 66 year old male with past medical history of hypertension, ventral hernia status post surgical repair, and lower extremity edema who presents with \" blood in stool.\"    History obtained from patient and daughter who is present for interview.    Patient states that around 5 AM this morning he woke up and had sudden urge to void bowels.  Before he was not able to get up to go to the bathroom before he had diarrhea on the bed.  Diarrhea was large volume with red/maroon color and without pain.  Patient got up and went to the restroom and notified his daughter, he finished voiding his bowels and cleaned himself up and his daughter brought him to the hospital.  Other than feeling slightly lightheaded during diarrhea this morning, he has had no other symptoms either this morning or days leading up to this event.  Patient has had no abdominal pain, cramping, or mass/hernia. No changes in bowel habits with exception of this morning. Patient usually has well formed to firm bowel movements that are light brown in color about every other day. No nausea, vomiting, fatigue, near syncope, headaches, fever, chills, or constipation.      Notably patient had CT of abdomen pelvis done in 2017 prior to repair of ventral hernia which noted incidental finding of diverticuli in sigmoid colon.  Patient does not remember being made aware of this finding.    Patient has no family history of bleeding disorders or colon cancer.  Patient daughter both had concerns for possible cancer.  Patient has never had a colonoscopy that he knows given his age he is due.  Patient takes occasional ibuprofen for ankle or any aches and pains, takes daily baby aspirin.  No anticoagulation.     Patient has chronic lower extremity swelling.  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  Patient has had " previous ankle fusion on the right.  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  Lower extremity edema is worse this morning the patient is not wearing his normal compression socks as him and his daughter left the house in a hurry though he describes edema is stable and at baseline.  Dry skin and mottling of bilateral lower extremities, lower tibia also chronic stable.      Past Medical History  Past Medical History:   Diagnosis Date     Hypertension      Ventral hernia 6/24/14     Past Surgical History:   Procedure Laterality Date     LAPAROTOMY EXPLORATORY N/A 12/12/2017    Procedure: LAPAROTOMY EXPLORATORY;  Exploratory Laparotomy and  Gint Ventral Hernia Repair with Mesh;  Surgeon: Mina Parsons MD;  Location: [facility] OR     ORTHOPEDIC SURGERY Left 2010    ankle fusion     amLODIPine (NORVASC) 5 MG tablet  lisinopril (ZESTRIL) 40 MG tablet  multivitamin, therapeutic with minerals (THERA-VIT-M) TABS  Ascorbic Acid (VITAMIN C PO)  Blood Pressure Monitoring (BLOOD PRESSURE MONITOR/WRIST) LAURA      No Known Allergies  Family History  Family History   Problem Relation Age of Onset     Alzheimer Disease Mother      Glaucoma Mother      Macular Degeneration No family hx of      Social History   Social History     Tobacco Use     Smoking status: Never Smoker     Smokeless tobacco: Never Used   Substance Use Topics     Alcohol use: Yes     Comment: rare     Drug use: No      Past medical history, past surgical history, medications, allergies, family history, and social history were reviewed with the patient. No additional pertinent items.       Review of Systems  A complete review of systems was performed with pertinent positives and negatives noted in the HPI, and all other systems negative.    Physical Exam   BP: (!) 135/92  Pulse: 94  Temp: 98.6  F (37  C)  Resp: 18  Weight: 124 kg (273 lb 4.8 oz)  SpO2: 94 %  Physical Exam  HENT:      Head: Normocephalic and atraumatic.       Mouth/Throat:      Mouth: Mucous membranes are dry.      Pharynx: Oropharynx is clear.   Eyes:      Pupils: Pupils are equal, round, and reactive to light.   Cardiovascular:      Rate and Rhythm: Normal rate and regular rhythm.      Pulses: Normal pulses.      Heart sounds: Normal heart sounds.   Pulmonary:      Effort: Pulmonary effort is normal.      Breath sounds: Normal breath sounds.   Abdominal:      General: Abdomen is flat. Bowel sounds are normal. There is no distension.      Palpations: Abdomen is soft. There is no mass.      Tenderness: There is no abdominal tenderness. There is no guarding or rebound.      Hernia: No hernia is present.      Comments: Well-appearing epigastric scar from previous ventral hernia repair   Musculoskeletal:         General: Swelling and deformity present.      Right lower leg: Edema present.      Left lower leg: Edema present.      Comments: Presence of chronic mottling of bilateral lower extremities lower tibia, chronic and stable inversion of left ankle       ED Course      Procedures           Results for orders placed or performed during the hospital encounter of 06/15/21   Comprehensive metabolic panel     Status: Abnormal   Result Value Ref Range    Sodium 140 133 - 144 mmol/L    Potassium 4.4 3.4 - 5.3 mmol/L    Chloride 107 94 - 109 mmol/L    Carbon Dioxide 27 20 - 32 mmol/L    Anion Gap 6 3 - 14 mmol/L    Glucose 105 (H) 70 - 99 mg/dL    Urea Nitrogen 20 7 - 30 mg/dL    Creatinine 0.73 0.66 - 1.25 mg/dL    GFR Estimate >90 >60 mL/min/[1.73_m2]    GFR Estimate If Black >90 >60 mL/min/[1.73_m2]    Calcium 9.0 8.5 - 10.1 mg/dL    Bilirubin Total 1.1 0.2 - 1.3 mg/dL    Albumin 3.6 3.4 - 5.0 g/dL    Protein Total 7.5 6.8 - 8.8 g/dL    Alkaline Phosphatase 48 40 - 150 U/L    ALT 31 0 - 70 U/L    AST 26 0 - 45 U/L   CBC with platelets differential     Status: None   Result Value Ref Range    WBC 7.5 4.0 - 11.0 10e9/L    RBC Count 4.69 4.4 - 5.9 10e12/L    Hemoglobin 14.5  13.3 - 17.7 g/dL    Hematocrit 44.8 40.0 - 53.0 %    MCV 96 78 - 100 fl    MCH 30.9 26.5 - 33.0 pg    MCHC 32.4 31.5 - 36.5 g/dL    RDW 12.8 10.0 - 15.0 %    Platelet Count 235 150 - 450 10e9/L    Diff Method Automated Method     % Neutrophils 64.0 %    % Lymphocytes 26.0 %    % Monocytes 7.1 %    % Eosinophils 2.1 %    % Basophils 0.7 %    % Immature Granulocytes 0.1 %    Nucleated RBCs 0 0 /100    Absolute Neutrophil 4.8 1.6 - 8.3 10e9/L    Absolute Lymphocytes 2.0 0.8 - 5.3 10e9/L    Absolute Monocytes 0.5 0.0 - 1.3 10e9/L    Absolute Eosinophils 0.2 0.0 - 0.7 10e9/L    Absolute Basophils 0.1 0.0 - 0.2 10e9/L    Abs Immature Granulocytes 0.0 0 - 0.4 10e9/L    Absolute Nucleated RBC 0.0    INR     Status: None   Result Value Ref Range    INR 0.96 0.86 - 1.14   Asymptomatic SARS-CoV-2 COVID-19 Virus (Coronavirus) by PCR     Status: None    Specimen: Nasopharyngeal   Result Value Ref Range    SARS-CoV-2 Virus Specimen Source Nasopharyngeal     SARS-CoV-2 PCR Result NEGATIVE     SARS-CoV-2 PCR Comment (Note)    ABO/Rh type and screen     Status: None   Result Value Ref Range    ABO O     RH(D) Pos     Antibody Screen Neg     Test Valid Only At          [facility]    Specimen Expires 06/18/2021      Medications   0.9% sodium chloride BOLUS (0 mLs Intravenous Stopped 6/15/21 4737)     Followed by   sodium chloride 0.9% infusion (has no administration in time range)        Assessments & Plan (with Medical Decision Making)   66-year-old male presenting to ED with new onset, large volume bloody stool.    Initial differential included diverticulosis, acute hemorrhoid bleed (more likely internal versus external), upper GI bleed/ulcer versus lower intestinal bleed/ulcer, diverticulitis, intestinal hemorrhage, intestinal ischemia, other intestinal bleeding related to mass or cancer.     CBC, CMP, PT/INR, and type & screen ordered and all within normal limits.  Given painless  bleed, acuity (normal CBC), and no recent constitutional symptoms more likely to be diverticulosis or internal hemorrhoids.      On anoscope, there was apparent maroon stool in the rectum.  Was determined to be blood with fecal occult blood test.  Blood appeared room though relatively fresh, however the patient had not had any voiding of stool since the initial episode this morning.  No external or internal hemorrhoids seen on exam.  No other sources of blood could be seen.  Patient was given 1 L of fluid, made n.p.o. and admitted for observation.  Patient agreeable to observation admission and understanding of reasoning.    I have reviewed the nursing notes. I have reviewed the findings, diagnosis, plan and need for follow up with the patient.    New Prescriptions    No medications on file       Final diagnoses:   Blood in stool     This patient has been discussed with and seen by my attending who agrees with my assessment and plan.     Rosalba Luque MD  PGY1 Psychiatry Resident    --    [facility] EMERGENCY DEPARTMENT  6/15/2021  --    ED Attending Physician Attestation    I Antoine Anna MD, cared for this patient with the Resident. I have performed a history and physical examination of the patient and discussed management with the resident. I reviewed the resident's documentation above and agree with the documented findings and plan of care.    Summary of HPI, PE, ED Course   Patient is a 66 year old male evaluated in the emergency department for maroon stool this morning. Exam notable for guaiac positive maroon stool with evidence of bleeding proximal to in his scope. ED course notable for no further episodes of bleeding. After the completion of care in the emergency department, the patient was admitted to observation.    Critical Care & Procedures  Not applicable.    Medical Decision Making  The medical record was reviewed and interpreted.  Current labs reviewed and interpreted.  Previous  labs reviewed and interpreted.      Antoine Anna MD  Emergency Medicine          Antoine Anna MD  06/15/21 1600

## 2021-06-16 LAB
ALBUMIN SERPL-MCNC: 3.2 G/DL (ref 3.4–5)
ALP SERPL-CCNC: 43 U/L (ref 40–150)
ALT SERPL W P-5'-P-CCNC: 24 U/L (ref 0–70)
ANION GAP SERPL CALCULATED.3IONS-SCNC: 8 MMOL/L (ref 3–14)
AST SERPL W P-5'-P-CCNC: 10 U/L (ref 0–45)
BILIRUB SERPL-MCNC: 0.6 MG/DL (ref 0.2–1.3)
BUN SERPL-MCNC: 11 MG/DL (ref 7–30)
CALCIUM SERPL-MCNC: 7.8 MG/DL (ref 8.5–10.1)
CHLORIDE SERPL-SCNC: 109 MMOL/L (ref 94–109)
CO2 SERPL-SCNC: 24 MMOL/L (ref 20–32)
CREAT SERPL-MCNC: 0.64 MG/DL (ref 0.66–1.25)
ERYTHROCYTE [DISTWIDTH] IN BLOOD BY AUTOMATED COUNT: 12.9 % (ref 10–15)
ERYTHROCYTE [DISTWIDTH] IN BLOOD BY AUTOMATED COUNT: 13.1 % (ref 10–15)
ERYTHROCYTE [DISTWIDTH] IN BLOOD BY AUTOMATED COUNT: 13.1 % (ref 10–15)
GFR SERPL CREATININE-BSD FRML MDRD: >90 ML/MIN/{1.73_M2}
GLUCOSE SERPL-MCNC: 112 MG/DL (ref 70–99)
HCT VFR BLD AUTO: 29.9 % (ref 40–53)
HCT VFR BLD AUTO: 33.5 % (ref 40–53)
HCT VFR BLD AUTO: 38.6 % (ref 40–53)
HGB BLD-MCNC: 10.9 G/DL (ref 13.3–17.7)
HGB BLD-MCNC: 12.7 G/DL (ref 13.3–17.7)
HGB BLD-MCNC: 9.6 G/DL (ref 13.3–17.7)
MAGNESIUM SERPL-MCNC: 2.1 MG/DL (ref 1.6–2.3)
MCH RBC QN AUTO: 31.3 PG (ref 26.5–33)
MCH RBC QN AUTO: 31.6 PG (ref 26.5–33)
MCH RBC QN AUTO: 31.8 PG (ref 26.5–33)
MCHC RBC AUTO-ENTMCNC: 32.1 G/DL (ref 31.5–36.5)
MCHC RBC AUTO-ENTMCNC: 32.5 G/DL (ref 31.5–36.5)
MCHC RBC AUTO-ENTMCNC: 32.9 G/DL (ref 31.5–36.5)
MCV RBC AUTO: 96 FL (ref 78–100)
MCV RBC AUTO: 97 FL (ref 78–100)
MCV RBC AUTO: 98 FL (ref 78–100)
PHOSPHATE SERPL-MCNC: 2.4 MG/DL (ref 2.5–4.5)
PLATELET # BLD AUTO: 195 10E9/L (ref 150–450)
PLATELET # BLD AUTO: 208 10E9/L (ref 150–450)
PLATELET # BLD AUTO: 212 10E9/L (ref 150–450)
POTASSIUM SERPL-SCNC: 3.3 MMOL/L (ref 3.4–5.3)
POTASSIUM SERPL-SCNC: 3.5 MMOL/L (ref 3.4–5.3)
PROT SERPL-MCNC: 6.2 G/DL (ref 6.8–8.8)
RBC # BLD AUTO: 3.04 10E12/L (ref 4.4–5.9)
RBC # BLD AUTO: 3.48 10E12/L (ref 4.4–5.9)
RBC # BLD AUTO: 3.99 10E12/L (ref 4.4–5.9)
SODIUM SERPL-SCNC: 141 MMOL/L (ref 133–144)
WBC # BLD AUTO: 6 10E9/L (ref 4–11)
WBC # BLD AUTO: 6.1 10E9/L (ref 4–11)
WBC # BLD AUTO: 7.7 10E9/L (ref 4–11)

## 2021-06-16 PROCEDURE — 45378 DIAGNOSTIC COLONOSCOPY: CPT | Performed by: INTERNAL MEDICINE

## 2021-06-16 PROCEDURE — 250N000011 HC RX IP 250 OP 636: Performed by: NURSE PRACTITIONER

## 2021-06-16 PROCEDURE — 250N000011 HC RX IP 250 OP 636: Performed by: INTERNAL MEDICINE

## 2021-06-16 PROCEDURE — 258N000003 HC RX IP 258 OP 636: Performed by: PHYSICIAN ASSISTANT

## 2021-06-16 PROCEDURE — 250N000013 HC RX MED GY IP 250 OP 250 PS 637: Performed by: NURSE PRACTITIONER

## 2021-06-16 PROCEDURE — 99153 MOD SED SAME PHYS/QHP EA: CPT | Performed by: INTERNAL MEDICINE

## 2021-06-16 PROCEDURE — C9113 INJ PANTOPRAZOLE SODIUM, VIA: HCPCS | Performed by: NURSE PRACTITIONER

## 2021-06-16 PROCEDURE — 0DJD8ZZ INSPECTION OF LOWER INTESTINAL TRACT, VIA NATURAL OR ARTIFICIAL OPENING ENDOSCOPIC: ICD-10-PCS | Performed by: INTERNAL MEDICINE

## 2021-06-16 PROCEDURE — G0500 MOD SEDAT ENDO SERVICE >5YRS: HCPCS | Performed by: INTERNAL MEDICINE

## 2021-06-16 PROCEDURE — 258N000003 HC RX IP 258 OP 636: Performed by: STUDENT IN AN ORGANIZED HEALTH CARE EDUCATION/TRAINING PROGRAM

## 2021-06-16 PROCEDURE — 83735 ASSAY OF MAGNESIUM: CPT | Performed by: NURSE PRACTITIONER

## 2021-06-16 PROCEDURE — 36415 COLL VENOUS BLD VENIPUNCTURE: CPT | Performed by: NURSE PRACTITIONER

## 2021-06-16 PROCEDURE — 84132 ASSAY OF SERUM POTASSIUM: CPT | Performed by: NURSE PRACTITIONER

## 2021-06-16 PROCEDURE — 84100 ASSAY OF PHOSPHORUS: CPT | Performed by: NURSE PRACTITIONER

## 2021-06-16 PROCEDURE — 85027 COMPLETE CBC AUTOMATED: CPT | Performed by: NURSE PRACTITIONER

## 2021-06-16 PROCEDURE — 96361 HYDRATE IV INFUSION ADD-ON: CPT

## 2021-06-16 PROCEDURE — 250N000009 HC RX 250: Performed by: PHYSICIAN ASSISTANT

## 2021-06-16 PROCEDURE — G0378 HOSPITAL OBSERVATION PER HR: HCPCS

## 2021-06-16 PROCEDURE — 96376 TX/PRO/DX INJ SAME DRUG ADON: CPT

## 2021-06-16 PROCEDURE — 80053 COMPREHEN METABOLIC PANEL: CPT | Performed by: NURSE PRACTITIONER

## 2021-06-16 RX ORDER — FENTANYL CITRATE 50 UG/ML
INJECTION, SOLUTION INTRAMUSCULAR; INTRAVENOUS PRN
Status: COMPLETED | OUTPATIENT
Start: 2021-06-16 | End: 2021-06-16

## 2021-06-16 RX ORDER — POTASSIUM CHLORIDE 750 MG/1
40 TABLET, EXTENDED RELEASE ORAL ONCE
Status: COMPLETED | OUTPATIENT
Start: 2021-06-16 | End: 2021-06-16

## 2021-06-16 RX ADMIN — MIDAZOLAM 2 MG: 1 INJECTION INTRAMUSCULAR; INTRAVENOUS at 14:19

## 2021-06-16 RX ADMIN — FENTANYL CITRATE 25 MCG: 50 INJECTION, SOLUTION INTRAMUSCULAR; INTRAVENOUS at 14:51

## 2021-06-16 RX ADMIN — LISINOPRIL 40 MG: 20 TABLET ORAL at 19:38

## 2021-06-16 RX ADMIN — SODIUM CHLORIDE: 9 INJECTION, SOLUTION INTRAVENOUS at 20:32

## 2021-06-16 RX ADMIN — PANTOPRAZOLE SODIUM 40 MG: 40 INJECTION, POWDER, FOR SOLUTION INTRAVENOUS at 10:04

## 2021-06-16 RX ADMIN — POTASSIUM CHLORIDE 40 MEQ: 750 TABLET, EXTENDED RELEASE ORAL at 11:16

## 2021-06-16 RX ADMIN — SODIUM CHLORIDE: 9 INJECTION, SOLUTION INTRAVENOUS at 12:49

## 2021-06-16 RX ADMIN — SODIUM PHOSPHATE, MONOBASIC, MONOHYDRATE 15 MMOL: 276; 142 INJECTION, SOLUTION INTRAVENOUS at 21:29

## 2021-06-16 RX ADMIN — MIDAZOLAM 1 MG: 1 INJECTION INTRAMUSCULAR; INTRAVENOUS at 14:51

## 2021-06-16 RX ADMIN — AMLODIPINE BESYLATE 5 MG: 5 TABLET ORAL at 10:04

## 2021-06-16 RX ADMIN — FENTANYL CITRATE 100 MCG: 50 INJECTION, SOLUTION INTRAMUSCULAR; INTRAVENOUS at 14:19

## 2021-06-16 RX ADMIN — POLYETHYLENE GLYCOL 3350, SODIUM SULFATE ANHYDROUS, SODIUM BICARBONATE, SODIUM CHLORIDE, POTASSIUM CHLORIDE 2000 ML: 236; 22.74; 6.74; 5.86; 2.97 POWDER, FOR SOLUTION ORAL at 10:03

## 2021-06-16 RX ADMIN — SODIUM CHLORIDE: 9 INJECTION, SOLUTION INTRAVENOUS at 04:15

## 2021-06-16 NOTE — PLAN OF CARE
Observation Goals:    - Nausea/vomiting (diarrhea if present) improved: not met, bloody stools w/ clots, denies nausea/vomiting  - Tolerating oral intake to maintain hydration: in progress, receiving IVF, will be NPO at midnight  - Vital signs normal or at patient baseline and orthostatic vitals are normal and patient not lightheaded with standing: in progress, hypertensive, no lightheadedness when standing  - Diagnostic tests and consults completed and resulted if ordered: not met, GI consult to be completed  - Adequate pain control on oral analgesia: met  - Tolerating oral antibiotics if ordered: not met  - Safe disposition plan has been identified: not met    BP (!) 156/100 (BP Location: Right arm)   Pulse 85   Temp 97.9  F (36.6  C) (Oral)   Resp 19   Wt 124 kg (273 lb 4.8 oz)   SpO2 98%   BMI 36.06 kg/m

## 2021-06-16 NOTE — PLAN OF CARE
Outpatient/Observation goals to be met before discharge home:  - Nausea/vomiting (diarrhea if present) improved - Not Met, pt continues to have bloody stools, Golytely initiated overnight, stools watery but still red  - Tolerating oral intake to maintain hydration - In progress, pt is NPO /c exception of bowel prep, (additional 2000 mL GoLytle to be completed by 1130, IVF infusing   - Vital signs normal or at patient baseline and orthostatic vitals are normal and patient not lightheaded with standing - In progress, hypertensive, no dizziness reported /c standing  BP (!) 158/91 (BP Location: Right arm)   Pulse 79   Temp 98.5  F (36.9  C) (Oral)   Resp 18   Wt 114.3 kg (252 lb)   SpO2 97%   BMI 33.25 kg/m    - Diagnostic tests and consults completed and resulted if ordered - Not Met, GI consulted, plan for Colonoscopy at 1400  - Adequate pain control on oral analgesia - Met, denies pain  - Tolerating oral antibiotics if ordered - n/a  - Safe disposition plan has been identified - Not Met  - Nurse to notify provider when observation goals have been met and patient is ready for discharge

## 2021-06-16 NOTE — OR NURSING
Pt tolerated colonoscopy very well. Numerous diverticulum were found. No active bleeding was located. Report was called to floor nurse. Return pt to PCU

## 2021-06-16 NOTE — CONSULTS
GASTROENTEROLOGY CONSULTATION      Luminal consult    Consult requested by Ailyn Lebron MD    Reason for consult: hematochezia    History is obtained from the patient    Date of Admission:  6/15/2021         History of Present Illness:   Sav Mendoza is a 66 year old male with a history of HTN and presents with hematochezia.    We are consulted for hematochezia.    He was at home yesterday and had a sudden bowel movement in the morning which was dark, maroon colored blood intermixed with stool.  He went about his day normally and then in the evening had a second episode prompting him to present for evaluation.    In ED, /89, Pulse 90, RR 18.  He was started on pantoprazole 40 mg IV BID.    Creatinine 0.64, sodium 141 potassium 3.3.  AST 10, ALT 24. Hemoglobin 10.9, . INR 0.96.  COVID negative 6/15/2021.    Denies ETOH (rarely has a beer).  Denies NSAIDs.  Never had a colonoscopy or CRC screening before. Does not take any anticoagulation.    No family history of GI bleeding.  No unintentional weight loss.  No stool or bowel changes recently.         Social History:   Rare alcohol use (occasionally will have 1 beer)         Family History:   Denies relevant family history of gastrointestinal disease          Past Medical, Surgical and Procedural History (Summarized):   Pertinent Medical History:  Hypertension    Abdominal and Pelvis Surgeries:  Ventral hernia repair         Medications:     Current Facility-Administered Medications   Medication     acetaminophen (TYLENOL) Suppository 650 mg     acetaminophen (TYLENOL) tablet 650 mg     amLODIPine (NORVASC) tablet 5 mg     lidocaine (LMX4) cream     lidocaine 1 % 0.1-1 mL     lisinopril (ZESTRIL) tablet 40 mg     ondansetron (ZOFRAN-ODT) ODT tab 4 mg    Or     ondansetron (ZOFRAN) injection 4 mg     pantoprazole (PROTONIX) IV push injection 40 mg     polyethylene glycol (GoLYTELY) suspension 2,000 mL     potassium chloride ER (KLOR-CON M) CR  tablet 40 mEq     sodium chloride (PF) 0.9% PF flush 3 mL     sodium chloride (PF) 0.9% PF flush 3 mL     sodium chloride 0.9% infusion            Previous Endoscopy:   Recent Endoscopic History:    None         Review of Systems:   A complete 10 point review of systems was obtained.  Please see the HPI for pertinent positives and negatives.  All other systems were reviewed and were found to be negative.          Physical Exam:   BP (!) 142/89 (BP Location: Right arm)   Pulse 80   Temp 98.5  F (36.9  C) (Oral)   Resp 18   Wt 124 kg (273 lb 4.8 oz)   SpO2 97%   BMI 36.06 kg/m    Wt:   Wt Readings from Last 2 Encounters:   06/15/21 124 kg (273 lb 4.8 oz)   02/21/21 128.4 kg (283 lb)      Constitutional: Cooperative, pleasant, no apparent distress.  HEENT: No conjunctival icterus, moist mucus membranes.  CV: Normal rate.  No edema.  Respiratory: Unlabored breathing  Abdomen:    Non-distended   Non-tender  ABDIRAHMAN: No external lesions or masses.  Maroon hematochezia externally.  Maroon blood present on ABDIRAHMAN in rectal vault.  Skin: No jaundice, warm  Neuro: Alert, moves all extremities, no asterixis.  Psych: Normal affect, answers questions appropriately.          Data (Summarized):   Notable labs:  Creatinine 0.64, sodium 141 potassium 3.3.  AST 10, ALT 24. Hemoglobin 10.9, . INR 0.96.    Imaging reviewed:  None           ASSESSMENT AND PLAN:   Sav Mendoza is a 66 year old male with a history of HTN and presents with hematochezia.    This gentleman presents with painless hematochezia/BRBPR in the context of no prior colorectal cancer screening.    Hemoglobin relatively stable though downtrending somewhat now and after fluids.    Likely DDX: internal hemorrhoids, diveritcular bleeding (if he has diverticulosis), less likely malignancy but possible given no prior CRC screening.    He is prepped 4L.  Plan to prep an additional 2L and proceed with colonoscopy this afternoon.    #1 Painless hematochezia  #2  Hypertension    Recommendations:  -- Given an additional 2L golytely this morning  -- Strict NPO after 1130 this morning  -- Plan for colonoscopy at ~1400 today in endoscopy with moderate sedation    The case was staffed with attending, Dr. Green.  Luminal will continue to follow.    Attila Mackey MD  Gastroenterology Fellow, PGY-4

## 2021-06-16 NOTE — PLAN OF CARE
Outpatient/Observation goals to be met before discharge home:  - Nausea/vomiting (diarrhea if present) improved - Not Met, pt continues to have bloody stools, Golytely initiated overnight, stools watery but still red  - Tolerating oral intake to maintain hydration - In progress, pt is NPO /c exception of bowel prep, IVF infusing   - Vital signs normal or at patient baseline and orthostatic vitals are normal and patient not lightheaded with standing - In progress, hypertensive, no dizziness reported /c standing  - Diagnostic tests and consults completed and resulted if ordered - Not Met, GI consulted, plan for Colonoscopy this AM (awaiting time)  - Adequate pain control on oral analgesia - Met, denies pain  - Tolerating oral antibiotics if ordered - n/a  - Safe disposition plan has been identified - Not Met  - Nurse to notify provider when observation goals have been met and patient is ready for discharge

## 2021-06-16 NOTE — PROGRESS NOTES
Endo called /c report. Findings included Diverticular bleed clotted /c no active bleeding noted, cleaned out blood. Patient is okay to eat. Will update LES.

## 2021-06-16 NOTE — PROGRESS NOTES
[facility]    Medicine Progress Note - Emergency Department Observation Unit       Date of Admission:  6/15/2021  Assessment & Plan       Sav Mendoza is a 66 year old male admitted on 6/15/2021. He has a history of hypertension, ventral hernia status post surgical repair, and lower extremity edema who presents with BRBPR.        ## BRBPR: At 5 am patient reported feeling as though he had to urinate, but found he had defecated a small amount of bowel movement with blood in the bed.  Diarrhea was large volume with red/maroon color and without pain.  Patient got up and went to the restroom and notified his daughter, he finished voiding his bowels and took a shower and his daughter brought him to the hospital.   He felt light headed once this morning. Patient reports no abdominal pain or lightheadedness currently. He has never had blood in his stool. He has never had a colonoscopy. Patient took a baby aspirin this morning. Hgb trend: 14.5 -> 13.1 -> 12.7 -> 10.9 -9.6. Patient reports passing clots this morning. GI recommended an additional 2 L of Golytely and underwent a colonoscopy. Per GI note, suspect this was a diverticular bleed which has resolved.. There was extensive clot but nothing actively bleeding.  - Please monitor overnight and check a hemoglobin in the morning.  - . If he develops hemodynamically unstable bleeding low threshold for CTA through IR for suspected diverticular bleed  - Patient may continue to pass red blood or clots per rectum given his colon exam.  - . GI will follow.     ##. 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  Patient has had previous ankle fusion on the right.  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  Lower extremity edema is worse this morning the patient is not wearing his normal  compression socks        Diet: NPO for Medical/Clinical Reasons Except for: Meds    DVT Prophylaxis: Low Risk/Ambulatory with no VTE prophylaxis indicated  Love Catheter: not present  Code Status: Full Code           Disposition Plan   Expected discharge: Tomorrow, recommended to prior living arrangement once hemoglobin stable.  Entered: Sabrina Villasenor JS CNP 06/16/2021, 7:50 AM       The patient's care was discussed with the Attending Physician, Dr. Rosario, Bedside Nurse and Patient.    Sabrina ALMODOVARJS Mota CNP  ______________________________________________________________________    Interval History   Hgb dropping overnight. VSS. asymptomatic    Data reviewed today: I reviewed all medications, new labs and imaging results over the last 24 hours.    Physical Exam   Vital Signs: Temp: 98.5  F (36.9  C) Temp src: Oral BP: (!) 142/89 Pulse: 80   Resp: 18 SpO2: 97 % O2 Device: None (Room air)    Weight: 273 lbs 4.8 oz  Constitutional: healthy, alert and no distress   Head: Normocephalic. No masses, lesions, tenderness or abnormalities   Neck: Neck supple. No adenopathy. Thyroid symmetric, normal size,, Carotids without bruits.   ENT: ENT exam normal, no neck nodes or sinus tenderness   Cardiovascular: RRR. No murmurs, clicks gallops or rub   Respiratory: . Good diaphragmatic excursion. Lungs clear   Gastrointestinal: Abdomen soft, non tender . BS normal. No masses, organomegaly.   :  Per ED physician, On anoscope, there was apparent maroon stool in the rectum.  Was determined to be blood with fecal occult blood test.  Musculoskeletal: Edema in legs bilaterally.  Skin: bilateral mottling skin in legs bilaterally.   Neurologic: Gait normal. Reflexes normal and symmetric. Sensation grossly WNL.   Psychiatric: mentation appears normal and affect normal/bright   Hematologic/Lymphatic/Immunologic: normal ant/post cervical, axillary, supraclavicular and inguinal        Data   Recent Labs   Lab 06/16/21  0719  06/16/21  0111 06/15/21  1722 06/15/21  0940   WBC 6.0 7.7 7.1 7.5   HGB 10.9* 12.7* 13.1* 14.5   MCV 96 97 96 96    212 220 235   INR  --   --   --  0.96     --   --  140   POTASSIUM 3.3*  --   --  4.4   CHLORIDE 109  --   --  107   CO2 24  --   --  27   BUN 11  --   --  20   CR 0.64*  --   --  0.73   ANIONGAP 8  --   --  6   RAJAT 7.8*  --   --  9.0   *  --   --  105*   ALBUMIN 3.2*  --   --  3.6   PROTTOTAL 6.2*  --   --  7.5   BILITOTAL 0.6  --   --  1.1   ALKPHOS 43  --   --  48   ALT 24  --   --  31   AST 10  --   --  26     No results found for this or any previous visit (from the past 24 hour(s)).  Medications     sodium chloride 125 mL/hr at 06/16/21 0415       amLODIPine  5 mg Oral Daily     lisinopril  40 mg Oral QPM     pantoprazole (PROTONIX) IV  40 mg Intravenous Daily     polyethylene glycol  2,000 mL Oral Once     potassium chloride  40 mEq Oral Once     sodium chloride (PF)  3 mL Intracatheter Q8H

## 2021-06-16 NOTE — PLAN OF CARE
Observation Goals:     - Nausea/vomiting (diarrhea if present) improved: not met, bloody stools w/ clots, denies nausea/vomiting  - Tolerating oral intake to maintain hydration: in progress, receiving IVF, will be NPO at 2am except for bowel prep  - Vital signs normal or at patient baseline and orthostatic vitals are normal and patient not lightheaded with standing: in progress, hypertensive, no lightheadedness when standing  - Diagnostic tests and consults completed and resulted if ordered: not met, GI consult to be completed, colonoscopy in AM  - Adequate pain control on oral analgesia: met  - Tolerating oral antibiotics if ordered: n/a  - Safe disposition plan has been identified: not met    BP (!) 154/90 (BP Location: Right arm)   Pulse 86   Temp 97.9  F (36.6  C) (Oral)   Resp 19   Wt 124 kg (273 lb 4.8 oz)   SpO2 100%   BMI 36.06 kg/m      GI recommendation for pt to have colonoscopy this AM, bowel prep initiated, pt is currently tolerating.

## 2021-06-16 NOTE — PROGRESS NOTES
Brief GI inpatient progress note:    66M w/ history of HTN, ventral hernia s/p surgical repair admitted to ED Obs for one day history of painless hematochezia. Patient is hemodynamically stable with hemoglobin 14-->13.1 after fluids. He has never had a colonoscopy before. Last episode of hematochezia at approximately 8:30pm.     Discussed case with Dr. Cantu (on call GI staff). Given that patient has never had a colonoscopy before, will go ahead and initiate bowel preparation tonight in anticipation of inpatient colonoscopy.     Plan:  --Initiate GoLytely bowel prep  --Clear liquids until 2am then NPO except bowel prep    Dayana Mccallum MD  Gastroenterology Fellow  Pager [phone]

## 2021-06-16 NOTE — BRIEF OP NOTE
[facility]    Brief Operative Note    Pre-operative diagnosis: Hematochezia [K92.1]  Post-operative diagnosis Same as pre-operative diagnosis    Procedure: Procedure(s):  COLONOSCOPY  Surgeon: Surgeon(s) and Role:     * Echo Green MD - Primary  Anesthesia: Conscious Sedation   Estimated blood loss: Minimal  Drains: None  Specimens: * No specimens in log *  Findings:     - Extensive clotted and liquid red blood throughout the entire colon, nothing actively bleeding.  - A large amount of small and moderate size diverticula throughout the entire examined colon with some diverticula containing clot.  - Normal retroflexion exam    Complications: None.  Implants: None    Recommendations:  1. We suspect this was a diverticular bleed which has resolved.  2. There was extensive clot but nothing actively bleeding.  3. Please monitor overnight and check a hemoglobin in the morning.  4. If he develops hemodynamically unstable bleeding low threshold for CTA through IR for suspected diverticular bleed  5. We will not be surprised if he continues to pass red blood or clots per rectum given his colon exam.  6. GI will follow.

## 2021-06-16 NOTE — PLAN OF CARE
Observation Goals:     - Nausea/vomiting (diarrhea if present) improved: not met, bloody stools improving, denies nausea/vomiting  - Tolerating oral intake to maintain hydration: in progress, receiving IVF, will be NPO as of 2am except for bowel prep  - Vital signs normal or at patient baseline and orthostatic vitals are normal and patient not lightheaded with standing: in progress, hypertensive, no lightheadedness when standing  - Diagnostic tests and consults completed and resulted if ordered: not met, GI consult to be completed, colonoscopy in AM  - Adequate pain control on oral analgesia: met  - Tolerating oral antibiotics if ordered: n/a  - Safe disposition plan has been identified: not met    BP (!) 140/89 (BP Location: Right arm)   Pulse 90   Temp 98  F (36.7  C) (Oral)   Resp 18   Wt 124 kg (273 lb 4.8 oz)   SpO2 99%   BMI 36.06 kg/m      Colonoscopy bowel prep completed. Large watery stools, still bloody but lightening in color.

## 2021-06-17 LAB
ALBUMIN SERPL-MCNC: 2.8 G/DL (ref 3.4–5)
ALP SERPL-CCNC: 37 U/L (ref 40–150)
ALT SERPL W P-5'-P-CCNC: 20 U/L (ref 0–70)
ANION GAP SERPL CALCULATED.3IONS-SCNC: 4 MMOL/L (ref 3–14)
AST SERPL W P-5'-P-CCNC: 13 U/L (ref 0–45)
BILIRUB SERPL-MCNC: 0.4 MG/DL (ref 0.2–1.3)
BUN SERPL-MCNC: 9 MG/DL (ref 7–30)
CALCIUM SERPL-MCNC: 7.9 MG/DL (ref 8.5–10.1)
CHLORIDE SERPL-SCNC: 113 MMOL/L (ref 94–109)
CO2 SERPL-SCNC: 26 MMOL/L (ref 20–32)
CREAT SERPL-MCNC: 0.64 MG/DL (ref 0.66–1.25)
ERYTHROCYTE [DISTWIDTH] IN BLOOD BY AUTOMATED COUNT: 13.1 % (ref 10–15)
ERYTHROCYTE [DISTWIDTH] IN BLOOD BY AUTOMATED COUNT: 13.2 % (ref 10–15)
GFR SERPL CREATININE-BSD FRML MDRD: >90 ML/MIN/{1.73_M2}
GLUCOSE SERPL-MCNC: 111 MG/DL (ref 70–99)
HCT VFR BLD AUTO: 23.6 % (ref 40–53)
HCT VFR BLD AUTO: 25.1 % (ref 40–53)
HGB BLD-MCNC: 7.6 G/DL (ref 13.3–17.7)
HGB BLD-MCNC: 8.1 G/DL (ref 13.3–17.7)
HGB BLD-MCNC: 8.2 G/DL (ref 13.3–17.7)
MAGNESIUM SERPL-MCNC: 2.1 MG/DL (ref 1.6–2.3)
MCH RBC QN AUTO: 31.5 PG (ref 26.5–33)
MCH RBC QN AUTO: 31.7 PG (ref 26.5–33)
MCHC RBC AUTO-ENTMCNC: 32.2 G/DL (ref 31.5–36.5)
MCHC RBC AUTO-ENTMCNC: 32.7 G/DL (ref 31.5–36.5)
MCV RBC AUTO: 97 FL (ref 78–100)
MCV RBC AUTO: 98 FL (ref 78–100)
PHOSPHATE SERPL-MCNC: 3 MG/DL (ref 2.5–4.5)
PLATELET # BLD AUTO: 171 10E9/L (ref 150–450)
PLATELET # BLD AUTO: 189 10E9/L (ref 150–450)
POTASSIUM SERPL-SCNC: 3.7 MMOL/L (ref 3.4–5.3)
PROT SERPL-MCNC: 5.4 G/DL (ref 6.8–8.8)
RBC # BLD AUTO: 2.4 10E12/L (ref 4.4–5.9)
RBC # BLD AUTO: 2.6 10E12/L (ref 4.4–5.9)
SODIUM SERPL-SCNC: 143 MMOL/L (ref 133–144)
WBC # BLD AUTO: 5.3 10E9/L (ref 4–11)
WBC # BLD AUTO: 5.6 10E9/L (ref 4–11)

## 2021-06-17 PROCEDURE — 250N000013 HC RX MED GY IP 250 OP 250 PS 637: Performed by: PHYSICIAN ASSISTANT

## 2021-06-17 PROCEDURE — 36415 COLL VENOUS BLD VENIPUNCTURE: CPT | Performed by: NURSE PRACTITIONER

## 2021-06-17 PROCEDURE — 250N000013 HC RX MED GY IP 250 OP 250 PS 637: Performed by: NURSE PRACTITIONER

## 2021-06-17 PROCEDURE — 250N000013 HC RX MED GY IP 250 OP 250 PS 637: Performed by: STUDENT IN AN ORGANIZED HEALTH CARE EDUCATION/TRAINING PROGRAM

## 2021-06-17 PROCEDURE — 83735 ASSAY OF MAGNESIUM: CPT | Performed by: NURSE PRACTITIONER

## 2021-06-17 PROCEDURE — 36415 COLL VENOUS BLD VENIPUNCTURE: CPT | Performed by: STUDENT IN AN ORGANIZED HEALTH CARE EDUCATION/TRAINING PROGRAM

## 2021-06-17 PROCEDURE — 999N000128 HC STATISTIC PERIPHERAL IV START W/O US GUIDANCE

## 2021-06-17 PROCEDURE — C9113 INJ PANTOPRAZOLE SODIUM, VIA: HCPCS | Performed by: NURSE PRACTITIONER

## 2021-06-17 PROCEDURE — 250N000011 HC RX IP 250 OP 636: Performed by: NURSE PRACTITIONER

## 2021-06-17 PROCEDURE — 85018 HEMOGLOBIN: CPT | Performed by: NURSE PRACTITIONER

## 2021-06-17 PROCEDURE — 120N000002 HC R&B MED SURG/OB UMMC

## 2021-06-17 PROCEDURE — G0378 HOSPITAL OBSERVATION PER HR: HCPCS

## 2021-06-17 PROCEDURE — 80053 COMPREHEN METABOLIC PANEL: CPT | Performed by: NURSE PRACTITIONER

## 2021-06-17 PROCEDURE — 85027 COMPLETE CBC AUTOMATED: CPT | Performed by: NURSE PRACTITIONER

## 2021-06-17 PROCEDURE — 96376 TX/PRO/DX INJ SAME DRUG ADON: CPT

## 2021-06-17 PROCEDURE — 96361 HYDRATE IV INFUSION ADD-ON: CPT

## 2021-06-17 PROCEDURE — 84100 ASSAY OF PHOSPHORUS: CPT | Performed by: NURSE PRACTITIONER

## 2021-06-17 PROCEDURE — 99223 1ST HOSP IP/OBS HIGH 75: CPT | Mod: AI | Performed by: FAMILY MEDICINE

## 2021-06-17 PROCEDURE — 85027 COMPLETE CBC AUTOMATED: CPT | Performed by: STUDENT IN AN ORGANIZED HEALTH CARE EDUCATION/TRAINING PROGRAM

## 2021-06-17 RX ORDER — LANOLIN ALCOHOL/MO/W.PET/CERES
3 CREAM (GRAM) TOPICAL AT BEDTIME
Status: DISCONTINUED | OUTPATIENT
Start: 2021-06-17 | End: 2021-06-18 | Stop reason: HOSPADM

## 2021-06-17 RX ADMIN — PANTOPRAZOLE SODIUM 40 MG: 40 INJECTION, POWDER, FOR SOLUTION INTRAVENOUS at 07:52

## 2021-06-17 RX ADMIN — Medication 5 MG: at 00:34

## 2021-06-17 RX ADMIN — MELATONIN 3 MG: at 22:07

## 2021-06-17 RX ADMIN — AMLODIPINE BESYLATE 5 MG: 5 TABLET ORAL at 07:52

## 2021-06-17 ASSESSMENT — ACTIVITIES OF DAILY LIVING (ADL)
CONCENTRATING,_REMEMBERING_OR_MAKING_DECISIONS_DIFFICULTY: NO
WALKING_OR_CLIMBING_STAIRS_DIFFICULTY: YES
TOILETING_ISSUES: NO
DRESSING/BATHING_DIFFICULTY: NO
DOING_ERRANDS_INDEPENDENTLY_DIFFICULTY: NO
FALL_HISTORY_WITHIN_LAST_SIX_MONTHS: NO
DIFFICULTY_COMMUNICATING: NO
VISION_MANAGEMENT: GLASSES
WALKING_OR_CLIMBING_STAIRS: AMBULATION DIFFICULTY, REQUIRES EQUIPMENT
WEAR_GLASSES_OR_BLIND: YES
DIFFICULTY_EATING/SWALLOWING: NO
HEARING_DIFFICULTY_OR_DEAF: NO

## 2021-06-17 ASSESSMENT — MIFFLIN-ST. JEOR: SCORE: 2082.18

## 2021-06-17 NOTE — PROGRESS NOTES
Pt passed 350 ml of blood (clots present). When asked, pt reported slight dizziness when getting up/getting back into bed.

## 2021-06-17 NOTE — PROGRESS NOTES
Observation Unit Transfer Summary     Patient ID:  Sav Mendoza  MRN: [id number]  66 year old  YOB: 1955    Observation Admit Date: 6/15/2021    ED Admitting Attending: Abigail Rosario MD    Transfer Date and Time: June 17, 2021 at 11:03 AM     Transferring Observation Provider: JS Baig CNP    Admission Diagnoses:     1. Blood in stool        Transfer Diagnoses:    1. Blood in stool      Emergency Department and Observation Course:      Sav Mendoza is a 66 year old male admitted on 6/15/2021. He has a history of hypertension, ventral hernia status post surgical repair, and lower extremity edema who presents with BRBPR.    ## BRBPR: At 5 am (6/15) patient reported feeling as though he had to urinate, but found he had defecated a small amount of bowel movement with blood in the bed.  Diarrhea was large volume with red/maroon color and without pain.  Patient got up and went to the restroom and notified his daughter, he finished voiding his bowels and took a shower and his daughter brought him to the hospital.   He felt light headed once. Patient reported no abdominal pain or lightheadedness upon admission. He has never had blood in his stool. He has never had a colonoscopy. Patient took a baby aspirin. Hgb trend: 14.5 -> 13.1 -> 12.7 -> 10.9 -9.6. Patient reports passing clots. GI recommended an additional 2 L of Golytely and underwent a colonoscopy (6/16). Per GI note, suspect this was a diverticular bleed which has resolved. There was extensive clot but nothing actively bleeding. Today (6/17), patient continued to have bloody stools x 2. One overnight (350 mL) with clots present and another in the morning. Hemoglobin dropped from 9.6 to 8.2. Patient reports occasional lightheadedness, but otherwise close to his normal. Update with GI consult recommendations included to continue to monitor patient until hbg equilibrates, which could take another 1-2 days. He is not safe to  discharge home and his vital signs remain stable. Discussed with triage medicine since patient has spent over 46 hours in observation. Plan to transfer patient to medicine with q6h hbg checks and will get 2nd IV placed. Further GI recommendations included if he becomes hemodynamically unstable r/t bleeding, there is a low threshold for CTA through IR for suspected diverticular bleed. Patient may continue to pass red blood or clots per rectum given his colon exam. GI will continue to follow.      ##. 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  Patient has had previous ankle fusion on the right.  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  Lower extremity edema is worse this morning the patient is not wearing his normal compression socks       At this time the patient has failed observation management due to continued blood in stool and drop in hbg and will be transferred to inpatient status.    Consults: GI    DATA:    Transfer Exam:    /75 (BP Location: Right arm)   Pulse 84   Temp 98  F (36.7  C) (Oral)   Resp 14   Wt 114.3 kg (252 lb)   SpO2 97%   BMI 33.25 kg/m    GENERAL APPEARANCE: Alert and no distress  RESP: lungs clear to auscultation - no rales, rhonchi or wheezes  CV: regular rates and rhythm, normal S1 S2, no S3 or S4, no murmur, click or rub, no peripheral edema and peripheral pulses strong  ABDOMEN: soft, nontender, no hepatosplenomegaly, no masses and bowel sounds normal   (male): Per GI note, No external lesions or masses.  Maroon hematochezia externally.  Maroon blood present on ABDIRAHMAN in rectal vault.  MS: Edema in legs bilaterallu  SKIN: Bilateral hyperemia in lower legs   NEURO: Normal strength and tone, sensory exam grossly normal, mentation intact and speech normal  PSYCH: mentation appears normal and answers questions appropriately      Current Medications:    No current outpatient medications on file.       Medications Prior to Admission:    Medications Prior to Admission   Medication Sig Dispense Refill Last Dose     amLODIPine (NORVASC) 5 MG tablet Take 1 tablet (5 mg) by mouth daily 90 tablet 3 6/15/2021 at Unknown time     lisinopril (ZESTRIL) 40 MG tablet TAKE 1 TABLET BY MOUTH ONCE DAILY IN THE EVENING. Please call the clinic to schedule a follow up visit to re-check this medication. 90 tablet 3 6/14/2021 at Unknown time     multivitamin, therapeutic with minerals (THERA-VIT-M) TABS Take 1 tablet by mouth daily   6/14/2021 at Unknown time     Ascorbic Acid (VITAMIN C PO) Take 500 mg by mouth daily + 500 mg po qHS PRN   Unknown at Unknown time     Blood Pressure Monitoring (BLOOD PRESSURE MONITOR/WRIST) LAURA 1 Application 2 times daily as needed (HYPERTENSION) 1 each 0 Unknown at Unknown time       Significant Diagnostic Studies:     Results for orders placed or performed during the hospital encounter of 06/15/21   Comprehensive metabolic panel     Status: Abnormal   Result Value Ref Range    Sodium 140 133 - 144 mmol/L    Potassium 4.4 3.4 - 5.3 mmol/L    Chloride 107 94 - 109 mmol/L    Carbon Dioxide 27 20 - 32 mmol/L    Anion Gap 6 3 - 14 mmol/L    Glucose 105 (H) 70 - 99 mg/dL    Urea Nitrogen 20 7 - 30 mg/dL    Creatinine 0.73 0.66 - 1.25 mg/dL    GFR Estimate >90 >60 mL/min/[1.73_m2]    GFR Estimate If Black >90 >60 mL/min/[1.73_m2]    Calcium 9.0 8.5 - 10.1 mg/dL    Bilirubin Total 1.1 0.2 - 1.3 mg/dL    Albumin 3.6 3.4 - 5.0 g/dL    Protein Total 7.5 6.8 - 8.8 g/dL    Alkaline Phosphatase 48 40 - 150 U/L    ALT 31 0 - 70 U/L    AST 26 0 - 45 U/L   CBC with platelets differential     Status: None   Result Value Ref Range    WBC 7.5 4.0 - 11.0 10e9/L    RBC Count 4.69 4.4 - 5.9 10e12/L    Hemoglobin 14.5 13.3 - 17.7 g/dL    Hematocrit 44.8 40.0 - 53.0 %    MCV 96 78 - 100 fl    MCH 30.9 26.5 - 33.0 pg    MCHC 32.4 31.5 - 36.5 g/dL     RDW 12.8 10.0 - 15.0 %    Platelet Count 235 150 - 450 10e9/L    Diff Method Automated Method     % Neutrophils 64.0 %    % Lymphocytes 26.0 %    % Monocytes 7.1 %    % Eosinophils 2.1 %    % Basophils 0.7 %    % Immature Granulocytes 0.1 %    Nucleated RBCs 0 0 /100    Absolute Neutrophil 4.8 1.6 - 8.3 10e9/L    Absolute Lymphocytes 2.0 0.8 - 5.3 10e9/L    Absolute Monocytes 0.5 0.0 - 1.3 10e9/L    Absolute Eosinophils 0.2 0.0 - 0.7 10e9/L    Absolute Basophils 0.1 0.0 - 0.2 10e9/L    Abs Immature Granulocytes 0.0 0 - 0.4 10e9/L    Absolute Nucleated RBC 0.0    INR     Status: None   Result Value Ref Range    INR 0.96 0.86 - 1.14   Asymptomatic SARS-CoV-2 COVID-19 Virus (Coronavirus) by PCR     Status: None    Specimen: Nasopharyngeal   Result Value Ref Range    SARS-CoV-2 Virus Specimen Source Nasopharyngeal     SARS-CoV-2 PCR Result NEGATIVE     SARS-CoV-2 PCR Comment (Note)    CBC with platelets     Status: Abnormal   Result Value Ref Range    WBC 7.1 4.0 - 11.0 10e9/L    RBC Count 4.13 (L) 4.4 - 5.9 10e12/L    Hemoglobin 13.1 (L) 13.3 - 17.7 g/dL    Hematocrit 39.7 (L) 40.0 - 53.0 %    MCV 96 78 - 100 fl    MCH 31.7 26.5 - 33.0 pg    MCHC 33.0 31.5 - 36.5 g/dL    RDW 13.1 10.0 - 15.0 %    Platelet Count 220 150 - 450 10e9/L   CBC with platelets     Status: Abnormal   Result Value Ref Range    WBC 7.7 4.0 - 11.0 10e9/L    RBC Count 3.99 (L) 4.4 - 5.9 10e12/L    Hemoglobin 12.7 (L) 13.3 - 17.7 g/dL    Hematocrit 38.6 (L) 40.0 - 53.0 %    MCV 97 78 - 100 fl    MCH 31.8 26.5 - 33.0 pg    MCHC 32.9 31.5 - 36.5 g/dL    RDW 12.9 10.0 - 15.0 %    Platelet Count 212 150 - 450 10e9/L   Comprehensive metabolic panel     Status: Abnormal   Result Value Ref Range    Sodium 141 133 - 144 mmol/L    Potassium 3.3 (L) 3.4 - 5.3 mmol/L    Chloride 109 94 - 109 mmol/L    Carbon Dioxide 24 20 - 32 mmol/L    Anion Gap 8 3 - 14 mmol/L    Glucose 112 (H) 70 - 99 mg/dL    Urea Nitrogen 11 7 - 30 mg/dL    Creatinine 0.64 (L) 0.66 -  1.25 mg/dL    GFR Estimate >90 >60 mL/min/[1.73_m2]    GFR Estimate If Black >90 >60 mL/min/[1.73_m2]    Calcium 7.8 (L) 8.5 - 10.1 mg/dL    Bilirubin Total 0.6 0.2 - 1.3 mg/dL    Albumin 3.2 (L) 3.4 - 5.0 g/dL    Protein Total 6.2 (L) 6.8 - 8.8 g/dL    Alkaline Phosphatase 43 40 - 150 U/L    ALT 24 0 - 70 U/L    AST 10 0 - 45 U/L   CBC with platelets     Status: Abnormal   Result Value Ref Range    WBC 6.0 4.0 - 11.0 10e9/L    RBC Count 3.48 (L) 4.4 - 5.9 10e12/L    Hemoglobin 10.9 (L) 13.3 - 17.7 g/dL    Hematocrit 33.5 (L) 40.0 - 53.0 %    MCV 96 78 - 100 fl    MCH 31.3 26.5 - 33.0 pg    MCHC 32.5 31.5 - 36.5 g/dL    RDW 13.1 10.0 - 15.0 %    Platelet Count 195 150 - 450 10e9/L   Magnesium     Status: None   Result Value Ref Range    Magnesium 2.1 1.6 - 2.3 mg/dL   Phosphorus     Status: Abnormal   Result Value Ref Range    Phosphorus 2.4 (L) 2.5 - 4.5 mg/dL   Potassium     Status: None   Result Value Ref Range    Potassium 3.5 3.4 - 5.3 mmol/L   CBC with platelets     Status: Abnormal   Result Value Ref Range    WBC 6.1 4.0 - 11.0 10e9/L    RBC Count 3.04 (L) 4.4 - 5.9 10e12/L    Hemoglobin 9.6 (L) 13.3 - 17.7 g/dL    Hematocrit 29.9 (L) 40.0 - 53.0 %    MCV 98 78 - 100 fl    MCH 31.6 26.5 - 33.0 pg    MCHC 32.1 31.5 - 36.5 g/dL    RDW 13.1 10.0 - 15.0 %    Platelet Count 208 150 - 450 10e9/L   CBC with platelets     Status: Abnormal   Result Value Ref Range    WBC 5.6 4.0 - 11.0 10e9/L    RBC Count 2.60 (L) 4.4 - 5.9 10e12/L    Hemoglobin 8.2 (L) 13.3 - 17.7 g/dL    Hematocrit 25.1 (L) 40.0 - 53.0 %    MCV 97 78 - 100 fl    MCH 31.5 26.5 - 33.0 pg    MCHC 32.7 31.5 - 36.5 g/dL    RDW 13.2 10.0 - 15.0 %    Platelet Count 171 150 - 450 10e9/L   Phosphorus     Status: None   Result Value Ref Range    Phosphorus 3.0 2.5 - 4.5 mg/dL   Magnesium     Status: None   Result Value Ref Range    Magnesium 2.1 1.6 - 2.3 mg/dL   Comprehensive metabolic panel     Status: Abnormal   Result Value Ref Range    Sodium 143 133 -  144 mmol/L    Potassium 3.7 3.4 - 5.3 mmol/L    Chloride 113 (H) 94 - 109 mmol/L    Carbon Dioxide 26 20 - 32 mmol/L    Anion Gap 4 3 - 14 mmol/L    Glucose 111 (H) 70 - 99 mg/dL    Urea Nitrogen 9 7 - 30 mg/dL    Creatinine 0.64 (L) 0.66 - 1.25 mg/dL    GFR Estimate >90 >60 mL/min/[1.73_m2]    GFR Estimate If Black >90 >60 mL/min/[1.73_m2]    Calcium 7.9 (L) 8.5 - 10.1 mg/dL    Bilirubin Total 0.4 0.2 - 1.3 mg/dL    Albumin 2.8 (L) 3.4 - 5.0 g/dL    Protein Total 5.4 (L) 6.8 - 8.8 g/dL    Alkaline Phosphatase 37 (L) 40 - 150 U/L    ALT 20 0 - 70 U/L    AST 13 0 - 45 U/L   GI LUMINAL ADULT IP CONSULT: Patient to be seen: Routine within 24 hrs; Call back #: [id number]; GI bleed; Consultant may enter orders: Yes; Requesting provider? Attending physician     Status: None ()    Narrative    Attila Mackey MD     6/16/2021  9:32 AM    GASTROENTEROLOGY CONSULTATION      Luminal consult    Consult requested by Ailyn Lebron MD    Reason for consult: hematochezia    History is obtained from the patient    Date of Admission:  6/15/2021         History of Present Illness:   Sav Mendoza is a 66 year old male with a history of HTN and   presents with hematochezia.    We are consulted for hematochezia.    He was at home yesterday and had a sudden bowel movement in the   morning which was dark, maroon colored blood intermixed with   stool.  He went about his day normally and then in the evening   had a second episode prompting him to present for evaluation.    In ED, /89, Pulse 90, RR 18.  He was started on   pantoprazole 40 mg IV BID.    Creatinine 0.64, sodium 141 potassium 3.3.  AST 10, ALT 24.   Hemoglobin 10.9, . INR 0.96.  COVID negative 6/15/2021.    Denies ETOH (rarely has a beer).  Denies NSAIDs.  Never had a   colonoscopy or CRC screening before. Does not take any   anticoagulation.    No family history of GI bleeding.  No unintentional weight loss.    No stool or bowel changes recently.          Social History:   Rare alcohol use (occasionally will have 1 beer)         Family History:   Denies relevant family history of gastrointestinal disease          Past Medical, Surgical and Procedural History (Summarized):   Pertinent Medical History:  Hypertension    Abdominal and Pelvis Surgeries:  Ventral hernia repair         Medications:     Current Facility-Administered Medications   Medication     acetaminophen (TYLENOL) Suppository 650 mg     acetaminophen (TYLENOL) tablet 650 mg     amLODIPine (NORVASC) tablet 5 mg     lidocaine (LMX4) cream     lidocaine 1 % 0.1-1 mL     lisinopril (ZESTRIL) tablet 40 mg     ondansetron (ZOFRAN-ODT) ODT tab 4 mg    Or     ondansetron (ZOFRAN) injection 4 mg     pantoprazole (PROTONIX) IV push injection 40 mg     polyethylene glycol (GoLYTELY) suspension 2,000 mL     potassium chloride ER (KLOR-CON M) CR tablet 40 mEq     sodium chloride (PF) 0.9% PF flush 3 mL     sodium chloride (PF) 0.9% PF flush 3 mL     sodium chloride 0.9% infusion            Previous Endoscopy:   Recent Endoscopic History:    None         Review of Systems:   A complete 10 point review of systems was obtained.  Please see   the HPI for pertinent positives and negatives.  All other systems   were reviewed and were found to be negative.          Physical Exam:   BP (!) 142/89 (BP Location: Right arm)   Pulse 80   Temp 98.5    F (36.9  C) (Oral)   Resp 18   Wt 124 kg (273 lb 4.8 oz)     SpO2 97%   BMI 36.06 kg/m    Wt:   Wt Readings from Last 2 Encounters:   06/15/21 124 kg (273 lb 4.8 oz)   02/21/21 128.4 kg (283 lb)      Constitutional: Cooperative, pleasant, no apparent distress.  HEENT: No conjunctival icterus, moist mucus membranes.  CV: Normal rate.  No edema.  Respiratory: Unlabored breathing  Abdomen:    Non-distended   Non-tender  ABDIRAHMAN: No external lesions or masses.  Maroon hematochezia   externally.  Maroon blood present on ABDIRAHMAN in rectal vault.  Skin: No jaundice, warm  Neuro: Alert, moves  all extremities, no asterixis.  Psych: Normal affect, answers questions appropriately.          Data (Summarized):   Notable labs:  Creatinine 0.64, sodium 141 potassium 3.3.  AST 10, ALT 24.   Hemoglobin 10.9, . INR 0.96.    Imaging reviewed:  None           ASSESSMENT AND PLAN:   Sav Mendoza is a 66 year old male with a history of HTN and   presents with hematochezia.    This gentleman presents with painless hematochezia/BRBPR in the   context of no prior colorectal cancer screening.    Hemoglobin relatively stable though downtrending somewhat now and   after fluids.    Likely DDX: internal hemorrhoids, diveritcular bleeding (if he   has diverticulosis), less likely malignancy but possible given no   prior CRC screening.    He is prepped 4L.  Plan to prep an additional 2L and proceed with   colonoscopy this afternoon.    #1 Painless hematochezia  #2 Hypertension    Recommendations:  -- Given an additional 2L golytely this morning  -- Strict NPO after 1130 this morning  -- Plan for colonoscopy at ~1400 today in endoscopy with moderate   sedation    The case was staffed with attending, Dr. Green.  Luminal will   continue to follow.    Attila Mackey MD  Gastroenterology Fellow, PGY-4     ABO/Rh type and screen     Status: None   Result Value Ref Range    ABO O     RH(D) Pos     Antibody Screen Neg     Test Valid Only At          [facility]    Specimen Expires 06/18/2021        Signed:  JS Baig CNP  June 17, 2021 at 11:03 AM

## 2021-06-17 NOTE — PLAN OF CARE
/73 (BP Location: Right arm)   Pulse 80   Temp 98  F (36.7  C) (Oral)   Resp 18   Wt 114.3 kg (252 lb)   SpO2 97%   BMI 33.25 kg/m       - Nausea/vomiting (diarrhea if present) improved. - met, pt denies N/V. Pt passed 350 ml of blood (clots present).  - Tolerating oral intake to maintain hydration - met. Pt also has IV fluids infusing (NS @ 125 ml/hr).   - Vital signs normal or at patient baseline and orthostatic vitals are normal and patient not lightheaded with standing - met, VSS. Pt reported slight dizziness after passing blood.  - Diagnostic tests and consults completed and resulted if ordered - in progress. Colonoscopy completed.   - Adequate pain control on oral analgesia - met, pt denies pain.   - Tolerating oral antibiotics if ordered - no abx were ordered at this time.   - Safe disposition plan has been identified - met.   - Nurse to notify provider when observation goals have been met and patient is ready for discharge.

## 2021-06-17 NOTE — H&P
[facility] History and Physical        Date of Admission:  6/15/2021  Date of Service: 6/17/2021         HPI      Chief Complaint   Bloody stool    History is obtained from the patient, ED provider, and chart review.    History of Present Illness   Danial is a 66 year old male with a history of hypertension who presents 1 day history of 2 episodes painless, soft, dark maroon colored stool. No history of hemorrhoids. No recent weight loss, nausea, vomiting, hematemesis. No history of Gl bleed. No recent significant NSAID use. No recent travel or history of H. Pylori. Reports drinking 2-3 beers/month and having a BM typically every other day.    Takes beet root powder for joint pain (reports stool often has red tinge) and garlic supplements.    ED/Obs Course  Fluids: NaCl  Labs: covid negative, HgB 13.1, Glu 111, LFTs wnl, INR 0.96, Mag 2.1, Phos 2.4  Imaging/Procedures: bowel prep and colonoscopy  Meds: Pantoprazole 40mg IV BID,   Abx: none    The patient was admitted to the Family Medicine inpatient service for management of GI bleed.         History:   Review of Systems   The 10 point Review of Systems is negative other than noted in the HPI or here.     Past Medical History    I have reviewed this patient's medical history and updated it with pertinent information if needed.   Past Medical History:   Diagnosis Date     Hypertension      Ventral hernia 6/24/14      Past Surgical History   I have reviewed this patient's surgical history and updated it with pertinent information if needed.  Past Surgical History:   Procedure Laterality Date     COLONOSCOPY N/A 6/16/2021    Procedure: COLONOSCOPY;  Surgeon: Echo Green MD;  Location: [facility]     LAPAROTOMY EXPLORATORY N/A 12/12/2017    Procedure: LAPAROTOMY EXPLORATORY;  Exploratory Laparotomy and  Gint Ventral Hernia Repair with Mesh;  Surgeon: Mina Parsons MD;  Location:  OR      ORTHOPEDIC SURGERY Left 2010    ankle fusion      Social History   Social History     Tobacco Use     Smoking status: Never Smoker     Smokeless tobacco: Never Used   Substance Use Topics     Alcohol use: Yes     Comment: rare     Drug use: No     Family History   I have reviewed this patient's family history and updated it with pertinent information if needed.   Family History   Problem Relation Age of Onset     Alzheimer Disease Mother      Glaucoma Mother      Macular Degeneration No family hx of      Prior to Admission Medications   Prior to Admission Medications   Prescriptions Last Dose Informant Patient Reported? Taking?   Ascorbic Acid (VITAMIN C PO) Unknown at Unknown time Self Yes No   Sig: Take 500 mg by mouth daily + 500 mg po qHS PRN   Blood Pressure Monitoring (BLOOD PRESSURE MONITOR/WRIST) LAURA Unknown at Unknown time  No No   Si Application 2 times daily as needed (HYPERTENSION)   amLODIPine (NORVASC) 5 MG tablet 6/15/2021 at Unknown time  No Yes   Sig: Take 1 tablet (5 mg) by mouth daily   lisinopril (ZESTRIL) 40 MG tablet 2021 at Unknown time  No Yes   Sig: TAKE 1 TABLET BY MOUTH ONCE DAILY IN THE EVENING. Please call the clinic to schedule a follow up visit to re-check this medication.   multivitamin, therapeutic with minerals (THERA-VIT-M) TABS 2021 at Unknown time Self Yes Yes   Sig: Take 1 tablet by mouth daily      Facility-Administered Medications: None     Allergies   No Known Allergies        Physical Exam      Vital Signs: Temp: 98.6  F (37  C) Temp src: Oral BP: 119/71 Pulse: 80   Resp: 16 SpO2: 97 % O2 Device: None (Room air)    Weight: 252 lbs 0 oz    Physical Exam  Vitals signs reviewed.   Constitutional:       Appearance: Normal appearance.   HENT:      Head: Normocephalic.   Eyes:      Conjunctiva/sclera: Conjunctivae normal.      Pupils: Pupils are equal, round, and reactive to light.   Cardiovascular:      Rate and Rhythm: Normal rate and regular rhythm.      Pulses:  Normal pulses.      Heart sounds: Normal heart sounds.   Pulmonary:      Effort: Pulmonary effort is normal.      Breath sounds: Normal breath sounds. No wheezing, rhonchi or rales.   Abdominal:      General: Abdomen is flat. Bowel sounds are normal. There is no distension.      Palpations: Abdomen is soft. There is no mass.      Tenderness: There is no abdominal tenderness.      Hernia: There is no hernia in the ventral area.      Comments: Per ED physician, on anoscope, there was apparent maroon stool in the rectum   Genitourinary:     Rectum: Guaiac result positive.   Musculoskeletal:         General: Injury:       Right lower leg: No edema.      Left lower leg: No edema.      Right foot: Deformity present.      Comments: Walks with cane at baseline   Skin:     General: Skin is warm and dry.      Comments: Bilateral LE purple discoloration with scaling   Neurological:      Mental Status: He is alert and oriented to person, place, and time.   Psychiatric:         Mood and Affect: Mood normal.         Behavior: Behavior normal.       Assessment & Plan      Danial is a 66 year old male with a history of stable HTN admitted on 6/15/2021 for management of GI bleed.    # GI bleed  No history of colonoscopy prior to hospitalization. 6/16 colonoscopy consistent with likely diverticular bleed; no internal hemorrhoids or mass seen. At this point, we are monitoring to determine whether he is actively bleeding vs. passing clots from resolved bleed.   - 2 sites of IV access  - BID HgB check; transfusion goal of < 7  - GI consulted   - If ongoing active GI bleeding (bright red blood with brisk output rather than clots) or hemodynamically unstable and difficulty with resuscitation, consider CTA through IR  - Regular adult diet    # Hypertension  Stable  - hold PTA Amlodipine 5mg, Lisinopril 40mg     # ventral hernia s/p surgical repair, 2017  Stable    # history of cellulitis, resolved  # symptoms of PVD  Hospitalized in 2014 for  cellulitis of lower legs. Wears compression socks regularly. Denies any formal diagnosis of peripheral vascular disease.    # Pain Assessment:  Current Pain Score 6/17/2021   Patient currently in pain? denies   Pain score (0-10) -   Pain location -   Pain descriptors -   Sav mcfadden pain level was assessed and he currently denies pain.      Diet: Regular Diet Adult  Fluids: PO  DVT Prophylaxis: Ambulatory (VTE not indicated in potential active bleed)  Code Status: Full Code    Disposition Plan   Expected discharge: Tomorrow; recommended to prior living arrangement once hemoglobin stable. Dispo:       Entered: Gardenia Díaz 06/17/2021, 2:18 PM   Information in the above section will display in the discharge planner report.    Discussed with and examined by faculty, Dr. David Ca's [facility] Inpatient [facility]  [facility]   Pager: [phone]  Please see sticky note for cross cover information      Results:     Data   Recent Labs   Lab 06/17/21  1317 06/17/21  0737 06/16/21  1617 06/16/21  0958 06/16/21  0739 06/15/21  0940 06/15/21  0940   WBC  --  5.6 6.1  --  6.0   < > 7.5   HGB 8.1* 8.2* 9.6*  --  10.9*   < > 14.5   MCV  --  97 98  --  96   < > 96   PLT  --  171 208  --  195   < > 235   INR  --   --   --   --   --   --  0.96   NA  --  143  --   --  141  --  140   POTASSIUM  --  3.7  --  3.5 3.3*  --  4.4   CHLORIDE  --  113*  --   --  109  --  107   CO2  --  26  --   --  24  --  27   BUN  --  9  --   --  11  --  20   CR  --  0.64*  --   --  0.64*  --  0.73   ANIONGAP  --  4  --   --  8  --  6   RAJAT  --  7.9*  --   --  7.8*  --  9.0   GLC  --  111*  --   --  112*  --  105*   ALBUMIN  --  2.8*  --   --  3.2*  --  3.6   PROTTOTAL  --  5.4*  --   --  6.2*  --  7.5   BILITOTAL  --  0.4  --   --  0.6  --  1.1   ALKPHOS  --  37*  --   --  43  --  48   ALT  --  20  --   --  24  --  31   AST  --  13  --   --  10  --  26    < > = values in this interval not displayed.     No results  found for this or any previous visit (from the past 24 hour(s)).

## 2021-06-17 NOTE — PLAN OF CARE
/73 (BP Location: Right arm)   Pulse 80   Temp 98  F (36.7  C) (Oral)   Resp 18   Wt 114.3 kg (252 lb)   SpO2 97%   BMI 33.25 kg/m      - Nausea/vomiting (diarrhea if present) improved. - Met. Pt denies nausea and vomiting.  - Tolerating oral intake to maintain hydration. - Met. Pt drinking; IV fluids infusing at 125 mL/hr.  - Vital signs normal or at patient baseline and orthostatic vitals are normal and patient not lightheaded with standing. - Met. Pt denies lightheadedness.  - Diagnostic tests and consults completed and resulted if ordered. - In progress. Colonoscopy completed.  - Adequate pain control on oral analgesia. - Met. Pt denies pain.  - Tolerating oral antibiotics if ordered. - No antibiotics ordered at this time.  - Safe disposition plan has been identified. - Met.  - Nurse to notify provider when observation goals have been met and patient is ready for discharge.

## 2021-06-17 NOTE — PROGRESS NOTES
Observation goals:      - Nausea/vomiting (diarrhea if present) improved. - met, pt denies N/V.   - Tolerating oral intake to maintain hydration - met.   - Vital signs normal or at patient baseline and orthostatic vitals are normal and patient not lightheaded with standing - met, VSS.   - Diagnostic tests and consults completed and resulted if ordered - Not met. Hgb continues to trend down. .  - Adequate pain control on oral analgesia - met, pt denies pain.   - Tolerating oral antibiotics if ordered - no abx were ordered at this time.   - Safe disposition plan has been identified - Due to continued bloody stools and dropping Hgb patient has been transferred to medicine. Patient will likely be changed to inpatient status.

## 2021-06-17 NOTE — PLAN OF CARE
/73 (BP Location: Right arm)   Pulse 80   Temp 98  F (36.7  C) (Oral)   Resp 18   Wt 114.3 kg (252 lb)   SpO2 97%   BMI 33.25 kg/m      - Nausea/vomiting (diarrhea if present) improved. - met, pt denies N/V. Pt has not had a BM since before his colonoscopy.   - Tolerating oral intake to maintain hydration - met. Pt also has IV fluids infusing (NS @ 125 ml/hr).   - Vital signs normal or at patient baseline and orthostatic vitals are normal and patient not lightheaded with standing - met, VSS. Pt denies lightheadedness.   - Diagnostic tests and consults completed and resulted if ordered - in progress. Colonoscopy completed.   - Adequate pain control on oral analgesia - met, pt denies pain.   - Tolerating oral antibiotics if ordered - no abx were ordered at this time.   - Safe disposition plan has been identified - met.   - Nurse to notify provider when observation goals have been met and patient is ready for discharge.

## 2021-06-17 NOTE — PLAN OF CARE
Observation goals:     - Nausea/vomiting (diarrhea if present) improved. - met, pt denies N/V.   - Tolerating oral intake to maintain hydration - met.   - Vital signs normal or at patient baseline and orthostatic vitals are normal and patient not lightheaded with standing - met, VSS.   - Diagnostic tests and consults completed and resulted if ordered - in progress. Colonoscopy completed. Trending Hgb.  - Adequate pain control on oral analgesia - met, pt denies pain.   - Tolerating oral antibiotics if ordered - no abx were ordered at this time.   - Safe disposition plan has been identified - met.   - Nurse to notify provider when observation goals have been met and patient is ready for discharge.

## 2021-06-17 NOTE — UTILIZATION REVIEW
Admission Status; Secondary Review Determination         Under the authority of the Utilization Management Committee, the utilization review process indicated a secondary review on the above patient.  The review outcome is based on review of the medical records, discussions with staff, and applying clinical experience noted on the date of the review.        (x)      Inpatient Status Appropriate - This patient's medical care is consistent with medical management for inpatient care and reasonable inpatient medical practice.      RATIONALE FOR DETERMINATION   The patient is a 66-year-old male admitted on 6/15/2021. He presents with bright red blood per rectum.  He has had continuing clots.  He has dropped his hemoglobin from 14.5 to 8.2 this morning.  He did have a colonoscopy yesterday which did not show bleeding that had stopped in the distal colon region with a diverticular bleed.  He continues however to have clots passed per rectum.  GI has recommended a low threshold for CTA through interventional radiology for suspected continued diverticular bleed.  GI is continuing to follow.  The patient is being transferred to [facility] for inpatient care management.  Discussed with Jo Leonard PA-C who will switch him to inpatient status.    The severity of illness, intensity of service provided, expected LOS and risk for adverse outcome make the care complex, high risk and appropriate for hospital admission.        The information on this document is developed by the utilization review team in order for the business office to ensure compliance.  This only denotes the appropriateness of proper admission status and does not reflect the quality of care rendered.         The definitions of Inpatient Status and Observation Status used in making the determination above are those provided in the CMS Coverage Manual, Chapter 1 and Chapter 6, section 70.4.      Sincerely,     Vamshi Rand MD  Physician  Advisor  Utilization Review/ Case Management  [facility].

## 2021-06-17 NOTE — PROGRESS NOTES
GASTROENTEROLOGY PROGRESS NOTE    ASSESSMENT:  Sav Mendoza is a 66 year old male with a history of HTN and presents with hematochezia.    Colonoscopy yesterday consistent with likely diverticular bleed.  Nothing active at the time but substantial clots and blood.    Overnight, hemoglobin down trended to 8.2 from 9.6.  Continuing to have clots out.    He is being admitted to inpatient status, ED obs had asked our thoughts for next steps.    We feel it is most likely that he is clearing residual clots/blood from the colon and re-equilibrating his hgb after his resolved bleed.  If this is the case, hemoglobin should remain relatively stable and the output should stop.  If this is the case, we can continue conservative care and transfusions PRN for goal of 7.    If he has ongoing, active GI bleeding or becomes hemodynamically unstable, would have a low threshold for CTA with IR to evaluate if there is something bleeding briskly enough to be embolized.    RECOMMENDATIONS:  -- Continue BID hemoglobin check and supportive care  -- Agree with transfusion to hgb goal of 7  -- If ongoing active GI bleeding (bright red blood with brisk output rather than clots) or hemodynamically unstable and difficulty with resuscitation, consider CTA through IR    The patient was discussed and plan agreed upon with GI staff, Dr. Green  _______________________________________________________________  S: Continues to have clot per rectum    O:  Blood pressure 119/71, pulse 80, temperature 98.6  F (37  C), temperature source Oral, resp. rate 16, weight 114.3 kg (252 lb), SpO2 97 %.    Gen: No apparent distress  HEENT: No icterus  CV: Normal rate  Lungs: Comfortable on room air  Abd: Non-distended  Skin: No jaundice  ABDIRAHMAN: bright red clots in commode  Neuro: Moves all extremities  Psych: Normal affect      LABS:  BMP  Recent Labs   Lab 06/17/21  0737 06/16/21  0958 06/16/21  0739 06/15/21  0940     --  141 140   POTASSIUM 3.7 3.5  3.3* 4.4   CHLORIDE 113*  --  109 107   RAJAT 7.9*  --  7.8* 9.0   CO2 26  --  24 27   BUN 9  --  11 20   CR 0.64*  --  0.64* 0.73   *  --  112* 105*     CBC  Recent Labs   Lab 06/17/21  0737 06/16/21  1617 06/16/21  0739 06/16/21  0111   WBC 5.6 6.1 6.0 7.7   RBC 2.60* 3.04* 3.48* 3.99*   HGB 8.2* 9.6* 10.9* 12.7*   HCT 25.1* 29.9* 33.5* 38.6*   MCV 97 98 96 97   MCH 31.5 31.6 31.3 31.8   MCHC 32.7 32.1 32.5 32.9   RDW 13.2 13.1 13.1 12.9    208 195 212     INR  Recent Labs   Lab 06/15/21  0940   INR 0.96     LFTs  Recent Labs   Lab 06/17/21  0737 06/16/21  0739 06/15/21  0940   ALKPHOS 37* 43 48   AST 13 10 26   ALT 20 24 31   BILITOTAL 0.4 0.6 1.1   PROTTOTAL 5.4* 6.2* 7.5   ALBUMIN 2.8* 3.2* 3.6      PANCNo lab results found in last 7 days.

## 2021-06-17 NOTE — PLAN OF CARE
Outpatient/Observation goals to be met before discharge home:  - Nausea/vomiting (diarrhea if present) improved - Met, Colonoscopy completed  - Tolerating oral intake to maintain hydration - Met, advanced to regular diet, will continue to monitor  - Vital signs normal or at patient baseline and orthostatic vitals are normal and patient not lightheaded with standing - Met,  Hypertension improved, no dizziness reported /c standing  /78 (BP Location: Right arm)   Pulse 89   Temp 98.1  F (36.7  C) (Oral)   Resp 20   Wt 114.3 kg (252 lb)   SpO2 98%   BMI 33.25 kg/m    - Diagnostic tests and consults completed and resulted if ordered - In Process, Colonoscopy completed by 1515, numerous diverticulum found, no active bleeding. Will continue to monitor overnight and recheck Hgb in am. GI to Consult tomorrow.  - Adequate pain control on oral analgesia - Met, denies pain  - Tolerating oral antibiotics if ordered - n/a  - Safe disposition plan has been identified - In Process  - Nurse to notify provider when observation goals have been met and patient is ready for discharge

## 2021-06-17 NOTE — PLAN OF CARE
Outpatient/Observation goals to be met before discharge home:   /78 (BP Location: Right arm)   Pulse 89   Temp 98.1  F (36.7  C) (Oral)   Resp 20   Wt 114.3 kg (252 lb)   SpO2 98%   BMI 33.25 kg/m      - Nausea/vomiting (diarrhea if present) improved.: Met   - Tolerating oral intake to maintain hydration: Met  - Vital signs normal or at patient baseline and orthostatic vitals are normal and patient not lightheaded with standing: Met  - Diagnostic tests and consults completed and resulted if ordered: Not met  - Adequate pain control on oral analgesia: Met  - Tolerating oral antibiotics if ordered: N/A  - Safe disposition plan has been identified: Not met

## 2021-06-17 NOTE — PROGRESS NOTES
Medicine Triage Note    65 y/o M who presented with hematochezia, s/p colonoscopy with extensive clot and liquid blood but no active bleed.  Hb continues to downtrend but hemodynamically stable.    - Trend Hb q6H  - GI is following  - Next step would be CTA with IR if patient becomes unstable.   - Med/surg no telemetry.  [phone] is the phone number for ED obs provider.

## 2021-06-18 VITALS
DIASTOLIC BLOOD PRESSURE: 81 MMHG | TEMPERATURE: 97.9 F | WEIGHT: 269.8 LBS | OXYGEN SATURATION: 95 % | SYSTOLIC BLOOD PRESSURE: 161 MMHG | HEART RATE: 87 BPM | HEIGHT: 73 IN | RESPIRATION RATE: 16 BRPM | BODY MASS INDEX: 35.76 KG/M2

## 2021-06-18 LAB
COLONOSCOPY: NORMAL
ERYTHROCYTE [DISTWIDTH] IN BLOOD BY AUTOMATED COUNT: 13.2 % (ref 10–15)
ERYTHROCYTE [DISTWIDTH] IN BLOOD BY AUTOMATED COUNT: 13.2 % (ref 10–15)
HCT VFR BLD AUTO: 23.5 % (ref 40–53)
HCT VFR BLD AUTO: 23.9 % (ref 40–53)
HGB BLD-MCNC: 7.6 G/DL (ref 13.3–17.7)
HGB BLD-MCNC: 7.8 G/DL (ref 13.3–17.7)
MAGNESIUM SERPL-MCNC: 2.2 MG/DL (ref 1.6–2.3)
MCH RBC QN AUTO: 31.5 PG (ref 26.5–33)
MCH RBC QN AUTO: 31.7 PG (ref 26.5–33)
MCHC RBC AUTO-ENTMCNC: 32.3 G/DL (ref 31.5–36.5)
MCHC RBC AUTO-ENTMCNC: 32.6 G/DL (ref 31.5–36.5)
MCV RBC AUTO: 97 FL (ref 78–100)
MCV RBC AUTO: 98 FL (ref 78–100)
PHOSPHATE SERPL-MCNC: 3.1 MG/DL (ref 2.5–4.5)
PLATELET # BLD AUTO: 187 10E9/L (ref 150–450)
PLATELET # BLD AUTO: 212 10E9/L (ref 150–450)
POTASSIUM SERPL-SCNC: 3.5 MMOL/L (ref 3.4–5.3)
RBC # BLD AUTO: 2.41 10E12/L (ref 4.4–5.9)
RBC # BLD AUTO: 2.46 10E12/L (ref 4.4–5.9)
WBC # BLD AUTO: 6 10E9/L (ref 4–11)
WBC # BLD AUTO: 7.9 10E9/L (ref 4–11)

## 2021-06-18 PROCEDURE — 84132 ASSAY OF SERUM POTASSIUM: CPT | Performed by: STUDENT IN AN ORGANIZED HEALTH CARE EDUCATION/TRAINING PROGRAM

## 2021-06-18 PROCEDURE — 250N000013 HC RX MED GY IP 250 OP 250 PS 637: Performed by: NURSE PRACTITIONER

## 2021-06-18 PROCEDURE — 99238 HOSP IP/OBS DSCHRG MGMT 30/<: CPT | Mod: GC | Performed by: FAMILY MEDICINE

## 2021-06-18 PROCEDURE — 250N000011 HC RX IP 250 OP 636: Performed by: NURSE PRACTITIONER

## 2021-06-18 PROCEDURE — 36415 COLL VENOUS BLD VENIPUNCTURE: CPT | Performed by: STUDENT IN AN ORGANIZED HEALTH CARE EDUCATION/TRAINING PROGRAM

## 2021-06-18 PROCEDURE — 83735 ASSAY OF MAGNESIUM: CPT | Performed by: STUDENT IN AN ORGANIZED HEALTH CARE EDUCATION/TRAINING PROGRAM

## 2021-06-18 PROCEDURE — 85027 COMPLETE CBC AUTOMATED: CPT | Performed by: STUDENT IN AN ORGANIZED HEALTH CARE EDUCATION/TRAINING PROGRAM

## 2021-06-18 PROCEDURE — 84100 ASSAY OF PHOSPHORUS: CPT | Performed by: STUDENT IN AN ORGANIZED HEALTH CARE EDUCATION/TRAINING PROGRAM

## 2021-06-18 PROCEDURE — C9113 INJ PANTOPRAZOLE SODIUM, VIA: HCPCS | Performed by: NURSE PRACTITIONER

## 2021-06-18 RX ORDER — FERROUS GLUCONATE 324(38)MG
324 TABLET ORAL EVERY OTHER DAY
Qty: 90 TABLET | Refills: 0 | Status: SHIPPED | OUTPATIENT
Start: 2021-06-18 | End: 2021-11-11

## 2021-06-18 RX ORDER — POLYETHYLENE GLYCOL 3350 17 G/17G
1 POWDER, FOR SOLUTION ORAL DAILY PRN
Qty: 507 G | Refills: 0 | Status: SHIPPED | OUTPATIENT
Start: 2021-06-18 | End: 2022-10-13

## 2021-06-18 RX ADMIN — ACETAMINOPHEN 650 MG: 325 TABLET, FILM COATED ORAL at 03:09

## 2021-06-18 RX ADMIN — PANTOPRAZOLE SODIUM 40 MG: 40 INJECTION, POWDER, FOR SOLUTION INTRAVENOUS at 08:16

## 2021-06-18 ASSESSMENT — ACTIVITIES OF DAILY LIVING (ADL)
ADLS_ACUITY_SCORE: 15

## 2021-06-18 ASSESSMENT — MIFFLIN-ST. JEOR: SCORE: 2057.68

## 2021-06-18 NOTE — PLAN OF CARE
"Assumed cares 0700 - 1130    BP (!) 155/72 (BP Location: Left arm)   Pulse 87   Temp 97.7  F (36.5  C) (Oral)   Resp 16   Ht 1.854 m (6' 1\")   Wt 124.8 kg (275 lb 3.2 oz)   SpO2 97%   BMI 36.31 kg/m      A&Ox4. VSS on RA ex hypertensive. Denied pain, N/V, and SOB. SBA with crutch. Voiding adequately with bedside urinal. Hgb 7.8, next recheck at 1400. K+ 3.5, phos 3.1, and mag 2.2. Rechecks ordered for tomorrow AM. No BM. R and L PIV's saline locked.   "

## 2021-06-18 NOTE — PLAN OF CARE
".Assumed cares [id number]. Pt rounded on hourly.     .BP (!) 155/74 (BP Location: Left arm)   Pulse 81   Temp 97.8  F (36.6  C) (Oral)   Resp 17   Ht 1.854 m (6' 1\")   Wt 124.8 kg (275 lb 3.2 oz)   SpO2 96%   BMI 36.31 kg/m      Pain: denies  Neuro: Aox4, able to make needs known.   Resp: WDL on RA. Denies SOB.  Cardiac: WDL- denying chest pains.   Skin: rodo and blotchy lower extremities. Otherwise skin intact.   GI/: no BM this shift. Pt voiding spontaneously. Large output this shift.   Nutrition: regular diet  Activity: SBA. special shoe, cane, and crutch needed when ambulating.   Access: PIV's saline locked.     Events:  Hgb AM of 06/18: 7.8  Sleeping in between cares.    Plan: continue to follow plan of care and notify MD with changes in condition.     "

## 2021-06-18 NOTE — SUMMARY OF CARE
Pr is admitted into the unit with the following belongings: sweat pants, t-shirt, knee brace,reading glasses, pair of shoes, pair of shoes, cruche and cane, (wallet has being taken home by daughter)

## 2021-06-18 NOTE — PLAN OF CARE
Reason for admission: change from observation to inpatient status  Admitted from: ED Obs   Report received from: Willy Valdes RN skin assessment completed by: Saige DUMONT and Carlos PAL  Bed Algorithm reevaluated: Y  Was Pulsate ordered?: N  Care plan (primary problem) and Education initiated: Y  Detailed Belongings: Summary of care note    Pt alert and oriented x4. Denied pain, N/V. No BM this evening. Hgb downtrending, most recent 7.6. Monitor for signs/symptoms of bleeding. Next Hgb check in AM. Up to BSC with SBA, cane and crutch. Pt has right ankle deformity and must wear special shoe while ambulating.

## 2021-06-18 NOTE — PROGRESS NOTES
Alert, oriented, denies any pain, no abdominal pain, no nausea and vomiting. No BM from 3 pm to 7 pm, ate 100% of his dinner. SBA, but was not up and out of bed.  Abdomen: globular, Normoactive bowel sounds, soft, no tenderness

## 2021-06-18 NOTE — PROGRESS NOTES
Gastroenterology Inpatient Sign Off Note    Luminal service will sign off. No further recommendations at this time. If primary team has addition questions, please page the consult fellow listed in [place].    Current GI Consult Staff: Dr. Bowers    A/P:  Sav Mendoza is a 66 year old male with a history of HTN and presents with hematochezia.    His bleeding his stopped.  Hemoglobin is stable, 7.8 from 7.6 yesterday.  He feels well.    We feel this is resolved diverticular bleed.  Discussed warning signs such as recurrent bleeding, fatigue, dyspnea.      From our perspective, he should have iron supplementation.  No GI follow-up needed.  He should return if he develops recurrent bleeding or red flags.    #1 Resolved diverticular bleeding  #2 Acute post-hemorrhagic anemia, resolved    Recommendations:  - Supplement iron per primary  - OK to discharge from GI perspective  - No GI follow-up needed  - Return if developing recurrent bleeding or dyspnea/lightheadedness/signs of worsening anemia    Follow up recommendations:   Follow-up in GI clinic is not indicated.  Follow-up with primary care provider at timing determined by discharge physician.    If outpatient GI follow-up is felt indicated by primary care, they may coordinate this by placing new GI referral and contacting  General GI clinic - ([phone])    Case staffed with attending, Dr. Bowers who agrees with this assessment and plan.

## 2021-06-18 NOTE — PLAN OF CARE
Discharged to: Home  Transportation: Private  Time: 1800  Prescriptions: Picked up at discharge pharmacy prior to departure  Belongings: Sent with patient  PIV/Access: Bilateral PIVs removed prior to departure.   Care Plan and Education discontinued: Yes  Paperwork: Reviewed and questions answered. No further action.

## 2021-06-18 NOTE — DISCHARGE SUMMARY
[facility]    Family Medicine Discharge Summary- [facility]  Service    Date of Admission:  6/15/2021  Date of Service: 6/18/2021  Date of Discharge:  6/18/2021  Discharging Attending Provider: Dr. Bird  Discharge Team: [facility]    Discharge Diagnoses         Blood in stool  Anemia due to blood loss, acute  Drug-induced constipation  Diverticular hemorrhage      Follow-ups Needed After Discharge   PCP within 3-5 days for hospital follow up; check HGb    Hospital Course   Sav Mendoza was admitted on 6/15/2021 for GI bleed.  The following problems were addressed during his hospitalization:    # GI bleed  No history of colonoscopy prior to hospitalization. GI consulted and completed a 6/16 colonoscopy consistent with likely diverticular bleed; no internal hemorrhoids or mass seen. He continued to pass clots from likely resolved bleed, did not require a blood transfusion (threshold   < 7), and Hemoglobin stabilized at the time of discharge (13.1-> 10.9-> 8.2-> 7.6-> 7.8-> 7.6). Recommend iron supplement every other day for blood loss anemia.    # Hypertension  Stable. Held PTA antihypertensives during hospitalization for acute GI bleed and restarted at discharge.     # ventral hernia s/p surgical repair, 2017  Stable     # history of cellulitis, resolved  # symptoms of PVD  Hospitalized in 2014 for cellulitis of lower legs. Wears compression socks regularly. Denies any formal diagnosis of peripheral vascular disease.    # Discharge Pain Plan:   - Patient currently has NO PAIN and is not being prescribed pain medications on discharge.    Consultations This Hospital Stay   GI LUMINAL ADULT IP CONSULT  VASCULAR ACCESS CARE ADULT IP CONSULT    Code Status   Full Code     The patient was discussed with Dr. Brett Ca's Family Medicine Inpatient Service  [facility]    Pager:[phone]  ___________________________________________________________________  Review of Systems:  Denies fever, chills, night sweats, headache, abdominal pain. Endorses fatigue for poor sleep, since he thinks the room was hot overnight.     Physical Exam   Vital Signs: Temp: 97.9  F (36.6  C) Temp src: Oral BP: (!) 161/81 Pulse: 87   Resp: 16 SpO2: 95 % O2 Device: None (Room air)    Weight: 269 lbs 12.8 oz     General: Alert and oriented, in no acute distress. Tired  Skin: Warm and dry, bilateral purple discoloration on LE, scaling present and no signs of infection  Eyes: Extra-ocular muscles intact, pupils equal and reactive.  ENT: Speech intact, nasal passages open, no hearing impairment noted.  CV: No cyanosis or pallor, warm and well perfused.  : mildly distended, no pain to palpation  Respiratory: No respiratory distress, no accessory muscle use.  Neuro: Gait and station normal, comprehension intact. Gross and fine motor skills intact.   Psychiatric: Mood and affect appear normal.   Extremities: Warm, able to move all four extremities at will. Walks with a cane due to chronic R ankle deformity    Significant Results and Procedures   Most Recent 3 CBC's:  Recent Labs   Lab Test 06/18/21  1555 06/18/21  0511 06/17/21  2030   WBC 7.9 6.0 5.3   HGB 7.6* 7.8* 7.6*   MCV 98 97 98    187 189     Most Recent 3 BMP's:  Recent Labs   Lab Test 06/18/21  0511 06/17/21  0737 06/16/21  0958 06/16/21  0739 06/15/21  0940   NA  --  143  --  141 140   POTASSIUM 3.5 3.7 3.5 3.3* 4.4   CHLORIDE  --  113*  --  109 107   CO2  --  26  --  24 27   BUN  --  9  --  11 20   CR  --  0.64*  --  0.64* 0.73   ANIONGAP  --  4  --  8 6   RAJAT  --  7.9*  --  7.8* 9.0   GLC  --  111*  --  112* 105*     Most Recent 2 LFT's:  Recent Labs   Lab Test 06/17/21  0737 06/16/21  0739   AST 13 10   ALT 20 24   ALKPHOS 37* 43   BILITOTAL 0.4 0.6     Most Recent 3 Creatinines:  Recent Labs   Lab Test 06/17/21  0737 06/16/21  0739  06/15/21  0940   CR 0.64* 0.64* 0.73     Most Recent 3 Hemoglobins:  Recent Labs   Lab Test 06/18/21  1555 06/18/21  0511 06/17/21  2030   HGB 7.6* 7.8* 7.6*     Most Recent Urinalysis:No lab results found.  Most Recent ESR & CRP:  Recent Labs   Lab Test 02/21/21  1641   SED 14   CRP <2.9     Most Recent Anemia Panel:  Recent Labs   Lab Test 06/18/21  1555   WBC 7.9   HGB 7.6*   HCT 23.5*   MCV 98      ,   Results for orders placed or performed during the hospital encounter of 02/21/21   CT Head w/o Contrast    Narrative    CT OF THE HEAD WITHOUT CONTRAST 2/21/2021 4:54 PM     COMPARISON: None.    HISTORY: Diplopia    TECHNIQUE: 5 mm thick axial CT images of the head were acquired  without IV contrast material.    FINDINGS: There is moderate diffuse cerebral volume loss. There are  subtle patchy areas of decreased density in the cerebral white matter  bilaterally that are consistent with sequela of chronic small vessel  ischemic disease.    The ventricles and basal cisterns are within normal limits in  configuration given the degree of cerebral volume loss.  There is no  midline shift. There are no extra-axial fluid collections.    No intracranial hemorrhage, mass or recent infarct. No evidence for  mass lesion in the suprasellar cistern. No evidence for abnormality of  the cavernous sinus on either side.    The visualized paranasal sinuses are well-aerated. There is no  mastoiditis. There are no fractures of the visualized bones.      Impression    IMPRESSION: Diffuse cerebral volume loss and cerebral white matter  changes consistent with chronic small vessel ischemic disease. No  evidence for acute intracranial pathology.      Radiation dose for this scan was reduced using automated exposure  control, adjustment of the mA and/or kV according to patient size, or  iterative reconstruction technique    GORDON DEL ROSARIO MD   MRA Neck (Carotids) w Contrast    Narrative    EXAM: MRA NECK (CAROTIDS) W  CONTRAST  LOCATION: [facility]  DATE/TIME: 2/21/2021 6:41 PM    INDICATION: Altered mental status. Double vision. Headache.  COMPARISON: None.  TECHNIQUE: Neck MRA without IV contrast. Stenosis measurements made according to NASCET criteria unless otherwise specified.    FINDINGS:  RIGHT CAROTID: No measurable stenosis or dissection.    LEFT CAROTID: No measurable stenosis or dissection.    VERTEBRAL ARTERIES: No focal stenosis or dissection. Balanced vertebral arteries.    AORTIC ARCH: Classic aortic arch anatomy with no significant stenosis at the origin of the great vessels.      Impression    IMPRESSION:  1.  Normal neck MRA.   MR Brain w/o & w Contrast    Narrative    EXAM: MR BRAIN W/O AND W CONTRAST  LOCATION: [facility]  DATE/TIME: 2/21/2021 5:58 PM    INDICATION: Double vision. Headache.  COMPARISON: Head CT 02/21/2021.  CONTRAST: 13 mL Gadavist IV.  TECHNIQUE: Routine multiplanar multisequence head MRI without and with intravenous contrast.    FINDINGS:  INTRACRANIAL CONTENTS: No acute or subacute infarct. No mass, acute hemorrhage, or extra-axial fluid collections. Patchy and confluent nonspecific T2/FLAIR hyperintensities within the cerebral white matter most consistent with moderate chronic   microvascular ischemic change. Numerous scattered focal lacunar infarcts in the cerebral white matter bilaterally are noted. Moderate generalized cerebral atrophy. No hydrocephalus. Normal position of the cerebellar tonsils. No pathologic contrast   enhancement.    SELLA: No abnormality accounting for technique.    OSSEOUS STRUCTURES/SOFT TISSUES: Normal marrow signal. The major intracranial vascular flow voids are maintained.     ORBITS: No abnormality accounting for technique.     SINUSES/MASTOIDS: No paranasal sinus mucosal disease. No middle ear or mastoid effusion.       Impression    IMPRESSION:  1.  No acute intracranial process.  2.  Generalized brain atrophy and presumed  microvascular ischemic changes as detailed above.       MRA Brain ([place]) wo Contrast    Narrative    EXAM: MRA BRAIN ([place]) WO CONTRAST  LOCATION: [facility]  DATE/TIME: 2/21/2021 6:41 PM    INDICATION: Altered mental status. Double vision. Headache.  COMPARISON: None.  TECHNIQUE: 3D time-of-flight head MRA without intravenous contrast.    FINDINGS:  ANTERIOR CIRCULATION: There is focal irregularity in contour and mild narrowing of the proximal A2 segment of the left anterior cerebral artery. The A1 segment of the right anterior cerebral artery is not visualized and may be tiny or congenitally   absent. The A2 segment of the right anterior cerebral artery is supplied by the anterior communicating artery. The bilateral middle cerebral and bilateral posterior cerebral arteries are patent and unremarkable.    POSTERIOR CIRCULATION: No stenosis/occlusion, aneurysm, or high flow vascular malformation. Balanced vertebral arteries supply a normal basilar artery.       Impression    IMPRESSION:  1.  Focal irregularity in contour and moderate narrowing of the proximal A2 segment of the left anterior cerebral artery that may be atherosclerotic in nature.  2.  Otherwise, normal [place] MRA.       Pending Results   These results will be followed up by N/A  Unresulted Labs Ordered in the Past 30 Days of this Admission     No orders found from 5/16/2021 to 6/16/2021.          Primary Care Physician   DAYSI GUIDRY    Discharge Disposition   Discharged to home  Condition at discharge: Stable    Discharge Orders      Reason for your hospital stay    You came in with bleeding from your rectum and had a colonoscopy. The GI specialists saw clots and think that you likely had a diverticulitis hemorrhage. However, the bleeding stopped, your hemoglobin stabilized, and you did not require a blood transfusion. This all means that the bleeding resolved on its own. We do not know if this will  happen again but it is certainly possible.     Adult [facility] Follow-up and recommended labs and tests    Follow up with primary care provider, DAYSI GUIDRY, within 7 days for hospital follow- up.  No follow up labs or test are needed.      Appointments on [place] and/or [facility] (with [facility] or [facility] provider or service). Call [phone] if you haven't heard regarding these appointments within 7 days of discharge.     Activity    Your activity upon discharge: activity as tolerated     When to contact your care team    Return to the Emergency Room if you notice bright red stool from your rectum, feel lightheaded or dizzy for an extended period of time.     Full Code     Diet    Follow this diet upon discharge: Low residue for the next two weeks while your colon is recovering from this bleed. You can then eat a high fiber diet, which is healthy for your GI system.    We recommend that you reduce your beet powder intake to the recommended dose, as it may help you better visualize the color of your stool.     Discharge Medications   Current Discharge Medication List      START taking these medications    Details   ferrous gluconate (FERGON) 324 (38 Fe) MG tablet Take 1 tablet (324 mg) by mouth every other day  Qty: 90 tablet, Refills: 0    Associated Diagnoses: Anemia due to blood loss, acute      polyethylene glycol (MIRALAX) 17 GM/Dose powder Take 17 g (1 capful) by mouth daily as needed for constipation  Qty: 507 g, Refills: 0    Associated Diagnoses: Drug-induced constipation         CONTINUE these medications which have NOT CHANGED    Details   amLODIPine (NORVASC) 5 MG tablet Take 1 tablet (5 mg) by mouth daily  Qty: 90 tablet, Refills: 3    Associated Diagnoses: Benign essential hypertension      lisinopril (ZESTRIL) 40 MG tablet TAKE 1 TABLET BY MOUTH ONCE DAILY IN THE EVENING. Please call the clinic to schedule a follow up visit to re-check this medication.  Qty: 90 tablet, Refills: 3     Associated Diagnoses: Essential hypertension      multivitamin, therapeutic with minerals (THERA-VIT-M) TABS Take 1 tablet by mouth daily      Ascorbic Acid (VITAMIN C PO) Take 500 mg by mouth daily + 500 mg po qHS PRN      Blood Pressure Monitoring (BLOOD PRESSURE MONITOR/WRIST) LAURA 1 Application 2 times daily as needed (HYPERTENSION)  Qty: 1 each, Refills: 0    Associated Diagnoses: Benign essential hypertension           Allergies   No Known Allergies

## 2021-06-21 ENCOUNTER — PATIENT OUTREACH (OUTPATIENT)
Dept: CARE COORDINATION | Facility: CLINIC | Age: 66
End: 2021-06-21

## 2021-06-21 ENCOUNTER — OFFICE VISIT (OUTPATIENT)
Dept: FAMILY MEDICINE | Facility: CLINIC | Age: 66
End: 2021-06-21
Payer: MEDICARE

## 2021-06-21 VITALS
OXYGEN SATURATION: 96 % | WEIGHT: 266 LBS | SYSTOLIC BLOOD PRESSURE: 154 MMHG | HEIGHT: 71 IN | HEART RATE: 104 BPM | DIASTOLIC BLOOD PRESSURE: 95 MMHG | RESPIRATION RATE: 16 BRPM | TEMPERATURE: 98 F | BODY MASS INDEX: 37.24 KG/M2

## 2021-06-21 DIAGNOSIS — Z12.5 SCREENING FOR PROSTATE CANCER: ICD-10-CM

## 2021-06-21 DIAGNOSIS — D50.0 IRON DEFICIENCY ANEMIA DUE TO CHRONIC BLOOD LOSS: ICD-10-CM

## 2021-06-21 DIAGNOSIS — Z11.59 NEED FOR HEPATITIS C SCREENING TEST: ICD-10-CM

## 2021-06-21 DIAGNOSIS — Z00.00 ENCOUNTER FOR MEDICARE ANNUAL WELLNESS EXAM: Primary | ICD-10-CM

## 2021-06-21 DIAGNOSIS — Z71.89 OTHER SPECIFIED COUNSELING: ICD-10-CM

## 2021-06-21 DIAGNOSIS — Z13.1 SCREENING FOR DIABETES MELLITUS: ICD-10-CM

## 2021-06-21 DIAGNOSIS — Z76.89 ESTABLISHING CARE WITH NEW DOCTOR, ENCOUNTER FOR: ICD-10-CM

## 2021-06-21 DIAGNOSIS — Z13.220 LIPID SCREENING: ICD-10-CM

## 2021-06-21 DIAGNOSIS — I10 BENIGN ESSENTIAL HYPERTENSION: ICD-10-CM

## 2021-06-21 LAB
CHOLEST SERPL-MCNC: 153 MG/DL
CREAT UR-MCNC: 214 MG/DL
HBA1C MFR BLD: 5.4 % (ref 0–5.6)
HDLC SERPL-MCNC: 37 MG/DL
HGB BLD-MCNC: 9.1 G/DL (ref 13.3–17.7)
LDLC SERPL CALC-MCNC: 75 MG/DL
MICROALBUMIN UR-MCNC: 69 MG/L
MICROALBUMIN/CREAT UR: 32.2 MG/G CR (ref 0–17)
NONHDLC SERPL-MCNC: 116 MG/DL
PSA SERPL-ACNC: 4.16 UG/L (ref 0–4)
TRIGL SERPL-MCNC: 204 MG/DL

## 2021-06-21 PROCEDURE — 86803 HEPATITIS C AB TEST: CPT | Performed by: FAMILY MEDICINE

## 2021-06-21 PROCEDURE — 99213 OFFICE O/P EST LOW 20 MIN: CPT | Mod: 25 | Performed by: FAMILY MEDICINE

## 2021-06-21 PROCEDURE — 80061 LIPID PANEL: CPT | Performed by: FAMILY MEDICINE

## 2021-06-21 PROCEDURE — 99397 PER PM REEVAL EST PAT 65+ YR: CPT | Performed by: FAMILY MEDICINE

## 2021-06-21 PROCEDURE — 85018 HEMOGLOBIN: CPT | Performed by: FAMILY MEDICINE

## 2021-06-21 PROCEDURE — 36415 COLL VENOUS BLD VENIPUNCTURE: CPT | Performed by: FAMILY MEDICINE

## 2021-06-21 PROCEDURE — G0103 PSA SCREENING: HCPCS | Performed by: FAMILY MEDICINE

## 2021-06-21 PROCEDURE — 83036 HEMOGLOBIN GLYCOSYLATED A1C: CPT | Performed by: FAMILY MEDICINE

## 2021-06-21 PROCEDURE — 82043 UR ALBUMIN QUANTITATIVE: CPT | Performed by: FAMILY MEDICINE

## 2021-06-21 RX ORDER — LISINOPRIL 40 MG/1
TABLET ORAL
Qty: 90 TABLET | Refills: 3 | Status: SHIPPED | OUTPATIENT
Start: 2021-06-21 | End: 2022-07-18

## 2021-06-21 RX ORDER — AMLODIPINE BESYLATE 5 MG/1
5 TABLET ORAL DAILY
Qty: 90 TABLET | Refills: 3 | Status: ON HOLD | OUTPATIENT
Start: 2021-06-21 | End: 2021-12-16

## 2021-06-21 ASSESSMENT — ENCOUNTER SYMPTOMS
HEMATURIA: 0
NERVOUS/ANXIOUS: 0
WEAKNESS: 0
FEVER: 0
PARESTHESIAS: 0
PALPITATIONS: 0
NAUSEA: 0
DIARRHEA: 0
DIZZINESS: 0
SHORTNESS OF BREATH: 0
CHILLS: 0
CONSTIPATION: 0
COUGH: 0
ARTHRALGIAS: 0
HEADACHES: 0
HEARTBURN: 0
ABDOMINAL PAIN: 0
SORE THROAT: 0
DYSURIA: 0
JOINT SWELLING: 1
HEMATOCHEZIA: 0
EYE PAIN: 0
FREQUENCY: 0

## 2021-06-21 ASSESSMENT — ACTIVITIES OF DAILY LIVING (ADL): CURRENT_FUNCTION: NO ASSISTANCE NEEDED

## 2021-06-21 ASSESSMENT — MIFFLIN-ST. JEOR: SCORE: 2008.7

## 2021-06-21 NOTE — PATIENT INSTRUCTIONS
Patient Education   Personalized Prevention Plan  You are due for the preventive services outlined below.  Your care team is available to assist you in scheduling these services.  If you have already completed any of these items, please share that information with your care team to update in your medical record.  Health Maintenance Due   Topic Date Due     ANNUAL REVIEW OF HM ORDERS  Never done     COVID-19 Vaccine (1) Never done     Hepatitis C Screening  Never done     Zoster (Shingles) Vaccine (1 of 2) Never done     Annual Wellness Visit  04/24/2020     FALL RISK ASSESSMENT  Never done     Pneumococcal Vaccine (1 of 1 - PPSV23) Never done     Discuss Advance Care Planning  05/10/2021

## 2021-06-21 NOTE — PROGRESS NOTES
"SUBJECTIVE:   Sav Mendoza is a 66 year old male who presents for Preventive Visit.      Patient has been advised of split billing requirements and indicates understanding: Yes   Are you in the first 12 months of your Medicare coverage?  No    Healthy Habits:     In general, how would you rate your overall health?  Good    Frequency of exercise:  6-7 days/week    Duration of exercise:  Less than 15 minutes    Do you usually eat at least 4 servings of fruit and vegetables a day, include whole grains    & fiber and avoid regularly eating high fat or \"junk\" foods?  Yes    Taking medications regularly:  Yes    Medication side effects:  None    Ability to successfully perform activities of daily living:  No assistance needed    Home Safety:  No safety concerns identified    Hearing Impairment:  No hearing concerns    In the past 6 months, have you been bothered by leaking of urine?  No    In general, how would you rate your overall mental or emotional health?  Good      PHQ-2 Total Score: 0    Additional concerns today:  No    Other issues:    Here to establish care.    Takes amlodipine and lisinopril for BP. Has been out for a few weeks. Needs labs and refill.    Hx of hematochezia. Followed by GI. Recently had colonoscopy 6/15 showing old and fresh blood throughout entire colon and significant diverticula. Recently started oral iron supplementation. Needs hemoglobin rechecked. Feeling well today.     Do you feel safe in your environment? Yes    Have you ever done Advance Care Planning? (For example, a Health Directive, POLST, or a discussion with a medical provider or your loved ones about your wishes): Yes, patient states has an Advance Care Planning document and will bring a copy to the clinic.    Fall risk  Any fall with injury in the past year?: No    Cognitive Screening   1) Repeat 3 items (Leader, Season, Table)    2) Clock draw: NORMAL  3) 3 item recall: Recalls 3 objects  Results: 3 items recalled: " COGNITIVE IMPAIRMENT LESS LIKELY    Mini-CogTM Copyright GÓMEZ James. Licensed by the author for use in [facility]; reprinted with permission (laine@.[facility]). All rights reserved.      Do you have sleep apnea, excessive snoring or daytime drowsiness?: no    Reviewed and updated as needed this visit by clinical staff  Tobacco  Allergies  Meds   Med Hx  Surg Hx  Fam Hx  Soc Hx        Reviewed and updated as needed this visit by Provider                Social History     Tobacco Use     Smoking status: Never Smoker     Smokeless tobacco: Never Used   Substance Use Topics     Alcohol use: Yes     Comment: rare     If you drink alcohol do you typically have >3 drinks per day or >7 drinks per week? No    Alcohol Use 6/21/2021   Prescreen: >3 drinks/day or >7 drinks/week? No   Prescreen: >3 drinks/day or >7 drinks/week? -   No flowsheet data found.      Current providers sharing in care for this patient include:   Patient Care Team:  Logan Calvo DO as PCP - General (Family Medicine)  Rodney Viveros MD as Assigned PCP    The following health maintenance items are reviewed in Epic and correct as of today:  Health Maintenance Due   Topic Date Due     COVID-19 Vaccine (1) Never done     ZOSTER IMMUNIZATION (1 of 2) Never done     FALL RISK ASSESSMENT  Never done     Pneumococcal Vaccine: 65+ Years (1 of 1 - PPSV23) Never done       Review of Systems   Constitutional: Negative for chills and fever.   HENT: Negative for congestion, ear pain, hearing loss and sore throat.    Eyes: Negative for pain and visual disturbance.   Respiratory: Negative for cough and shortness of breath.    Cardiovascular: Positive for peripheral edema. Negative for chest pain and palpitations.   Gastrointestinal: Negative for abdominal pain, constipation, diarrhea, heartburn, hematochezia and nausea.   Genitourinary: Negative for discharge, dysuria, frequency, genital sores, hematuria, impotence and urgency.   Musculoskeletal:  "Positive for joint swelling. Negative for arthralgias.   Skin: Negative for rash.   Neurological: Negative for dizziness, weakness, headaches and paresthesias.   Psychiatric/Behavioral: Negative for mood changes. The patient is not nervous/anxious.        OBJECTIVE:   BP (!) 154/95 (BP Location: Right arm, Patient Position: Chair, Cuff Size: Adult Regular)   Pulse 104   Temp 98  F (36.7  C) (Tympanic)   Resp 16   Ht 1.803 m (5' 11\")   Wt 120.7 kg (266 lb)   SpO2 96%   BMI 37.10 kg/m   Estimated body mass index is 37.1 kg/m  as calculated from the following:    Height as of this encounter: 1.803 m (5' 11\").    Weight as of this encounter: 120.7 kg (266 lb).  Physical Exam  GENERAL: healthy, alert and no distress  EYES: Eyes grossly normal to inspection, conjunctivae and sclerae normal  RESP: lungs clear to auscultation - no rales, rhonchi or wheezes  CV: regular rate and rhythm, normal S1 S2, no S3 or S4, no murmur, click or rub, no peripheral edema and peripheral pulses strong  ABDOMEN: soft, nontender, no hepatosplenomegaly, no masses and bowel sounds normal  SKIN: no suspicious lesions or rashes  PSYCH: mentation appears normal, affect normal/bright    ASSESSMENT / PLAN:   Sav was seen today for physical.    Diagnoses and all orders for this visit:    Encounter for Medicare annual wellness exam  -     REVIEW OF HEALTH MAINTENANCE PROTOCOL ORDERS    Lipid screening  -     Lipid panel reflex to direct LDL Non-fasting    Screening for diabetes mellitus  -     Hemoglobin A1c    Need for hepatitis C screening test  -     Hepatitis C Screen Reflex to HCV RNA Quant and Genotype    Screening for prostate cancer  -     Prostate spec antigen screen    Establishing care with new doctor, encounter for    Benign essential hypertension - elevated, has been off mediation  -Restart meds: amlodipine and lisinopril  -Recent CMP reviewed  -     Albumin Random Urine Quantitative with Creat Ratio  -     amLODIPine (NORVASC) 5 " MG tablet; Take 1 tablet (5 mg) by mouth daily  -     lisinopril (ZESTRIL) 40 MG tablet; TAKE 1 TABLET BY MOUTH ONCE DAILY IN THE EVENING.    Iron deficiency anemia due to chronic blood loss  -     Hemoglobin      In addition to preventive health visit, encounter was spent establish care, discussing HTN and anemia, doing chart review, history and exam, documentation and further activities per the note.    COUNSELING:  Reviewed preventive health counseling, as reflected in patient instructions       Regular exercise       Healthy diet/nutrition       Colon cancer screening       Prostate cancer screening    He reports that he has never smoked. He has never used smokeless tobacco.      Appropriate preventive services were discussed with this patient, including applicable screening as appropriate for cardiovascular disease, diabetes, osteopenia/osteoporosis, and glaucoma.  As appropriate for age/gender, discussed screening for colorectal cancer, prostate cancer, breast cancer, and cervical cancer. Checklist reviewing preventive services available has been given to the patient.    Reviewed patients plan of care and provided an AVS. The Basic Care Plan (routine screening as documented in Health Maintenance) for Sav meets the Care Plan requirement. This Care Plan has been established and reviewed with the Patient.      Logan Calvo DO  [facility]    Identified Health Risks:

## 2021-06-21 NOTE — PROGRESS NOTES
Clinic Care Coordination Contact      Patient has a follow up appointment with a provider within 24-48 hours of hospital discharge. Will cancel/close post-hospital call rom [facility] at this time.    Yvonne Marquez MA  [facility], [facility]

## 2021-06-22 DIAGNOSIS — R97.20 ELEVATED PROSTATE SPECIFIC ANTIGEN (PSA): Primary | ICD-10-CM

## 2021-06-22 LAB — HCV AB SERPL QL IA: NONREACTIVE

## 2021-06-23 ENCOUNTER — TELEPHONE (OUTPATIENT)
Dept: FAMILY MEDICINE | Facility: CLINIC | Age: 66
End: 2021-06-23

## 2021-06-23 NOTE — TELEPHONE ENCOUNTER
Message from PCP communicated to patient. Patient given referral to Urology.    Thanks! Dayanna Vargas RN

## 2021-09-29 ENCOUNTER — HOSPITAL ENCOUNTER (EMERGENCY)
Facility: CLINIC | Age: 66
Discharge: HOME | End: 2021-09-30
Attending: EMERGENCY MEDICINE | Admitting: EMERGENCY MEDICINE
Payer: MEDICARE

## 2021-09-29 DIAGNOSIS — L03.90 CELLULITIS, UNSPECIFIED CELLULITIS SITE: ICD-10-CM

## 2021-09-29 PROCEDURE — 99284 EMERGENCY DEPT VISIT MOD MDM: CPT | Mod: 25 | Performed by: EMERGENCY MEDICINE

## 2021-09-29 PROCEDURE — 96365 THER/PROPH/DIAG IV INF INIT: CPT | Performed by: EMERGENCY MEDICINE

## 2021-09-29 PROCEDURE — 99284 EMERGENCY DEPT VISIT MOD MDM: CPT | Performed by: EMERGENCY MEDICINE

## 2021-09-29 RX ORDER — SODIUM CHLORIDE 9 MG/ML
INJECTION, SOLUTION INTRAVENOUS CONTINUOUS
Status: DISCONTINUED | OUTPATIENT
Start: 2021-09-30 | End: 2021-09-30 | Stop reason: HOSPADM

## 2021-09-29 RX ORDER — CEFTRIAXONE 1 G/1
1 INJECTION, POWDER, FOR SOLUTION INTRAMUSCULAR; INTRAVENOUS ONCE
Status: COMPLETED | OUTPATIENT
Start: 2021-09-29 | End: 2021-09-30

## 2021-09-29 ASSESSMENT — ENCOUNTER SYMPTOMS: COLOR CHANGE: 1

## 2021-09-30 VITALS
DIASTOLIC BLOOD PRESSURE: 87 MMHG | RESPIRATION RATE: 18 BRPM | HEART RATE: 107 BPM | SYSTOLIC BLOOD PRESSURE: 157 MMHG | BODY MASS INDEX: 37.1 KG/M2 | TEMPERATURE: 99.3 F | OXYGEN SATURATION: 95 % | WEIGHT: 266 LBS

## 2021-09-30 LAB
ANION GAP SERPL CALCULATED.3IONS-SCNC: 4 MMOL/L (ref 3–14)
BASOPHILS # BLD AUTO: 0.1 10E3/UL (ref 0–0.2)
BASOPHILS NFR BLD AUTO: 0 %
BUN SERPL-MCNC: 17 MG/DL (ref 7–30)
CALCIUM SERPL-MCNC: 8.6 MG/DL (ref 8.5–10.1)
CHLORIDE BLD-SCNC: 106 MMOL/L (ref 94–109)
CO2 SERPL-SCNC: 27 MMOL/L (ref 20–32)
CREAT SERPL-MCNC: 0.85 MG/DL (ref 0.66–1.25)
CRP SERPL-MCNC: 50.9 MG/L (ref 0–8)
EOSINOPHIL # BLD AUTO: 0.1 10E3/UL (ref 0–0.7)
EOSINOPHIL NFR BLD AUTO: 1 %
ERYTHROCYTE [DISTWIDTH] IN BLOOD BY AUTOMATED COUNT: 13.9 % (ref 10–15)
GFR SERPL CREATININE-BSD FRML MDRD: >90 ML/MIN/1.73M2
GLUCOSE BLD-MCNC: 104 MG/DL (ref 70–99)
HCT VFR BLD AUTO: 40.8 % (ref 40–53)
HGB BLD-MCNC: 13.2 G/DL (ref 13.3–17.7)
IMM GRANULOCYTES # BLD: 0.1 10E3/UL
IMM GRANULOCYTES NFR BLD: 0 %
LACTATE SERPL-SCNC: 1.4 MMOL/L (ref 0.7–2)
LYMPHOCYTES # BLD AUTO: 1.8 10E3/UL (ref 0.8–5.3)
LYMPHOCYTES NFR BLD AUTO: 15 %
MCH RBC QN AUTO: 29.1 PG (ref 26.5–33)
MCHC RBC AUTO-ENTMCNC: 32.4 G/DL (ref 31.5–36.5)
MCV RBC AUTO: 90 FL (ref 78–100)
MONOCYTES # BLD AUTO: 0.7 10E3/UL (ref 0–1.3)
MONOCYTES NFR BLD AUTO: 6 %
NEUTROPHILS # BLD AUTO: 9.6 10E3/UL (ref 1.6–8.3)
NEUTROPHILS NFR BLD AUTO: 78 %
NRBC # BLD AUTO: 0 10E3/UL
NRBC BLD AUTO-RTO: 0 /100
PLATELET # BLD AUTO: 226 10E3/UL (ref 150–450)
POTASSIUM BLD-SCNC: 3.7 MMOL/L (ref 3.4–5.3)
RBC # BLD AUTO: 4.54 10E6/UL (ref 4.4–5.9)
SODIUM SERPL-SCNC: 137 MMOL/L (ref 133–144)
WBC # BLD AUTO: 12.2 10E3/UL (ref 4–11)

## 2021-09-30 PROCEDURE — 87040 BLOOD CULTURE FOR BACTERIA: CPT | Performed by: EMERGENCY MEDICINE

## 2021-09-30 PROCEDURE — 86140 C-REACTIVE PROTEIN: CPT | Performed by: EMERGENCY MEDICINE

## 2021-09-30 PROCEDURE — 85025 COMPLETE CBC W/AUTO DIFF WBC: CPT | Performed by: EMERGENCY MEDICINE

## 2021-09-30 PROCEDURE — 80048 BASIC METABOLIC PNL TOTAL CA: CPT | Performed by: EMERGENCY MEDICINE

## 2021-09-30 PROCEDURE — 258N000003 HC RX IP 258 OP 636: Performed by: EMERGENCY MEDICINE

## 2021-09-30 PROCEDURE — 250N000011 HC RX IP 250 OP 636: Performed by: EMERGENCY MEDICINE

## 2021-09-30 PROCEDURE — 36415 COLL VENOUS BLD VENIPUNCTURE: CPT | Performed by: EMERGENCY MEDICINE

## 2021-09-30 PROCEDURE — 83605 ASSAY OF LACTIC ACID: CPT | Performed by: EMERGENCY MEDICINE

## 2021-09-30 RX ORDER — CEPHALEXIN 500 MG/1
500 CAPSULE ORAL 4 TIMES DAILY
Qty: 40 CAPSULE | Refills: 0 | Status: SHIPPED | OUTPATIENT
Start: 2021-09-30 | End: 2021-10-10

## 2021-09-30 RX ADMIN — CEFTRIAXONE 1 G: 1 INJECTION, POWDER, FOR SOLUTION INTRAMUSCULAR; INTRAVENOUS at 01:02

## 2021-09-30 RX ADMIN — SODIUM CHLORIDE 1000 ML: 9 INJECTION, SOLUTION INTRAVENOUS at 00:49

## 2021-09-30 NOTE — ED NOTES
The patient was accepted at shift change signout with a plan for me to follow-up his labs, discharge if there was no major concerns after the patient received his dose of IV antibiotic.  CBC revealed a mildly elevated white count of 12.2, CRP elevated at 50.9 (consistent with his underlying infection).  He tolerated the IV antibiotic without issue.  He feels very comfortable with plan for discharge home with oral antibiotic, follow-up, return with concerns.  He is discharged at this time.    Dictation Disclaimer: Some of this Note has been completed with voice-recognition dictation software. Although errors are generally corrected real-time, there is the potential for a rare error to be present in the completed chart.       Manuela Jeffries MD  09/30/21 0140

## 2021-09-30 NOTE — ED PROVIDER NOTES
[place] EMERGENCY DEPARTMENT ([facility])  9/29/21 [id number]    History     Chief Complaint   Patient presents with     Wound Check     Sore on right ankle and foot, been bothering him for a couple days.      The history is provided by the patient and medical records.     Sav Mendoza is a 66 year old male with a past medical history of cellulitis of left lower leg, edema of bilateral legs, stasis dermatitis of bilateral legs, hypertension, abscess of the left leg, and obesity who presents to the emergency department in a wheelchair with his daughter for a wound check.  Patient states that today when walking he noticed his right lower leg was red, swollen, and painful.  He subsequently has a sore on the bottom of his right lateral foot.  He has had cellulitis twice before and was treated here in the hospital for his symptoms.  Patient states he feels like he is fighting infection and has felt under the weather today.  He states his symptoms have improved since earlier.  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  Patient abscess bilateral legs for his edema.  He is on lisinopril for his hypertension.  He denies any other medical issues.      Past Medical History  Past Medical History:   Diagnosis Date     Hypertension      Ventral hernia 6/24/14     Past Surgical History:   Procedure Laterality Date     COLONOSCOPY N/A 6/16/2021    Procedure: COLONOSCOPY;  Surgeon: Echo Green MD;  Location: [facility]     LAPAROTOMY EXPLORATORY N/A 12/12/2017    Procedure: LAPAROTOMY EXPLORATORY;  Exploratory Laparotomy and  Gint Ventral Hernia Repair with Mesh;  Surgeon: Mina Parsons MD;  Location: [facility] OR     ORTHOPEDIC SURGERY Left 2010    ankle fusion     amLODIPine (NORVASC) 5 MG tablet  cephALEXin (KEFLEX) 500 MG capsule  lisinopril (ZESTRIL) 40 MG tablet  Ascorbic Acid (VITAMIN C PO)  Blood Pressure Monitoring (BLOOD PRESSURE MONITOR/WRIST) LAURA  ferrous gluconate (FERGON)  324 (38 Fe) MG tablet  multivitamin, therapeutic with minerals (THERA-VIT-M) TABS  polyethylene glycol (MIRALAX) 17 GM/Dose powder      No Known Allergies  Family History  Family History   Problem Relation Age of Onset     Alzheimer Disease Mother      Glaucoma Mother      Macular Degeneration No family hx of      Colon Cancer No family hx of      Prostate Cancer No family hx of      Social History   Social History     Tobacco Use     Smoking status: Never Smoker     Smokeless tobacco: Never Used   Substance Use Topics     Alcohol use: Not Currently     Comment: rare     Drug use: No      Past medical history, past surgical history, medications, allergies, family history, and social history were reviewed with the patient. No additional pertinent items.       Review of Systems   Cardiovascular: Positive for leg swelling (Chronic ).   Musculoskeletal:        Right lower leg pain.   Skin: Positive for color change (Redness to the right lower leg and foot).   All other systems reviewed and are negative.    A complete review of systems was performed with pertinent positives and negatives noted in the HPI, and all other systems negative.    Physical Exam   BP: 138/81  Pulse: 107  Temp: 99.7  F (37.6  C)  Resp: 16  Weight: 120.7 kg (266 lb)  SpO2: 95 %  Physical Exam  Constitutional:       General: He is not in acute distress.     Appearance: He is not diaphoretic.   HENT:      Head: Normocephalic.      Mouth/Throat:      Pharynx: No oropharyngeal exudate.   Eyes:      Extraocular Movements: Extraocular movements intact.   Cardiovascular:      Heart sounds: Normal heart sounds.   Pulmonary:      Effort: No respiratory distress.      Breath sounds: Normal breath sounds.   Abdominal:      General: There is no distension.      Palpations: Abdomen is soft.      Tenderness: There is no abdominal tenderness.   Musculoskeletal:         General: No deformity.      Cervical back: Neck supple.   Skin:     General: Skin is dry.       Comments: R lower leg/ ankle Medial erythema    Neurological:      Mental Status: He is alert.      Comments: alert   Psychiatric:         Behavior: Behavior normal.         ED Course     11:29 PM  The patient was seen and examined by Damaso Mejia DO in Room HW06.    Procedures       Results for orders placed or performed during the hospital encounter of 09/29/21   Basic metabolic panel     Status: Abnormal   Result Value Ref Range    Sodium 137 133 - 144 mmol/L    Potassium 3.7 3.4 - 5.3 mmol/L    Chloride 106 94 - 109 mmol/L    Carbon Dioxide (CO2) 27 20 - 32 mmol/L    Anion Gap 4 3 - 14 mmol/L    Urea Nitrogen 17 7 - 30 mg/dL    Creatinine 0.85 0.66 - 1.25 mg/dL    Calcium 8.6 8.5 - 10.1 mg/dL    Glucose 104 (H) 70 - 99 mg/dL    GFR Estimate >90 >60 mL/min/1.73m2   Lactic acid whole blood     Status: Normal   Result Value Ref Range    Lactic Acid 1.4 0.7 - 2.0 mmol/L   CRP inflammation     Status: Abnormal   Result Value Ref Range    CRP Inflammation 50.9 (H) 0.0 - 8.0 mg/L   CBC with platelets and differential     Status: Abnormal   Result Value Ref Range    WBC Count 12.2 (H) 4.0 - 11.0 10e3/uL    RBC Count 4.54 4.40 - 5.90 10e6/uL    Hemoglobin 13.2 (L) 13.3 - 17.7 g/dL    Hematocrit 40.8 40.0 - 53.0 %    MCV 90 78 - 100 fL    MCH 29.1 26.5 - 33.0 pg    MCHC 32.4 31.5 - 36.5 g/dL    RDW 13.9 10.0 - 15.0 %    Platelet Count 226 150 - 450 10e3/uL    % Neutrophils 78 %    % Lymphocytes 15 %    % Monocytes 6 %    % Eosinophils 1 %    % Basophils 0 %    % Immature Granulocytes 0 %    NRBCs per 100 WBC 0 <1 /100    Absolute Neutrophils 9.6 (H) 1.6 - 8.3 10e3/uL    Absolute Lymphocytes 1.8 0.8 - 5.3 10e3/uL    Absolute Monocytes 0.7 0.0 - 1.3 10e3/uL    Absolute Eosinophils 0.1 0.0 - 0.7 10e3/uL    Absolute Basophils 0.1 0.0 - 0.2 10e3/uL    Absolute Immature Granulocytes 0.1 (H) <=0.0 10e3/uL    Absolute NRBCs 0.0 10e3/uL   Blood Culture Peripheral Blood     Status: Normal (Preliminary result)    Specimen:  Peripheral Blood   Result Value Ref Range    Culture No growth after 4 days    Blood Culture Peripheral Blood     Status: Normal (Preliminary result)    Specimen: Peripheral Blood   Result Value Ref Range    Culture No growth after 4 days    CBC with platelets differential     Status: Abnormal    Narrative    The following orders were created for panel order CBC with platelets differential.  Procedure                               Abnormality         Status                     ---------                               -----------         ------                     CBC with platelets and d...[[id number]]  Abnormal            Final result                 Please view results for these tests on the individual orders.            Results for orders placed or performed during the hospital encounter of 09/29/21   Basic metabolic panel     Status: Abnormal   Result Value Ref Range    Sodium 137 133 - 144 mmol/L    Potassium 3.7 3.4 - 5.3 mmol/L    Chloride 106 94 - 109 mmol/L    Carbon Dioxide (CO2) 27 20 - 32 mmol/L    Anion Gap 4 3 - 14 mmol/L    Urea Nitrogen 17 7 - 30 mg/dL    Creatinine 0.85 0.66 - 1.25 mg/dL    Calcium 8.6 8.5 - 10.1 mg/dL    Glucose 104 (H) 70 - 99 mg/dL    GFR Estimate >90 >60 mL/min/1.73m2   Lactic acid whole blood     Status: Normal   Result Value Ref Range    Lactic Acid 1.4 0.7 - 2.0 mmol/L   CRP inflammation     Status: Abnormal   Result Value Ref Range    CRP Inflammation 50.9 (H) 0.0 - 8.0 mg/L   CBC with platelets and differential     Status: Abnormal   Result Value Ref Range    WBC Count 12.2 (H) 4.0 - 11.0 10e3/uL    RBC Count 4.54 4.40 - 5.90 10e6/uL    Hemoglobin 13.2 (L) 13.3 - 17.7 g/dL    Hematocrit 40.8 40.0 - 53.0 %    MCV 90 78 - 100 fL    MCH 29.1 26.5 - 33.0 pg    MCHC 32.4 31.5 - 36.5 g/dL    RDW 13.9 10.0 - 15.0 %    Platelet Count 226 150 - 450 10e3/uL    % Neutrophils 78 %    % Lymphocytes 15 %    % Monocytes 6 %    % Eosinophils 1 %    % Basophils 0 %    % Immature Granulocytes  0 %    NRBCs per 100 WBC 0 <1 /100    Absolute Neutrophils 9.6 (H) 1.6 - 8.3 10e3/uL    Absolute Lymphocytes 1.8 0.8 - 5.3 10e3/uL    Absolute Monocytes 0.7 0.0 - 1.3 10e3/uL    Absolute Eosinophils 0.1 0.0 - 0.7 10e3/uL    Absolute Basophils 0.1 0.0 - 0.2 10e3/uL    Absolute Immature Granulocytes 0.1 (H) <=0.0 10e3/uL    Absolute NRBCs 0.0 10e3/uL   Blood Culture Peripheral Blood     Status: Normal (Preliminary result)    Specimen: Peripheral Blood   Result Value Ref Range    Culture No growth after 4 days    Blood Culture Peripheral Blood     Status: Normal (Preliminary result)    Specimen: Peripheral Blood   Result Value Ref Range    Culture No growth after 4 days    CBC with platelets differential     Status: Abnormal    Narrative    The following orders were created for panel order CBC with platelets differential.  Procedure                               Abnormality         Status                     ---------                               -----------         ------                     CBC with platelets and d...[[id number]]  Abnormal            Final result                 Please view results for these tests on the individual orders.     Medications   0.9% sodium chloride BOLUS (0 mLs Intravenous Stopped 9/30/21 0143)   cefTRIAXone (ROCEPHIN) 1 g vial to attach to  mL bag for ADULTS or NS 50 mL bag for PEDS (0 g Intravenous Stopped 9/30/21 0148)        Assessments & Plan (with Medical Decision Making)   66-year-old male presents to us with a chief complaint of erythema on his right medial leg and ankle.  Differential includes but not limited to erythema, cellulitis, DVT.  Exam is consistent with cellulitis.  Patient is afebrile here.  He was started on Rocephin IV and labs are drawn.  Lactic acid was negative.  BMP was normal.  CRP is elevated at 50.9.  Lactic acid is normal at 1.9.  Cultures were drawn.  Patient is comfortable with discharge home.  We will give him oral antibiotics and have him follow-up  with primary care and return to us if his symptoms worsen.    I have reviewed the nursing notes. I have reviewed the findings, diagnosis, plan and need for follow up with the patient.    Discharge Medication List as of 9/30/2021  1:51 AM      START taking these medications    Details   cephALEXin (KEFLEX) 500 MG capsule Take 1 capsule (500 mg) by mouth 4 times daily for 10 days, Disp-40 capsule, R-0, E-Prescribe             Final diagnoses:   Cellulitis, unspecified cellulitis site     I, Kandice Abbott, am serving as a trained medical scribe to document services personally performed by Damaso Mejia DO based on the provider's statements to me on September 29, 2021.  This document has been checked and approved by the attending provider.    I, Damaso Mejia DO, was physically present and have reviewed and verified the accuracy of this note documented by Kandice Abbott medical scribe.      Damaso Mejia DO    --  [facility] EMERGENCY DEPARTMENT  9/29/2021     Damaso Mejia DO  10/04/21 0934

## 2021-10-05 LAB
BACTERIA BLD CULT: NO GROWTH
BACTERIA BLD CULT: NO GROWTH

## 2021-10-18 DIAGNOSIS — Z11.59 ENCOUNTER FOR SCREENING FOR OTHER VIRAL DISEASES: ICD-10-CM

## 2021-11-11 ENCOUNTER — OFFICE VISIT (OUTPATIENT)
Dept: FAMILY MEDICINE | Facility: CLINIC | Age: 66
End: 2021-11-11
Payer: MEDICARE

## 2021-11-11 VITALS
BODY MASS INDEX: 37.8 KG/M2 | OXYGEN SATURATION: 95 % | SYSTOLIC BLOOD PRESSURE: 129 MMHG | WEIGHT: 271 LBS | HEART RATE: 84 BPM | DIASTOLIC BLOOD PRESSURE: 85 MMHG | TEMPERATURE: 98.6 F | RESPIRATION RATE: 18 BRPM

## 2021-11-11 DIAGNOSIS — I10 BENIGN ESSENTIAL HYPERTENSION: ICD-10-CM

## 2021-11-11 DIAGNOSIS — I87.2 STASIS DERMATITIS OF BOTH LEGS: ICD-10-CM

## 2021-11-11 DIAGNOSIS — Z01.818 PREOP EXAMINATION: Primary | ICD-10-CM

## 2021-11-11 DIAGNOSIS — E66.01 MORBID OBESITY (H): ICD-10-CM

## 2021-11-11 DIAGNOSIS — M14.671 CHARCOT ANKLE, RIGHT: ICD-10-CM

## 2021-11-11 DIAGNOSIS — D50.0 IRON DEFICIENCY ANEMIA DUE TO CHRONIC BLOOD LOSS: ICD-10-CM

## 2021-11-11 LAB — HGB BLD-MCNC: 13.7 G/DL (ref 13.3–17.7)

## 2021-11-11 PROCEDURE — 80048 BASIC METABOLIC PNL TOTAL CA: CPT | Performed by: FAMILY MEDICINE

## 2021-11-11 PROCEDURE — 85018 HEMOGLOBIN: CPT | Performed by: FAMILY MEDICINE

## 2021-11-11 PROCEDURE — 99214 OFFICE O/P EST MOD 30 MIN: CPT | Performed by: FAMILY MEDICINE

## 2021-11-11 PROCEDURE — 93000 ELECTROCARDIOGRAM COMPLETE: CPT | Performed by: FAMILY MEDICINE

## 2021-11-11 PROCEDURE — 36415 COLL VENOUS BLD VENIPUNCTURE: CPT | Performed by: FAMILY MEDICINE

## 2021-11-11 NOTE — PROGRESS NOTES
[facility] [address] [place] [address]  Phone: [phone]  Primary Provider: Celena Calvo  Pre-op Performing Provider: CELENA CALVO      PREOPERATIVE EVALUATION:  Today's date: 11/11/2021    Sav Mendoza is a 66 year old male who presents for a preoperative evaluation.    Surgical Information:  Surgery/Procedure:RIGHT ANKLE CORRECTIVE OSTEOTOMY AND SUBTALAR AND TIIOTALOCALCANEAL FUSION   Surgery Location:  OR  Surgeon: Ad Miller MD  Surgery Date: 11/22/2021  Time of Surgery: 9:00 AM   Where patient plans to recover: At home with family  Fax number for surgical facility: Note does not need to be faxed, will be available electronically in Epic.    Type of Anesthesia Anticipated: General    Assessment & Plan     The proposed surgical procedure is considered INTERMEDIATE risk.    Preop examination  Charcot ankle, right  - EKG 12-lead complete w/read - Clinics  - Hemoglobin  - Basic metabolic panel  (Ca, Cl, CO2, Creat, Gluc, K, Na, BUN)      Other chronic conditions:    Obesity (BMI 35.0-39.9) with comorbidity (H)  Benign essential hypertension  Stasis dermatitis of both legs  Iron deficiency anemia due to chronic blood loss      Risks and Recommendations:  The patient has the following additional risks and recommendations for perioperative complications:   - No identified additional risk factors other than previously addressed    Medication Instructions:  Patient is to take all scheduled medications on the day of surgery    RECOMMENDATION:  APPROVAL GIVEN to proceed with proposed procedure, without further diagnostic evaluation.      30 minutes spent on the date of the encounter doing chart review, history and exam, documentation and further activities per the note.    Subjective     HPI related to upcoming procedure: Patient with history of HTN, obesity, stasis dermatitis of lower extremities.  History of right charcot ankle. Follows with orthopedics.  Recently treated for cellulitis of right leg. Plans for corrective surgery of right ankle deformity.    Preop Questions 11/11/2021   1. Have you ever had a heart attack or stroke? No   2. Have you ever had surgery on your heart or blood vessels, such as a stent placement, a coronary artery bypass, or surgery on an artery in your head, neck, heart, or legs? No   3. Do you have chest pain with activity? No   4. Do you have a history of  heart failure? No   5. Do you currently have a cold, bronchitis or symptoms of other infection? No   6. Do you have a cough, shortness of breath, or wheezing? No   7. Do you or anyone in your family have previous history of blood clots? No   8. Do you or does anyone in your family have a serious bleeding problem such as prolonged bleeding following surgeries or cuts? No   9. Have you ever had problems with anemia or been told to take iron pills? No   10. Have you had any abnormal blood loss such as black, tarry or bloody stools? No   11. Have you ever had a blood transfusion? No   12. Are you willing to have a blood transfusion if it is medically needed before, during, or after your surgery? Yes   13. Have you or any of your relatives ever had problems with anesthesia? No   14. Do you have sleep apnea, excessive snoring or daytime drowsiness? No   15. Do you have any artifical heart valves or other implanted medical devices like a pacemaker, defibrillator, or continuous glucose monitor? No   16. Do you have artificial joints? No   17. Are you allergic to latex? No       Health Care Directive:  Patient does not have a Health Care Directive or Living Will: Discussed advance care planning with patient; information given to patient to review.    Preoperative Review of :   reviewed - no record of controlled substances prescribed.      Status of Chronic Conditions:  HYPERTENSION - Patient has longstanding history of HTN , currently denies any symptoms referable to elevated blood  pressure. Specifically denies chest pain, palpitations, dyspnea, orthopnea, PND or peripheral edema. Blood pressure readings have been in normal range. Current medication regimen is as listed below. Patient denies any side effects of medication.       Review of Systems  CONSTITUTIONAL: NEGATIVE for fever, chills, change in weight  INTEGUMENTARY/SKIN: NEGATIVE for worrisome rashes, moles or lesions  EYES: NEGATIVE for vision changes or irritation  ENT/MOUTH: NEGATIVE for ear, mouth and throat problems  RESP: NEGATIVE for significant cough or SOB  CV: NEGATIVE for chest pain, palpitations or peripheral edema  GI: NEGATIVE for nausea, abdominal pain, heartburn, or change in bowel habits  : NEGATIVE for frequency, dysuria, or hematuria  MUSCULOSKELETAL: NEGATIVE for significant arthralgias or myalgia  NEURO: NEGATIVE for weakness, dizziness or paresthesias  ENDOCRINE: NEGATIVE for temperature intolerance, skin/hair changes  HEME: NEGATIVE for bleeding problems  PSYCHIATRIC: NEGATIVE for changes in mood or affect    Patient Active Problem List    Diagnosis Date Noted     Iron deficiency anemia due to chronic blood loss 06/21/2021     Priority: Medium     Hematochezia 06/15/2021     Priority: Medium     Blood in stool 06/15/2021     Priority: Medium     Obesity (BMI 35.0-39.9) with comorbidity (H) 02/09/2019     Priority: Medium     Visit for wound check 01/04/2018     Priority: Medium     Incarcerated hernia 12/12/2017     Priority: Medium     Bilateral leg edema 06/10/2016     Priority: Medium     Non morbid obesity due to excess calories with comorbid HTN 05/16/2016     Priority: Medium     Benign essential hypertension 05/10/2016     Priority: Medium     Need for hepatitis C screening test 05/10/2016     Priority: Medium     Cellulitis and abscess of leg: Lt  Lat Malleolus  onset late 4-16  05/10/2016     Priority: Medium     ACP (advance care planning) 05/10/2016     Priority: Medium     Advance Care Planning  5/10/2016: ACP Review of Chart / Resources Provided:  Reviewed chart for advance care plan.  Sav Mendoza has no plan or code status on file. Discussed available resources and provided with information. Confirmed code status reflects current choices pending further ACP discussions.   Added by Yoana Sawyer               Edema of both legs 06/18/2014     Priority: Medium     Stasis dermatitis of both legs 06/18/2014     Priority: Medium     Simmons's cyst 02/24/2014     Priority: Medium     Ventral hernia 02/24/2014     Priority: Medium     Do you wish to do the replacement in the background? yes         Left inguinal hernia 02/24/2014     Priority: Medium     Diverticulosis of large intestine 02/24/2014     Priority: Medium     Problem list name updated by automated process. Provider to review       Cellulitis of Lt lower leg since 3-11-[id number] since last saw ID  [id number] 02/18/2014     Priority: Medium      Past Medical History:   Diagnosis Date     Hypertension      Ventral hernia 6/24/14     Past Surgical History:   Procedure Laterality Date     COLONOSCOPY N/A 6/16/2021    Procedure: COLONOSCOPY;  Surgeon: Echo Green MD;  Location: [facility]     LAPAROTOMY EXPLORATORY N/A 12/12/2017    Procedure: LAPAROTOMY EXPLORATORY;  Exploratory Laparotomy and  Gint Ventral Hernia Repair with Mesh;  Surgeon: Mina Parsons MD;  Location: [facility] OR     ORTHOPEDIC SURGERY Left 2010    ankle fusion     Current Outpatient Medications   Medication Sig Dispense Refill     amLODIPine (NORVASC) 5 MG tablet Take 1 tablet (5 mg) by mouth daily 90 tablet 3     Ascorbic Acid (VITAMIN C PO) Take 500 mg by mouth daily + 500 mg po qHS PRN       Blood Pressure Monitoring (BLOOD PRESSURE MONITOR/WRIST) LAURA 1 Application 2 times daily as needed (HYPERTENSION) 1 each 0     lisinopril (ZESTRIL) 40 MG tablet TAKE 1 TABLET BY MOUTH ONCE DAILY IN THE EVENING. 90 tablet 3     multivitamin, therapeutic with minerals (THERA-VIT-M) TABS Take 1  tablet by mouth daily       polyethylene glycol (MIRALAX) 17 GM/Dose powder Take 17 g (1 capful) by mouth daily as needed for constipation 507 g 0       No Known Allergies     Social History     Tobacco Use     Smoking status: Never Smoker     Smokeless tobacco: Never Used   Substance Use Topics     Alcohol use: Not Currently     Comment: rare       History   Drug Use No         Objective     /85 (BP Location: Left arm, Patient Position: Sitting, Cuff Size: Adult Regular)   Pulse 84   Temp 98.6  F (37  C) (Temporal)   Resp 18   Wt 122.9 kg (271 lb)   SpO2 95%   BMI 37.80 kg/m      Physical Exam    GENERAL APPEARANCE: healthy, alert and no distress     EYES: EOMI,  PERRL     HENT: ear canals and TM's normal and nose and mouth without ulcers or lesions     NECK: no adenopathy, no asymmetry, masses, or scars and thyroid normal to palpation     RESP: lungs clear to auscultation - no rales, rhonchi or wheezes     CV: regular rates and rhythm, normal S1 S2, no S3 or S4 and no murmur, click or rub     ABDOMEN:  soft, nontender, no HSM or masses and bowel sounds normal     MS: right ankle deformity, trace edema b/l lower extremities      SKIN: stasis dermatitis b/l  legs     NEURO: Normal strength and tone, sensory exam grossly normal, mentation intact and speech normal     PSYCH: mentation appears normal. and affect normal/bright     LYMPHATICS: No cervical adenopathy    Diagnostics:  Recent Results (from the past 48 hour(s))   Basic metabolic panel  (Ca, Cl, CO2, Creat, Gluc, K, Na, BUN)    Collection Time: 11/11/21  2:10 PM   Result Value Ref Range    Sodium 137 133 - 144 mmol/L    Potassium 4.1 3.4 - 5.3 mmol/L    Chloride 107 94 - 109 mmol/L    Carbon Dioxide (CO2) 27 20 - 32 mmol/L    Anion Gap 3 3 - 14 mmol/L    Urea Nitrogen 16 7 - 30 mg/dL    Creatinine 0.76 0.66 - 1.25 mg/dL    Calcium 9.2 8.5 - 10.1 mg/dL    Glucose 99 70 - 99 mg/dL    GFR Estimate >90 >60 mL/min/1.73m2   Hemoglobin    Collection  Time: 11/11/21  2:11 PM   Result Value Ref Range    Hemoglobin 13.7 13.3 - 17.7 g/dL      EKG: appears normal, NSR, normal intervals, no acute ST/T changes c/w ischemia, no LVH by voltage criteria, unchanged from previous tracings    Revised Cardiac Risk Index (RCRI):  The patient has the following serious cardiovascular risks for perioperative complications:   - No serious cardiac risks = 0 points     RCRI Interpretation: 0 points: Class I (very low risk - 0.4% complication rate)       Signed Electronically by: Logan Calvo DO  Copy of this evaluation report is provided to requesting physician.

## 2021-11-11 NOTE — H&P (VIEW-ONLY)
[facility] [address] [place] [address]  Phone: [phone]  Primary Provider: Celena Calvo  Pre-op Performing Provider: CELENA CALVO      PREOPERATIVE EVALUATION:  Today's date: 11/11/2021    Sav Mendoza is a 66 year old male who presents for a preoperative evaluation.    Surgical Information:  Surgery/Procedure:RIGHT ANKLE CORRECTIVE OSTEOTOMY AND SUBTALAR AND TIIOTALOCALCANEAL FUSION   Surgery Location:  OR  Surgeon: Ad Miller MD  Surgery Date: 11/22/2021  Time of Surgery: 9:00 AM   Where patient plans to recover: At home with family  Fax number for surgical facility: Note does not need to be faxed, will be available electronically in Epic.    Type of Anesthesia Anticipated: General    Assessment & Plan     The proposed surgical procedure is considered INTERMEDIATE risk.    Preop examination  Charcot ankle, right  - EKG 12-lead complete w/read - Clinics  - Hemoglobin  - Basic metabolic panel  (Ca, Cl, CO2, Creat, Gluc, K, Na, BUN)      Other chronic conditions:    Obesity (BMI 35.0-39.9) with comorbidity (H)  Benign essential hypertension  Stasis dermatitis of both legs  Iron deficiency anemia due to chronic blood loss      Risks and Recommendations:  The patient has the following additional risks and recommendations for perioperative complications:   - No identified additional risk factors other than previously addressed    Medication Instructions:  Patient is to take all scheduled medications on the day of surgery    RECOMMENDATION:  APPROVAL GIVEN to proceed with proposed procedure, without further diagnostic evaluation.      30 minutes spent on the date of the encounter doing chart review, history and exam, documentation and further activities per the note.    Subjective     HPI related to upcoming procedure: Patient with history of HTN, obesity, stasis dermatitis of lower extremities.  History of right charcot ankle. Follows with orthopedics.  Recently treated for cellulitis of right leg. Plans for corrective surgery of right ankle deformity.    Preop Questions 11/11/2021   1. Have you ever had a heart attack or stroke? No   2. Have you ever had surgery on your heart or blood vessels, such as a stent placement, a coronary artery bypass, or surgery on an artery in your head, neck, heart, or legs? No   3. Do you have chest pain with activity? No   4. Do you have a history of  heart failure? No   5. Do you currently have a cold, bronchitis or symptoms of other infection? No   6. Do you have a cough, shortness of breath, or wheezing? No   7. Do you or anyone in your family have previous history of blood clots? No   8. Do you or does anyone in your family have a serious bleeding problem such as prolonged bleeding following surgeries or cuts? No   9. Have you ever had problems with anemia or been told to take iron pills? No   10. Have you had any abnormal blood loss such as black, tarry or bloody stools? No   11. Have you ever had a blood transfusion? No   12. Are you willing to have a blood transfusion if it is medically needed before, during, or after your surgery? Yes   13. Have you or any of your relatives ever had problems with anesthesia? No   14. Do you have sleep apnea, excessive snoring or daytime drowsiness? No   15. Do you have any artifical heart valves or other implanted medical devices like a pacemaker, defibrillator, or continuous glucose monitor? No   16. Do you have artificial joints? No   17. Are you allergic to latex? No       Health Care Directive:  Patient does not have a Health Care Directive or Living Will: Discussed advance care planning with patient; information given to patient to review.    Preoperative Review of :   reviewed - no record of controlled substances prescribed.      Status of Chronic Conditions:  HYPERTENSION - Patient has longstanding history of HTN , currently denies any symptoms referable to elevated blood  pressure. Specifically denies chest pain, palpitations, dyspnea, orthopnea, PND or peripheral edema. Blood pressure readings have been in normal range. Current medication regimen is as listed below. Patient denies any side effects of medication.       Review of Systems  CONSTITUTIONAL: NEGATIVE for fever, chills, change in weight  INTEGUMENTARY/SKIN: NEGATIVE for worrisome rashes, moles or lesions  EYES: NEGATIVE for vision changes or irritation  ENT/MOUTH: NEGATIVE for ear, mouth and throat problems  RESP: NEGATIVE for significant cough or SOB  CV: NEGATIVE for chest pain, palpitations or peripheral edema  GI: NEGATIVE for nausea, abdominal pain, heartburn, or change in bowel habits  : NEGATIVE for frequency, dysuria, or hematuria  MUSCULOSKELETAL: NEGATIVE for significant arthralgias or myalgia  NEURO: NEGATIVE for weakness, dizziness or paresthesias  ENDOCRINE: NEGATIVE for temperature intolerance, skin/hair changes  HEME: NEGATIVE for bleeding problems  PSYCHIATRIC: NEGATIVE for changes in mood or affect    Patient Active Problem List    Diagnosis Date Noted     Iron deficiency anemia due to chronic blood loss 06/21/2021     Priority: Medium     Hematochezia 06/15/2021     Priority: Medium     Blood in stool 06/15/2021     Priority: Medium     Obesity (BMI 35.0-39.9) with comorbidity (H) 02/09/2019     Priority: Medium     Visit for wound check 01/04/2018     Priority: Medium     Incarcerated hernia 12/12/2017     Priority: Medium     Bilateral leg edema 06/10/2016     Priority: Medium     Non morbid obesity due to excess calories with comorbid HTN 05/16/2016     Priority: Medium     Benign essential hypertension 05/10/2016     Priority: Medium     Need for hepatitis C screening test 05/10/2016     Priority: Medium     Cellulitis and abscess of leg: Lt  Lat Malleolus  onset late 4-16  05/10/2016     Priority: Medium     ACP (advance care planning) 05/10/2016     Priority: Medium     Advance Care Planning  5/10/2016: ACP Review of Chart / Resources Provided:  Reviewed chart for advance care plan.  Sav Mendoza has no plan or code status on file. Discussed available resources and provided with information. Confirmed code status reflects current choices pending further ACP discussions.   Added by Yoana Sawyer               Edema of both legs 06/18/2014     Priority: Medium     Stasis dermatitis of both legs 06/18/2014     Priority: Medium     Simmons's cyst 02/24/2014     Priority: Medium     Ventral hernia 02/24/2014     Priority: Medium     Do you wish to do the replacement in the background? yes         Left inguinal hernia 02/24/2014     Priority: Medium     Diverticulosis of large intestine 02/24/2014     Priority: Medium     Problem list name updated by automated process. Provider to review       Cellulitis of Lt lower leg since [id number] since last saw ID  [id number] 02/18/2014     Priority: Medium      Past Medical History:   Diagnosis Date     Hypertension      Ventral hernia 6/24/14     Past Surgical History:   Procedure Laterality Date     COLONOSCOPY N/A 6/16/2021    Procedure: COLONOSCOPY;  Surgeon: Echo Green MD;  Location: [facility] GI     LAPAROTOMY EXPLORATORY N/A 12/12/2017    Procedure: LAPAROTOMY EXPLORATORY;  Exploratory Laparotomy and  Gint Ventral Hernia Repair with Mesh;  Surgeon: Mina Parsons MD;  Location: [facility] OR     ORTHOPEDIC SURGERY Left 2010    ankle fusion     Current Outpatient Medications   Medication Sig Dispense Refill     amLODIPine (NORVASC) 5 MG tablet Take 1 tablet (5 mg) by mouth daily 90 tablet 3     Ascorbic Acid (VITAMIN C PO) Take 500 mg by mouth daily + 500 mg po qHS PRN       Blood Pressure Monitoring (BLOOD PRESSURE MONITOR/WRIST) LAURA 1 Application 2 times daily as needed (HYPERTENSION) 1 each 0     lisinopril (ZESTRIL) 40 MG tablet TAKE 1 TABLET BY MOUTH ONCE DAILY IN THE EVENING. 90 tablet 3     multivitamin, therapeutic with minerals (THERA-VIT-M) TABS Take 1  tablet by mouth daily       polyethylene glycol (MIRALAX) 17 GM/Dose powder Take 17 g (1 capful) by mouth daily as needed for constipation 507 g 0       No Known Allergies     Social History     Tobacco Use     Smoking status: Never Smoker     Smokeless tobacco: Never Used   Substance Use Topics     Alcohol use: Not Currently     Comment: rare       History   Drug Use No         Objective     /85 (BP Location: Left arm, Patient Position: Sitting, Cuff Size: Adult Regular)   Pulse 84   Temp 98.6  F (37  C) (Temporal)   Resp 18   Wt 122.9 kg (271 lb)   SpO2 95%   BMI 37.80 kg/m      Physical Exam    GENERAL APPEARANCE: healthy, alert and no distress     EYES: EOMI,  PERRL     HENT: ear canals and TM's normal and nose and mouth without ulcers or lesions     NECK: no adenopathy, no asymmetry, masses, or scars and thyroid normal to palpation     RESP: lungs clear to auscultation - no rales, rhonchi or wheezes     CV: regular rates and rhythm, normal S1 S2, no S3 or S4 and no murmur, click or rub     ABDOMEN:  soft, nontender, no HSM or masses and bowel sounds normal     MS: right ankle deformity, trace edema b/l lower extremities      SKIN: stasis dermatitis b/l  legs     NEURO: Normal strength and tone, sensory exam grossly normal, mentation intact and speech normal     PSYCH: mentation appears normal. and affect normal/bright     LYMPHATICS: No cervical adenopathy    Diagnostics:  Recent Results (from the past 48 hour(s))   Basic metabolic panel  (Ca, Cl, CO2, Creat, Gluc, K, Na, BUN)    Collection Time: 11/11/21  2:10 PM   Result Value Ref Range    Sodium 137 133 - 144 mmol/L    Potassium 4.1 3.4 - 5.3 mmol/L    Chloride 107 94 - 109 mmol/L    Carbon Dioxide (CO2) 27 20 - 32 mmol/L    Anion Gap 3 3 - 14 mmol/L    Urea Nitrogen 16 7 - 30 mg/dL    Creatinine 0.76 0.66 - 1.25 mg/dL    Calcium 9.2 8.5 - 10.1 mg/dL    Glucose 99 70 - 99 mg/dL    GFR Estimate >90 >60 mL/min/1.73m2   Hemoglobin    Collection  Time: 11/11/21  2:11 PM   Result Value Ref Range    Hemoglobin 13.7 13.3 - 17.7 g/dL      EKG: appears normal, NSR, normal intervals, no acute ST/T changes c/w ischemia, no LVH by voltage criteria, unchanged from previous tracings    Revised Cardiac Risk Index (RCRI):  The patient has the following serious cardiovascular risks for perioperative complications:   - No serious cardiac risks = 0 points     RCRI Interpretation: 0 points: Class I (very low risk - 0.4% complication rate)       Signed Electronically by: Logan Calvo DO  Copy of this evaluation report is provided to requesting physician.

## 2021-11-11 NOTE — PATIENT INSTRUCTIONS
Preparing for Your Surgery  Getting started  A nurse will call you to review your health history and instructions. They will give you an arrival time based on your scheduled surgery time.  Please be ready to share the following:    Your doctor's clinic name and phone number    Your medical, surgical and anesthesia history    A list of allergies and sensitivities    A list of medicines, including herbal treatments and over-the-counter drugs    Whether the patient has a legal guardian (ask how to send us the papers in advance)  If you have a child who's having surgery, please ask for a copy of Preparing for Your Child's Surgery.    Preparing for surgery    Within 30 days of surgery: Have a pre-op exam (sometimes called an H&P, or History and Physical). This can be done at a clinic or pre-operative center.  ? If you're having a , you may not need this exam. Talk to your care team    At your pre-op exam, talk to your care team about all medicines you take. If you need to stop any medicines before surgery, ask when to start taking them again.  ? We do this for your safety. Many medicines can make you bleed too much during surgery. Some change how well surgery (anesthesia) drugs work.    Call your insurance company to let them know you're having surgery. (If you don't have insurance, call [phone].)    Call your clinic if there's any change in your health. This includes signs of a cold or flu (sore throat, runny nose, cough, rash, fever). It also includes a scrape or scratch near the surgery site.    If you have questions on the day of surgery, call your hospital or surgery center.  Eating and drinking guidelines  For your safety: Unless your surgeon tells you otherwise, follow the guidelines below.    Eat and drink as usual until 8 hours before surgery. After that, no food or milk.    Drink clear liquids until 2 hours before surgery. These are liquids you can see through, like water, Gatorade and Propel  Water. You may also have black coffee and tea (no cream or milk).    Nothing by mouth within 2 hours of surgery. This includes gum, candy and breath mints.    If you drink, stop drinking alcohol the night before surgery.    If your care team tells you to take medicine on the morning of surgery, it's okay to take it with a sip of water.  Preventing infection    Shower or bathe the night before and morning of your surgery. Follow the instructions your clinic gave you. (If no instructions, use regular soap.)    Don't shave or clip hair near your surgery site. We'll remove the hair if needed.    Don't smoke or vape the morning of surgery. You may chew nicotine gum up to 2 hours before surgery. A nicotine patch is okay.  ? Note: Some surgeries require you to completely quit smoking and nicotine. Check with your surgeon.    Your care team will make every effort to keep you safe from infection. We will:  ? Clean our hands often with soap and water (or an alcohol-based hand rub).  ? Clean the skin at your surgery site with a special soap that kills germs.  ? Give you a special gown to keep you warm. (Cold raises the risk of infection.)  ? Wear special hair covers, masks, gowns and gloves during surgery.  ? Give antibiotic medicine, if prescribed. Not all surgeries need antibiotics.  What to bring on the day of surgery    Photo ID and insurance card    Copy of your health care directive, if you have one    Glasses and hearing aides (bring cases)  ? You can't wear contacts during surgery    Inhaler and eye drops, if you use them (tell us about these when you arrive)    CPAP machine or breathing device, if you use them    A few personal items, if spending the night    If you have . . .  ? A pacemaker or ICD (cardiac defibrillator): Bring the ID card.  ? An implanted stimulator: Bring the remote control.  ? A legal guardian: Bring a copy of the certified (court-stamped) guardianship papers.  Please remove any jewelry, including  body piercings. Leave jewelry and other valuables at home.  If you're going home the day of surgery  Important: If you don't follow the rules below, we must cancel your surgery.     Arrange for someone to drive you home after surgery. You may not drive, take a taxi or take public transportation by yourself (unless you'll have local anesthesia only).    Arrange for a responsible adult to stay with you overnight. If you don't, we may keep you in the hospital overnight, and you may need to pay the costs yourself.  Questions?   If you have any questions for your care team, list them here: _________________________________________________________________________________________________________________________________________________________________________________________________________________________________________________________________________________________________________________________  For informational purposes only. Not to replace the advice of your health care provider. Copyright   2003, 2019 [employer]. All rights reserved. Clinically reviewed by Denia Wilson MD. [employer] 238581 - REV 4/20.      Patient Education       Understanding the mRNA COVID-19 Vaccine  Vaccines for COVID-19 are available. What do you need to know about getting a COVID-19 vaccine? What can you expect after you get it? Read on to learn more.  mRNA COVID-19 vaccine fast facts    Two mRNA vaccine choices are available: Pfizer and Moderna.    The vaccine is given as a shot in a muscle in your upper arm.    The mRNA vaccine does not use live, dead, or weak COVID virus.    The vaccine will not give you COVID-19.    Side effects of the vaccine mean your immune system is working, not that you have the virus.    You will need 2 doses of the vaccine, at least 3 weeks apart.    People with a very weak immune system may not build up enough antibodies to fight COVID-19 after getting the first 2 doses of the 2-dose vaccine. This  "includes people who have had a solid organ transplant or who have a condition such as cancer that can cause a very weak immune system. For these people, an extra dose of the vaccine is advised. This extra dose is part of their vaccine series (primary series), not a booster. The extra shot is given at least 28 days after the second dose. Talk with your healthcare provider about your own case and risk.    CDC recommends people ages 65 and older, people living in long-term care settings, and people ages 50 to 64 with certain health conditions get a Pfizer booster shot at least 6 months after the last dose of their primary series.    Other adults older than 18 maychoose to get a Pfizer booster shot based on risk. Talk with your provider about your risk.    The FDA is exploring booster doses of the other types of COVID-19 vaccines, including Moderna. No final recommendation has been made.    What does the COVID-19 vaccine do?  The COVID-19 vaccine has been shown to work well to prevent symptomatic COVID-19 illness. Getting a COVID-19 vaccine can help protect you and your family from getting ill from the virus. If you get the virus after you get the vaccine, it may help your symptoms be milder. The COVID-19 vaccine may also help protect people around you from getting the infection.  COVID-19 vaccines may also lead to more widespread changes. The more people who get the COVID-19 vaccine, the less likely the virus will be able to spread in the community. This is called \"herd immunity\" or \"community immunity.\" As more people get the vaccine, local and regional policies may be able to change about what types of businesses can be open and how people can gather together.  Schools may back in session in person faster. Workplaces may reopen. Events may be allowed, travel may resume for many people, and it may be easier to see family and friends.  Should I get a COVID-19 vaccine?  The most important thing to do is talk with your " healthcare provider. mRNA vaccines are approved to protect people against COVID-19, including those who are pregnant or breastfeeding. One vaccine is approved for people as young as 12. Talk with your healthcare provider about your risks and which vaccine may be best for you and your family.  People who have had COVID-19 may still benefit from the vaccine. Researchers don t yet exactly know how long natural immunity lasts after you have COVID-19. Your healthcare provider may advise you to get the vaccine if you had COVID-19 more than 90 days ago.  Tell your healthcare provider if you have ever had a severe allergic reaction to food or medicine. Talk with them about your risks if you carry an epinephrine autoinjector. This may affect your provider s advice to you about the vaccine.  How does an mRNA COVID-19 vaccine work?  The COVID-19 mRNA vaccines are different from traditional vaccines. They re not made with live, dead, or weak virus. Instead, they're made with messenger ribonucleic acid (mRNA). This is a type of molecule that gives instructions for how to make different kinds of proteins. mRNA molecules are a natural part of our cells and how our bodies work.  The mRNA in the vaccines tells your cells how to make a harmless piece of a protein called a spike protein. This protein is found on the outside of the SARS-CoV-2 virus that causes COVID-19. Your immune system sees this spike protein as a threat, and creates antibodies and other defenses against it.  This will help your body's immune system recognize and fight the real virus if it ever shows up. It s kind of like recognizing someone by the hat they wear. Your body is then prepared to spot COVID-19 and fight it off before it grows in your body s cells.  How were the COVID-19 vaccines approved for safety?  The COVID-19 mRNA vaccines have passed many tests in labs and in thousands of people, and meet strict standards from the FDA.  The vaccines were tested  first in animals. They were then tested in a series of clinical trials that included thousands of people. All of the data from these tests was collected and submitted to the FDA and other scientific groups. These committees of scientists and public health experts carefully look at the data to see if a vaccine is safe and effective. If the vaccine meets the FDA's strict standards of safety and quality, the agency tells the vaccine company they can make the vaccine for emergency use.  How is this vaccine ready so quickly?  Researchers have been working with mRNA vaccines for many years. They are made more easily and safely in a lab than a vaccine that uses a virus. Because of this, they can also be made faster.  Vaccines have typically taken longer to be approved and come to market. But over many years of creating vaccines, research groups and public health agencies have been making the vaccine process work faster. For COVID-19, a special program called Operation Warp Speed (OWS) was created to help get COVID-19 vaccines ready even more quickly.  [employer] is a partnership of the U.S. Department of Health and Human Services, the U.S. Department of Defense, and many medical research and manufacturing groups. These organizations agreed to work together as closely as possible to communicate and move through a robust process to develop safe COVID-19 vaccines more quickly. .  How much does the vaccine cost?  The [place] government is providing the vaccine free to [place] residents. But the site where you get your vaccine may bill your health insurer for giving you the vaccine. Talk with your health insurer, local pharmacy, employer, or healthcare provider to find out more about a possible fee. You can t be denied a vaccine if you don t have health insurance or can t pay the fee yourself.  Getting the mRNA COVID-19 vaccine  The vaccine is given as a shot in a muscle in your upper arm. You will need to have 2 doses, spaced 21 days or  more apart. You ll need both of these doses to get the best COVID-19 protection from the vaccine.  People with a moderately or severely weak immune system may not build up enough antibodies to fight COVID-19 after getting the first 2 doses of the vaccine. They may need an extra dose.. This includes people who have had a solid organ transplant or who have a condition that causes a very weak immune system. The extra dose is given at least 28 days after the second dose. Talk with your healthcare provider about your situation and risk.  Follow instructions from the healthcare staff. Tell the staff if you have ever had a severe allergic reaction to food or medicine, or carry an epinephrine autoinjector. Tell them if you feel any reaction after you have the shot. You may be asked to stay for some time after getting the vaccine so you can be monitored.  Side effects: What to expect  The vaccine will have side effects for some people. A vaccine activates a person s immune system. It causes the immune system to create antibodies to fight off a specific virus or bacteria. When your immune system goes into action, you may feel your immune system kick into gear as though it s fighting an illness. This does not mean you are infected with an illness. It means that your immune system is working.  People in the COVID-19 vaccine trials commonly had soreness where the shot was given, tiredness, headaches, muscle and joint aches, chills, and fever for a day or two. Fewer people had redness and swelling at the injection site. These are all signs that your immune system is working on its defense. You can get these kinds of effects after many kinds of vaccines. But these symptoms should last a short time. In comparison, COVID-19 symptoms can be severe and last much longer, and cause complications, long-term illness, and death. The FDA approval process makes sure that the discomfort and risks of a vaccine outweigh the risks and  "complications of the illness it helps prevent.  Allergic reactions  In general, the COVID-19 vaccines are very safe. They have been tested on thousands of people. Non-severe allergic reactions have happened in a few people up to 4 hours after getting the vaccine. The vaccine clinic may ask you to stay on-site for a period of time after you get the vaccine. This is to make sure you don't have an immediate reaction.  Talk with your healthcare provider before you get a COVID-19 vaccine. Tell them if you have ever had an immediate reaction to any vaccine, even if the reaction was not severe. Your provider will help you weigh the risks and benefits of the COVID-19 vaccine for you.  If you have an allergy to any ingredient in the mRNA COVID-19 vaccine, the CDC advises that you not get the vaccine. If you have had 1 dose of COVID-19 vaccine and you have an immediate allergic reaction, the [facility] advises that you not get the second dose.  The [employer] has a smartphone heriberto called V-Safe to help you report side effects. The heriberto will also send you reminders if you need a second vaccine dose. To access this heriberto, see \"To learn more\" below.  Severe symptoms  If you get a COVID-19 vaccine and think you may be having a severe allergic reaction after leaving the vaccine clinic, call 911. Severe symptoms include:    Trouble breathing    Wheezing    Trouble swallowing or feeling like your throat is closing    Cool, moist, pale, or blue-tinted skin    Hoarse voice or trouble speaking    Chest pain    Fainting    Swelling in the eyes, mouth, face, or tongue    Seizure    Feeling very drowsy or having trouble awakening    Fast heart rate    Nausea, vomiting, diarrhea, stomach cramps, or abdominal pain  After you get the COVID-19 vaccine  When you get both doses of the vaccine:    It s still possible to get COVID-19. Like most vaccines, the COVID-19 vaccines are not 100% effective at preventing the disease. You should still take care to prevent " contact with sick people and follow local advice about staying safe.    Follow your local, state, and national instructions about wearing a mask and social distancing. Check the most current CDC guidelines .  Talking with your healthcare provider  You may have a lot of questions about the vaccine for yourself. Should you get it? If so, when? What are the risks and benefits to you? The best way to answer these questions is to talk with your healthcare provider. They can let you know when and what kind of vaccine is available, and what you should consider.  To learn more    CDC: COVID-19 Vaccines    FDA: COVID-19 Vaccines    V-Safe After Vaccination Health     Date last modified: 9/24/2021  [employer] last reviewed this educational content on 12/1/2020 2000-2021 [employer]. All rights reserved. This information is not intended as a substitute for professional medical care. Always follow your healthcare professional's instructions.

## 2021-11-12 LAB
ANION GAP SERPL CALCULATED.3IONS-SCNC: 3 MMOL/L (ref 3–14)
BUN SERPL-MCNC: 16 MG/DL (ref 7–30)
CALCIUM SERPL-MCNC: 9.2 MG/DL (ref 8.5–10.1)
CHLORIDE BLD-SCNC: 107 MMOL/L (ref 94–109)
CO2 SERPL-SCNC: 27 MMOL/L (ref 20–32)
CREAT SERPL-MCNC: 0.76 MG/DL (ref 0.66–1.25)
GFR SERPL CREATININE-BSD FRML MDRD: >90 ML/MIN/1.73M2
GLUCOSE BLD-MCNC: 99 MG/DL (ref 70–99)
POTASSIUM BLD-SCNC: 4.1 MMOL/L (ref 3.4–5.3)
SODIUM SERPL-SCNC: 137 MMOL/L (ref 133–144)

## 2021-11-18 ENCOUNTER — LAB (OUTPATIENT)
Dept: LAB | Facility: CLINIC | Age: 66
End: 2021-11-18
Attending: ORTHOPAEDIC SURGERY
Payer: MEDICARE

## 2021-11-18 DIAGNOSIS — Z11.59 ENCOUNTER FOR SCREENING FOR OTHER VIRAL DISEASES: ICD-10-CM

## 2021-11-18 PROCEDURE — U0005 INFEC AGEN DETEC AMPLI PROBE: HCPCS | Performed by: ORTHOPAEDIC SURGERY

## 2021-11-19 LAB — SARS-COV-2 RNA RESP QL NAA+PROBE: NEGATIVE

## 2021-11-22 ENCOUNTER — ANESTHESIA (OUTPATIENT)
Dept: SURGERY | Facility: CLINIC | Age: 66
End: 2021-11-22
Payer: MEDICARE

## 2021-11-22 ENCOUNTER — APPOINTMENT (OUTPATIENT)
Dept: GENERAL RADIOLOGY | Facility: CLINIC | Age: 66
End: 2021-11-22
Attending: ORTHOPAEDIC SURGERY
Payer: MEDICARE

## 2021-11-22 ENCOUNTER — ANESTHESIA EVENT (OUTPATIENT)
Dept: SURGERY | Facility: CLINIC | Age: 66
End: 2021-11-22
Payer: MEDICARE

## 2021-11-22 ENCOUNTER — HOSPITAL ENCOUNTER (OUTPATIENT)
Facility: CLINIC | Age: 66
Discharge: HOME | End: 2021-11-22
Attending: ORTHOPAEDIC SURGERY | Admitting: ORTHOPAEDIC SURGERY
Payer: MEDICARE

## 2021-11-22 VITALS
HEART RATE: 89 BPM | DIASTOLIC BLOOD PRESSURE: 83 MMHG | OXYGEN SATURATION: 95 % | WEIGHT: 272.5 LBS | RESPIRATION RATE: 14 BRPM | HEIGHT: 72 IN | BODY MASS INDEX: 36.91 KG/M2 | TEMPERATURE: 98.6 F | SYSTOLIC BLOOD PRESSURE: 133 MMHG

## 2021-11-22 DIAGNOSIS — Z98.1 S/P ANKLE FUSION: Primary | ICD-10-CM

## 2021-11-22 PROCEDURE — 250N000011 HC RX IP 250 OP 636: Performed by: ANESTHESIOLOGY

## 2021-11-22 PROCEDURE — 999N000141 HC STATISTIC PRE-PROCEDURE NURSING ASSESSMENT: Performed by: ORTHOPAEDIC SURGERY

## 2021-11-22 PROCEDURE — 272N000001 HC OR GENERAL SUPPLY STERILE: Performed by: ORTHOPAEDIC SURGERY

## 2021-11-22 PROCEDURE — 710N000009 HC RECOVERY PHASE 1, LEVEL 1, PER MIN: Performed by: ORTHOPAEDIC SURGERY

## 2021-11-22 PROCEDURE — 370N000017 HC ANESTHESIA TECHNICAL FEE, PER MIN: Performed by: ORTHOPAEDIC SURGERY

## 2021-11-22 PROCEDURE — 360N000083 HC SURGERY LEVEL 3 W/ FLUORO, PER MIN: Performed by: ORTHOPAEDIC SURGERY

## 2021-11-22 PROCEDURE — 258N000003 HC RX IP 258 OP 636: Performed by: NURSE ANESTHETIST, CERTIFIED REGISTERED

## 2021-11-22 PROCEDURE — 710N000012 HC RECOVERY PHASE 2, PER MINUTE: Performed by: ORTHOPAEDIC SURGERY

## 2021-11-22 PROCEDURE — 250N000011 HC RX IP 250 OP 636: Performed by: PHYSICIAN ASSISTANT

## 2021-11-22 PROCEDURE — 271N000001 HC OR GENERAL SUPPLY NON-STERILE: Performed by: ORTHOPAEDIC SURGERY

## 2021-11-22 PROCEDURE — 250N000025 HC SEVOFLURANE, PER MIN: Performed by: ORTHOPAEDIC SURGERY

## 2021-11-22 PROCEDURE — 250N000009 HC RX 250: Performed by: ANESTHESIOLOGY

## 2021-11-22 PROCEDURE — C1769 GUIDE WIRE: HCPCS | Performed by: ORTHOPAEDIC SURGERY

## 2021-11-22 PROCEDURE — 999N000179 XR SURGERY CARM FLUORO LESS THAN 5 MIN W STILLS

## 2021-11-22 PROCEDURE — 250N000009 HC RX 250: Performed by: ORTHOPAEDIC SURGERY

## 2021-11-22 PROCEDURE — 250N000011 HC RX IP 250 OP 636: Performed by: NURSE ANESTHETIST, CERTIFIED REGISTERED

## 2021-11-22 PROCEDURE — 250N000009 HC RX 250: Performed by: NURSE ANESTHETIST, CERTIFIED REGISTERED

## 2021-11-22 PROCEDURE — 278N000051 HC OR IMPLANT GENERAL: Performed by: ORTHOPAEDIC SURGERY

## 2021-11-22 PROCEDURE — C1713 ANCHOR/SCREW BN/BN,TIS/BN: HCPCS | Performed by: ORTHOPAEDIC SURGERY

## 2021-11-22 DEVICE — LOCKING SCREW, FULLY THREADED: Type: IMPLANTABLE DEVICE | Site: ANKLE | Status: FUNCTIONAL

## 2021-11-22 DEVICE — ANKLE ARTHRODESIS NAIL, RIGHT
Type: IMPLANTABLE DEVICE | Site: ANKLE | Status: FUNCTIONAL
Brand: T2

## 2021-11-22 DEVICE — END CAP, SCN
Type: IMPLANTABLE DEVICE | Site: ANKLE | Status: FUNCTIONAL
Brand: T2

## 2021-11-22 RX ORDER — ONDANSETRON 4 MG/1
4-8 TABLET, ORALLY DISINTEGRATING ORAL EVERY 8 HOURS PRN
Qty: 15 TABLET | Refills: 1 | Status: SHIPPED | OUTPATIENT
Start: 2021-11-22 | End: 2021-12-09

## 2021-11-22 RX ORDER — CEFAZOLIN SODIUM IN 0.9 % NACL 3 G/100 ML
3 INTRAVENOUS SOLUTION, PIGGYBACK (ML) INTRAVENOUS
Status: COMPLETED | OUTPATIENT
Start: 2021-11-22 | End: 2021-11-22

## 2021-11-22 RX ORDER — CEFAZOLIN SODIUM IN 0.9 % NACL 3 G/100 ML
3 INTRAVENOUS SOLUTION, PIGGYBACK (ML) INTRAVENOUS SEE ADMIN INSTRUCTIONS
Status: DISCONTINUED | OUTPATIENT
Start: 2021-11-22 | End: 2021-11-22 | Stop reason: HOSPADM

## 2021-11-22 RX ORDER — MAGNESIUM HYDROXIDE 1200 MG/15ML
LIQUID ORAL PRN
Status: DISCONTINUED | OUTPATIENT
Start: 2021-11-22 | End: 2021-11-22 | Stop reason: HOSPADM

## 2021-11-22 RX ORDER — SODIUM CHLORIDE, SODIUM LACTATE, POTASSIUM CHLORIDE, CALCIUM CHLORIDE 600; 310; 30; 20 MG/100ML; MG/100ML; MG/100ML; MG/100ML
INJECTION, SOLUTION INTRAVENOUS CONTINUOUS
Status: DISCONTINUED | OUTPATIENT
Start: 2021-11-22 | End: 2021-11-22 | Stop reason: HOSPADM

## 2021-11-22 RX ORDER — MEPERIDINE HYDROCHLORIDE 25 MG/ML
12.5 INJECTION INTRAMUSCULAR; INTRAVENOUS; SUBCUTANEOUS
Status: DISCONTINUED | OUTPATIENT
Start: 2021-11-22 | End: 2021-11-22 | Stop reason: HOSPADM

## 2021-11-22 RX ORDER — FENTANYL CITRATE 50 UG/ML
50 INJECTION, SOLUTION INTRAMUSCULAR; INTRAVENOUS
Status: DISCONTINUED | OUTPATIENT
Start: 2021-11-22 | End: 2021-11-22 | Stop reason: HOSPADM

## 2021-11-22 RX ORDER — ONDANSETRON 2 MG/ML
4 INJECTION INTRAMUSCULAR; INTRAVENOUS EVERY 30 MIN PRN
Status: DISCONTINUED | OUTPATIENT
Start: 2021-11-22 | End: 2021-11-22 | Stop reason: HOSPADM

## 2021-11-22 RX ORDER — LIDOCAINE 40 MG/G
CREAM TOPICAL
Status: DISCONTINUED | OUTPATIENT
Start: 2021-11-22 | End: 2021-11-22 | Stop reason: HOSPADM

## 2021-11-22 RX ORDER — FENTANYL CITRATE 50 UG/ML
25 INJECTION, SOLUTION INTRAMUSCULAR; INTRAVENOUS EVERY 5 MIN PRN
Status: DISCONTINUED | OUTPATIENT
Start: 2021-11-22 | End: 2021-11-22 | Stop reason: HOSPADM

## 2021-11-22 RX ORDER — FENTANYL CITRATE 50 UG/ML
INJECTION, SOLUTION INTRAMUSCULAR; INTRAVENOUS PRN
Status: DISCONTINUED | OUTPATIENT
Start: 2021-11-22 | End: 2021-11-22

## 2021-11-22 RX ORDER — EPHEDRINE SULFATE 50 MG/ML
INJECTION, SOLUTION INTRAMUSCULAR; INTRAVENOUS; SUBCUTANEOUS PRN
Status: DISCONTINUED | OUTPATIENT
Start: 2021-11-22 | End: 2021-11-22

## 2021-11-22 RX ORDER — ONDANSETRON 4 MG/1
4 TABLET, ORALLY DISINTEGRATING ORAL EVERY 30 MIN PRN
Status: DISCONTINUED | OUTPATIENT
Start: 2021-11-22 | End: 2021-11-22 | Stop reason: HOSPADM

## 2021-11-22 RX ORDER — OXYCODONE HYDROCHLORIDE 5 MG/1
5 TABLET ORAL EVERY 4 HOURS PRN
Status: DISCONTINUED | OUTPATIENT
Start: 2021-11-22 | End: 2021-11-22 | Stop reason: HOSPADM

## 2021-11-22 RX ORDER — OXYCODONE HYDROCHLORIDE 5 MG/1
5-10 TABLET ORAL EVERY 4 HOURS PRN
Qty: 40 TABLET | Refills: 0 | Status: ON HOLD | OUTPATIENT
Start: 2021-11-22 | End: 2021-12-15

## 2021-11-22 RX ORDER — PROPOFOL 10 MG/ML
INJECTION, EMULSION INTRAVENOUS PRN
Status: DISCONTINUED | OUTPATIENT
Start: 2021-11-22 | End: 2021-11-22

## 2021-11-22 RX ORDER — OXYCODONE HYDROCHLORIDE 5 MG/1
5 TABLET ORAL
Status: DISCONTINUED | OUTPATIENT
Start: 2021-11-22 | End: 2021-11-22 | Stop reason: HOSPADM

## 2021-11-22 RX ORDER — ONDANSETRON 4 MG/1
4 TABLET, ORALLY DISINTEGRATING ORAL
Status: DISCONTINUED | OUTPATIENT
Start: 2021-11-22 | End: 2021-11-22 | Stop reason: HOSPADM

## 2021-11-22 RX ORDER — HYDROMORPHONE HYDROCHLORIDE 1 MG/ML
0.2 INJECTION, SOLUTION INTRAMUSCULAR; INTRAVENOUS; SUBCUTANEOUS EVERY 5 MIN PRN
Status: DISCONTINUED | OUTPATIENT
Start: 2021-11-22 | End: 2021-11-22 | Stop reason: HOSPADM

## 2021-11-22 RX ORDER — LIDOCAINE HYDROCHLORIDE 20 MG/ML
INJECTION, SOLUTION INFILTRATION; PERINEURAL PRN
Status: DISCONTINUED | OUTPATIENT
Start: 2021-11-22 | End: 2021-11-22

## 2021-11-22 RX ORDER — SODIUM CHLORIDE, SODIUM LACTATE, POTASSIUM CHLORIDE, CALCIUM CHLORIDE 600; 310; 30; 20 MG/100ML; MG/100ML; MG/100ML; MG/100ML
INJECTION, SOLUTION INTRAVENOUS CONTINUOUS PRN
Status: DISCONTINUED | OUTPATIENT
Start: 2021-11-22 | End: 2021-11-22

## 2021-11-22 RX ORDER — FENTANYL CITRATE 0.05 MG/ML
25 INJECTION, SOLUTION INTRAMUSCULAR; INTRAVENOUS
Status: DISCONTINUED | OUTPATIENT
Start: 2021-11-22 | End: 2021-11-22 | Stop reason: HOSPADM

## 2021-11-22 RX ORDER — HYDROXYZINE HYDROCHLORIDE 10 MG/1
10 TABLET, FILM COATED ORAL
Status: DISCONTINUED | OUTPATIENT
Start: 2021-11-22 | End: 2021-11-22 | Stop reason: HOSPADM

## 2021-11-22 RX ORDER — ONDANSETRON 2 MG/ML
INJECTION INTRAMUSCULAR; INTRAVENOUS PRN
Status: DISCONTINUED | OUTPATIENT
Start: 2021-11-22 | End: 2021-11-22

## 2021-11-22 RX ORDER — DEXAMETHASONE SODIUM PHOSPHATE 4 MG/ML
INJECTION, SOLUTION INTRA-ARTICULAR; INTRALESIONAL; INTRAMUSCULAR; INTRAVENOUS; SOFT TISSUE PRN
Status: DISCONTINUED | OUTPATIENT
Start: 2021-11-22 | End: 2021-11-22

## 2021-11-22 RX ORDER — AMOXICILLIN 250 MG
1-2 CAPSULE ORAL 2 TIMES DAILY
Qty: 30 TABLET | Refills: 0 | Status: SHIPPED | OUTPATIENT
Start: 2021-11-22 | End: 2021-12-09

## 2021-11-22 RX ORDER — METHOCARBAMOL 750 MG/1
750 TABLET, FILM COATED ORAL
Status: DISCONTINUED | OUTPATIENT
Start: 2021-11-22 | End: 2021-11-22 | Stop reason: HOSPADM

## 2021-11-22 RX ADMIN — SODIUM CHLORIDE, POTASSIUM CHLORIDE, SODIUM LACTATE AND CALCIUM CHLORIDE: 600; 310; 30; 20 INJECTION, SOLUTION INTRAVENOUS at 08:05

## 2021-11-22 RX ADMIN — Medication 3 G: at 08:05

## 2021-11-22 RX ADMIN — Medication 10 MG: at 08:27

## 2021-11-22 RX ADMIN — LIDOCAINE HYDROCHLORIDE 60 MG: 20 INJECTION, SOLUTION INFILTRATION; PERINEURAL at 08:13

## 2021-11-22 RX ADMIN — BUPIVACAINE HYDROCHLORIDE 20 ML: 5 INJECTION, SOLUTION EPIDURAL; INTRACAUDAL; PERINEURAL at 07:35

## 2021-11-22 RX ADMIN — Medication 10 MG: at 08:36

## 2021-11-22 RX ADMIN — PROPOFOL 50 MG: 10 INJECTION, EMULSION INTRAVENOUS at 08:13

## 2021-11-22 RX ADMIN — HYDROMORPHONE HYDROCHLORIDE 0.5 MG: 1 INJECTION, SOLUTION INTRAMUSCULAR; INTRAVENOUS; SUBCUTANEOUS at 08:21

## 2021-11-22 RX ADMIN — DEXAMETHASONE SODIUM PHOSPHATE 4 MG: 4 INJECTION, SOLUTION INTRA-ARTICULAR; INTRALESIONAL; INTRAMUSCULAR; INTRAVENOUS; SOFT TISSUE at 08:29

## 2021-11-22 RX ADMIN — MIDAZOLAM 1 MG: 1 INJECTION INTRAMUSCULAR; INTRAVENOUS at 08:14

## 2021-11-22 RX ADMIN — FENTANYL CITRATE 50 MCG: 50 INJECTION, SOLUTION INTRAMUSCULAR; INTRAVENOUS at 08:14

## 2021-11-22 RX ADMIN — MIDAZOLAM HYDROCHLORIDE 1 MG: 1 INJECTION, SOLUTION INTRAMUSCULAR; INTRAVENOUS at 07:50

## 2021-11-22 RX ADMIN — PHENYLEPHRINE HYDROCHLORIDE 100 MCG: 10 INJECTION INTRAVENOUS at 08:48

## 2021-11-22 RX ADMIN — MIDAZOLAM 1 MG: 1 INJECTION INTRAMUSCULAR; INTRAVENOUS at 08:05

## 2021-11-22 RX ADMIN — FENTANYL CITRATE 50 MCG: 50 INJECTION, SOLUTION INTRAMUSCULAR; INTRAVENOUS at 08:05

## 2021-11-22 RX ADMIN — ONDANSETRON 4 MG: 2 INJECTION INTRAMUSCULAR; INTRAVENOUS at 08:52

## 2021-11-22 RX ADMIN — PHENYLEPHRINE HYDROCHLORIDE 50 MCG: 10 INJECTION INTRAVENOUS at 08:42

## 2021-11-22 RX ADMIN — MIDAZOLAM HYDROCHLORIDE 1 MG: 1 INJECTION, SOLUTION INTRAMUSCULAR; INTRAVENOUS at 07:45

## 2021-11-22 RX ADMIN — PROPOFOL 200 MG: 10 INJECTION, EMULSION INTRAVENOUS at 08:12

## 2021-11-22 ASSESSMENT — MIFFLIN-ST. JEOR: SCORE: 2054.05

## 2021-11-22 ASSESSMENT — ENCOUNTER SYMPTOMS: SEIZURES: 0

## 2021-11-22 ASSESSMENT — LIFESTYLE VARIABLES: TOBACCO_USE: 0

## 2021-11-22 NOTE — DISCHARGE INSTRUCTIONS
Same Day Surgery Discharge Instructions for  Sedation and General Anesthesia       It's not unusual to feel dizzy, light-headed or faint for up to 24 hours after surgery or while taking pain medication.  If you have these symptoms: sit for a few minutes before standing and have someone assist you when you get up to walk or use the bathroom.      You should rest and relax for the next 24 hours. We recommend you make arrangements to have an adult stay with you for at least 24 hours after your discharge.  Avoid hazardous and strenuous activity.      DO NOT DRIVE any vehicle or operate mechanical equipment for 24 hours following the end of your surgery.  Even though you may feel normal, your reactions may be affected by the medication you have received.      Do not drink alcoholic beverages for 24 hours following surgery.       Slowly progress to your regular diet as you feel able. It's not unusual to feel nauseated and/or vomit after receiving anesthesia.  If you develop these symptoms, drink clear liquids (apple juice, ginger ale, broth, 7-up, etc. ) until you feel better.  If your nausea and vomiting persists for 24 hours, please notify your surgeon.        All narcotic pain medications, along with inactivity and anesthesia, can cause constipation. Drinking plenty of liquids and increasing fiber intake will help.      For any questions of a medical nature, call your surgeon.      Do not make important decisions for 24 hours.      If you had general anesthesia, you may have a sore throat for a couple of days related to the breathing tube used during surgery.  You may use Cepacol lozenges to help with this discomfort.  If it worsens or if you develop a fever, contact your surgeon.       If you feel your pain is not well managed with the pain medications prescribed by your surgeon, please contact your surgeon's office to let them know so they can address your concerns.       CoVid 19 Information    We want to give you  information regarding Covid. Please consult your primary care provider with any questions you might have.     Patient who have symptoms (cough, fever, or shortness of breath), need to isolate for 7 days from when symptoms started OR 72 hours after fever resolves (without fever reducing medications) AND improvement of respiratory symptoms (whichever is longer).      Isolate yourself at home (in own room/own bathroom if possible)    Do Not allow any visitors    Do Not go to work or school    Do Not go to [employer],  centers, shopping, or other public places.    Do Not shake hands.    Avoid close and intimate contact with others (hugging, kissing).    Follow CDC recommendations for household cleaning of frequently touched services.     After the initial 7 days, continue to isolate yourself from household members as much as possible. To continue decrease the risk of community spread and exposure, you and any members of your household should limit activities in public for 14 days after starting home isolation.     You can reference the following CDC link for helpful home isolation/care tips:  https://www.cdc.gov/coronavirus/2019-ncov/downloads/10Things.pdf    Protect Others:    Cover Your Mouth and Nose with a mask, disposable tissue or wash cloth to avoid spreading germs to others.    Wash your hands and face frequently with soap and water    Call Your Primary Doctor If: Breathing difficulty develops or you become worse.    For more information about COVID19 and options for caring for yourself at home, please visit the CDC website at https://www.cdc.gov/coronavirus/2019-ncov/about/steps-when-sick.html  For more options for care at [facility], please visit our website at [email or web address]    POST-OPERATIVE INSTRUCTIONS  FOOT AND ANKLE SURGERY  JOE Miller M.D.  Ty Saleem P.A.-C    These precautions MUST be followed for the first 24 hours after surgery:    Upon  discharge, go directly home.    You must make arrangements to have a responsible adult stay with you.    DO NOT DRINK ALCOHOLIC BEVERAGES    It is not unusual to feel lightheaded up to 24 hours after surgery or while taking pain medication.  If you feel lightheaded, sit for a few minutes before standing and have someone assisted you when you get up to walk or use the restroom.    Do not use any mechanical equipment.    Do not make any important or legal decisions for 24 hours or while on pain medications.    You may experience dry mouth, sore throat, or sleep disturbances from the anesthesia and medications used during surgery.  Generalized muscle aches can sometimes occur.  These usually disappear in 12-24 hours.    POST-OPERATIVE CARE GUIDELINES    The following are general guidelines about what to expect the first days/weeks after surgery.  They are not specific, and your recovery may be slightly different.    Blood clots are not common, but are emergencies.  If you have sudden onset pain in your calf or leg, or have sudden shortness of breath, go to the Emergency Department.      Elevation of your operative foot/ankle is key to reducing the swelling in the immediate post-operative phase (first 3-5 days).  When you are at rest, elevate your foot at or above the level of your heart.  When sitting, your foot should be elevated on a chair or stool; not hanging down.      You should plan to rest the day after surgery no matter how minor the procedure.  More complicated procedures will require more time to return to normal activity.      Foot and ankle surgery is painful in most cases.   It is not unusual for the pain to be worse a day or two after surgery than on the day of.  If your pain is more than 8/10 contact our office.  If you don t have pain, gradually decrease your pain meds by substituting Tylenol.  Please don t use Advil/ibuprofen unless we order it for you.  Pt may resume regular home medications of:  ALL  PRESCRIBED BLOOD THINNERS (INCLUDING ASA 81MG) on the day after surgery.        You will be prescribed Percocet or Vicodin for pain, Vistaril for spasms/pain adjunct, Senokot for constipation.  If you have had trouble taking these meds before or experience nausea or vomiting after surgery from your medication, please advise the recovery room nurses.  If you are already at home, try drinking only clear liquids and/or call our office.  Start taking narcotic pain medication when you feel sensation in your lower extremity after a block.       All pain medications, along with inactivity can cause constipation.  Use the Senakot as directed, increase fluid intake to 1 quart per day and increase your dietary fiber.  (The  P  fruits - peaches, plums, pears, and prunes as well as anise/black licorice are recommended.)    It is not unusual to run a low-grade fever after surgery.  If your temperature is elevated above 102 , lasts longer than 24 hours, or is questionable in any way, contact our office.      Drainage from your cast/dressing is normal.  Reinforce your cast/dressing with 4x4 dressings and cover with an Ace wrap.  Wait until 24 hours after surgery to change your dressings; by this time most of your bleeding will have stopped.  If you have drainage through your dressings 2 days after surgery, contact our office.      You cast/dressing will be changed at your 2-week follow up appointment.  They should be kept clean and DRY.  If your cast/dressing is damaged before that, contact our office.      It is normal to experience some bruising in the toes after surgery and they may appear  dark  when your foot hangs down.  It is important to actively wiggle your toes for at least 5-10 minutes each hour UNLESS you had surgery on those toes.      Use ice on your foot/ankle over the dressing/cast for 20 minutes per hour, 10 or more times per day.  A large bag of frozen peas works well.      Bathing: take a tub or sponge bath  instead of a shower if possible during the first two weeks.  If you choose to shower, cover the dressing/cast with a waterproof covering, these may be ordered from www.seal-tight.com or are available at some pharmacies/medical supply stores.  Another option is XeroSox, which is the original vacuum sealed bandage and cast cover.  The cast cover has a vacuum seal and is absolutely waterproof.  [phone] or www.xerosox.[employer] for more information.      Driving:  For surgery on the left foot/ankle, you may drive as soon as narcotic medications are stopped.  For surgery on the right foot/ankle, you may drive when you are out of a cast, off pain medications, and you feel secure with braking.      In general, listen to your foot/ankle.  If you have a sudden, dramatic increase in pain that does not resolve after an hour or so, something is wrong - call our office.  If you fall or bump your foot/ankle and have sudden pain that resolves, give it 24 hours before you call.      Many of your questions can be addressed at your 2-week follow-up appointment - please make a list of things to ask us as they come up during your recovery.      If you had a nerve block it is common to have numbness in your leg and foot for up to 30 hours after surgery.  If your leg or foot is still numb more than 30 hours after surgery, please call the office.      FUTURE DENTIST VISITS:  If you have had a total ankle arthroplasty please be advised you must be on antibiotics prior to ANY dental work.  Please call your dentist office ahead of time and make them aware of this as your dentist will be able to order the prescription.  If you have had any other surgery this is not a concern.    If you have any further questions or concerns, please call our office at [phone]      Pt may resume regular home medications of:  ALL PRESCRIBED BLOOD THINNERS (INCLUDING ASA 81MG)   on the day after surgery.          [facility]      DISCHARGE  "INSTRUCTIONS FOLLOWING   REGIONAL BLOCK ANESTHESIA      Numbness or lack of feeling in the arm/leg that was operated may last up to 24 hours.  The average time is usually 10-15 hours.  You may not be able to lift or move the arm or leg where the operation was by itself during that time.  Long-acting local anesthetic medicines were used to give you long-lasting pain relief.    Wear a sling until your arm is completely \"awake\"    Avoid bumping your arm, leg or foot while it is numb    Avoid extremes of hot or cold while it is numb    Remain quiet and restful the day of surgery.  Resume normal activities gradually over the next day or so as advise by your surgeon.    Do not drive or operate  Any machinery until your extremity is full  \"awake\"        You will have a tingling and prickly sensation in your arm/leg as the feeling begins to return; you can also expect some discomfort. The amount of discomfort is unpredictable, but if you have more pain that can be controlled with the pain medication you received, you should contact your surgeon.  Start to take your pain pills as soon as you start to feel any discomfort or pain.                    **If you have questions or concerns about your procedure,   call Dr. Miller at [phone]**  "

## 2021-11-22 NOTE — ANESTHESIA PROCEDURE NOTES
Airway       Patient location during procedure: OR  Staff -        Performed By: CRNA  Consent for Airway        Urgency: elective  Indications and Patient Condition       Indications for airway management: bry-procedural       Induction type:intravenous       Mask difficulty assessment: 1 - vent by mask    Final Airway Details       Final airway type: supraglottic airway    Supraglottic Airway Details        Type: LMA       Brand: Ambu AuraGain       LMA size: 5    Post intubation assessment        Placement verified by: capnometry and equal breath sounds        Number of attempts at approach: 1       Secured with: commercial tube ordonez and pink tape       Ease of procedure: easy       Dentition: Intact and Unchanged

## 2021-11-22 NOTE — ANESTHESIA PREPROCEDURE EVALUATION
Anesthesia Pre-Procedure Evaluation    Patient: Sav Mendoza   MRN: [id number] : 1955        Preoperative Diagnosis: Charcot ankle, right [M14.671]    Procedure : Procedure(s):  RIGHT ANKLE CORRECTIVE OSTEOTOMY AND SUBTALAR AND TIIOTALOCALCANEAL FUSION          Past Medical History:   Diagnosis Date     Hypertension      Ventral hernia 14      Past Surgical History:   Procedure Laterality Date     COLONOSCOPY N/A 2021    Procedure: COLONOSCOPY;  Surgeon: Echo Green MD;  Location: [facility] GI     LAPAROTOMY EXPLORATORY N/A 2017    Procedure: LAPAROTOMY EXPLORATORY;  Exploratory Laparotomy and  Gint Ventral Hernia Repair with Mesh;  Surgeon: Mina Parsons MD;  Location: [facility] OR     ORTHOPEDIC SURGERY Left     ankle fusion      No Known Allergies   Social History     Tobacco Use     Smoking status: Never Smoker     Smokeless tobacco: Never Used   Substance Use Topics     Alcohol use: Not Currently     Comment: rare      Wt Readings from Last 1 Encounters:   21 123.6 kg (272 lb 8 oz)        Anesthesia Evaluation   Pt has had prior anesthetic.     No history of anesthetic complications       ROS/MED HX  ENT/Pulmonary:    (-) tobacco use and sleep apnea   Neurologic:    (-) no seizures and no CVA   Cardiovascular:     (+) hypertension-----    METS/Exercise Tolerance:     Hematologic:       Musculoskeletal:       GI/Hepatic:    (-) GERD and liver disease   Renal/Genitourinary:    (-) renal disease   Endo:     (+) Obesity,  (-) Type II DM and thyroid disease   Psychiatric/Substance Use:       Infectious Disease:       Malignancy:       Other:            Physical Exam    Airway        Mallampati: II   TM distance: > 3 FB   Neck ROM: full   Mouth opening: > 3 cm    Respiratory Devices and Support         Dental  no notable dental history         Cardiovascular   cardiovascular exam normal          Pulmonary   pulmonary exam normal                OUTSIDE LABS:  CBC:   Lab Results    Component Value Date    WBC 12.2 (H) 09/30/2021    WBC 7.9 06/18/2021    HGB 13.7 11/11/2021    HGB 13.2 (L) 09/30/2021    HCT 40.8 09/30/2021    HCT 23.5 (L) 06/18/2021     09/30/2021     06/18/2021     BMP:   Lab Results   Component Value Date     11/11/2021     09/30/2021    POTASSIUM 4.1 11/11/2021    POTASSIUM 3.7 09/30/2021    CHLORIDE 107 11/11/2021    CHLORIDE 106 09/30/2021    CO2 27 11/11/2021    CO2 27 09/30/2021    BUN 16 11/11/2021    BUN 17 09/30/2021    CR 0.76 11/11/2021    CR 0.85 09/30/2021    GLC 99 11/11/2021     (H) 09/30/2021     COAGS:   Lab Results   Component Value Date    PTT 30 02/21/2021    INR 0.96 06/15/2021     POC:   Lab Results   Component Value Date     (H) 02/21/2021     HEPATIC:   Lab Results   Component Value Date    ALBUMIN 2.8 (L) 06/17/2021    PROTTOTAL 5.4 (L) 06/17/2021    ALT 20 06/17/2021    AST 13 06/17/2021    ALKPHOS 37 (L) 06/17/2021    BILITOTAL 0.4 06/17/2021     OTHER:   Lab Results   Component Value Date    LACT 1.4 09/30/2021    A1C 5.4 06/21/2021    RAJAT 9.2 11/11/2021    PHOS 3.1 06/18/2021    MAG 2.2 06/18/2021    CRP 50.9 (H) 09/30/2021    SED 14 02/21/2021       Anesthesia Plan    ASA Status:  2   NPO Status:  NPO Appropriate    Anesthesia Type: General.     - Airway: LMA   Induction: Intravenous.   Maintenance: Balanced.        Consents    Anesthesia Plan(s) and associated risks, benefits, and realistic alternatives discussed. Questions answered and patient/representative(s) expressed understanding.    - Discussed:     - Discussed with:  Patient         Postoperative Care    Pain management: Multi-modal analgesia, Peripheral nerve block (Single Shot).   PONV prophylaxis: Ondansetron (or other 5HT-3), Dexamethasone or Solumedrol, Background Propofol Infusion     Comments:                Attila Chambers MD

## 2021-11-22 NOTE — ANESTHESIA PROCEDURE NOTES
Popliteal Procedure Note    Pre-Procedure   Staff -        Anesthesiologist:  Attila Chambers MD       Performed By: anesthesiologist       Location: pre-op       Pre-Anesthestic Checklist: patient identified, IV checked, site marked, risks and benefits discussed, informed consent, monitors and equipment checked, pre-op evaluation, at physician/surgeon's request and post-op pain management  Timeout:       Correct Patient: Yes        Correct Procedure: Yes        Correct Site: Yes        Correct Position: Yes        Correct Laterality: Yes        Site Marked: Yes  Procedure Documentation  Procedure: Popliteal       Patient Position: supine (LE elevated on limb elevator)       Patient Prep/Sterile Barriers: mask       Skin prep: Chloraprep       Local skin infiltrated with 3 mL of 1% lidocaine.        Needle Type: insulated and short bevel       Needle Gauge: 21.        Needle Length (millimeters): 90        Ultrasound guided       1. Ultrasound was used to identify targeted nerve, plexus, vascular marker, or fascial plane and place a needle adjacent to it in real-time.       2. Ultrasound was used to visualize the spread of anesthetic in close proximity to the above referenced structure.       3. A permanent image is entered into the patient's record.       4. The visualized anatomic structures appeared normal.       5. There were no apparent abnormal pathologic findings.    Assessment/Narrative         The placement was negative for: blood aspirated, painful injection and site bleeding       Paresthesias: No.    Test dose of mL at.         Test dose negative, 3 minutes after injection, for signs of intravascular, subdural, or intrathecal injection.     Bolus given via needle..        Secured via.        Insertion/Infusion Method: Single Shot       Complications: none    Medication(s) Administered   Bupivacaine 0.5% w/ 1:400K Epi (Injection), 20 mL  Medication Administration Time: 11/22/2021 7:35 AM      Comments:  Bupivacaine 0.5% with 1:400,000 epi 20ml   Patient sedated but commutative throughout the procedure.   Patient tolerated well.      Ultrasound Interpretation, peripheral nerve block    1.  Under ultrasound guidance, the needle was inserted and placed in close proximity to the nerve.  2. Ultrasound was also used to visualize the spread of the anesthetic in close proximity to the nerve being blocked.  3. The nerve(s) appeared anatomically normal.   4. There were no apparent abnormal pathological findings.  5. A permanent ultrasound image was saved n the patient's record.    The surgeon has given a verbal order transferring this pt to me for a performance of regional analgesia block for post op pain control. It is requested of me because I am trained and qualifed to perform this block and the surgeon is nether trained nor qualified to perform this procedure.

## 2021-11-22 NOTE — ANESTHESIA PROCEDURE NOTES
Adductor canal and Femoral Procedure Note    Pre-Procedure   Staff -        Anesthesiologist:  Attila Chambers MD       Performed By: anesthesiologist       Location: pre-op       Pre-Anesthestic Checklist: patient identified, IV checked, site marked, risks and benefits discussed, informed consent, monitors and equipment checked, pre-op evaluation, at physician/surgeon's request and post-op pain management  Timeout:       Correct Patient: Yes        Correct Procedure: Yes        Correct Site: Yes        Correct Position: Yes        Correct Laterality: Yes        Site Marked: Yes  Procedure Documentation  Procedure: Adductor canal, Femoral       Patient Position: supine       Patient Prep/Sterile Barriers: sterile gloves, mask, patient draped       Skin prep: Chloraprep      Local skin infiltrated with mL of 1% lidocaine.        Needle Type: other       Needle Gauge: 21.        Needle Length (millimeters): 90        Ultrasound guided       1. Ultrasound was used to identify targeted nerve, plexus, vascular marker, or fascial plane and place a needle adjacent to it in real-time.       2. Ultrasound was used to visualize the spread of anesthetic in close proximity to the above referenced structure.       3. A permanent image is entered into the patient's record.       4. The visualized anatomic structures appeared normal.       5. There were no apparent abnormal pathologic findings.    Assessment/Narrative         The placement was negative for: blood aspirated, painful injection and site bleeding       Paresthesias: No.     Bolus given via needle..        Secured via.        Insertion/Infusion Method: Single Shot       Complications: none    Medication(s) Administered   Bupivacaine 0.5% w/ 1:400K Epi (Injection), 10 mL  Medication Administration Time: 11/22/2021 7:37 AM     Comments:  Bupivacaine 0.5% with 1:400,000 epi 10ml   Patient sedated but commutative throughout the procedure.   Patient tolerated  well.    Ultrasound Interpretation, peripheral nerve block    1.  Under ultrasound guidance, the needle was inserted and placed in close proximity to the nerve.  2. Ultrasound was also used to visualize the spread of the anesthetic in close proximity to the nerve being blocked.  3. The nerve(s) appeared anatomically normal.   4. There were no apparent abnormal pathological findings.  5. A permanent ultrasound image was saved n the patient's record.    The surgeon has given a verbal order transferring this pt to me for a performance of regional analgesia block for post op pain control. It is requested of me because I am trained and qualifed to perform this block and the surgeon is nether trained nor qualified to perform this procedure.

## 2021-11-22 NOTE — ANESTHESIA POSTPROCEDURE EVALUATION
Patient: Sav Mendoza    Procedure: Procedure(s):  RIGHT ANKLE CORRECTIVE OSTEOTOMY AND SUBTALAR AND TIBIOTALOCALCANEAL FUSION       Diagnosis:Charcot ankle, right [M14.671]  Diagnosis Additional Information: No value filed.    Anesthesia Type:  General    Note:     Postop Pain Control: Uneventful            Sign Out: Well controlled pain   PONV: No   Neuro/Psych: Uneventful            Sign Out: Acceptable/Baseline neuro status   Airway/Respiratory: Uneventful            Sign Out: Acceptable/Baseline resp. status   CV/Hemodynamics: Uneventful            Sign Out: Acceptable CV status; No obvious hypovolemia; No obvious fluid overload   Other NRE: NONE   DID A NON-ROUTINE EVENT OCCUR? No           Last vitals:  Vitals Value Taken Time   /89 11/22/21 1000   Temp 37  C (98.6  F) 11/22/21 0913   Pulse 90 11/22/21 1003   Resp 10 11/22/21 1003   SpO2 94 % 11/22/21 1003   Vitals shown include unvalidated device data.    Electronically Signed By: Attila Chambers MD  November 22, 2021  12:56 PM

## 2021-11-22 NOTE — ANESTHESIA CARE TRANSFER NOTE
Patient: Sav Mendoza    Procedure: Procedure(s):  RIGHT ANKLE CORRECTIVE OSTEOTOMY AND SUBTALAR AND TIBIOTALOCALCANEAL FUSION       Diagnosis: Charcot ankle, right [M14.671]  Diagnosis Additional Information: No value filed.    Anesthesia Type:   General     Note:    Oropharynx: oropharynx clear of all foreign objects and spontaneously breathing  Level of Consciousness: awake  Oxygen Supplementation: face mask  Level of Supplemental Oxygen (L/min / FiO2): 6  Independent Airway: airway patency satisfactory and stable  Dentition: dentition unchanged  Vital Signs Stable: post-procedure vital signs reviewed and stable  Report to RN Given: handoff report given  Patient transferred to: PACU  Comments: Pt exhibits spontaneous respirations, follows commands, suctioned, LMA removed, exchanging well, transferred to pacu with O2 @ 10L via mask, all monitors and alarms on, report to RN, VSS.  Handoff Report: Identifed the Patient, Identified the Reponsible Provider, Reviewed the pertinent medical history, Discussed the surgical course, Reviewed Intra-OP anesthesia mangement and issues during anesthesia, Set expectations for post-procedure period and Allowed opportunity for questions and acknowledgement of understanding      Vitals:  Vitals Value Taken Time   /83 11/22/21 0913   Temp     Pulse 86 11/22/21 0915   Resp 7 11/22/21 0915   SpO2 94 % 11/22/21 0915   Vitals shown include unvalidated device data.    Electronically Signed By: JS Hurst CRNA  November 22, 2021  9:16 AM

## 2021-12-03 NOTE — PROGRESS NOTES
"PATIENT HISTORY:  Sav Mendoza is a 63 year old male who presents to clinic for R foot \"callus.\"  Reports bleeding from the area for about a month.  Hx of L ankle fusion per pt at [facility].  States he has had longstanding R ankle deformity.  Reports injury in the 70s/80s.  He states a brace has been attempted but didn't work.  Reports numbness in feet.  Denies DM.  He dresses the R foot daily.  4/10 pain.    Review of Systems:  Patient denies fever, chills, rash, wound, stiffness, limping, numbness, weakness, heart burn, blood in stool, chest pain with activity, calf pain when walking, shortness of breath with activity, chronic cough, easy bleeding/bruising, swelling of ankles, excessive thirst, fatigue, depression, anxiety.  Patient admits to limping.     PAST MEDICAL HISTORY:   Past Medical History:   Diagnosis Date     Hypertension      Ventral hernia 6/24/14        PAST SURGICAL HISTORY:   Past Surgical History:   Procedure Laterality Date     LAPAROTOMY EXPLORATORY N/A 12/12/2017    Procedure: LAPAROTOMY EXPLORATORY;  Exploratory Laparotomy and  Gint Ventral Hernia Repair with Mesh;  Surgeon: Mina Parsons MD;  Location: [facility] OR     ORTHOPEDIC SURGERY Left 2010    ankle fusion        MEDICATIONS:   Current Outpatient Medications:      amLODIPine (NORVASC) 5 MG tablet, Take 1 tablet (5 mg) by mouth daily, Disp: 90 tablet, Rfl: 3     Ascorbic Acid (VITAMIN C PO), Take 500 mg by mouth daily + 500 mg po qHS PRN, Disp: , Rfl:      carvedilol (COREG) 25 MG tablet, TAKE ONE TABLET BY MOUTH TWICE DAILY WITH MEALS NEED  TO  BE  SEEN  FOR  MORE  REFILLS, Disp: 180 tablet, Rfl: 3     lisinopril (PRINIVIL/ZESTRIL) 40 MG tablet, TAKE 1 TABLET BY MOUTH ONCE DAILY IN THE EVENING, Disp: 90 tablet, Rfl: 3     multivitamin, therapeutic with minerals (THERA-VIT-M) TABS, Take 1 tablet by mouth daily, Disp: , Rfl:      ALLERGIES:  No Known Allergies     SOCIAL HISTORY:   Social History     Socioeconomic History     Marital " status:      Spouse name: Not on file     Number of children: Not on file     Years of education: Not on file     Highest education level: Not on file   Social Needs     Financial resource strain: Not on file     Food insecurity - worry: Not on file     Food insecurity - inability: Not on file     Transportation needs - medical: Not on file     Transportation needs - non-medical: Not on file   Occupational History     Not on file   Tobacco Use     Smoking status: Never Smoker     Smokeless tobacco: Never Used   Substance and Sexual Activity     Alcohol use: Yes     Comment: rare     Drug use: No     Sexual activity: No   Other Topics Concern     Parent/sibling w/ CABG, MI or angioplasty before 65F 55M? No   Social History Narrative     Not on file        FAMILY HISTORY:   Family History   Problem Relation Age of Onset     Alzheimer Disease Mother         EXAM:Vitals: /85   Pulse 68   Ht 1.829 m (6')   Wt 130.6 kg (288 lb)   BMI 39.06 kg/m    BMI= Body mass index is 39.06 kg/m .    General appearance: Patient is alert and fully cooperative with history & exam.  No sign of distress is noted during the visit.     Psychiatric: Affect is pleasant & appropriate.  Patient appears motivated to improve health.     Respiratory: Breathing is regular & unlabored while sitting.     HEENT: Hearing is intact to spoken word.  Speech is clear.  No gross evidence of visual impairment that would impact ambulation.    Pt deferred L foot/ankle exam.     Dermatologic: L 5th met base laterally with hyperkeratotic tissue, local wound 5mm in diameter with granular base.  No deep probing.  No paronychia or evidence of soft tissue infection is noted.     Vascular: DP & PT pulses are intact & regular on the R.  No significant edema or varicosities noted.  CFT and skin temperature are normal.     Neurologic: Lower extremity sensation is diminished to monofilament exam.     Musculoskeletal:  Severe varus deformity of R ankle.   Ankle ROM is basically absent.  Rigid deformity overall.  Pt walks on the side of his foot with ambulation. Patient is ambulatory without assistive device or brace.  Uses a cane at times.  No gross ankle deformity noted.  No foot or ankle joint effusion is noted.    XRs of R foot/ankle reviewed with pt.  Obliteration of ankle joint with hypertrophic bone formation.  Evidence of prior 5th met base fracture with callus/healing.  No erosions to suggest osteomyelitis.  Varus angulation of foot at level of the ankle.     ASSESSMENT:   R foot ulcer, callus  Severe R foot/ankle varus deformity  Peripheral neuropathy     PLAN:  Reviewed patient's chart in epic.  Discussed condition and treatment options including pros and cons.    Treatment alternatives for foot ulceration were discussed.  Local wound care options were explained.  Healing will be a significant challenge based on the weight bearing / pressure location of the ulcer.  I explained that the ulcer is likely to become complicated at some point.  Risk of deep infection will become greater if the wound becomes larger or deeper.    Potential for infection was described including the soft tissues, bone or joints.  Surgery would likely involve hospitalization and partial limb amputation.     Pt has a severe rigid contributory ankle deformity.  ? prior charcot event, more likely posttraumatic.  Pt with hx of neuropathy, but not diabetic.  I'm not sure this is reconstructable.  Below knee amputation may be the only option to address this deformity.  We discussed offloading options including inserts/padding.    Will send to Dr. Ortiz at [facility] to see if there are any reconstruction options for this patient.    Offloading options discussed including padding, brace, inserts.  Pt has an offloading insert he will reinforce.  Minimize WB to allow for healing.    Wound Care Recommendations:    1)  Keep the wound covered by a bandage when bathing.    2)  Gently clean the wound with  soap water, separate from bath/shower water.      3)  Each day, apply a topical antibiotic ointment to the wound (Neosporin, Triple antibiotic, Bacitracin).   Cover with large band-aid or gauze.      4)  Please seek immediate medical attention if any increasing redness, swelling, drainage, smell, or pain related to the wound.     5)  Please return to clinic in the period of time requested.    F/u in 4 wks.       Kamran Pineda DPM, FACFAS    Weight management plan: Patient was referred to their PCP to discuss a diet and exercise plan.     no

## 2021-12-09 ENCOUNTER — HOSPITAL ENCOUNTER (INPATIENT)
Facility: CLINIC | Age: 66
LOS: 8 days | Discharge: HOME HEALTH | DRG: 857 | End: 2021-12-17
Attending: EMERGENCY MEDICINE | Admitting: STUDENT IN AN ORGANIZED HEALTH CARE EDUCATION/TRAINING PROGRAM
Payer: MEDICARE

## 2021-12-09 ENCOUNTER — APPOINTMENT (OUTPATIENT)
Dept: GENERAL RADIOLOGY | Facility: CLINIC | Age: 66
DRG: 857 | End: 2021-12-09
Attending: EMERGENCY MEDICINE
Payer: MEDICARE

## 2021-12-09 DIAGNOSIS — T81.49XA SURGICAL SITE INFECTION: Primary | ICD-10-CM

## 2021-12-09 DIAGNOSIS — L03.115 CELLULITIS OF RIGHT LOWER EXTREMITY: ICD-10-CM

## 2021-12-09 LAB
ANION GAP SERPL CALCULATED.3IONS-SCNC: 7 MMOL/L (ref 3–14)
BASOPHILS # BLD AUTO: 0 10E3/UL (ref 0–0.2)
BASOPHILS NFR BLD AUTO: 0 %
BUN SERPL-MCNC: 11 MG/DL (ref 7–30)
CALCIUM SERPL-MCNC: 9.5 MG/DL (ref 8.5–10.1)
CHLORIDE BLD-SCNC: 102 MMOL/L (ref 94–109)
CO2 SERPL-SCNC: 27 MMOL/L (ref 20–32)
CREAT SERPL-MCNC: 0.7 MG/DL (ref 0.66–1.25)
CRP SERPL-MCNC: 217 MG/L (ref 0–8)
EOSINOPHIL # BLD AUTO: 0.2 10E3/UL (ref 0–0.7)
EOSINOPHIL NFR BLD AUTO: 2 %
ERYTHROCYTE [DISTWIDTH] IN BLOOD BY AUTOMATED COUNT: 14.7 % (ref 10–15)
GFR SERPL CREATININE-BSD FRML MDRD: >90 ML/MIN/1.73M2
GLUCOSE BLD-MCNC: 115 MG/DL (ref 70–99)
HCT VFR BLD AUTO: 35.4 % (ref 40–53)
HGB BLD-MCNC: 11.3 G/DL (ref 13.3–17.7)
HOLD SPECIMEN: NORMAL
IMM GRANULOCYTES # BLD: 0 10E3/UL
IMM GRANULOCYTES NFR BLD: 0 %
LYMPHOCYTES # BLD AUTO: 1.5 10E3/UL (ref 0.8–5.3)
LYMPHOCYTES NFR BLD AUTO: 15 %
MCH RBC QN AUTO: 29.7 PG (ref 26.5–33)
MCHC RBC AUTO-ENTMCNC: 31.9 G/DL (ref 31.5–36.5)
MCV RBC AUTO: 93 FL (ref 78–100)
MONOCYTES # BLD AUTO: 0.8 10E3/UL (ref 0–1.3)
MONOCYTES NFR BLD AUTO: 9 %
MRSA DNA SPEC QL NAA+PROBE: NEGATIVE
NEUTROPHILS # BLD AUTO: 7 10E3/UL (ref 1.6–8.3)
NEUTROPHILS NFR BLD AUTO: 74 %
NRBC # BLD AUTO: 0 10E3/UL
NRBC BLD AUTO-RTO: 0 /100
PLATELET # BLD AUTO: 389 10E3/UL (ref 150–450)
POTASSIUM BLD-SCNC: 3.8 MMOL/L (ref 3.4–5.3)
RBC # BLD AUTO: 3.8 10E6/UL (ref 4.4–5.9)
SA TARGET DNA: POSITIVE
SARS-COV-2 RNA RESP QL NAA+PROBE: NEGATIVE
SODIUM SERPL-SCNC: 136 MMOL/L (ref 133–144)
WBC # BLD AUTO: 9.5 10E3/UL (ref 4–11)

## 2021-12-09 PROCEDURE — 120N000001 HC R&B MED SURG/OB

## 2021-12-09 PROCEDURE — 36415 COLL VENOUS BLD VENIPUNCTURE: CPT | Performed by: EMERGENCY MEDICINE

## 2021-12-09 PROCEDURE — 87040 BLOOD CULTURE FOR BACTERIA: CPT | Performed by: EMERGENCY MEDICINE

## 2021-12-09 PROCEDURE — 250N000011 HC RX IP 250 OP 636: Performed by: EMERGENCY MEDICINE

## 2021-12-09 PROCEDURE — 250N000013 HC RX MED GY IP 250 OP 250 PS 637: Performed by: STUDENT IN AN ORGANIZED HEALTH CARE EDUCATION/TRAINING PROGRAM

## 2021-12-09 PROCEDURE — 258N000003 HC RX IP 258 OP 636: Performed by: STUDENT IN AN ORGANIZED HEALTH CARE EDUCATION/TRAINING PROGRAM

## 2021-12-09 PROCEDURE — 86140 C-REACTIVE PROTEIN: CPT | Performed by: STUDENT IN AN ORGANIZED HEALTH CARE EDUCATION/TRAINING PROGRAM

## 2021-12-09 PROCEDURE — 87641 MR-STAPH DNA AMP PROBE: CPT | Performed by: STUDENT IN AN ORGANIZED HEALTH CARE EDUCATION/TRAINING PROGRAM

## 2021-12-09 PROCEDURE — 99223 1ST HOSP IP/OBS HIGH 75: CPT | Mod: AI | Performed by: STUDENT IN AN ORGANIZED HEALTH CARE EDUCATION/TRAINING PROGRAM

## 2021-12-09 PROCEDURE — 73610 X-RAY EXAM OF ANKLE: CPT | Mod: RT

## 2021-12-09 PROCEDURE — 87635 SARS-COV-2 COVID-19 AMP PRB: CPT | Performed by: EMERGENCY MEDICINE

## 2021-12-09 PROCEDURE — 250N000011 HC RX IP 250 OP 636: Performed by: STUDENT IN AN ORGANIZED HEALTH CARE EDUCATION/TRAINING PROGRAM

## 2021-12-09 PROCEDURE — 96375 TX/PRO/DX INJ NEW DRUG ADDON: CPT

## 2021-12-09 PROCEDURE — 99285 EMERGENCY DEPT VISIT HI MDM: CPT | Mod: 25

## 2021-12-09 PROCEDURE — 87205 SMEAR GRAM STAIN: CPT | Performed by: EMERGENCY MEDICINE

## 2021-12-09 PROCEDURE — C9803 HOPD COVID-19 SPEC COLLECT: HCPCS

## 2021-12-09 PROCEDURE — 85025 COMPLETE CBC W/AUTO DIFF WBC: CPT | Performed by: EMERGENCY MEDICINE

## 2021-12-09 PROCEDURE — 80048 BASIC METABOLIC PNL TOTAL CA: CPT | Performed by: EMERGENCY MEDICINE

## 2021-12-09 PROCEDURE — 96365 THER/PROPH/DIAG IV INF INIT: CPT

## 2021-12-09 RX ORDER — SODIUM CHLORIDE 9 MG/ML
INJECTION, SOLUTION INTRAVENOUS CONTINUOUS
Status: DISCONTINUED | OUTPATIENT
Start: 2021-12-09 | End: 2021-12-11

## 2021-12-09 RX ORDER — ACETAMINOPHEN 650 MG/1
650 SUPPOSITORY RECTAL EVERY 6 HOURS PRN
Status: DISCONTINUED | OUTPATIENT
Start: 2021-12-09 | End: 2021-12-11

## 2021-12-09 RX ORDER — ONDANSETRON 4 MG/1
4 TABLET, ORALLY DISINTEGRATING ORAL EVERY 6 HOURS PRN
Status: DISCONTINUED | OUTPATIENT
Start: 2021-12-09 | End: 2021-12-10

## 2021-12-09 RX ORDER — AMLODIPINE BESYLATE 5 MG/1
5 TABLET ORAL DAILY
Status: DISCONTINUED | OUTPATIENT
Start: 2021-12-10 | End: 2021-12-16

## 2021-12-09 RX ORDER — LIDOCAINE 40 MG/G
CREAM TOPICAL
Status: DISCONTINUED | OUTPATIENT
Start: 2021-12-09 | End: 2021-12-17 | Stop reason: HOSPADM

## 2021-12-09 RX ORDER — ASCORBIC ACID 500 MG
500 TABLET ORAL DAILY
COMMUNITY

## 2021-12-09 RX ORDER — HYDROMORPHONE HYDROCHLORIDE 1 MG/ML
0.5 INJECTION, SOLUTION INTRAMUSCULAR; INTRAVENOUS; SUBCUTANEOUS
Status: DISCONTINUED | OUTPATIENT
Start: 2021-12-09 | End: 2021-12-09

## 2021-12-09 RX ORDER — ACETAMINOPHEN 325 MG/1
650 TABLET ORAL EVERY 6 HOURS PRN
Status: DISCONTINUED | OUTPATIENT
Start: 2021-12-09 | End: 2021-12-10

## 2021-12-09 RX ORDER — LISINOPRIL 40 MG/1
40 TABLET ORAL DAILY
Status: DISCONTINUED | OUTPATIENT
Start: 2021-12-10 | End: 2021-12-17 | Stop reason: HOSPADM

## 2021-12-09 RX ORDER — HYDROMORPHONE HCL IN WATER/PF 6 MG/30 ML
0.2 PATIENT CONTROLLED ANALGESIA SYRINGE INTRAVENOUS
Status: DISCONTINUED | OUTPATIENT
Start: 2021-12-09 | End: 2021-12-10

## 2021-12-09 RX ORDER — ONDANSETRON 2 MG/ML
4 INJECTION INTRAMUSCULAR; INTRAVENOUS EVERY 6 HOURS PRN
Status: DISCONTINUED | OUTPATIENT
Start: 2021-12-09 | End: 2021-12-10

## 2021-12-09 RX ORDER — VALSARTAN 40 MG/1
40 TABLET ORAL DAILY
Status: DISCONTINUED | OUTPATIENT
Start: 2021-12-09 | End: 2021-12-09

## 2021-12-09 RX ORDER — ASPIRIN 81 MG
100 TABLET, DELAYED RELEASE (ENTERIC COATED) ORAL DAILY
COMMUNITY

## 2021-12-09 RX ORDER — OXYCODONE HYDROCHLORIDE 5 MG/1
5 TABLET ORAL EVERY 4 HOURS PRN
Status: DISCONTINUED | OUTPATIENT
Start: 2021-12-09 | End: 2021-12-10

## 2021-12-09 RX ORDER — CEFAZOLIN SODIUM 2 G/100ML
2 INJECTION, SOLUTION INTRAVENOUS EVERY 8 HOURS
Status: DISCONTINUED | OUTPATIENT
Start: 2021-12-09 | End: 2021-12-10

## 2021-12-09 RX ORDER — CEFAZOLIN SODIUM 2 G/100ML
2 INJECTION, SOLUTION INTRAVENOUS ONCE
Status: COMPLETED | OUTPATIENT
Start: 2021-12-09 | End: 2021-12-09

## 2021-12-09 RX ADMIN — ACETAMINOPHEN 650 MG: 325 TABLET, FILM COATED ORAL at 17:55

## 2021-12-09 RX ADMIN — CEFAZOLIN SODIUM 2 G: 2 INJECTION, SOLUTION INTRAVENOUS at 13:27

## 2021-12-09 RX ADMIN — SODIUM CHLORIDE, PRESERVATIVE FREE: 5 INJECTION INTRAVENOUS at 17:49

## 2021-12-09 RX ADMIN — OXYCODONE HYDROCHLORIDE 5 MG: 5 TABLET ORAL at 22:51

## 2021-12-09 RX ADMIN — HYDROMORPHONE HYDROCHLORIDE 0.5 MG: 1 INJECTION, SOLUTION INTRAMUSCULAR; INTRAVENOUS; SUBCUTANEOUS at 13:28

## 2021-12-09 RX ADMIN — OXYCODONE HYDROCHLORIDE 5 MG: 5 TABLET ORAL at 14:37

## 2021-12-09 RX ADMIN — CEFAZOLIN 2 G: 10 INJECTION, POWDER, FOR SOLUTION INTRAVENOUS at 20:22

## 2021-12-09 ASSESSMENT — ACTIVITIES OF DAILY LIVING (ADL)
ADLS_ACUITY_SCORE: 20
ADLS_ACUITY_SCORE: 18
ADLS_ACUITY_SCORE: 20
ADLS_ACUITY_SCORE: 18
ADLS_ACUITY_SCORE: 14
ADLS_ACUITY_SCORE: 14
ADLS_ACUITY_SCORE: 16
ADLS_ACUITY_SCORE: 20
ADLS_ACUITY_SCORE: 14
ADLS_ACUITY_SCORE: 18

## 2021-12-09 ASSESSMENT — ENCOUNTER SYMPTOMS
HEMATURIA: 0
COLOR CHANGE: 1
ARTHRALGIAS: 1
COUGH: 0
DYSURIA: 0
DIFFICULTY URINATING: 0
FREQUENCY: 0
FEVER: 1
MYALGIAS: 1

## 2021-12-09 ASSESSMENT — MIFFLIN-ST. JEOR
SCORE: 2042.71
SCORE: 2047.25

## 2021-12-09 NOTE — ED TRIAGE NOTES
"Pt had right ankle fusion 2 1/2 weeks ago. Today was seen in clinic and is having redness, swelling, and pain at the incision site and PMD is concerned for cellulitis which pt has history of in the past. Rates pain 10/10. Dressing clean dry and intact that was placed in clinic. Pt is having drainage from wound that was \"orange, clear, watery.\"   "

## 2021-12-09 NOTE — H&P
[facility]    History and Physical - Hospitalist Service       Date of Admission:  12/9/2021    Assessment & Plan      Sav Mendoza is a 66 year old male with past medical history significant for HTN, peripheral vascular disease, obesity admitted on 12/9/2021 with post-operative infection.     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 Miller on 11/22. He was seen in clinic today and noted to have redness, swelling and pain at the incision site (see below pictures). Pt reports low grade fevers at home. The patient is currently hemodynamically stable, non-toxic appearing, though he does describe fairly rapid progression of redness, pain and swelling. CRP is markedly elevated to 217. He will need close monitoring with concern for possible developing necrotizing infection given the rapidity of progression. If any worsening of clinical status would broaden antibiotics and call orthopedics urgently.    - Continue Cefazolin   - Follow-up blood and wound cultures   - Pain control PRN  - Orthopedics consults   - NPO at midnight for possible debridement tomorrow    HTN  Dyslipidemia  - Resume PTA amlodipine and lisinopril when verified   - pt not prescribed ASA or statin PTA    Mild anemia  Hgb is 11.3, down from 13.7 pre-operatively   - Monitor     Obesity   Body mass index is 36.62 kg/m .      Diet: Regular diet, NPO at midnight   DVT Prophylaxis: Pneumatic Compression Devices  Love Catheter: Not present  Central Lines: None  Code Status: Full Code     Disposition Plan   Expected Discharge: 2-3 days  Anticipated discharge location:  Awaiting care coordination huddle    The patient's care was discussed with the Patient.    Mari Byrnes  [facility]  Securely message with the Vocera Web Console (learn more  here)  Text page via [facility] Paging/Directory        ______________________________________________________________________    Chief Complaint   R ankle pain, redness and swelling     History is obtained from the patient    History of Present Illness   Sav Mendoza is a 66 year old male who presents to the ED with concern for post-operative infection.     He presented to orthopedics clinic initially with severe varus collapse of this right leg. On 11/22 he underwent a 60 degree lateral closing wedge osteotomy through the previous ankle joint as well as TTC fusion with corrective osteotomy through the subtalar joint.      He was seen in clinic initially on 11/30 and reportedly everything looked okay at that time. He was Placed in a boot and advised to be non-weight bearing, though it sounds like he has had difficulty with adhering to this recommendation.     He started having low grade fevers at home a few days ago and increased pain and swelling in the R ankle yesterday. He was seen in clinic today. There is concern for infection of the incision and surrounding cellulitis so he was instructed to come to the ED for IV antibiotics and operative debridement in the next few days. He reports that the ankle looks worse now even then from this morning in clinic.     Review of Systems    The 10 point Review of Systems is negative other than noted in the HPI or here.     Past Medical History    I have reviewed this patient's medical history and updated it with pertinent information if needed.   Past Medical History:   Diagnosis Date     Hypertension      Ventral hernia 6/24/14       Past Surgical History   I have reviewed this patient's surgical history and updated it with pertinent information if needed.  Past Surgical History:   Procedure Laterality Date     COLONOSCOPY N/A 6/16/2021    Procedure: COLONOSCOPY;  Surgeon: Echo Green MD;  Location:  GI     LAPAROTOMY EXPLORATORY N/A 12/12/2017    Procedure:  LAPAROTOMY EXPLORATORY;  Exploratory Laparotomy and  Gint Ventral Hernia Repair with Mesh;  Surgeon: Mina Parsons MD;  Location: [facility] OR     ORTHOPEDIC SURGERY Left     ankle fusion     OSTEOTOMY ANKLE Right 2021    Procedure: RIGHT ANKLE CORRECTIVE OSTEOTOMY AND SUBTALAR AND TIBIOTALOCALCANEAL FUSION;  Surgeon: Ad Miller MD;  Location:  OR       Social History   I have reviewed this patient's social history and updated it with pertinent information if needed.  Social History     Tobacco Use     Smoking status: Never Smoker     Smokeless tobacco: Never Used   Substance Use Topics     Alcohol use: Not Currently     Comment: rare     Drug use: No       Family History   I have reviewed this patient's family history and updated it with pertinent information if needed.  Family History   Problem Relation Age of Onset     Alzheimer Disease Mother      Glaucoma Mother      Macular Degeneration No family hx of      Colon Cancer No family hx of      Prostate Cancer No family hx of        Prior to Admission Medications   Prior to Admission Medications   Prescriptions Last Dose Informant Patient Reported? Taking?   Ascorbic Acid (VITAMIN C PO)  Self Yes No   Sig: Take 500 mg by mouth daily + 500 mg po qHS PRN   Blood Pressure Monitoring (BLOOD PRESSURE MONITOR/WRIST) LAURA   No No   Si Application 2 times daily as needed (HYPERTENSION)   amLODIPine (NORVASC) 5 MG tablet   No No   Sig: Take 1 tablet (5 mg) by mouth daily   lisinopril (ZESTRIL) 40 MG tablet   No No   Sig: TAKE 1 TABLET BY MOUTH ONCE DAILY IN THE EVENING.   multivitamin, therapeutic with minerals (THERA-VIT-M) TABS  Self Yes No   Sig: Take 1 tablet by mouth daily   ondansetron (ZOFRAN-ODT) 4 MG ODT tab   No No   Sig: Take 1-2 tablets (4-8 mg) by mouth every 8 hours as needed for nausea   oxyCODONE (ROXICODONE) 5 MG tablet   No No   Sig: Take 1-2 tablets (5-10 mg) by mouth every 4 hours as needed for moderate to severe pain    polyethylene glycol (MIRALAX) 17 GM/Dose powder   No No   Sig: Take 17 g (1 capful) by mouth daily as needed for constipation   senna-docusate (SENOKOT-S/PERICOLACE) 8.6-50 MG tablet   No No   Sig: Take 1-2 tablets by mouth 2 times daily      Facility-Administered Medications: None     Allergies   No Known Allergies    Physical Exam   Vital Signs: Temp: 99.3  F (37.4  C) Temp src: Oral BP: 116/77 Pulse: 93   Resp: 18 SpO2: 97 % O2 Device: None (Room air)    Weight: 270 lbs 0 oz    Constitutional: Awake, alert, cooperative, no apparent distress.  Eyes: Conjunctiva and pupils examined and normal.  Respiratory: Clear to auscultation bilaterally, no crackles or wheezing.  Cardiovascular: Regular rate and rhythm, normal S1 and S2, and no murmur noted.  GI: Soft, non-distended, non-tender, normal bowel sounds..  Skin/Musculoskeletal:  See photo below. There is marked edema, erythema and warmth surrounding the right ankle. It is very tender to touch.   Neurologic: Cranial nerves 2-12 intact, normal strength and sensation.  Psychiatric: Alert, oriented to person, place and time, no obvious anxiety or depression.      Media Information               Data   Data reviewed today: I reviewed all medications, new labs and imaging results over the last 24 hours. I personally reviewed no images or EKG's today.    Recent Labs   Lab 12/09/21  1223   WBC 9.5   HGB 11.3*   MCV 93         POTASSIUM 3.8   CHLORIDE 102   CO2 27   BUN 11   CR 0.70   ANIONGAP 7   RAJAT 9.5   *     No results found for this or any previous visit (from the past 24 hour(s)).

## 2021-12-09 NOTE — ED NOTES
[facility]  ED Nurse Handoff Report    ED Chief complaint: Post-op Problem and Cellulitis      ED Diagnosis:   Final diagnoses:   Cellulitis of right lower extremity       Code Status: MD to address    Allergies: No Known Allergies    Patient Story: A&Ox4. NWB to RLE. Pt is s/p (R) ankle fusion with staple removal today at clinic. (R) ankle is quite swollen, red and draining moderate amount purulent drainage. Patient has increase in pain. Reports temps at home have been 99F. Pt will be going for I&D potentially 12/10 and is to be NPO at 00:00.  Focused Assessment:  See above.    Treatments and/or interventions provided: IV, Pain medication, IV abx, imaging, labs.  Patient's response to treatments and/or interventions: Adequate    To be done/followed up on inpatient unit:  All inpatient orders    Does this patient have any cognitive concerns?: None    Activity level - Baseline/Home:  Stand with Assist  Activity Level - Current:   Stand with Assist    Patient's Preferred language: English   Needed?: No    Isolation: None  Infection: Not Applicable  Patient tested for COVID 19 prior to admission: YES  Bariatric?: Yes    Vital Signs:   Vitals:    12/09/21 1215 12/09/21 1230 12/09/21 1300 12/09/21 1330   BP: 116/77 132/74 115/71 123/70   Pulse: 93 94 87 87   Resp:       Temp: 99.3  F (37.4  C)      TempSrc: Oral      SpO2: 97% 96% 97% 96%   Weight:       Height:           Cardiac Rhythm:     Was the PSS-3 completed:   Yes  What interventions are required if any?               Family Comments: Daughter Janna would like updates.  OBS brochure/video discussed/provided to patient/family: N/A              Name of person given brochure if not patient:               Relationship to patient:     For the majority of the shift this patient's behavior was Green.   Behavioral interventions performed were .    ED NURSE PHONE NUMBER: *[phone]

## 2021-12-09 NOTE — ED PROVIDER NOTES
History   Chief Complaint:  Post-op Problem and Cellulitis       The history is provided by the patient and a relative.      Sav Mendoza is a 66 year old male with history of hypertension, cellulitis, peripheral vascular disease, and stasis dermatitis who presents for a wound check. The patient had right ankle surgery on 11/22 at [facility] with Dr. Miller for a corrective osteotomy and subtalar and tibiotalocalcaneal fusion. He returned for a follow up appointment on 11/30 to have his cast removed and was told everything was looking good at that time. He had his foot wrapped and was placed in a boot and told he was non weight bearing. The patient struggled to not bare weight as his left leg is weak so he was unable to completely follow these instructions. Starting 5 days ago the patient began to have fever around 100 and pain throughout the right ankle and foot. Today he returned for a second follow up to have his sutures removed at 1000 this morning. While at this appointment his surgeon showed concern for the redness and swelling around the surgery site. The patient was then sent to the hospital to be admitted with IV antibiotics and the surgeon plans to perform surgery in the next couple days. Upon arrival to the ED the patient has right ankle swelling and redness. He denies any cough or change in urinary symptoms.     Review of Systems   Constitutional: Positive for fever.   Respiratory: Negative for cough.    Genitourinary: Negative for decreased urine volume, difficulty urinating, dysuria, frequency, hematuria and urgency.   Musculoskeletal: Positive for arthralgias (right ankle) and myalgias (right foot).   Skin: Positive for color change (right lower leg redness).   All other systems reviewed and are negative.        Allergies:  No Known Allergies    Medications:  amlodipine   Ascorbic Acid  lisinopril   ondansetron   oxycodone   polyethylene glycol   senna-docusate     Past Medical  History:     Hypertension  Ventral hernia  Cellulitis and abscess of leg  Baker's cyst  Left inguinal hernia  Diverticulosis of large intestine  Stasis dermatitis of both legs  Non morbid obesity  Hematochezia  Iron deficiency anemia    Peripheral vascular disease    Past Surgical History:    Colonoscopy  Laparotomy exploratory   Ankle fusion  Osteotomy ankle      Family History:    Alzheimer Disease  Glaucoma    Social History:  Presents with daughter.   PCP: Logan Calvo     Physical Exam     Patient Vitals for the past 24 hrs:   BP Temp Temp src Pulse Resp SpO2 Height Weight   12/09/21 1330 123/70 -- -- 87 -- 96 % -- --   12/09/21 1300 115/71 -- -- 87 -- 97 % -- --   12/09/21 1230 132/74 -- -- 94 -- 96 % -- --   12/09/21 1215 116/77 99.3  F (37.4  C) Oral 93 -- 97 % -- --   12/09/21 1152 130/73 98.2  F (36.8  C) Temporal 94 18 96 % 1.829 m (6') 122.5 kg (270 lb)       Physical Exam    GENERAL: well developed, pleasant  HEAD: atraumatic  EYES: pupils reactive, extraocular muscles intact, conjunctivae normal  ENT:  mucus membranes moist  NECK:  trachea midline, normal range of motion  RESPIRATORY: no tachypnea, breath sounds clear to auscultation   CVS: normal S1/S2, no murmurs, intact distal pulses  ABDOMEN: soft, nontender, nondistention  MUSCULOSKELETAL: no deformities  SKIN: warm and dry, Swelling, parulis, and redness noted in right lower leg   NEURO: GCS 15, cranial nerves intact, alert and oriented x3  PSYCH:  Mood/affect normal                    Emergency Department Course     Imaging:  Ankle XR, G/E 3 views, right    (Results Pending)     Report per radiology    Laboratory:  Labs Ordered and Resulted from Time of ED Arrival to Time of ED Departure   BASIC METABOLIC PANEL - Abnormal       Result Value    Sodium 136      Potassium 3.8      Chloride 102      Carbon Dioxide (CO2) 27      Anion Gap 7      Urea Nitrogen 11      Creatinine 0.70      Calcium 9.5      Glucose 115 (*)     GFR Estimate >90     CBC  WITH PLATELETS AND DIFFERENTIAL - Abnormal    WBC Count 9.5      RBC Count 3.80 (*)     Hemoglobin 11.3 (*)     Hematocrit 35.4 (*)     MCV 93      MCH 29.7      MCHC 31.9      RDW 14.7      Platelet Count 389      % Neutrophils 74      % Lymphocytes 15      % Monocytes 9      % Eosinophils 2      % Basophils 0      % Immature Granulocytes 0      NRBCs per 100 WBC 0      Absolute Neutrophils 7.0      Absolute Lymphocytes 1.5      Absolute Monocytes 0.8      Absolute Eosinophils 0.2      Absolute Basophils 0.0      Absolute Immature Granulocytes 0.0      Absolute NRBCs 0.0     CRP INFLAMMATION - Abnormal    CRP Inflammation 217.0 (*)    COVID-19 VIRUS (CORONAVIRUS) BY PCR   BLOOD CULTURE   AEROBIC BACTERIAL CULTURE ROUTINE   BLOOD CULTURE   MRSA MSSA PCR, NASAL SWAB       Emergency Department Course:    Reviewed:  I reviewed nursing notes, vitals and past medical history    Assessments:  1227 I obtained history and examined the patient as noted above.    I rechecked the patient and explained findings.     Consults:  1245 I consulted with Dr. Byrnes of the hospitalist services who is in agreement to accept the patient for admission.     Interventions:  1327 Cefazolin, 2 g, IV  1328 Hydromorphone, 0.5 mg, IV     Disposition:  The patient was admitted to the hospital under the care of Dr. Byrnes.     Impression & Plan   CMS Diagnoses: None      Medical Decision Making:    Patient presents from a orthopedic clinic with plans for IV antibiotics and admission with surgery tomorrow. Patient had surgery and was placed in a cast followed by a walking boot with instructions of being not weightbearing. A remove the boot today and noted significant drainage swelling and purulence with redness as noted in the photos above.  Blood cultures were obtained given his subjective history of fever and culture from the wound.  Patient does not have diabetes.  Patient was given Ancef IV and spoke with the hospitalist regarding mission.   X-ray shows postsurgical changes without dislocation.      Diagnosis:    ICD-10-CM    1. Cellulitis of right lower extremity  L03.115          Scribe Disclosure:  I, Shanel Ivey, am serving as a scribe at 12:24 PM on 12/9/2021 to document services personally performed by El Stack MD based on my observations and the provider's statements to me.            El Stack MD  12/09/21 5601

## 2021-12-09 NOTE — PHARMACY-ADMISSION MEDICATION HISTORY
Pharmacy Medication History  Admission medication history interview status for the 12/9/2021  admission is complete. See EPIC admission navigator for prior to admission medications     Location of Interview: Patient room  Medication history sources: Patient and outside med report    Significant changes made to the medication list:  Updated Ascorbic Acid, Stool softener, and Oxycodone  Discontinued Zofran    In the past week, patient estimated taking medication this percent of the time: greater than 90%    Additional medication history information:   None    Medication reconciliation completed by provider prior to medication history? No    Time spent in this activity: 20 mins    Prior to Admission medications    Medication Sig Last Dose Taking? Auth Provider   amLODIPine (NORVASC) 5 MG tablet Take 1 tablet (5 mg) by mouth daily 12/9/2021 at am Yes Logan Calvo, DO   docusate sodium (COLACE) 100 MG tablet Take 100 mg by mouth daily 12/8/2021 Yes Unknown, Entered By History   lisinopril (ZESTRIL) 40 MG tablet TAKE 1 TABLET BY MOUTH ONCE DAILY IN THE EVENING. 12/8/2021 at pm Yes Logan Calvo, DO   multivitamin, therapeutic with minerals (THERA-VIT-M) TABS Take 1 tablet by mouth daily 12/9/2021 at am Yes Unknown, Entered By History   oxyCODONE (ROXICODONE) 5 MG tablet Take 1-2 tablets (5-10 mg) by mouth every 4 hours as needed for moderate to severe pain  Patient taking differently: Take 10 mg by mouth At Bedtime  12/8/2021 at hs Yes Vasquez Saleem, PA-C   polyethylene glycol (MIRALAX) 17 GM/Dose powder Take 17 g (1 capful) by mouth daily as needed for constipation prn at prn Yes Erika Aguilera MD   vitamin C (ASCORBIC ACID) 500 MG tablet Take 500 mg by mouth daily 12/9/2021 at am Yes Unknown, Entered By History   Blood Pressure Monitoring (BLOOD PRESSURE MONITOR/WRIST) LAURA 1 Application 2 times daily as needed (HYPERTENSION)   Rodney Viveros MD       The information provided in this note is only as  accurate as the sources available at the time of update(s)

## 2021-12-09 NOTE — PROGRESS NOTES
RECEIVING UNIT ED HANDOFF REVIEW    ED Nurse Handoff Report was reviewed by: Abigail Faria RN on December 9, 2021 at 3:16 PM

## 2021-12-10 ENCOUNTER — ANESTHESIA (OUTPATIENT)
Dept: SURGERY | Facility: CLINIC | Age: 66
DRG: 857 | End: 2021-12-10
Payer: MEDICARE

## 2021-12-10 ENCOUNTER — ANESTHESIA EVENT (OUTPATIENT)
Dept: SURGERY | Facility: CLINIC | Age: 66
DRG: 857 | End: 2021-12-10
Payer: MEDICARE

## 2021-12-10 LAB
ANION GAP SERPL CALCULATED.3IONS-SCNC: 6 MMOL/L (ref 3–14)
BUN SERPL-MCNC: 8 MG/DL (ref 7–30)
CALCIUM SERPL-MCNC: 8.9 MG/DL (ref 8.5–10.1)
CHLORIDE BLD-SCNC: 103 MMOL/L (ref 94–109)
CO2 SERPL-SCNC: 27 MMOL/L (ref 20–32)
CREAT SERPL-MCNC: 0.64 MG/DL (ref 0.66–1.25)
CRP SERPL-MCNC: 168 MG/L (ref 0–8)
CRP SERPL-MCNC: 177 MG/L (ref 0–8)
ERYTHROCYTE [DISTWIDTH] IN BLOOD BY AUTOMATED COUNT: 15.1 % (ref 10–15)
GFR SERPL CREATININE-BSD FRML MDRD: >90 ML/MIN/1.73M2
GLUCOSE BLD-MCNC: 117 MG/DL (ref 70–99)
GRAM STAIN RESULT: ABNORMAL
GRAM STAIN RESULT: ABNORMAL
HCT VFR BLD AUTO: 32.5 % (ref 40–53)
HGB BLD-MCNC: 10.3 G/DL (ref 13.3–17.7)
MCH RBC QN AUTO: 29.4 PG (ref 26.5–33)
MCHC RBC AUTO-ENTMCNC: 31.7 G/DL (ref 31.5–36.5)
MCV RBC AUTO: 93 FL (ref 78–100)
PLATELET # BLD AUTO: 401 10E3/UL (ref 150–450)
POTASSIUM BLD-SCNC: 3.6 MMOL/L (ref 3.4–5.3)
RBC # BLD AUTO: 3.5 10E6/UL (ref 4.4–5.9)
SODIUM SERPL-SCNC: 136 MMOL/L (ref 133–144)
WBC # BLD AUTO: 7.7 10E3/UL (ref 4–11)

## 2021-12-10 PROCEDURE — 272N000001 HC OR GENERAL SUPPLY STERILE: Performed by: ORTHOPAEDIC SURGERY

## 2021-12-10 PROCEDURE — 87205 SMEAR GRAM STAIN: CPT | Performed by: ORTHOPAEDIC SURGERY

## 2021-12-10 PROCEDURE — 258N000003 HC RX IP 258 OP 636: Performed by: NURSE ANESTHETIST, CERTIFIED REGISTERED

## 2021-12-10 PROCEDURE — 0JBQ0ZZ EXCISION OF RIGHT FOOT SUBCUTANEOUS TISSUE AND FASCIA, OPEN APPROACH: ICD-10-PCS | Performed by: ORTHOPAEDIC SURGERY

## 2021-12-10 PROCEDURE — 86140 C-REACTIVE PROTEIN: CPT | Performed by: STUDENT IN AN ORGANIZED HEALTH CARE EDUCATION/TRAINING PROGRAM

## 2021-12-10 PROCEDURE — 85027 COMPLETE CBC AUTOMATED: CPT | Performed by: STUDENT IN AN ORGANIZED HEALTH CARE EDUCATION/TRAINING PROGRAM

## 2021-12-10 PROCEDURE — 370N000017 HC ANESTHESIA TECHNICAL FEE, PER MIN: Performed by: ORTHOPAEDIC SURGERY

## 2021-12-10 PROCEDURE — 250N000011 HC RX IP 250 OP 636: Performed by: STUDENT IN AN ORGANIZED HEALTH CARE EDUCATION/TRAINING PROGRAM

## 2021-12-10 PROCEDURE — 258N000003 HC RX IP 258 OP 636: Performed by: ANESTHESIOLOGY

## 2021-12-10 PROCEDURE — 36415 COLL VENOUS BLD VENIPUNCTURE: CPT | Performed by: HOSPITALIST

## 2021-12-10 PROCEDURE — 250N000013 HC RX MED GY IP 250 OP 250 PS 637: Performed by: ORTHOPAEDIC SURGERY

## 2021-12-10 PROCEDURE — 258N000003 HC RX IP 258 OP 636: Performed by: ORTHOPAEDIC SURGERY

## 2021-12-10 PROCEDURE — 250N000009 HC RX 250: Performed by: ORTHOPAEDIC SURGERY

## 2021-12-10 PROCEDURE — 120N000001 HC R&B MED SURG/OB

## 2021-12-10 PROCEDURE — 36415 COLL VENOUS BLD VENIPUNCTURE: CPT | Performed by: STUDENT IN AN ORGANIZED HEALTH CARE EDUCATION/TRAINING PROGRAM

## 2021-12-10 PROCEDURE — 250N000011 HC RX IP 250 OP 636: Performed by: NURSE ANESTHETIST, CERTIFIED REGISTERED

## 2021-12-10 PROCEDURE — 80048 BASIC METABOLIC PNL TOTAL CA: CPT | Performed by: STUDENT IN AN ORGANIZED HEALTH CARE EDUCATION/TRAINING PROGRAM

## 2021-12-10 PROCEDURE — 250N000025 HC SEVOFLURANE, PER MIN: Performed by: ORTHOPAEDIC SURGERY

## 2021-12-10 PROCEDURE — 710N000009 HC RECOVERY PHASE 1, LEVEL 1, PER MIN: Performed by: ORTHOPAEDIC SURGERY

## 2021-12-10 PROCEDURE — 87075 CULTR BACTERIA EXCEPT BLOOD: CPT | Performed by: ORTHOPAEDIC SURGERY

## 2021-12-10 PROCEDURE — 87070 CULTURE OTHR SPECIMN AEROBIC: CPT | Performed by: ORTHOPAEDIC SURGERY

## 2021-12-10 PROCEDURE — 250N000013 HC RX MED GY IP 250 OP 250 PS 637: Performed by: STUDENT IN AN ORGANIZED HEALTH CARE EDUCATION/TRAINING PROGRAM

## 2021-12-10 PROCEDURE — 999N000141 HC STATISTIC PRE-PROCEDURE NURSING ASSESSMENT: Performed by: ORTHOPAEDIC SURGERY

## 2021-12-10 PROCEDURE — 250N000009 HC RX 250: Performed by: NURSE ANESTHETIST, CERTIFIED REGISTERED

## 2021-12-10 PROCEDURE — 250N000011 HC RX IP 250 OP 636: Performed by: ORTHOPAEDIC SURGERY

## 2021-12-10 PROCEDURE — 250N000011 HC RX IP 250 OP 636: Performed by: PHYSICIAN ASSISTANT

## 2021-12-10 PROCEDURE — 86140 C-REACTIVE PROTEIN: CPT | Performed by: HOSPITALIST

## 2021-12-10 PROCEDURE — 99233 SBSQ HOSP IP/OBS HIGH 50: CPT | Performed by: HOSPITALIST

## 2021-12-10 PROCEDURE — 250N000011 HC RX IP 250 OP 636: Performed by: ANESTHESIOLOGY

## 2021-12-10 PROCEDURE — 360N000075 HC SURGERY LEVEL 2, PER MIN: Performed by: ORTHOPAEDIC SURGERY

## 2021-12-10 PROCEDURE — 258N000003 HC RX IP 258 OP 636: Performed by: STUDENT IN AN ORGANIZED HEALTH CARE EDUCATION/TRAINING PROGRAM

## 2021-12-10 RX ORDER — NALOXONE HYDROCHLORIDE 0.4 MG/ML
0.4 INJECTION, SOLUTION INTRAMUSCULAR; INTRAVENOUS; SUBCUTANEOUS
Status: DISCONTINUED | OUTPATIENT
Start: 2021-12-10 | End: 2021-12-17 | Stop reason: HOSPADM

## 2021-12-10 RX ORDER — METHOCARBAMOL 500 MG/1
500 TABLET, FILM COATED ORAL EVERY 6 HOURS PRN
Status: DISCONTINUED | OUTPATIENT
Start: 2021-12-10 | End: 2021-12-17 | Stop reason: HOSPADM

## 2021-12-10 RX ORDER — IBUPROFEN 600 MG/1
600 TABLET, FILM COATED ORAL EVERY 6 HOURS PRN
Qty: 30 TABLET | Refills: 0 | Status: SHIPPED | OUTPATIENT
Start: 2021-12-10 | End: 2021-12-15

## 2021-12-10 RX ORDER — LIDOCAINE 40 MG/G
CREAM TOPICAL
Status: DISCONTINUED | OUTPATIENT
Start: 2021-12-10 | End: 2021-12-11

## 2021-12-10 RX ORDER — HYDROXYZINE HYDROCHLORIDE 10 MG/1
10 TABLET, FILM COATED ORAL EVERY 6 HOURS PRN
Qty: 30 TABLET | Refills: 0 | Status: SHIPPED | OUTPATIENT
Start: 2021-12-10 | End: 2021-12-15

## 2021-12-10 RX ORDER — SODIUM CHLORIDE, SODIUM LACTATE, POTASSIUM CHLORIDE, CALCIUM CHLORIDE 600; 310; 30; 20 MG/100ML; MG/100ML; MG/100ML; MG/100ML
INJECTION, SOLUTION INTRAVENOUS CONTINUOUS
Status: DISCONTINUED | OUTPATIENT
Start: 2021-12-10 | End: 2021-12-10 | Stop reason: HOSPADM

## 2021-12-10 RX ORDER — ONDANSETRON 2 MG/ML
4 INJECTION INTRAMUSCULAR; INTRAVENOUS EVERY 30 MIN PRN
Status: DISCONTINUED | OUTPATIENT
Start: 2021-12-10 | End: 2021-12-10 | Stop reason: HOSPADM

## 2021-12-10 RX ORDER — LIDOCAINE HYDROCHLORIDE 20 MG/ML
INJECTION, SOLUTION INFILTRATION; PERINEURAL PRN
Status: DISCONTINUED | OUTPATIENT
Start: 2021-12-10 | End: 2021-12-10

## 2021-12-10 RX ORDER — NALOXONE HYDROCHLORIDE 0.4 MG/ML
0.2 INJECTION, SOLUTION INTRAMUSCULAR; INTRAVENOUS; SUBCUTANEOUS
Status: DISCONTINUED | OUTPATIENT
Start: 2021-12-10 | End: 2021-12-17 | Stop reason: HOSPADM

## 2021-12-10 RX ORDER — ACETAMINOPHEN 325 MG/1
975 TABLET ORAL EVERY 8 HOURS
Status: COMPLETED | OUTPATIENT
Start: 2021-12-10 | End: 2021-12-13

## 2021-12-10 RX ORDER — ONDANSETRON 4 MG/1
4 TABLET, ORALLY DISINTEGRATING ORAL EVERY 6 HOURS PRN
Status: DISCONTINUED | OUTPATIENT
Start: 2021-12-10 | End: 2021-12-17 | Stop reason: HOSPADM

## 2021-12-10 RX ORDER — CEFAZOLIN SODIUM IN 0.9 % NACL 3 G/100 ML
3 INTRAVENOUS SOLUTION, PIGGYBACK (ML) INTRAVENOUS
Status: COMPLETED | OUTPATIENT
Start: 2021-12-10 | End: 2021-12-10

## 2021-12-10 RX ORDER — PROPOFOL 10 MG/ML
INJECTION, EMULSION INTRAVENOUS PRN
Status: DISCONTINUED | OUTPATIENT
Start: 2021-12-10 | End: 2021-12-10

## 2021-12-10 RX ORDER — POLYETHYLENE GLYCOL 3350 17 G/17G
17 POWDER, FOR SOLUTION ORAL DAILY
Status: DISCONTINUED | OUTPATIENT
Start: 2021-12-11 | End: 2021-12-17 | Stop reason: HOSPADM

## 2021-12-10 RX ORDER — ONDANSETRON 4 MG/1
4 TABLET, ORALLY DISINTEGRATING ORAL EVERY 30 MIN PRN
Status: DISCONTINUED | OUTPATIENT
Start: 2021-12-10 | End: 2021-12-10 | Stop reason: HOSPADM

## 2021-12-10 RX ORDER — HYDROMORPHONE HCL IN WATER/PF 6 MG/30 ML
0.2 PATIENT CONTROLLED ANALGESIA SYRINGE INTRAVENOUS
Status: DISCONTINUED | OUTPATIENT
Start: 2021-12-10 | End: 2021-12-17 | Stop reason: HOSPADM

## 2021-12-10 RX ORDER — AMOXICILLIN 250 MG
1-2 CAPSULE ORAL 2 TIMES DAILY
Qty: 30 TABLET | Refills: 0 | Status: SHIPPED | OUTPATIENT
Start: 2021-12-10 | End: 2021-12-15

## 2021-12-10 RX ORDER — AMOXICILLIN 250 MG
1 CAPSULE ORAL 2 TIMES DAILY
Status: DISCONTINUED | OUTPATIENT
Start: 2021-12-10 | End: 2021-12-17 | Stop reason: HOSPADM

## 2021-12-10 RX ORDER — FENTANYL CITRATE 50 UG/ML
INJECTION, SOLUTION INTRAMUSCULAR; INTRAVENOUS PRN
Status: DISCONTINUED | OUTPATIENT
Start: 2021-12-10 | End: 2021-12-10

## 2021-12-10 RX ORDER — FENTANYL CITRATE 0.05 MG/ML
25 INJECTION, SOLUTION INTRAMUSCULAR; INTRAVENOUS EVERY 5 MIN PRN
Status: DISCONTINUED | OUTPATIENT
Start: 2021-12-10 | End: 2021-12-10 | Stop reason: HOSPADM

## 2021-12-10 RX ORDER — OXYCODONE HYDROCHLORIDE 5 MG/1
10 TABLET ORAL EVERY 4 HOURS PRN
Status: DISCONTINUED | OUTPATIENT
Start: 2021-12-10 | End: 2021-12-17 | Stop reason: HOSPADM

## 2021-12-10 RX ORDER — BISACODYL 10 MG
10 SUPPOSITORY, RECTAL RECTAL DAILY PRN
Status: DISCONTINUED | OUTPATIENT
Start: 2021-12-10 | End: 2021-12-17 | Stop reason: HOSPADM

## 2021-12-10 RX ORDER — SODIUM CHLORIDE, SODIUM LACTATE, POTASSIUM CHLORIDE, CALCIUM CHLORIDE 600; 310; 30; 20 MG/100ML; MG/100ML; MG/100ML; MG/100ML
INJECTION, SOLUTION INTRAVENOUS CONTINUOUS
Status: DISCONTINUED | OUTPATIENT
Start: 2021-12-10 | End: 2021-12-12

## 2021-12-10 RX ORDER — ACETAMINOPHEN 325 MG/1
650 TABLET ORAL EVERY 4 HOURS PRN
Status: DISCONTINUED | OUTPATIENT
Start: 2021-12-13 | End: 2021-12-17 | Stop reason: HOSPADM

## 2021-12-10 RX ORDER — ONDANSETRON 2 MG/ML
4 INJECTION INTRAMUSCULAR; INTRAVENOUS EVERY 6 HOURS PRN
Status: DISCONTINUED | OUTPATIENT
Start: 2021-12-10 | End: 2021-12-17 | Stop reason: HOSPADM

## 2021-12-10 RX ORDER — HYDROMORPHONE HCL IN WATER/PF 6 MG/30 ML
0.4 PATIENT CONTROLLED ANALGESIA SYRINGE INTRAVENOUS
Status: DISCONTINUED | OUTPATIENT
Start: 2021-12-10 | End: 2021-12-17 | Stop reason: HOSPADM

## 2021-12-10 RX ORDER — OXYCODONE HYDROCHLORIDE 5 MG/1
5 TABLET ORAL EVERY 4 HOURS PRN
Status: DISCONTINUED | OUTPATIENT
Start: 2021-12-10 | End: 2021-12-17 | Stop reason: HOSPADM

## 2021-12-10 RX ORDER — HYDROMORPHONE HYDROCHLORIDE 1 MG/ML
0.5 INJECTION, SOLUTION INTRAMUSCULAR; INTRAVENOUS; SUBCUTANEOUS EVERY 5 MIN PRN
Status: DISCONTINUED | OUTPATIENT
Start: 2021-12-10 | End: 2021-12-10 | Stop reason: HOSPADM

## 2021-12-10 RX ORDER — CEFAZOLIN SODIUM IN 0.9 % NACL 3 G/100 ML
3 INTRAVENOUS SOLUTION, PIGGYBACK (ML) INTRAVENOUS SEE ADMIN INSTRUCTIONS
Status: DISCONTINUED | OUTPATIENT
Start: 2021-12-10 | End: 2021-12-10 | Stop reason: HOSPADM

## 2021-12-10 RX ORDER — PROCHLORPERAZINE MALEATE 5 MG
5 TABLET ORAL EVERY 6 HOURS PRN
Status: DISCONTINUED | OUTPATIENT
Start: 2021-12-10 | End: 2021-12-17 | Stop reason: HOSPADM

## 2021-12-10 RX ORDER — OXYCODONE HYDROCHLORIDE 5 MG/1
5 TABLET ORAL EVERY 4 HOURS PRN
Status: DISCONTINUED | OUTPATIENT
Start: 2021-12-10 | End: 2021-12-10 | Stop reason: HOSPADM

## 2021-12-10 RX ORDER — IBUPROFEN 200 MG
400 TABLET ORAL EVERY 4 HOURS PRN
Status: DISCONTINUED | OUTPATIENT
Start: 2021-12-10 | End: 2021-12-17 | Stop reason: HOSPADM

## 2021-12-10 RX ORDER — HYDROXYZINE HYDROCHLORIDE 10 MG/1
10 TABLET, FILM COATED ORAL EVERY 6 HOURS PRN
Status: DISCONTINUED | OUTPATIENT
Start: 2021-12-10 | End: 2021-12-17 | Stop reason: HOSPADM

## 2021-12-10 RX ORDER — CEFAZOLIN SODIUM 2 G/100ML
2 INJECTION, SOLUTION INTRAVENOUS EVERY 8 HOURS
Status: COMPLETED | OUTPATIENT
Start: 2021-12-10 | End: 2021-12-11

## 2021-12-10 RX ORDER — ACETAMINOPHEN 325 MG/1
650 TABLET ORAL EVERY 4 HOURS PRN
Qty: 100 TABLET | Refills: 0 | Status: SHIPPED | OUTPATIENT
Start: 2021-12-10 | End: 2021-12-15

## 2021-12-10 RX ADMIN — SENNOSIDES AND DOCUSATE SODIUM 1 TABLET: 50; 8.6 TABLET ORAL at 21:25

## 2021-12-10 RX ADMIN — MIDAZOLAM 1 MG: 1 INJECTION INTRAMUSCULAR; INTRAVENOUS at 12:36

## 2021-12-10 RX ADMIN — PHENYLEPHRINE HYDROCHLORIDE 100 MCG: 10 INJECTION INTRAVENOUS at 12:45

## 2021-12-10 RX ADMIN — ACETAMINOPHEN 975 MG: 325 TABLET, FILM COATED ORAL at 22:29

## 2021-12-10 RX ADMIN — FENTANYL CITRATE 25 MCG: 50 INJECTION, SOLUTION INTRAMUSCULAR; INTRAVENOUS at 13:45

## 2021-12-10 RX ADMIN — PHENYLEPHRINE HYDROCHLORIDE 200 MCG: 10 INJECTION INTRAVENOUS at 13:02

## 2021-12-10 RX ADMIN — AMLODIPINE BESYLATE 5 MG: 5 TABLET ORAL at 09:21

## 2021-12-10 RX ADMIN — LISINOPRIL 40 MG: 40 TABLET ORAL at 09:21

## 2021-12-10 RX ADMIN — PROPOFOL 300 MG: 10 INJECTION, EMULSION INTRAVENOUS at 12:38

## 2021-12-10 RX ADMIN — ACETAMINOPHEN 975 MG: 325 TABLET, FILM COATED ORAL at 16:32

## 2021-12-10 RX ADMIN — SODIUM CHLORIDE, POTASSIUM CHLORIDE, SODIUM LACTATE AND CALCIUM CHLORIDE: 600; 310; 30; 20 INJECTION, SOLUTION INTRAVENOUS at 11:44

## 2021-12-10 RX ADMIN — METHOCARBAMOL 500 MG: 500 TABLET ORAL at 19:57

## 2021-12-10 RX ADMIN — OXYCODONE HYDROCHLORIDE 5 MG: 5 TABLET ORAL at 19:57

## 2021-12-10 RX ADMIN — ACETAMINOPHEN 650 MG: 325 TABLET, FILM COATED ORAL at 00:02

## 2021-12-10 RX ADMIN — FENTANYL CITRATE 25 MCG: 50 INJECTION, SOLUTION INTRAMUSCULAR; INTRAVENOUS at 13:40

## 2021-12-10 RX ADMIN — CEFAZOLIN 2 G: 10 INJECTION, POWDER, FOR SOLUTION INTRAVENOUS at 04:09

## 2021-12-10 RX ADMIN — FENTANYL CITRATE 25 MCG: 50 INJECTION, SOLUTION INTRAMUSCULAR; INTRAVENOUS at 13:52

## 2021-12-10 RX ADMIN — FENTANYL CITRATE 25 MCG: 50 INJECTION, SOLUTION INTRAMUSCULAR; INTRAVENOUS at 13:35

## 2021-12-10 RX ADMIN — Medication 3 G: at 12:31

## 2021-12-10 RX ADMIN — CEFAZOLIN 2 G: 10 INJECTION, POWDER, FOR SOLUTION INTRAVENOUS at 21:25

## 2021-12-10 RX ADMIN — HYDROMORPHONE HYDROCHLORIDE 0.2 MG: 0.2 INJECTION, SOLUTION INTRAMUSCULAR; INTRAVENOUS; SUBCUTANEOUS at 19:44

## 2021-12-10 RX ADMIN — FENTANYL CITRATE 50 MCG: 50 INJECTION, SOLUTION INTRAMUSCULAR; INTRAVENOUS at 12:38

## 2021-12-10 RX ADMIN — HYDROMORPHONE HYDROCHLORIDE 0.5 MG: 1 INJECTION, SOLUTION INTRAMUSCULAR; INTRAVENOUS; SUBCUTANEOUS at 14:12

## 2021-12-10 RX ADMIN — FENTANYL CITRATE 50 MCG: 50 INJECTION, SOLUTION INTRAMUSCULAR; INTRAVENOUS at 12:49

## 2021-12-10 RX ADMIN — HYDROMORPHONE HYDROCHLORIDE 0.2 MG: 0.2 INJECTION, SOLUTION INTRAMUSCULAR; INTRAVENOUS; SUBCUTANEOUS at 16:27

## 2021-12-10 RX ADMIN — HYDROMORPHONE HYDROCHLORIDE 0.5 MG: 1 INJECTION, SOLUTION INTRAMUSCULAR; INTRAVENOUS; SUBCUTANEOUS at 13:56

## 2021-12-10 RX ADMIN — MIDAZOLAM 1 MG: 1 INJECTION INTRAMUSCULAR; INTRAVENOUS at 12:31

## 2021-12-10 RX ADMIN — LIDOCAINE HYDROCHLORIDE 100 MG: 20 INJECTION, SOLUTION INFILTRATION; PERINEURAL at 12:38

## 2021-12-10 RX ADMIN — SODIUM CHLORIDE, PRESERVATIVE FREE: 5 INJECTION INTRAVENOUS at 04:08

## 2021-12-10 ASSESSMENT — ACTIVITIES OF DAILY LIVING (ADL)
ADLS_ACUITY_SCORE: 17
ADLS_ACUITY_SCORE: 18
ADLS_ACUITY_SCORE: 18
ADLS_ACUITY_SCORE: 17
ADLS_ACUITY_SCORE: 17
ADLS_ACUITY_SCORE: 18
ADLS_ACUITY_SCORE: 17
ADLS_ACUITY_SCORE: 18
ADLS_ACUITY_SCORE: 17
ADLS_ACUITY_SCORE: 17
ADLS_ACUITY_SCORE: 18
ADLS_ACUITY_SCORE: 17
ADLS_ACUITY_SCORE: 18
ADLS_ACUITY_SCORE: 17
ADLS_ACUITY_SCORE: 18

## 2021-12-10 NOTE — ANESTHESIA PREPROCEDURE EVALUATION
Anesthesia Pre-Procedure Evaluation    Patient: Sav Mendoza   MRN: [id number] : 1955        Preoperative Diagnosis: Cellulitis [L03.90]    Procedure : Procedure(s):  IRRIGATION AND DEBRIDEMENT RIGHT ANKLE.          Past Medical History:   Diagnosis Date     Hypertension      Ventral hernia 14      Past Surgical History:   Procedure Laterality Date     COLONOSCOPY N/A 2021    Procedure: COLONOSCOPY;  Surgeon: Echo Green MD;  Location: [facility] GI     LAPAROTOMY EXPLORATORY N/A 2017    Procedure: LAPAROTOMY EXPLORATORY;  Exploratory Laparotomy and  Gint Ventral Hernia Repair with Mesh;  Surgeon: Mina Parsons MD;  Location: [facility] OR     ORTHOPEDIC SURGERY Left     ankle fusion     OSTEOTOMY ANKLE Right 2021    Procedure: RIGHT ANKLE CORRECTIVE OSTEOTOMY AND SUBTALAR AND TIBIOTALOCALCANEAL FUSION;  Surgeon: Ad Miller MD;  Location:  OR      No Known Allergies   Social History     Tobacco Use     Smoking status: Never Smoker     Smokeless tobacco: Never Used   Substance Use Topics     Alcohol use: Not Currently     Comment: rare      Wt Readings from Last 1 Encounters:   21 122.9 kg (271 lb)        Anesthesia Evaluation            ROS/MED HX  ENT/Pulmonary:     (+) YARIEL risk factors, obese,  (-) sleep apnea   Neurologic:  - neg neurologic ROS     Cardiovascular:     (+) Dyslipidemia hypertension----- (-) CAD   METS/Exercise Tolerance:     Hematologic:       Musculoskeletal:   (+) arthritis,     GI/Hepatic:       Renal/Genitourinary:       Endo:     (+) Obesity,  (-) Type II DM   Psychiatric/Substance Use:    (-) psychiatric history   Infectious Disease: Comment: Post op infection. R foot.      Malignancy:       Other:            Physical Exam    Airway        Mallampati: III   TM distance: > 3 FB   Neck ROM: full   Mouth opening: > 3 cm    Respiratory Devices and Support         Dental  no notable dental history         Cardiovascular   cardiovascular exam  normal       Rhythm and rate: regular     Pulmonary   pulmonary exam normal        breath sounds clear to auscultation           OUTSIDE LABS:  CBC:   Lab Results   Component Value Date    WBC 7.7 12/10/2021    WBC 9.5 12/09/2021    HGB 10.3 (L) 12/10/2021    HGB 11.3 (L) 12/09/2021    HCT 32.5 (L) 12/10/2021    HCT 35.4 (L) 12/09/2021     12/10/2021     12/09/2021     BMP:   Lab Results   Component Value Date     12/10/2021     12/09/2021    POTASSIUM 3.6 12/10/2021    POTASSIUM 3.8 12/09/2021    CHLORIDE 103 12/10/2021    CHLORIDE 102 12/09/2021    CO2 27 12/10/2021    CO2 27 12/09/2021    BUN 8 12/10/2021    BUN 11 12/09/2021    CR 0.64 (L) 12/10/2021    CR 0.70 12/09/2021     (H) 12/10/2021     (H) 12/09/2021     COAGS:   Lab Results   Component Value Date    PTT 30 02/21/2021    INR 0.96 06/15/2021     POC:   Lab Results   Component Value Date     (H) 02/21/2021     HEPATIC:   Lab Results   Component Value Date    ALBUMIN 2.8 (L) 06/17/2021    PROTTOTAL 5.4 (L) 06/17/2021    ALT 20 06/17/2021    AST 13 06/17/2021    ALKPHOS 37 (L) 06/17/2021    BILITOTAL 0.4 06/17/2021     OTHER:   Lab Results   Component Value Date    LACT 1.4 09/30/2021    A1C 5.4 06/21/2021    RAJAT 8.9 12/10/2021    PHOS 3.1 06/18/2021    MAG 2.2 06/18/2021    .0 (H) 12/10/2021    SED 14 02/21/2021       Anesthesia Plan    ASA Status:  2   NPO Status:  NPO Appropriate    Anesthesia Type: General.     - Airway: LMA   Induction: Intravenous, Propofol.   Maintenance: Balanced.        Consents    Anesthesia Plan(s) and associated risks, benefits, and realistic alternatives discussed. Questions answered and patient/representative(s) expressed understanding.    - Discussed:     - Discussed with:  Patient      - Extended Intubation/Ventilatory Support Discussed: No.      - Patient is DNR/DNI Status: No    Use of blood products discussed: No .     Postoperative Care    Pain management: Multi-modal  analgesia.   PONV prophylaxis: Ondansetron (or other 5HT-3), Dexamethasone or Solumedrol     Comments:    Other Comments: Patient is counseled on the anesthesia plan and relevant anesthesia procedures including all risks and benefits. All patient questions were answered.                       Max Kelly MD

## 2021-12-10 NOTE — ANESTHESIA CARE TRANSFER NOTE
Patient: Sav Mendoza    Procedure: Procedure(s):  IRRIGATION AND DEBRIDEMENT RIGHT ANKLE, WOUND VAC PLACEMENT       Diagnosis: Cellulitis [L03.90]  Diagnosis Additional Information: No value filed.    Anesthesia Type:   General     Note:    Oropharynx: oropharynx clear of all foreign objects  Level of Consciousness: awake  Oxygen Supplementation: face mask  Level of Supplemental Oxygen (L/min / FiO2): 6  Independent Airway: airway patency satisfactory and stable  Dentition: dentition unchanged  Vital Signs Stable: post-procedure vital signs reviewed and stable  Report to RN Given: handoff report given  Patient transferred to: PACU    Handoff Report: Identifed the Patient, Identified the Reponsible Provider, Reviewed the pertinent medical history, Discussed the surgical course, Reviewed Intra-OP anesthesia mangement and issues during anesthesia, Set expectations for post-procedure period and Allowed opportunity for questions and acknowledgement of understanding      Vitals:  Vitals Value Taken Time   /70 12/10/21 1321   Temp     Pulse 96 12/10/21 1323   Resp 13 12/10/21 1323   SpO2 98 % 12/10/21 1324   Vitals shown include unvalidated device data.    Electronically Signed By: JS Pal CRNA  December 10, 2021  1:24 PM

## 2021-12-10 NOTE — ANESTHESIA PROCEDURE NOTES
Airway       Patient location during procedure: OR       Procedure Start/Stop Times: 12/10/2021 12:39 PM  Staff -        CRNA: Coy Wolff APRN CRNA       Performed By: CRNA  Consent for Airway        Urgency: elective  Indications and Patient Condition       Indications for airway management: bry-procedural         Mask difficulty assessment: 1 - vent by mask    Final Airway Details       Final airway type: supraglottic airway    Supraglottic Airway Details        Type: LMA       Brand: Ambu AuraGain       LMA size: 5    Post intubation assessment        Placement verified by: capnometry, equal breath sounds and chest rise        Number of attempts at approach: 1       Ease of procedure: easy       Dentition: Unchanged and Intact

## 2021-12-10 NOTE — ANESTHESIA POSTPROCEDURE EVALUATION
Patient: Sav Mendoza    Procedure: Procedure(s):  IRRIGATION AND DEBRIDEMENT RIGHT ANKLE, WOUND VAC PLACEMENT       Diagnosis:Cellulitis [L03.90]  Diagnosis Additional Information: No value filed.    Anesthesia Type:  General    Note:  Disposition: Inpatient   Postop Pain Control: Uneventful            Sign Out: Well controlled pain   PONV:    Neuro/Psych: Uneventful            Sign Out: Acceptable/Baseline neuro status   Airway/Respiratory: Uneventful            Sign Out: Acceptable/Baseline resp. status   CV/Hemodynamics: Uneventful            Sign Out: Acceptable CV status   Other NRE: NONE   DID A NON-ROUTINE EVENT OCCUR? No           Last vitals:  Vitals Value Taken Time   /67 12/10/21 1420   Temp 37.1  C (98.8  F) 12/10/21 1420   Pulse 89 12/10/21 1429   Resp 14 12/10/21 1429   SpO2 95 % 12/10/21 1431   Vitals shown include unvalidated device data.    Electronically Signed By: Max Kelly MD  December 10, 2021  2:35 PM

## 2021-12-10 NOTE — PROGRESS NOTES
Ortho:  Dr Connor Discussed patient with Dr Miller and will plan to Right ankle I&D today. Keep NPO.  Abigail Osborn PA-C  [phone]

## 2021-12-10 NOTE — PLAN OF CARE
"Care Plan Nursing Note    Patient Information  Name: Sav Mendoza  Age: 66 year old  Reason for admission: \"Cellulitis of right lower extremity\"    Patient Assessment   DATE & TIME: 12/09/21,5147-4244  Cognitive Concerns/ Orientation : A & O times four  BEHAVIOR & AGGRESSION TOOL COLOR: calm  ABNL VS/O2: VSS, with low grade fever. Room air sat WDL  MOBILITY: lift assist  PAIN MANAGMENT: right ankle pain  DIET: Regular, will be NPO after Mid-night   BOWEL/BLADDER: Voids per urinal, no stool this shift  ABNL LAB/BG:   DRAIN/DEVICES: PIV infusing  SKIN: discoloration on bilateral lower ext. Right lower ext wound infection (red, swollen and drainage)   TESTS/PROCEDURES:  Plan I/D in the morning   D/C DATE: Pending  OTHER IMPORTANT INFO:  will be NPO after mid night for possible I and D in AM. Ortho consulted. Continue to monitor.   "

## 2021-12-10 NOTE — PROGRESS NOTES
[facility]    Medicine Progress Note - Hospitalist Service       Date of Admission:  12/9/2021    Assessment & Plan           Sav Mendoza is a 66 year old male with past medical history significant for HTN, peripheral vascular disease, obesity admitted on 12/9/2021 with post-operative infection.     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 Miller on 11/22. He was seen in clinic today and noted to have redness, swelling and pain at the incision site (see below pictures). Pt reports low grade fevers at home. The patient is currently hemodynamically stable, non-toxic appearing, though he does describe fairly rapid progression of redness, pain and swelling. CRP is markedly elevated to 217. He will need close monitoring with concern for possible developing necrotizing infection given the rapidity of progression. If any worsening of clinical status would broaden antibiotics and call orthopedics urgently.    Plan    -- Continue Cefazolin   -- Blood cultures negative so far.    -- wound cultures growing staph aureus.  -- Pain control PRN  -- Orthopedics consult appreciated.  Plan for I&D today     HTN  Dyslipidemia  -- Continue PTA amlodipine and lisinopril   -- pt not prescribed ASA or statin PTA     Mild anemia  Hgb is 11.3, down from 13.7 pre-operatively   -- Monitor      Obesity   Body mass index is 36.62 kg/m .          Diet: Regular Diet Adult    DVT Prophylaxis: Pneumatic Compression Devices  Love Catheter: Not present  Central Lines: None  Code Status: Full Code      Disposition Plan   Expected Discharge: 12/13/2021     Anticipated discharge location: home with family    Delays:            The patient's care was discussed with the Patient.    Renee Almanzar MD  Hospitalist Service  [facility]  Securely message  with the [employer] Web Console (learn more here)  Text page via [employer] Paging/Directory        Clinically Significant Risk Factors Present on Admission               ______________________________________________________________________    Interval History   Patient laying in bed, states pain is much improved. Ortho with plans for OR today.     Data reviewed today: I reviewed all medications, new labs and imaging results over the last 24 hours. I personally reviewed no images or EKG's today.    Physical Exam   Vital Signs: Temp: 98.8  F (37.1  C) Temp src: Temporal BP: 113/67 Pulse: 81   Resp: 9 SpO2: 95 % O2 Device: Nasal cannula Oxygen Delivery: 2 LPM  Weight: 271 lbs 0 oz  General Appearance: Well appearing for stated age.  Respiratory: CTAB, no rales or ronchi  Cardiovascular: S1, S2 normal, no murmurs  GI: non-tender on palpation, BS present  Extremity: Right lower extremity dressing in place.    Data   No results found for this or any previous visit (from the past 24 hour(s)).

## 2021-12-10 NOTE — PLAN OF CARE
Summary: Post-op infection in RLE. Hx HTN, PVD, Obesity.  DATE & TIME: 12/10/21 [id number]  Cognitive Concerns/ Orientation: A&Ox4  BEHAVIOR & AGGRESSION TOOL COLOR: green  ABNL VS/O2: VSS RA  MOBILITY: lift assist  PAIN MANAGMENT: Right ankle pain,   DIET: NPO at midnight for procedure today  BOWEL/BLADDER: Voids per urinal, no stool this shift  ABNL LAB/BG:   DRAIN/DEVICES: L-PIV Infusing LR 100mL/hr, wound vac placed during I&D today  SKIN: discoloration on bilateral lower ext. Dressing Right ankle  TESTS/PROCEDURES: I and D this morning  D/C DATE: TBD  OTHER IMPORTANT INFO: Ortho consulted.

## 2021-12-10 NOTE — PROVIDER NOTIFICATION
Hospitalist service consulted post procedure. Patient is primarily managed by hospitalist service already, currently being seen by Dr. Almanzar. Will cancel consult order.     Amilcar Johnson MD

## 2021-12-10 NOTE — PLAN OF CARE
Summary: Post-op infection in RLE. Hx HTN, PVD, Obesity.  DATE & TIME: 12/09/21-12/10/21 [id number]  Cognitive Concerns/ Orientation: A&Ox4  BEHAVIOR & AGGRESSION TOOL COLOR: green  ABNL VS/O2: VSS RA, with low grade fever.  MOBILITY: lift assist  PAIN MANAGMENT: Right ankle pain, Tylenol given x1  DIET: NPO since midnight  BOWEL/BLADDER: Voids in urinal, no stool this shift  ABNL LAB/BG:   DRAIN/DEVICES: L-PIV Infusing NS 100mL/hr  SKIN: discoloration on bilateral lower ext. RLE incision infection (red, swollen and yellow-green/sanguinous drainage)  TESTS/PROCEDURES: I and D in the morning   D/C DATE: TBD  OTHER IMPORTANT INFO: Ortho consulted.

## 2021-12-10 NOTE — CONSULTS
Consult Date: 12/10/2021    CHIEF COMPLAINT:  Right ankle wound.    HISTORY OF PRESENT ILLNESS:  The patient is a 66-year-old male who underwent a complex hindfoot and ankle reconstruction with a fusion and osteotomy for correction.  Postoperatively, he developed hematoma and signs of early infection.  He is a patient of Dr. Miller, and I spoke with Dr. Miller, who has asked of my assistance to proceed with an irrigation and debridement of the wound with possible wound VAC placement and delayed closure by Dr. Miller in the future.    PHYSICAL EXAMINATION:  There moderate edema of the limb.  The wound is bandaged.      His white blood cell count is unremarkable and his temperature has been also unremarkable.    IMPRESSION:  Right ankle hematoma and probable early infection, status post complex hindfoot surgery.    RECOMMENDATION:  I will proceed with irrigation and debridement per Dr. Miller's plan.  I may place a wound VAC or pack the wound, as Dr. Miller stated that he will reassume the patient's care postoperatively.  All questions were answered and I did contact his wife preoperatively and answered her questions as well.    Max Connor MD        D: 12/10/2021   T: 12/10/2021   MT: JEREMÍAS    Name:     BRADEN LUIS  MRN:      [id number]        Account:      [id number]   :      1955           Consult Date: 12/10/2021     Document: [id number]

## 2021-12-10 NOTE — PROGRESS NOTES
SPIRITUAL HEALTH SERVICES  SPIRITUAL ASSESSMENT Progress Note  [facility] UNIT 66    REFERRAL SOURCE: Patient request at admission      Introductory visit with Danial. Explored feelings of frustration re: chronic cellulitis following surgery and explored support system, which includes spouse and family members who Danial is in regularly contact with. Danial welcomed prayer for upcoming procedure, which I led.    I provided spiritual and emotional support, validation of emotions, and reassurance through prayer.    PLAN: Spiritual Health Services remains available for patient, family, and staff support.     Please page the on call  by placing a STAT or [facility] referral for Spiritual Health (which will roll to the on call pager).        CONNIE Hand.   Associate   Pager [phone]

## 2021-12-11 ENCOUNTER — APPOINTMENT (OUTPATIENT)
Dept: PHYSICAL THERAPY | Facility: CLINIC | Age: 66
DRG: 857 | End: 2021-12-11
Attending: STUDENT IN AN ORGANIZED HEALTH CARE EDUCATION/TRAINING PROGRAM
Payer: MEDICARE

## 2021-12-11 LAB — GLUCOSE BLDC GLUCOMTR-MCNC: 133 MG/DL (ref 70–99)

## 2021-12-11 PROCEDURE — 258N000003 HC RX IP 258 OP 636: Performed by: ORTHOPAEDIC SURGERY

## 2021-12-11 PROCEDURE — 120N000001 HC R&B MED SURG/OB

## 2021-12-11 PROCEDURE — 250N000013 HC RX MED GY IP 250 OP 250 PS 637: Performed by: STUDENT IN AN ORGANIZED HEALTH CARE EDUCATION/TRAINING PROGRAM

## 2021-12-11 PROCEDURE — 258N000003 HC RX IP 258 OP 636: Performed by: PHYSICIAN ASSISTANT

## 2021-12-11 PROCEDURE — 97162 PT EVAL MOD COMPLEX 30 MIN: CPT | Mod: GP

## 2021-12-11 PROCEDURE — 250N000011 HC RX IP 250 OP 636: Performed by: PHYSICIAN ASSISTANT

## 2021-12-11 PROCEDURE — 250N000013 HC RX MED GY IP 250 OP 250 PS 637: Performed by: ORTHOPAEDIC SURGERY

## 2021-12-11 PROCEDURE — 99232 SBSQ HOSP IP/OBS MODERATE 35: CPT | Performed by: INTERNAL MEDICINE

## 2021-12-11 PROCEDURE — 250N000011 HC RX IP 250 OP 636: Performed by: ORTHOPAEDIC SURGERY

## 2021-12-11 PROCEDURE — 97530 THERAPEUTIC ACTIVITIES: CPT | Mod: GP

## 2021-12-11 RX ORDER — CEFAZOLIN SODIUM 2 G/100ML
2 INJECTION, SOLUTION INTRAVENOUS EVERY 8 HOURS
Status: DISCONTINUED | OUTPATIENT
Start: 2021-12-11 | End: 2021-12-17 | Stop reason: HOSPADM

## 2021-12-11 RX ADMIN — CEFAZOLIN 2 G: 10 INJECTION, POWDER, FOR SOLUTION INTRAVENOUS at 05:01

## 2021-12-11 RX ADMIN — ACETAMINOPHEN 975 MG: 325 TABLET, FILM COATED ORAL at 23:05

## 2021-12-11 RX ADMIN — LISINOPRIL 40 MG: 40 TABLET ORAL at 09:48

## 2021-12-11 RX ADMIN — ACETAMINOPHEN 975 MG: 325 TABLET, FILM COATED ORAL at 06:30

## 2021-12-11 RX ADMIN — AMLODIPINE BESYLATE 5 MG: 5 TABLET ORAL at 09:48

## 2021-12-11 RX ADMIN — OXYCODONE HYDROCHLORIDE 5 MG: 5 TABLET ORAL at 13:22

## 2021-12-11 RX ADMIN — ACETAMINOPHEN 975 MG: 325 TABLET, FILM COATED ORAL at 15:55

## 2021-12-11 RX ADMIN — OXYCODONE HYDROCHLORIDE 5 MG: 5 TABLET ORAL at 01:39

## 2021-12-11 RX ADMIN — METHOCARBAMOL 500 MG: 500 TABLET ORAL at 01:39

## 2021-12-11 RX ADMIN — POLYETHYLENE GLYCOL 3350 17 G: 17 POWDER, FOR SOLUTION ORAL at 09:48

## 2021-12-11 RX ADMIN — OXYCODONE HYDROCHLORIDE 5 MG: 5 TABLET ORAL at 08:01

## 2021-12-11 RX ADMIN — CEFAZOLIN 2 G: 10 INJECTION, POWDER, FOR SOLUTION INTRAVENOUS at 21:27

## 2021-12-11 RX ADMIN — CEFAZOLIN 2 G: 10 INJECTION, POWDER, FOR SOLUTION INTRAVENOUS at 13:31

## 2021-12-11 ASSESSMENT — ACTIVITIES OF DAILY LIVING (ADL)
ADLS_ACUITY_SCORE: 19
ADLS_ACUITY_SCORE: 15
ADLS_ACUITY_SCORE: 18
ADLS_ACUITY_SCORE: 15
ADLS_ACUITY_SCORE: 19
ADLS_ACUITY_SCORE: 18
ADLS_ACUITY_SCORE: 19
ADLS_ACUITY_SCORE: 19
ADLS_ACUITY_SCORE: 18
ADLS_ACUITY_SCORE: 18
ADLS_ACUITY_SCORE: 19
ADLS_ACUITY_SCORE: 19
ADLS_ACUITY_SCORE: 15
ADLS_ACUITY_SCORE: 18
ADLS_ACUITY_SCORE: 19
ADLS_ACUITY_SCORE: 18
ADLS_ACUITY_SCORE: 15
ADLS_ACUITY_SCORE: 19
ADLS_ACUITY_SCORE: 19
ADLS_ACUITY_SCORE: 15
ADLS_ACUITY_SCORE: 18
ADLS_ACUITY_SCORE: 15

## 2021-12-11 NOTE — PROGRESS NOTES
Orthopedic Surgery  Sav Mendoza  2021  Admit Date:  2021  POD: 1 Day Post-Op  Procedure(s):  IRRIGATION AND DEBRIDEMENT RIGHT ANKLE, WOUND VAC PLACEMENT    Patient resting comfortably in bed.    Pain controlled.  Patient states he lives at home with his wife and son in a rambler with 4-6 steps in the front/back to obtain entry.  Tolerating oral intake.    Denies nausea or vomiting.  Denies chest pain or shortness of breath.  No events overnight.      Alert and orient to person, place, and time.  Vital Sign Ranges  Temperature Temp  Av.7  F (37.1  C)  Min: 97.6  F (36.4  C)  Max: 99.9  F (37.7  C)   Blood pressure Systolic (24hrs), Av , Min:99 , Max:148        Diastolic (24hrs), Av, Min:67, Max:92      Pulse Pulse  Av.4  Min: 80  Max: 99   Respirations Resp  Avg: 15.2  Min: 9  Max: 22   Pulse oximetry SpO2  Av.1 %  Min: 93 %  Max: 99 %       Dressing is clean, dry, and intact.   Prevena wound vac in place, intact and patent.   Minimal erythema of the surrounding skin.   Bilateral calves are soft, non-tender.   Able to wiggle toes   Sensation intact to light touch bilateral lower extremities.  Brisk capillary refill.     Labs:  Recent Labs   Lab Test 12/10/21  0815 21  1223 21  1410   POTASSIUM 3.6 3.8 4.1     Recent Labs   Lab Test 12/10/21  0815 21  1223 21  1411   HGB 10.3* 11.3* 13.7     Recent Labs   Lab Test 06/15/21  0940 21  1641   INR 0.96 1.01     Recent Labs   Lab Test 12/10/21  0815 21  1223 21  0052    389 226       A/P  1. Plan   Continue SCD for DVT prophylaxis.     Mobilize with PT/OT    NWB to RLE   Elevate RLE   Leave dressing and wound vac in place.    Following intra operative cultures - gram stain showing +cocci (wound cultures grew staph. Aureus)   IV abx - ancef   Continue current pain regiment.   Follow up with Dr. Miller's team in clinic in one week.    2. Disposition   Anticipate d/c to home w/ home  care vs TCU pending progress.      Gale Pineda PA-C

## 2021-12-11 NOTE — PROGRESS NOTES
[facility]    HOSPITALIST PROGRESS NOTE :   --------------------------------------------------    Date of Admission:  12/9/2021    Cumulative Summary: Sav Mendoza is a 66 year old male with past medical history significant for HTN, peripheral vascular disease, obesity admitted on 12/9/2021 with post-operative infection.     Assessment & Plan     Post-operative wound infection/cellulitis   Hx of Charcot ankle s/p R ankle corrective osteotomy and subtalar and tibiotalocalcaneal fusion (11/22)  Pt underwent the above procedure with Dr. Miller on 11/22. He was seen in clinic today and noted to have redness, swelling and pain at the incision site (see below pictures). Pt reports low grade fevers at home. The patient is currently hemodynamically stable, non-toxic appearing, though he does describe fairly rapid progression of redness, pain and swelling. CRP is markedly elevated to 217. He will need close monitoring with concern for possible developing necrotizing infection given the rapidity of progression. If any worsening of clinical status would broaden antibiotics and call orthopedics urgently.      -- Patient underwent Irrigation and debridement of right ankle wound and Placement of wound VAC device.  -- Possible need for ID evaluation for antibiotic duration and management if ok with orthopedics    -- Continue Cefazolin   -- Blood cultures negative so far.    -- wound cultures growing staph aureus.  -- Pain control PRN  -- Orthopedics consult appreciated.      HTN  Dyslipidemia  -- Continue PTA amlodipine and lisinopril   -- pt not prescribed ASA or statin PTA\  -- Check BMP tomorrow morning      Mild anemia  Hgb is 11.3, down from 13.7 pre-operatively   -- Monitor      Obesity   Body mass index is 36.62 kg/m .    Clinically Significant Risk Factors Present on Admission     Diet: Advance Diet as Tolerated: Regular Diet Adult  Regular Diet Adult    Love Catheter: Not present  DVT  Prophylaxis: Pneumatic Compression Devices  Code Status: Full Code    The patient's care was discussed with the Bedside Nurse and Patient.    Disposition Plan   Expected Discharge: 12/13/2021  ,  Anticipated discharge location: home with family   , Tentative discharge in 1 to 2 days once okay with orthopedic surgery, patient might need infectious disease evaluation for antibiotic plan.    Entered: Micaela Ardon MD 12/11/2021, 7:42 AM     Micaela Ardon MD, FACP  Text Page (7am - 6pm)    ----------------------------------------------------------------------------------------------------------------------    Interval History   Patient care was assumed this morning, patient was seen and examined.  Patient is feeling well, denying any fever or chills, discussed with him regarding wound and blood culture results, aware that he is receiving IV antibiotics, his blood pressure is well controlled at this point.  All the questions were answered    -Data reviewed today: I reviewed all new labs and imaging results over the last 24 hours.    I personally reviewed no images or EKG's today.    Physical Exam   Temp: 98.6  F (37  C) Temp src: Oral BP: 128/76 Pulse: 89   Resp: 11 SpO2: 93 % O2 Device: None (Room air) Oxygen Delivery: 1 LPM  Vitals:    12/09/21 1152 12/09/21 1550   Weight: 122.5 kg (270 lb) 122.9 kg (271 lb)     Vital Signs with Ranges  Temp:  [97.6  F (36.4  C)-99.9  F (37.7  C)] 98.6  F (37  C)  Pulse:  [80-99] 89  Resp:  [9-22] 11  BP: ()/(67-92) 128/76  SpO2:  [93 %-99 %] 93 %  I/O last 3 completed shifts:  In: 840 [P.O.:240; I.V.:600]  Out: 3630 [Urine:3625; Drains:5]    GENERAL: Alert , awake and oriented. NAD. Conversational, appropriate.   HEENT: Normocephalic. EOMI. No icterus or injection. Nares normal.   LUNGS: Clear to auscultation. No dyspnea at rest.   HEART: Regular rate. Extremities perfused.   ABDOMEN: Soft, nontender, and nondistended. Positive bowel sounds.   EXTREMITIES: No LE edema noted.   Right foot in surgical dressing.  NEUROLOGIC: Moves extremities x4 on command. No acute focal neurologic abnormalities noted.     Medications     lactated ringers Stopped (12/10/21 1943)     sodium chloride Stopped (12/10/21 1052)       acetaminophen  975 mg Oral Q8H     amLODIPine  5 mg Oral Daily     lisinopril  40 mg Oral Daily     polyethylene glycol  17 g Oral Daily     senna-docusate  1 tablet Oral BID     sodium chloride (PF)  3 mL Intracatheter Q8H     sodium chloride (PF)  3 mL Intracatheter Q8H       Data   Recent Labs   Lab 12/11/21  0549 12/10/21  0815 12/09/21  1223   WBC  --  7.7 9.5   HGB  --  10.3* 11.3*   MCV  --  93 93   PLT  --  401 389   NA  --  136 136   POTASSIUM  --  3.6 3.8   CHLORIDE  --  103 102   CO2  --  27 27   BUN  --  8 11   CR  --  0.64* 0.70   ANIONGAP  --  6 7   RAJAT  --  8.9 9.5   * 117* 115*       Imaging:   No results found for this or any previous visit (from the past 24 hour(s)).

## 2021-12-11 NOTE — PROGRESS NOTES
12/11/21 0831   Quick Adds   Type of Visit Initial PT Evaluation   Living Environment   People in home child(sophia), adult;spouse   Current Living Arrangements house   Home Accessibility stairs to enter home   Number of Stairs, Main Entrance 4   Stair Railings, Main Entrance railings on both sides of stairs   Living Environment Comments stairs to enter home only; family can help out at home as needed    Self-Care   Usual Activity Tolerance good   Current Activity Tolerance fair   Equipment Currently Used at Home walker, standard;walker, rolling;wheelchair, manual   Activity/Exercise/Self-Care Comment could get access to wheelchair    Disability/Function   Hearing Difficulty or Deaf no   Concentrating, Remembering or Making Decisions Difficulty no   Difficulty Communicating no   Difficulty Eating/Swallowing no   Walking or Climbing Stairs Difficulty yes   Walking or Climbing Stairs ambulation difficulty, requires equipment   Mobility Management walker    Dressing/Bathing Difficulty no   Toileting issues no   Doing Errands Independently Difficulty (such as shopping) no   Fall history within last six months yes   Number of times patient has fallen within last six months 1   Change in Functional Status Since Onset of Current Illness/Injury yes   General Information   Onset of Illness/Injury or Date of Surgery 12/09/21   Referring Physician Max Connor MD   Patient/Family Therapy Goals Statement (PT) to get better    Pertinent History of Current Problem (include personal factors and/or comorbidities that impact the POC) Per chart: Sav Mendoza is a 66 year old male with past medical history significant for HTN, peripheral vascular disease, obesity admitted on 12/9/2021 with post-operative infection.    Existing Precautions/Restrictions fall   Weight-Bearing Status - RUE nonweight-bearing   Weight-Bearing Status - LLE full weight-bearing   General Observations h/o B ankle fusion   Cognition   Orientation  Status (Cognition) oriented x 4   Strength   Strength Comments generally 4/5   Bed Mobility   Comment (Bed Mobility) SBA with bed railing and HOB raised. Able to use momentum to swing R LE up into bed. Able to use railing to supine scoot w/ verbal cuing for strategy    Transfers   Transfers sit-stand transfer   Sit-Stand Transfer   Sit-Stand [place] (Transfers) moderate assist (50% patient effort)   Assistive Device (Sit-Stand Transfers) walker, front-wheeled   Sit/Stand Transfer Comments Able to stand up full to FWW, follows R LE NWB precautions but with difficulty and time to complete.    Gait/Stairs (Locomotion)   Comment (Gait/Stairs) unable to shift weight once standing w/ NWB R LE    Clinical Impression   Criteria for Skilled Therapeutic Intervention yes, treatment indicated   PT Diagnosis (PT) impaired functional mobility   Influenced by the following impairments decreased strength, decreased activity tolerance, pain, orthopedic restrictions    Functional limitations due to impairments bed mobility, transfers, gait, stairs    Clinical Presentation Evolving/Changing   Clinical Presentation Rationale clinical judgement   Clinical Decision Making (Complexity) moderate complexity   Therapy Frequency (PT) Daily   Predicted Duration of Therapy Intervention (days/wks) 7 days   Planned Therapy Interventions (PT) bed mobility training;gait training;home exercise program;neuromuscular re-education;stair training;strengthening;transfer training;wheelchair management/propulsion training   Anticipated Equipment Needs at Discharge (PT) walker, rolling;wheelchair   Risk & Benefits of therapy have been explained evaluation/treatment results reviewed;care plan/treatment goals reviewed;risks/benefits reviewed;patient   PT Discharge Planning    PT Discharge Recommendation (DC Rec) Transitional Care Facility;home with assist;home with home care physical therapy   PT Rationale for DC Rec Patient presents with signifcantly  decreased safety and independence with functional mobility needing mod assist for sit <> stand, unable to pivot due to NWB R LE status. PT recommends TCU stay to address functional mobility prior to returning home. If pt were to return home, he would need total assist for manuevering stairs and ambulation, needs w/c for mobility    PT Brief overview of current status  mod assist sit<> stand to FWW; needs lift for transfers and w/c for mobility   Total Evaluation Time   Total Evaluation Time (Minutes) 12       Mala Padilla, PT

## 2021-12-11 NOTE — PLAN OF CARE
Summary: Post-op infection in RLE. Hx HTN, PVD, Obesity.  DATE & TIME: 12/10/21 6436-3416  Cognitive Concerns/ Orientation: A&Ox4  BEHAVIOR & AGGRESSION TOOL COLOR: green  ABNL VS/O2: VSS  Oxygen 1 LNC post op, on Capno with readings within normal range  MOBILITY:   Bedrest until tomorrow  PAIN MANAGMENT: Right ankle pain, Ice applied to surgical site per orders, Med x1 with IV Dilaudid 0.2 mg, on scheduled Tylenol. C/O int spasms in surgical site.   DIET: Regular  BOWEL/BLADDER: Voids per urinal, has voided x1 since returning from surgery  ABNL LAB/BG:   DRAIN/DEVICES: wound vac placed during I&D today, IVF DC'd per postop orses, SL patent  SKIN: discoloration on bilateral lower ext. Surgical dressing to right LE CDI   TESTS/PROCEDURES: I and D on Previous shift  D/C DATE: TBD  OTHER IMPORTANT INFO:  Wound vac in place to RLE , scant amount reddish drainage

## 2021-12-11 NOTE — PLAN OF CARE
Summary: Post-op infection in RLE. Hx HTN, PVD, Obesity.  DATE & TIME: 12/11/21 2199-1504  Cognitive Concerns/ Orientation: A&Ox4  BEHAVIOR & AGGRESSION TOOL COLOR: green  ABNL VS/O2: VSS  Oxygen 1 LNC post op, on Capno with readings within normal range  MOBILITY: No Wt. Bearing on right, requires lift to chair/commode  PAIN MANAGMENT: Right ankle painTylenol , oxycodone 5mg  DIET: Regular  BOWEL/BLADDER: Voids per urinal,   ABNL LAB/BG:   DRAIN/DEVICES: wound vac right ankle, PIV right hand SL   SKIN: discoloration on bilateral lower ext. Surgical dressing to right LE CDI   TESTS/PROCEDURES:none  D/C DATE: TBD  OTHER IMPORTANT INFO:  Wound vac in place to RLE , scant amount reddish drainage

## 2021-12-11 NOTE — PLAN OF CARE
[id number]    Danial had a good evening. VSS. Pain rated 2-5/10 in right ankle region, managed with ice, elevation, repositioning, PRN and scheduled medications. A&Ox4. Capno continues, no concerns. CMS to BLE intact. ACE wrap to RLE remains in place, removed x1 for skin inspection and then replaced. Wound vac remains in place to 125mmHg, ARTIE site closure. IV Abx tolerating well. POD #1 . Will continue to monitor.

## 2021-12-12 ENCOUNTER — APPOINTMENT (OUTPATIENT)
Dept: PHYSICAL THERAPY | Facility: CLINIC | Age: 66
DRG: 857 | End: 2021-12-12
Payer: MEDICARE

## 2021-12-12 LAB
BACTERIA WND CULT: ABNORMAL
GLUCOSE BLDC GLUCOMTR-MCNC: 124 MG/DL (ref 70–99)
GRAM STAIN RESULT: ABNORMAL
GRAM STAIN RESULT: ABNORMAL

## 2021-12-12 PROCEDURE — 99232 SBSQ HOSP IP/OBS MODERATE 35: CPT | Performed by: INTERNAL MEDICINE

## 2021-12-12 PROCEDURE — 250N000011 HC RX IP 250 OP 636: Performed by: PHYSICIAN ASSISTANT

## 2021-12-12 PROCEDURE — 250N000013 HC RX MED GY IP 250 OP 250 PS 637: Performed by: STUDENT IN AN ORGANIZED HEALTH CARE EDUCATION/TRAINING PROGRAM

## 2021-12-12 PROCEDURE — 250N000013 HC RX MED GY IP 250 OP 250 PS 637: Performed by: ORTHOPAEDIC SURGERY

## 2021-12-12 PROCEDURE — 120N000001 HC R&B MED SURG/OB

## 2021-12-12 PROCEDURE — 97530 THERAPEUTIC ACTIVITIES: CPT | Mod: GP

## 2021-12-12 RX ADMIN — CEFAZOLIN 2 G: 10 INJECTION, POWDER, FOR SOLUTION INTRAVENOUS at 04:47

## 2021-12-12 RX ADMIN — METHOCARBAMOL 500 MG: 500 TABLET ORAL at 00:23

## 2021-12-12 RX ADMIN — ACETAMINOPHEN 975 MG: 325 TABLET, FILM COATED ORAL at 15:31

## 2021-12-12 RX ADMIN — ACETAMINOPHEN 975 MG: 325 TABLET, FILM COATED ORAL at 06:37

## 2021-12-12 RX ADMIN — IBUPROFEN 400 MG: 200 TABLET, FILM COATED ORAL at 05:25

## 2021-12-12 RX ADMIN — OXYCODONE HYDROCHLORIDE 5 MG: 5 TABLET ORAL at 04:46

## 2021-12-12 RX ADMIN — LISINOPRIL 40 MG: 40 TABLET ORAL at 08:37

## 2021-12-12 RX ADMIN — AMLODIPINE BESYLATE 5 MG: 5 TABLET ORAL at 08:37

## 2021-12-12 RX ADMIN — OXYCODONE HYDROCHLORIDE 5 MG: 5 TABLET ORAL at 08:47

## 2021-12-12 RX ADMIN — CEFAZOLIN 2 G: 10 INJECTION, POWDER, FOR SOLUTION INTRAVENOUS at 22:08

## 2021-12-12 RX ADMIN — CEFAZOLIN 2 G: 10 INJECTION, POWDER, FOR SOLUTION INTRAVENOUS at 14:03

## 2021-12-12 RX ADMIN — OXYCODONE HYDROCHLORIDE 5 MG: 5 TABLET ORAL at 00:23

## 2021-12-12 RX ADMIN — ACETAMINOPHEN 975 MG: 325 TABLET, FILM COATED ORAL at 23:02

## 2021-12-12 ASSESSMENT — ACTIVITIES OF DAILY LIVING (ADL)
ADLS_ACUITY_SCORE: 15

## 2021-12-12 NOTE — PROGRESS NOTES
[facility]    HOSPITALIST PROGRESS NOTE :   --------------------------------------------------    Date of Admission:  12/9/2021    Cumulative Summary: Sav Mendoza is a 66 year old male with past medical history significant for HTN, peripheral vascular disease, obesity admitted on 12/9/2021 with post-operative infection.     Assessment & Plan     Post-operative wound infection/cellulitis   Hx of Charcot ankle s/p R ankle corrective osteotomy and subtalar and tibiotalocalcaneal fusion (11/22)  Pt underwent the above procedure with Dr. Miller on 11/22. He was seen in clinic today and noted to have redness, swelling and pain at the incision site (see below pictures). Pt reports low grade fevers at home. The patient is currently hemodynamically stable, non-toxic appearing, though he does describe fairly rapid progression of redness, pain and swelling. CRP is markedly elevated to 217. He will need close monitoring with concern for possible developing necrotizing infection given the rapidity of progression. If any worsening of clinical status would broaden antibiotics and call orthopedics urgently.      -- Patient underwent Irrigation and debridement of right ankle wound and Placement of wound VAC device.  -- will consult ID evaluation for antibiotic duration and management if ok with orthopedics    --Patient has been evaluated by infectious disease, recommending to continue IV Ancef, anticipating IV antibiotics for 6 weeks, planning to add oral rifampin but will wait for blood cultures for now.  --Patient will most likely need the PICC line once blood cultures are finalized.  --Follow-up on blood cultures and wound cultures result, wound cultures are growing a staph aureus.  --Continue pain control  --Orthopedic surgery has been following, appreciate their help.  --We will have  to start working on discharge planning, patient might need rehab with IV antibiotics and recent  surgery as he has a stairs to enter in the house.     HTN  Dyslipidemia  -- Continue PTA amlodipine and lisinopril   -- pt not prescribed ASA or statin PTA\  -- Check BMP and CRP tomorrow morning      Mild anemia  Hgb is 11.3, down from 13.7 pre-operatively   -- Monitor      Obesity   Body mass index is 36.62 kg/m .    Clinically Significant Risk Factors Present on Admission     Diet: Advance Diet as Tolerated: Regular Diet Adult  Regular Diet Adult    Love Catheter: Not present  DVT Prophylaxis: Pneumatic Compression Devices  Code Status: Full Code    The patient's care was discussed with the Bedside Nurse and Patient.    Disposition Plan   Expected Discharge: 12/13/2021  ,  Anticipated discharge location: patient thinks that he might need to go to rehab if he needs IV antibiotics and after foot surgert , will ask  to start looking into that .Tentative discharge in 1 to 2 days once okay with orthopedic surgery and ID    Entered: Micaela Ardon MD 12/12/2021, 8:13 AM     Micaela Ardon MD, FACP  Text Page (7am - 6pm)    ----------------------------------------------------------------------------------------------------------------------    Interval History    Patient was seen and examined, sitting in bed, denying any new complaints, we discussed about infectious disease consultation for the antibiotics recommendation and involved duration for IV antibiotics versus oral regimen.  Patient is otherwise denying any fever or chills.  Discussed with him regarding his wound and blood cultures.  All the questions were answered    -Data reviewed today: I reviewed all new labs and imaging results over the last 24 hours.    I personally reviewed no images or EKG's today.    Physical Exam   Temp: 98.2  F (36.8  C) Temp src: Oral BP: (!) 140/82 Pulse: 82   Resp: 18 SpO2: 94 % O2 Device: None (Room air)    Vitals:    12/09/21 1152 12/09/21 1550   Weight: 122.5 kg (270 lb) 122.9 kg (271 lb)     Vital Signs with  Ranges  Temp:  [98.1  F (36.7  C)-99.4  F (37.4  C)] 98.2  F (36.8  C)  Pulse:  [77-88] 82  Resp:  [18-21] 18  BP: (116-140)/(71-82) 140/82  SpO2:  [92 %-94 %] 94 %  I/O last 3 completed shifts:  In: 1360 [P.O.:1360]  Out: 1950 [Urine:1925; Drains:25]    GENERAL: Alert , awake and oriented. NAD. Conversational, appropriate.   HEENT: Normocephalic. EOMI. No icterus or injection. Nares normal.   LUNGS: Clear to auscultation. No dyspnea at rest.   HEART: Regular rate. Extremities perfused.   ABDOMEN: Soft, nontender, and nondistended. Positive bowel sounds.   EXTREMITIES: No LE edema noted.  Right foot in surgical dressing.  NEUROLOGIC: Moves extremities x4 on command. No acute focal neurologic abnormalities noted.     Medications     lactated ringers Stopped (12/10/21 1943)       acetaminophen  975 mg Oral Q8H     amLODIPine  5 mg Oral Daily     ceFAZolin  2 g Intravenous Q8H     lisinopril  40 mg Oral Daily     polyethylene glycol  17 g Oral Daily     senna-docusate  1 tablet Oral BID     sodium chloride (PF)  3 mL Intracatheter Q8H     sodium chloride (PF)  3 mL Intracatheter Q8H       Data   Recent Labs   Lab 12/12/21  0604 12/11/21  0549 12/10/21  0815 12/09/21  1223   WBC  --   --  7.7 9.5   HGB  --   --  10.3* 11.3*   MCV  --   --  93 93   PLT  --   --  401 389   NA  --   --  136 136   POTASSIUM  --   --  3.6 3.8   CHLORIDE  --   --  103 102   CO2  --   --  27 27   BUN  --   --  8 11   CR  --   --  0.64* 0.70   ANIONGAP  --   --  6 7   RAJAT  --   --  8.9 9.5   * 133* 117* 115*       Imaging:   No results found for this or any previous visit (from the past 24 hour(s)).

## 2021-12-12 NOTE — CONSULTS
[facility]    Infectious Disease Consultation     Date of Admission:  12/9/2021  Date of Consult (When I saw the patient): 12/12/21    Assessment & Plan   Sav Mendoza is a 66 year old male who was admitted on 12/9/2021.     Impression:  1. 66 y.o male with HTN   2. History of complex hindfoot and ankle reconstruction with a fusion and osteotomy for correction.   3. Admitted now with concern for infection   4. S/p: PROCEDURE PERFORMED:    1.  Irrigation and debridement of right ankle wound.    2.  Placement of wound VAC device.  5. Cultures with MSSA.   6. On ancef    Recommendations;   1. Continue on ancef   2. Follow up on the blood cultures.   3. Anticipate IV Antibiotics for 6 weeks.   4. will also add oral rifampin but wait on the blood cultures for now.         Yanna Espinoza MD    Reason for Consult   Reason for consult: I was asked to evaluate this patient for post operative wound infection.    Primary Care Physician   Logan Calvo    Chief Complaint   Post operative infection     History is obtained from the patient and medical records    History of Present Illness   Sav Mendoza is a 66 year old male  who underwent a complex hindfoot and ankle reconstruction with a fusion and osteotomy for correction.  Postoperatively, he developed hematoma and signs of early infection    Past Medical History   I have reviewed this patient's medical history and updated it with pertinent information if needed.   Past Medical History:   Diagnosis Date     Hypertension      Ventral hernia 6/24/14       Past Surgical History   I have reviewed this patient's surgical history and updated it with pertinent information if needed.  Past Surgical History:   Procedure Laterality Date     COLONOSCOPY N/A 6/16/2021    Procedure: COLONOSCOPY;  Surgeon: Echo Green MD;  Location: [facility] GI     LAPAROTOMY EXPLORATORY N/A 12/12/2017    Procedure: LAPAROTOMY EXPLORATORY;  Exploratory Laparotomy and  Gint  Ventral Hernia Repair with Mesh;  Surgeon: Mina Parsons MD;  Location: [facility] OR     ORTHOPEDIC SURGERY Left     ankle fusion     OSTEOTOMY ANKLE Right 2021    Procedure: RIGHT ANKLE CORRECTIVE OSTEOTOMY AND SUBTALAR AND TIBIOTALOCALCANEAL FUSION;  Surgeon: Ad Miller MD;  Location:  OR       Prior to Admission Medications   Prior to Admission Medications   Prescriptions Last Dose Informant Patient Reported? Taking?   Blood Pressure Monitoring (BLOOD PRESSURE MONITOR/WRIST) LAURA   No No   Si Application 2 times daily as needed (HYPERTENSION)   amLODIPine (NORVASC) 5 MG tablet 2021 at am Self No Yes   Sig: Take 1 tablet (5 mg) by mouth daily   docusate sodium (COLACE) 100 MG tablet 2021 Self Yes Yes   Sig: Take 100 mg by mouth daily   lisinopril (ZESTRIL) 40 MG tablet 2021 at pm Self No Yes   Sig: TAKE 1 TABLET BY MOUTH ONCE DAILY IN THE EVENING.   multivitamin, therapeutic with minerals (THERA-VIT-M) TABS 2021 at am Self Yes Yes   Sig: Take 1 tablet by mouth daily   oxyCODONE (ROXICODONE) 5 MG tablet 2021 at hs Self No Yes   Sig: Take 1-2 tablets (5-10 mg) by mouth every 4 hours as needed for moderate to severe pain   Patient taking differently: Take 10 mg by mouth At Bedtime    polyethylene glycol (MIRALAX) 17 GM/Dose powder prn at prn Self No Yes   Sig: Take 17 g (1 capful) by mouth daily as needed for constipation   vitamin C (ASCORBIC ACID) 500 MG tablet 2021 at am Self Yes Yes   Sig: Take 500 mg by mouth daily      Facility-Administered Medications: None     Allergies   No Known Allergies    Immunization History   Immunization History   Administered Date(s) Administered     TDAP Vaccine (Adacel) 05/10/2016       Social History   I have reviewed this patient's social history and updated it with pertinent information if needed. Sav Mendoza  reports that he has never smoked. He has never used smokeless tobacco. He reports previous alcohol use. He  reports that he does not use drugs.    Family History   I have reviewed this patient's family history and updated it with pertinent information if needed.   Family History   Problem Relation Age of Onset     Alzheimer Disease Mother      Glaucoma Mother      Macular Degeneration No family hx of      Colon Cancer No family hx of      Prostate Cancer No family hx of        Review of Systems   The 10 point Review of Systems is negative other than noted in the HPI or here.     Physical Exam   Temp: 98  F (36.7  C) Temp src: Oral BP: 133/88 Pulse: 85   Resp: 18 SpO2: 96 % O2 Device: None (Room air)    Vital Signs with Ranges  Temp:  [98  F (36.7  C)-99.4  F (37.4  C)] 98  F (36.7  C)  Pulse:  [77-88] 85  Resp:  [18] 18  BP: (118-140)/(72-88) 133/88  SpO2:  [92 %-96 %] 96 %  271 lbs 0 oz  Body mass index is 36.75 kg/m .    GENERAL APPEARANCE:  awake  EYES: Eyes grossly normal to inspection  NECK: no adenopathy  RESP: lungs clear   CV: regular rates and rhythm  LYMPHATICS: normal ant/post cervical and supraclavicular nodes  ABDOMEN: soft, nontender  MS: dressing on the right lower leg   The left leg has dry peely skin and venous statis changes   SKIN: no suspicious lesions or rashes  Psych: affect normal       Data   Lab Results   Component Value Date    WBC 7.7 12/10/2021    HGB 10.3 (L) 12/10/2021    HCT 32.5 (L) 12/10/2021     12/10/2021     12/10/2021    POTASSIUM 3.6 12/10/2021    CHLORIDE 103 12/10/2021    CO2 27 12/10/2021    BUN 8 12/10/2021    CR 0.64 (L) 12/10/2021     (H) 12/12/2021    SED 14 02/21/2021    AST 13 06/17/2021    ALT 20 06/17/2021    ALKPHOS 37 (L) 06/17/2021    BILITOTAL 0.4 06/17/2021    INR 0.96 06/15/2021     No results for input(s): CULT in the last 168 hours.  Recent Labs   Lab Test 01/18/20  1727 06/26/17  0014 05/14/16  1110 05/13/16  0554 05/12/16  1034 05/11/16  1434 05/10/16  1951 05/10/16  1939 02/19/14  1415   CULT No beta hemolytic Streptococcus Group A isolated No  growth No growth No growth No growth Cultured on the 1st day of incubation: Staphylococcus aureus  Critical Value/Significant Value, preliminary result only, called to and read   back by Taya Arriaga RN on UR10A 5/12/16 at 0850 SRQ  Susceptibility testing done on previous specimen  * Cultured on the 1st day of incubation: Staphylococcus aureus  Critical Value/Significant Value, preliminary result only, called to and read   back by Aneesh Echols Pharmacist on 10A 5/11/16 at 1229 SRQ  Susceptibility testing done on previous specimen  * Cultured on the 1st day of incubation: Staphylococcus aureus  Critical Value/Significant Value, preliminary result only, called to and read   back by Aneesh Echols Pharmacist on 10A 5/11/16 at 1229 SRQ  (Note)  POSITIVE for STAPHYLOCOCCUS AUREUS and NEGATIVE for the mecA gene  (not MRSA) by [employer] multiplex nucleic acid test. The mecA gene was  not detected. Final identification and antimicrobial susceptibility  testing will be verified by standard methods.    Specimen tested with [employer] multiplex, gram-positive blood culture  nucleic acid test for the following targets: Staph aureus, Staph  epidermidis, Staph lugdunensis, other Staph species, Enterococcus  faecalis, Enterococcus faecium, Streptococcus species, S. agalactiae,  S. anginosus grp., S. pneumoniae, S. pyogenes, Listeria sp., mecA  (methicillin resistance) and Licha/B (vancomycin resistance).    Critical Value/Significant Value called to and read back by  Leyla RODRÍGUEZ RN at 1512 3/11/16. hd  * No anaerobes isolated  Light growth Staphylococcus aureus Light growth Beta hemolytic Streptococcus group G This isolate is presumed to be  clindamycin resistant based on detection of inducible clindamycin resistance.*  Canceled, Test credited Incorrectly ordered by PCU/Clinic MD WANTED ANAEROBIC  CULTURE.  ANAEROBIC CULTURE ADDED TO ACCN [id number].

## 2021-12-12 NOTE — PLAN OF CARE
Cognitive Concerns/ Orientation: A&Ox4  BEHAVIOR & AGGRESSION TOOL COLOR: green  ABNL VS/O2: VSS on RA  MOBILITY: NWB RLE; A2, walker, GB last evening to pivot to BSC, not OOB overnight.   PAIN MANAGMENT: Right ankle pain, on scheduled Tylenol; PRN oxycodone x2, Robaxin x1, Ibuprofen x1.   DIET: Regular diet  BOWEL/BLADDER: Continent, voiding in urinal.    ABNL LAB/BG:   DRAIN/DEVICES: wound vac to right ankle, sanguinous drainage in canister, canister total 75 ml, no additional output overnight; PIV, saline locked   SKIN: BLE rodo. Surgical dressing to RLE, CDI   TESTS/PROCEDURES: POD2 R ankle I&D, wound vac placement.   D/C DATE: 1-2 days, PT recommending TCU   OTHER IMPORTANT INFO: CMS intact. Ortho surgery and PT following.

## 2021-12-12 NOTE — PLAN OF CARE
Summary: Post-op infection in RLE. Hx HTN, PVD, Obesity.  DATE & TIME: 12/11/21 3931-9358  Cognitive Concerns/ Orientation: A&Ox4  BEHAVIOR & AGGRESSION TOOL COLOR: green  ABNL VS/O2: VSS  on room air  MOBILITY: No Wt. Bearing on right, up to commode with strong assist of 2, GB and walker, no weight bearing right leg  PAIN MANAGMENT: Right ankle painTylenol ,   DIET: Regular  BOWEL/BLADDER: Voids per urinal, BM x1 on commode at HS.  ABNL LAB/BG:   DRAIN/DEVICES: wound vac right ankle, PIV right hand SL   SKIN: discoloration on bilateral lower ext. Surgical dressing to right LE CDI   TESTS/PROCEDURES:none  D/C DATE: TBD  OTHER IMPORTANT INFO:  Wound vac in place to RLE , 25 ml sanguinous drainage reddish drainage

## 2021-12-12 NOTE — CONSULTS
Care Management Initial Consult    General Information  Assessment completed with: Patient,    Type of CM/SW Visit: Initial Assessment    Primary Care Provider verified and updated as needed:     Readmission within the last 30 days:        Reason for Consult: discharge planning,facility placement  Advance Care Planning:            Communication Assessment  Patient's communication style: spoken language (English or Bilingual)    Hearing Difficulty or Deaf: no   Wear Glasses or Blind: yes    Cognitive  Cognitive/Neuro/Behavioral: WDL  Level of Consciousness: alert  Arousal Level: opens eyes spontaneously     Mood/Behavior: calm,cooperative          Living Environment:   People in home: spouse  Jyoti: Wife: [phone]  Current living Arrangements: house      Able to return to prior arrangements: no       Family/Social Support:  Care provided by: self  Provides care for: no one  Marital Status:   Wife          Description of Support System: Involved    Support Assessment: Adequate family and caregiver support    Current Resources:   Patient receiving home care services: No     Community Resources: None  Equipment currently used at home: walker, standard,walker, rolling,wheelchair, manual  Supplies currently used at home:      Employment/Financial:  Employment Status: retired        Financial Concerns: No concerns identified   Referral to Financial Counselor: No       Lifestyle & Psychosocial Needs:  Social Determinants of Health     Tobacco Use: Low Risk      Smoking Tobacco Use: Never Smoker     Smokeless Tobacco Use: Never Used   Alcohol Use: Not on file   Financial Resource Strain: Not on file   Food Insecurity: Not on file   Transportation Needs: Not on file   Physical Activity: Not on file   Stress: Not on file   Social Connections: Not on file   Intimate Partner Violence: Not on file   Depression: Not at risk     PHQ-2 Score: 0   Housing Stability: Not on file       Functional Status:  Prior to admission  patient needed assistance:              Mental Health Status:  Mental Health Status: No Current Concerns       Chemical Dependency Status:  Chemical Dependency Status: No Current Concerns             Values/Beliefs:  Spiritual, Cultural Beliefs, Amish Practices, Values that affect care: no               Additional Information:  PT recommends TCU. Pt will also need IV antibiotics and wound vac. Met with pt. He is agreeable. He lives with his spouse in their own home in [place]. Reviewed facilities in that area. Will send referrals to [facility], [facility], [place] and [facility].     WALTER Batres, LGSW  [phone]  [facility]

## 2021-12-12 NOTE — PLAN OF CARE
Summary: Post-op infection in RLE. Hx HTN, PVD, Obesity.  DATE & TIME: 12/12/2021 4321-8138  Cognitive Concerns/ Orientation: A&Ox4  BEHAVIOR & AGGRESSION TOOL COLOR: green  ABNL VS/O2: VSS on RA  MOBILITY: NWB RLE; A2, walker, GB last evening to pivot to BSC, lift to chair today  PAIN MANAGMENT: Right ankle pain, on scheduled Tylenol; PRN oxycodone x1  DIET: Regular diet  BOWEL/BLADDER: Continent, voiding in urinal.    ABNL LAB/BG: none  DRAIN/DEVICES: wound vac to right ankle, sanguinous drainage in canister, canister total 75 ml, no additional output   PIV, saline locked   SKIN: BLE rodo. Surgical dressing to RLE, CDI   TESTS/PROCEDURES: POD 2 R ankle I&D, wound vac placement.   D/C DATE: 1-2 days, PT recommending TCU   OTHER IMPORTANT INFO: CMS intact. Ortho surgery and PT following.

## 2021-12-12 NOTE — PROGRESS NOTES
Orthopedic Surgery  Sav Mendoza  2021  Admit Date:  2021  POD: 2 Days Post-Op  Procedure(s):  IRRIGATION AND DEBRIDEMENT RIGHT ANKLE, WOUND VAC PLACEMENT    Patient resting comfortably in bed.    Patient denies any pain.   Tolerating oral intake.    Denies nausea or vomiting.  Denies chest pain or shortness of breath.  No events overnight.      Alert and orient to person, place, and time.  Vital Sign Ranges  Temperature Temp  Av.6  F (37  C)  Min: 98  F (36.7  C)  Max: 99.4  F (37.4  C)   Blood pressure Systolic (24hrs), Av , Min:118 , Max:140        Diastolic (24hrs), Av, Min:71, Max:88      Pulse Pulse  Av.8  Min: 77  Max: 88   Respirations Resp  Av.5  Min: 18  Max: 21   Pulse oximetry SpO2  Av.5 %  Min: 92 %  Max: 96 %       Dressing is clean, dry, and intact.   Prevena wound vac in place, intact and patent.   Minimal erythema of the surrounding skin.   Bilateral calves are soft, non-tender.   Able to wiggle toes   Sensation intact to light touch bilateral lower extremities.  Brisk capillary refill.     Labs:  Recent Labs   Lab Test 12/10/21  0815 21  1223 21  1410   POTASSIUM 3.6 3.8 4.1     Recent Labs   Lab Test 12/10/21  0815 21  1223 21  1411   HGB 10.3* 11.3* 13.7     Recent Labs   Lab Test 06/15/21  0940 21  1641   INR 0.96 1.01     Recent Labs   Lab Test 12/10/21  0815 21  1223 21  0052    389 226     A/P  1. Plan              Continue SCD for DVT prophylaxis.                Mobilize with PT/OT               NWB to RLE              Elevate RLE              Leave dressing and wound vac in place.               Following intra operative cultures - growing out staph aureus               IV abx - ancef   ID consult placed yesterday               Continue current pain regiment.              Follow up with Dr. Miller's PA, Ty, in clinic on  (per Ty).     2. Disposition              Anticipate d/c to home w/  home care vs TCU pending progress and antibiotic plan in place. Ortho stable.     Gale Pineda PA-C

## 2021-12-13 ENCOUNTER — APPOINTMENT (OUTPATIENT)
Dept: PHYSICAL THERAPY | Facility: CLINIC | Age: 66
DRG: 857 | End: 2021-12-13
Payer: MEDICARE

## 2021-12-13 LAB
ANION GAP SERPL CALCULATED.3IONS-SCNC: 7 MMOL/L (ref 3–14)
BUN SERPL-MCNC: 12 MG/DL (ref 7–30)
CALCIUM SERPL-MCNC: 9 MG/DL (ref 8.5–10.1)
CHLORIDE BLD-SCNC: 102 MMOL/L (ref 94–109)
CO2 SERPL-SCNC: 27 MMOL/L (ref 20–32)
CREAT SERPL-MCNC: 0.67 MG/DL (ref 0.66–1.25)
CRP SERPL-MCNC: 61.6 MG/L (ref 0–8)
GFR SERPL CREATININE-BSD FRML MDRD: >90 ML/MIN/1.73M2
GLUCOSE BLD-MCNC: 164 MG/DL (ref 70–99)
GLUCOSE BLDC GLUCOMTR-MCNC: 124 MG/DL (ref 70–99)
HGB BLD-MCNC: 11.4 G/DL (ref 13.3–17.7)
POTASSIUM BLD-SCNC: 3.9 MMOL/L (ref 3.4–5.3)
SODIUM SERPL-SCNC: 136 MMOL/L (ref 133–144)
WBC # BLD AUTO: 6.9 10E3/UL (ref 4–11)

## 2021-12-13 PROCEDURE — 85018 HEMOGLOBIN: CPT | Performed by: INTERNAL MEDICINE

## 2021-12-13 PROCEDURE — 86140 C-REACTIVE PROTEIN: CPT | Performed by: INTERNAL MEDICINE

## 2021-12-13 PROCEDURE — 999N000040 HC STATISTIC CONSULT NO CHARGE VASC ACCESS

## 2021-12-13 PROCEDURE — 36415 COLL VENOUS BLD VENIPUNCTURE: CPT | Performed by: INTERNAL MEDICINE

## 2021-12-13 PROCEDURE — 80048 BASIC METABOLIC PNL TOTAL CA: CPT | Performed by: INTERNAL MEDICINE

## 2021-12-13 PROCEDURE — 250N000013 HC RX MED GY IP 250 OP 250 PS 637: Performed by: STUDENT IN AN ORGANIZED HEALTH CARE EDUCATION/TRAINING PROGRAM

## 2021-12-13 PROCEDURE — 272N000450 HC KIT 4FR POWER PICC SINGLE LUMEN

## 2021-12-13 PROCEDURE — 250N000013 HC RX MED GY IP 250 OP 250 PS 637: Performed by: ORTHOPAEDIC SURGERY

## 2021-12-13 PROCEDURE — 97530 THERAPEUTIC ACTIVITIES: CPT | Mod: GP

## 2021-12-13 PROCEDURE — 250N000011 HC RX IP 250 OP 636: Performed by: PHYSICIAN ASSISTANT

## 2021-12-13 PROCEDURE — 85048 AUTOMATED LEUKOCYTE COUNT: CPT | Performed by: INTERNAL MEDICINE

## 2021-12-13 PROCEDURE — 120N000001 HC R&B MED SURG/OB

## 2021-12-13 PROCEDURE — 99232 SBSQ HOSP IP/OBS MODERATE 35: CPT | Performed by: INTERNAL MEDICINE

## 2021-12-13 PROCEDURE — 36569 INSJ PICC 5 YR+ W/O IMAGING: CPT | Mod: 52

## 2021-12-13 RX ORDER — CEFAZOLIN SODIUM 1 G/3ML
2 INJECTION, POWDER, FOR SOLUTION INTRAMUSCULAR; INTRAVENOUS EVERY 8 HOURS
Qty: 1 EACH | DISCHARGE
Start: 2021-12-13 | End: 2022-01-22

## 2021-12-13 RX ADMIN — CEFAZOLIN 2 G: 10 INJECTION, POWDER, FOR SOLUTION INTRAVENOUS at 04:36

## 2021-12-13 RX ADMIN — POLYETHYLENE GLYCOL 3350 17 G: 17 POWDER, FOR SOLUTION ORAL at 08:24

## 2021-12-13 RX ADMIN — IBUPROFEN 400 MG: 200 TABLET, FILM COATED ORAL at 04:31

## 2021-12-13 RX ADMIN — METHOCARBAMOL 500 MG: 500 TABLET ORAL at 02:46

## 2021-12-13 RX ADMIN — SENNOSIDES AND DOCUSATE SODIUM 1 TABLET: 50; 8.6 TABLET ORAL at 08:24

## 2021-12-13 RX ADMIN — ACETAMINOPHEN 975 MG: 325 TABLET, FILM COATED ORAL at 08:23

## 2021-12-13 RX ADMIN — LISINOPRIL 40 MG: 40 TABLET ORAL at 08:24

## 2021-12-13 RX ADMIN — ACETAMINOPHEN 650 MG: 325 TABLET, FILM COATED ORAL at 21:03

## 2021-12-13 RX ADMIN — OXYCODONE HYDROCHLORIDE 5 MG: 5 TABLET ORAL at 21:03

## 2021-12-13 RX ADMIN — CEFAZOLIN 2 G: 10 INJECTION, POWDER, FOR SOLUTION INTRAVENOUS at 13:33

## 2021-12-13 RX ADMIN — CEFAZOLIN 2 G: 10 INJECTION, POWDER, FOR SOLUTION INTRAVENOUS at 21:10

## 2021-12-13 RX ADMIN — AMLODIPINE BESYLATE 5 MG: 5 TABLET ORAL at 08:24

## 2021-12-13 RX ADMIN — OXYCODONE HYDROCHLORIDE 5 MG: 5 TABLET ORAL at 02:07

## 2021-12-13 RX ADMIN — SENNOSIDES AND DOCUSATE SODIUM 1 TABLET: 50; 8.6 TABLET ORAL at 21:04

## 2021-12-13 RX ADMIN — OXYCODONE HYDROCHLORIDE 5 MG: 5 TABLET ORAL at 06:25

## 2021-12-13 ASSESSMENT — ACTIVITIES OF DAILY LIVING (ADL)
ADLS_ACUITY_SCORE: 14
ADLS_ACUITY_SCORE: 15
ADLS_ACUITY_SCORE: 14
ADLS_ACUITY_SCORE: 15
ADLS_ACUITY_SCORE: 14
ADLS_ACUITY_SCORE: 15
ADLS_ACUITY_SCORE: 14
ADLS_ACUITY_SCORE: 15
ADLS_ACUITY_SCORE: 15
ADLS_ACUITY_SCORE: 14
ADLS_ACUITY_SCORE: 14
ADLS_ACUITY_SCORE: 15
ADLS_ACUITY_SCORE: 14

## 2021-12-13 NOTE — PROGRESS NOTES
[facility]    Infectious Disease Progress Note    Date of Service (when I saw the patient): 12/13/2021     Assessment & Plan   Sav Mendoza is a 66 year old male who was admitted on 12/9/2021.     Impression:  1. 66 y.o male with HTN   2. History of complex hindfoot and ankle reconstruction with a fusion and osteotomy for correction.   3. Admitted now with concern for infection   4. S/p: PROCEDURE PERFORMED:    1.  Irrigation and debridement of right ankle wound.    2.  Placement of wound VAC device.  5. Cultures with MSSA.   6. On ancef     Recommendations;   1. Continue on ancef   2. Follow up on the blood cultures.   3. Anticipate IV Antibiotics for 6 weeks.   4. Add oral rifampin    5. Noted bacillus cereus in 1/2 wound cultures in broth only, ignoring as environmental contaminant.   6. Ok for PICC   7. Orders for out patient IV antibiotics in the chart, please order Rifampin for 6 weeks at discharge.     Yanna Espinoza MD    Interval History   Tolerating antibiotics ok   No new rashes or issues with antibiotics   Labs reviewed   Afebrile       Physical Exam   Temp: 97.2  F (36.2  C) Temp src: Oral BP: (!) 142/88 Pulse: 84   Resp: 18 SpO2: 94 % O2 Device: None (Room air)    Vitals:    12/09/21 1152 12/09/21 1550   Weight: 122.5 kg (270 lb) 122.9 kg (271 lb)     Vital Signs with Ranges  Temp:  [97.2  F (36.2  C)-98.5  F (36.9  C)] 97.2  F (36.2  C)  Pulse:  [76-85] 84  Resp:  [18-20] 18  BP: (121-142)/(71-88) 142/88  SpO2:  [92 %-95 %] 94 %    Constitutional: Awake, alert, cooperative, no apparent distress  Lungs: Clear to auscultation bilaterally, no crackles or wheezing  Cardiovascular: Regular rate and rhythm, normal S1 and S2, and no murmur noted  Abdomen: Normal bowel sounds, soft, non-distended, non-tender  Skin: No rashes, no cyanosis, no edema  Other:    Medications       amLODIPine  5 mg Oral Daily     ceFAZolin  2 g Intravenous Q8H     lisinopril  40 mg Oral Daily      polyethylene glycol  17 g Oral Daily     senna-docusate  1 tablet Oral BID     sodium chloride (PF)  3 mL Intracatheter Q8H       Data   All microbiology laboratory data reviewed.  Recent Labs   Lab Test 12/13/21  0844 12/10/21  0815 12/09/21  1223 11/11/21  1411 09/30/21  0052   WBC 6.9 7.7 9.5  --  12.2*   HGB 11.4* 10.3* 11.3*   < > 13.2*   HCT  --  32.5* 35.4*  --  40.8   MCV  --  93 93  --  90   PLT  --  401 389  --  226    < > = values in this interval not displayed.     Recent Labs   Lab Test 12/13/21  0844 12/10/21  0815 12/09/21  1223   CR 0.67 0.64* 0.70     Recent Labs   Lab Test 02/21/21  1641   SED 14     Recent Labs   Lab Test 01/18/20  1727 06/26/17  0014 05/14/16  1110 05/13/16  0554 05/12/16  1034 05/11/16  1434 05/10/16  1951 05/10/16  1939 02/19/14  1415   CULT No beta hemolytic Streptococcus Group A isolated No growth No growth No growth No growth Cultured on the 1st day of incubation: Staphylococcus aureus  Critical Value/Significant Value, preliminary result only, called to and read   back by Taya Arriaga RN on UR10A 5/12/16 at 0850 SRQ  Susceptibility testing done on previous specimen  * Cultured on the 1st day of incubation: Staphylococcus aureus  Critical Value/Significant Value, preliminary result only, called to and read   back by Cortez Olivarez on 10A 5/11/16 at 1229 SRQ  Susceptibility testing done on previous specimen  * Cultured on the 1st day of incubation: Staphylococcus aureus  Critical Value/Significant Value, preliminary result only, called to and read   back by Cortez Olivarez on 10A 5/11/16 at 1229 SRQ  (Note)  POSITIVE for STAPHYLOCOCCUS AUREUS and NEGATIVE for the mecA gene  (not MRSA) by [employer] multiplex nucleic acid test. The mecA gene was  not detected. Final identification and antimicrobial susceptibility  testing will be verified by standard methods.    Specimen tested with [employer] multiplex, gram-positive blood culture  nucleic acid test for  the following targets: Staph aureus, Staph  epidermidis, Staph lugdunensis, other Staph species, Enterococcus  faecalis, Enterococcus faecium, Streptococcus species, S. agalactiae,  S. anginosus grp., S. pneumoniae, S. pyogenes, Listeria sp., mecA  (methicillin resistance) and Licha/B (vancomycin resistance).    Critical Value/Significant Value called to and read back by  Leyla RODRÍGUEZ RN at 1512 3/11/16. hd  * No anaerobes isolated  Light growth Staphylococcus aureus Light growth Beta hemolytic Streptococcus group G This isolate is presumed to be  clindamycin resistant based on detection of inducible clindamycin resistance.*  Canceled, Test credited Incorrectly ordered by U/Clinic MD WANTED ANAEROBIC  CULTURE.  ANAEROBIC CULTURE ADDED TO ACCN [id number].

## 2021-12-13 NOTE — PROGRESS NOTES
Orthopedic Surgery  Sav Mendoza  2021  Admit Date:  2021  POD 3 Days Post-Op  S/P Procedure(s):  IRRIGATION AND DEBRIDEMENT RIGHT ANKLE, WOUND VAC PLACEMENT    Patient resting comfortably in bed.    Pain controlled.  Tolerating oral intake.    Denies nausea or vomiting  Denies chest pain or shortness of breath  No events overnight.     Alert and orient to person, place, and time.  Vital Sign Ranges  Temperature Temp  Av  F (36.7  C)  Min: 97.2  F (36.2  C)  Max: 98.5  F (36.9  C)   Blood pressure Systolic (24hrs), Av , Min:121 , Max:142        Diastolic (24hrs), Av, Min:71, Max:88      Pulse Pulse  Av  Min: 76  Max: 85   Respirations Resp  Av  Min: 18  Max: 20   Pulse oximetry SpO2  Av.8 %  Min: 92 %  Max: 95 %       Prevena wound vac in place, intact and patent.   Minimal erythema of the surrounding skin.   Bilateral calves are soft, non-tender.   Able to wiggle toes   Sensation intact to light touch bilateral lower extremities.  Brisk capillary refill.      Intra-op cultures: staph aureus   Labs:  Recent Labs   Lab Test 21  0844 12/10/21  0815 21  1223   POTASSIUM 3.9 3.6 3.8     Recent Labs   Lab Test 21  0844 12/10/21  0815 21  1223   HGB 11.4* 10.3* 11.3*     Recent Labs   Lab Test 06/15/21  0940 21  1641   INR 0.96 1.01     Recent Labs   Lab Test 12/10/21  0815 21  1223 21  0052    389 226     A/P  1. Plan              Continue SCD for DVT prophylaxis.                Mobilize with PT/OT               NWB to RLE              Elevate RLE              Leave dressing and wound vac in place.               IV abx per ID              Continue current pain regiment.              Follow up with Dr. Miller's PATy, in clinic on      2. Disposition              Anticipate d/c to TCU based on placement     Rosey Gonzalez PA-C

## 2021-12-13 NOTE — PLAN OF CARE
Summary: Post-op infection in RLE. Hx HTN, PVD, Obesity.  DATE & TIME: 12/13/2021 7 a to 3 p  Cognitive Concerns/ Orientation: A&Ox4  BEHAVIOR & AGGRESSION TOOL COLOR: green  ABNL VS/O2: VSS on RA  MOBILITY: NWB RLE;  ceiling lift to chair today  PAIN MANAGMENT: Right ankle pain/spasms, Scheduled Tylenol given  DIET: Regular diet  BOWEL/BLADDER: Continent, voiding in urinal, no BM continue with stool softeners  ABNL LAB/BG: CRP improving  DRAIN/DEVICES: wound vac to right ankle  PIV, saline locked , intermittent ABX  SKIN: BLE rodo. Surgical dressing to RLE, CDI   TESTS/PROCEDURES: POD 4 R ankle I&D, wound vac placement.   D/C DATE: 1-2 days, PT recommending TCU   OTHER IMPORTANT INFO:     3 p to 7 p  Vascular team tried placing a PICC but was not able to access, will try the L arm tomorrow.  New IV placed

## 2021-12-13 NOTE — PLAN OF CARE
Summary: Post-op infection in RLE. Hx HTN, PVD, Obesity.  DATE & TIME: 12/12/2021   Cognitive Concerns/ Orientation: A&Ox4  BEHAVIOR & AGGRESSION TOOL COLOR: green  ABNL VS/O2: VSS on RA  MOBILITY: NWB RLE;  ceiling lift to chair today  PAIN MANAGMENT: Right ankle pain, rated at 3 on scheduled Tylenol with stated decrease in pain  DIET: Regular diet  BOWEL/BLADDER: Continent, voiding in urinal.    ABNL LAB/BG: none  DRAIN/DEVICES: wound vac to right ankle, sanguinous drainage in canister, canister total  approx 70 ml, no additional output   PIV, saline locked   SKIN: BLE rodo. Surgical dressing to RLE, CDI   TESTS/PROCEDURES: POD 2 R ankle I&D, wound vac placement.   D/C DATE: 1-2 days, PT recommending TCU   OTHER IMPORTANT INFO: CMS intact. Ortho surgery and PT following.

## 2021-12-13 NOTE — PLAN OF CARE
Summary: Post-op infection in RLE. Hx HTN, PVD, Obesity.  DATE & TIME: 12/12/2021 [id number]  Cognitive Concerns/ Orientation: A&Ox4  BEHAVIOR & AGGRESSION TOOL COLOR: green  ABNL VS/O2: VSS on RA  MOBILITY: NWB RLE;  ceiling lift to chair today  PAIN MANAGMENT: Right ankle pain/spasms, Oxycodone, Ibuprofen and Robaxin given  DIET: Regular diet  BOWEL/BLADDER: Continent, voiding in urinal.    ABNL LAB/BG: none  DRAIN/DEVICES: wound vac to right ankle, canister changed due to wound vac beeping. About 50 mL in old canister  PIV, saline locked   SKIN: BLE rodo. Surgical dressing to RLE, CDI   TESTS/PROCEDURES: POD 3 R ankle I&D, wound vac placement.   D/C DATE: 1-2 days, PT recommending TCU   OTHER IMPORTANT INFO: CMS intact. Ortho surgery and PT following.

## 2021-12-13 NOTE — PROGRESS NOTES
[facility]    HOSPITALIST PROGRESS NOTE :   --------------------------------------------------    Date of Admission:  12/9/2021    Cumulative Summary: Sav Mendoza is a 66 year old male with past medical history significant for HTN, peripheral vascular disease, obesity admitted on 12/9/2021 with post-operative infection.     Assessment & Plan     Post-operative wound infection/cellulitis   Hx of Charcot ankle s/p R ankle corrective osteotomy and subtalar and tibiotalocalcaneal fusion (11/22)  Pt underwent the above procedure with Dr. Miller on 11/22. He was seen in clinic today and noted to have redness, swelling and pain at the incision site (see below pictures). Pt reports low grade fevers at home. The patient is currently hemodynamically stable, non-toxic appearing, though he does describe fairly rapid progression of redness, pain and swelling. CRP is markedly elevated to 217. He will need close monitoring with concern for possible developing necrotizing infection given the rapidity of progression. If any worsening of clinical status would broaden antibiotics and call orthopedics urgently.      -- Patient was seen and examined , feeling well, about to work with therapies, denying any fever or chills.  -- Patient underwent Irrigation and debridement of right ankle wound and Placement of wound VAC device.  --Patient has been evaluated by infectious disease, recommending to continue IV Ancef, anticipating IV antibiotics for 6 weeks, planning to add oral rifampin but will wait for blood cultures for now.  --We will order PICC line placement, discussed with infectious disease  --Follow-up on blood cultures and wound cultures result, wound cultures are growing a staph aureus.  --Continue pain control  --Orthopedic surgery has been following, appreciate their help.  --We will have  to start working on discharge planning, patient might need rehab with IV antibiotics and  recent surgery as he has a stairs to enter in the house.     HTN  Dyslipidemia  -- Continue PTA amlodipine and lisinopril   -- pt not prescribed ASA or statin PTA  -- Check BMP and CRP tomorrow morning      Mild anemia  Hgb is 11.3, down from 13.7 pre-operatively   -- Monitor      Obesity   Body mass index is 36.62 kg/m .    Clinically Significant Risk Factors Present on Admission     Diet: Advance Diet as Tolerated: Regular Diet Adult  Regular Diet Adult    Love Catheter: Not present  DVT Prophylaxis: Pneumatic Compression Devices  Code Status: Full Code    The patient's care was discussed with the Bedside Nurse and Patient.    Disposition Plan   Expected Discharge: 12/14/2021  Anticipated discharge location: patient thinks that he might need to go to rehab if he needs IV antibiotics and after foot surgert , will ask  to start looking into that .Tentative discharge in 1 to 2 days once okay with orthopedic surgery and ID    Entered: Micaela Ardon MD 12/13/2021, 7:59 AM     Micaela Ardon MD, FACP  Text Page (7am - 6pm)    ----------------------------------------------------------------------------------------------------------------------    Interval History    Patient was seen and examined, sitting in bed, denying any new complaints, about to work with therapies, discussed with infectious disease who were okay with PICC line placement.  Patient is otherwise denying any chest pain, palpitations or shortness of breath.  Patient is anticipated to have IV antibiotics for 6 weeks     -Data reviewed today: I reviewed all new labs and imaging results over the last 24 hours.    I personally reviewed no images or EKG's today.    Physical Exam   Temp: 97.2  F (36.2  C) Temp src: Oral BP: (!) 142/88 Pulse: 84   Resp: 18 SpO2: 94 % O2 Device: None (Room air)    Vitals:    12/09/21 1152 12/09/21 1550   Weight: 122.5 kg (270 lb) 122.9 kg (271 lb)     Vital Signs with Ranges  Temp:  [97.2  F (36.2  C)-98.5  F (36.9   C)] 97.2  F (36.2  C)  Pulse:  [76-85] 84  Resp:  [18-20] 18  BP: (121-142)/(71-88) 142/88  SpO2:  [92 %-95 %] 94 %  I/O last 3 completed shifts:  In: 1610 [P.O.:1510; I.V.:100]  Out: 2830 [Urine:2775; Drains:55]    GENERAL: Alert , awake and oriented. NAD. Conversational, appropriate.   HEENT: Normocephalic. EOMI. No icterus or injection. Nares normal.   LUNGS: Clear to auscultation. No dyspnea at rest.   HEART: Regular rate. Extremities perfused.   ABDOMEN: Soft, nontender, and nondistended. Positive bowel sounds.   EXTREMITIES: No LE edema noted.  Right foot in surgical dressing.  NEUROLOGIC: Moves extremities x4 on command. No acute focal neurologic abnormalities noted.     Medications       acetaminophen  975 mg Oral Q8H     amLODIPine  5 mg Oral Daily     ceFAZolin  2 g Intravenous Q8H     lisinopril  40 mg Oral Daily     polyethylene glycol  17 g Oral Daily     senna-docusate  1 tablet Oral BID     sodium chloride (PF)  3 mL Intracatheter Q8H       Data   Recent Labs   Lab 12/13/21  0733 12/12/21  0604 12/11/21  0549 12/10/21  0815 12/09/21  1223   WBC  --   --   --  7.7 9.5   HGB  --   --   --  10.3* 11.3*   MCV  --   --   --  93 93   PLT  --   --   --  401 389   NA  --   --   --  136 136   POTASSIUM  --   --   --  3.6 3.8   CHLORIDE  --   --   --  103 102   CO2  --   --   --  27 27   BUN  --   --   --  8 11   CR  --   --   --  0.64* 0.70   ANIONGAP  --   --   --  6 7   RAJAT  --   --   --  8.9 9.5   * 124* 133* 117* 115*       Imaging:   No results found for this or any previous visit (from the past 24 hour(s)).

## 2021-12-14 ENCOUNTER — APPOINTMENT (OUTPATIENT)
Dept: PHYSICAL THERAPY | Facility: CLINIC | Age: 66
DRG: 857 | End: 2021-12-14
Payer: MEDICARE

## 2021-12-14 LAB
BACTERIA BLD CULT: NO GROWTH
BACTERIA BLD CULT: NO GROWTH
BACTERIA TISS BX CULT: ABNORMAL
BACTERIA TISS BX CULT: ABNORMAL

## 2021-12-14 PROCEDURE — 250N000013 HC RX MED GY IP 250 OP 250 PS 637: Performed by: ORTHOPAEDIC SURGERY

## 2021-12-14 PROCEDURE — 120N000001 HC R&B MED SURG/OB

## 2021-12-14 PROCEDURE — 250N000013 HC RX MED GY IP 250 OP 250 PS 637: Performed by: STUDENT IN AN ORGANIZED HEALTH CARE EDUCATION/TRAINING PROGRAM

## 2021-12-14 PROCEDURE — 250N000013 HC RX MED GY IP 250 OP 250 PS 637: Performed by: INTERNAL MEDICINE

## 2021-12-14 PROCEDURE — 250N000011 HC RX IP 250 OP 636: Performed by: PHYSICIAN ASSISTANT

## 2021-12-14 PROCEDURE — 36569 INSJ PICC 5 YR+ W/O IMAGING: CPT

## 2021-12-14 PROCEDURE — 97530 THERAPEUTIC ACTIVITIES: CPT | Mod: GP

## 2021-12-14 PROCEDURE — 250N000009 HC RX 250: Performed by: INTERNAL MEDICINE

## 2021-12-14 PROCEDURE — 272N000450 HC KIT 4FR POWER PICC SINGLE LUMEN

## 2021-12-14 PROCEDURE — 97110 THERAPEUTIC EXERCISES: CPT | Mod: GP

## 2021-12-14 PROCEDURE — 99232 SBSQ HOSP IP/OBS MODERATE 35: CPT | Performed by: INTERNAL MEDICINE

## 2021-12-14 RX ORDER — RIFAMPIN 300 MG/1
300 CAPSULE ORAL EVERY 12 HOURS SCHEDULED
Status: DISCONTINUED | OUTPATIENT
Start: 2021-12-14 | End: 2021-12-17 | Stop reason: HOSPADM

## 2021-12-14 RX ADMIN — RIFAMPIN 300 MG: 300 CAPSULE ORAL at 21:25

## 2021-12-14 RX ADMIN — CEFAZOLIN 2 G: 10 INJECTION, POWDER, FOR SOLUTION INTRAVENOUS at 15:26

## 2021-12-14 RX ADMIN — LISINOPRIL 40 MG: 40 TABLET ORAL at 09:19

## 2021-12-14 RX ADMIN — POLYETHYLENE GLYCOL 3350 17 G: 17 POWDER, FOR SOLUTION ORAL at 09:19

## 2021-12-14 RX ADMIN — SENNOSIDES AND DOCUSATE SODIUM 1 TABLET: 50; 8.6 TABLET ORAL at 21:25

## 2021-12-14 RX ADMIN — ACETAMINOPHEN 650 MG: 325 TABLET, FILM COATED ORAL at 01:21

## 2021-12-14 RX ADMIN — OXYCODONE HYDROCHLORIDE 5 MG: 5 TABLET ORAL at 01:21

## 2021-12-14 RX ADMIN — CEFAZOLIN 2 G: 10 INJECTION, POWDER, FOR SOLUTION INTRAVENOUS at 21:25

## 2021-12-14 RX ADMIN — RIFAMPIN 300 MG: 300 CAPSULE ORAL at 12:55

## 2021-12-14 RX ADMIN — LIDOCAINE HYDROCHLORIDE 1 ML: 10 INJECTION, SOLUTION EPIDURAL; INFILTRATION; INTRACAUDAL; PERINEURAL at 08:55

## 2021-12-14 RX ADMIN — AMLODIPINE BESYLATE 5 MG: 5 TABLET ORAL at 09:19

## 2021-12-14 RX ADMIN — SENNOSIDES AND DOCUSATE SODIUM 1 TABLET: 50; 8.6 TABLET ORAL at 09:19

## 2021-12-14 RX ADMIN — CEFAZOLIN 2 G: 10 INJECTION, POWDER, FOR SOLUTION INTRAVENOUS at 05:13

## 2021-12-14 RX ADMIN — ACETAMINOPHEN 650 MG: 325 TABLET, FILM COATED ORAL at 22:03

## 2021-12-14 RX ADMIN — OXYCODONE HYDROCHLORIDE 5 MG: 5 TABLET ORAL at 22:03

## 2021-12-14 ASSESSMENT — ACTIVITIES OF DAILY LIVING (ADL)
ADLS_ACUITY_SCORE: 18
ADLS_ACUITY_SCORE: 18
ADLS_ACUITY_SCORE: 16
ADLS_ACUITY_SCORE: 15
ADLS_ACUITY_SCORE: 16
ADLS_ACUITY_SCORE: 15
ADLS_ACUITY_SCORE: 18
ADLS_ACUITY_SCORE: 15
ADLS_ACUITY_SCORE: 18
ADLS_ACUITY_SCORE: 15
ADLS_ACUITY_SCORE: 16
ADLS_ACUITY_SCORE: 18
ADLS_ACUITY_SCORE: 15
ADLS_ACUITY_SCORE: 15
ADLS_ACUITY_SCORE: 18
ADLS_ACUITY_SCORE: 18
ADLS_ACUITY_SCORE: 15
ADLS_ACUITY_SCORE: 16
ADLS_ACUITY_SCORE: 15
ADLS_ACUITY_SCORE: 15

## 2021-12-14 NOTE — PROGRESS NOTES
[facility]    Infectious Disease Progress Note    Date of Service (when I saw the patient): 12/14/2021     Assessment & Plan   Sav Mendoza is a 66 year old male who was admitted on 12/9/2021.     Impression:  1. 66 y.o male with HTN   2. History of complex hindfoot and ankle reconstruction with a fusion and osteotomy for correction.   3. Admitted now with concern for infection   4. S/p: PROCEDURE PERFORMED:    1.  Irrigation and debridement of right ankle wound.    2.  Placement of wound VAC device.  5. Cultures with MSSA.   6. On ancef     Recommendations;   1. Continue on ancef   2. Follow up on the blood cultures.   3. Anticipate IV Antibiotics for 6 weeks.   4. Add oral rifampin    5. Noted bacillus cereus in 1/2 wound cultures in broth only, ignoring as environmental contaminant.   6. Ok for PICC   7. Orders for out patient IV antibiotics in the chart, please order Rifampin for 6 weeks at discharge.     Yanna Espinoaz MD    Interval History   Tolerating antibiotics ok   No new rashes or issues with antibiotics   Labs reviewed   Afebrile       Physical Exam   Temp: 98.4  F (36.9  C) Temp src: Oral BP: (!) 140/83 Pulse: 87   Resp: 18 SpO2: 94 % O2 Device: None (Room air)    Vitals:    12/09/21 1152 12/09/21 1550   Weight: 122.5 kg (270 lb) 122.9 kg (271 lb)     Vital Signs with Ranges  Temp:  [97.3  F (36.3  C)-98.4  F (36.9  C)] 98.4  F (36.9  C)  Pulse:  [83-89] 87  Resp:  [18] 18  BP: (129-141)/(75-89) 140/83  SpO2:  [93 %-94 %] 94 %    Constitutional: Awake, alert, cooperative, no apparent distress  Lungs: Clear to auscultation bilaterally, no crackles or wheezing  Cardiovascular: Regular rate and rhythm, normal S1 and S2, and no murmur noted  Abdomen: Normal bowel sounds, soft, non-distended, non-tender  Skin: No rashes, no cyanosis, no edema  Other:    Medications       amLODIPine  5 mg Oral Daily     ceFAZolin  2 g Intravenous Q8H     lisinopril  40 mg Oral Daily      polyethylene glycol  17 g Oral Daily     rifampin  300 mg Oral Q12H JIM     senna-docusate  1 tablet Oral BID     sodium chloride (PF)  10-40 mL Intracatheter Q7 Days     sodium chloride (PF)  3 mL Intracatheter Q8H       Data   All microbiology laboratory data reviewed.  Recent Labs   Lab Test 12/13/21  0844 12/10/21  0815 12/09/21  1223 11/11/21  1411 09/30/21  0052   WBC 6.9 7.7 9.5  --  12.2*   HGB 11.4* 10.3* 11.3*   < > 13.2*   HCT  --  32.5* 35.4*  --  40.8   MCV  --  93 93  --  90   PLT  --  401 389  --  226    < > = values in this interval not displayed.     Recent Labs   Lab Test 12/13/21  0844 12/10/21  0815 12/09/21  1223   CR 0.67 0.64* 0.70     Recent Labs   Lab Test 02/21/21  1641   SED 14     Recent Labs   Lab Test 01/18/20  1727 06/26/17  0014 05/14/16  1110 05/13/16  0554 05/12/16  1034 05/11/16  1434 05/10/16  1951 05/10/16  1939 02/19/14  1415   CULT No beta hemolytic Streptococcus Group A isolated No growth No growth No growth No growth Cultured on the 1st day of incubation: Staphylococcus aureus  Critical Value/Significant Value, preliminary result only, called to and read   back by Taya Arriaga RN on UR10A 5/12/16 at 0850 SRQ  Susceptibility testing done on previous specimen  * Cultured on the 1st day of incubation: Staphylococcus aureus  Critical Value/Significant Value, preliminary result only, called to and read   back by Cortez Olivarez on 10A 5/11/16 at 1229 SRQ  Susceptibility testing done on previous specimen  * Cultured on the 1st day of incubation: Staphylococcus aureus  Critical Value/Significant Value, preliminary result only, called to and read   back by Aneesh Echols Pharmacist on 10A 5/11/16 at 1229 SRQ  (Note)  POSITIVE for STAPHYLOCOCCUS AUREUS and NEGATIVE for the mecA gene  (not MRSA) by [employer] multiplex nucleic acid test. The mecA gene was  not detected. Final identification and antimicrobial susceptibility  testing will be verified by standard  methods.    Specimen tested with [employer] multiplex, gram-positive blood culture  nucleic acid test for the following targets: Staph aureus, Staph  epidermidis, Staph lugdunensis, other Staph species, Enterococcus  faecalis, Enterococcus faecium, Streptococcus species, S. agalactiae,  S. anginosus grp., S. pneumoniae, S. pyogenes, Listeria sp., mecA  (methicillin resistance) and Licha/B (vancomycin resistance).    Critical Value/Significant Value called to and read back by  Leyla RODRÍGUEZ RN at 9274 3/11/16. hd  * No anaerobes isolated  Light growth Staphylococcus aureus Light growth Beta hemolytic Streptococcus group G This isolate is presumed to be  clindamycin resistant based on detection of inducible clindamycin resistance.*  Canceled, Test credited Incorrectly ordered by PCU/Clinic MD WANTED ANAEROBIC  CULTURE.  ANAEROBIC CULTURE ADDED TO ACCN [id number].

## 2021-12-14 NOTE — PROGRESS NOTES
Orthopedic Surgery  Sav Mendoza  2021  Admit Date:  2021  POD 4 Days Post-Op  S/P Procedure(s):  IRRIGATION AND DEBRIDEMENT RIGHT ANKLE, WOUND VAC PLACEMENT    Patient resting comfortably in chair.    Pain controlled.  Tolerating oral intake.    Denies nausea or vomiting  Denies chest pain or shortness of breath  No events overnight.     Alert and orient to person, place, and time.  Vital Sign Ranges  Temperature Temp  Av.1  F (36.7  C)  Min: 97.3  F (36.3  C)  Max: 98.4  F (36.9  C)   Blood pressure Systolic (24hrs), Av , Min:129 , Max:141        Diastolic (24hrs), Av, Min:75, Max:89      Pulse Pulse  Av.2  Min: 83  Max: 89   Respirations Resp  Av  Min: 18  Max: 18   Pulse oximetry SpO2  Av.8 %  Min: 93 %  Max: 94 %       Prevena wound vac in place, intact and patent.   Bloody serosanguinous drainage in cannister, mild amount  Minimal erythema of the surrounding skin.   Bilateral venous stasis changes LE  Bilateral calves are soft, non-tender.   Able to wiggle toes   Sensation intact to light touch bilateral lower extremities.  Brisk capillary refill.      Intra-op cultures: staph aureus   Labs:  Recent Labs   Lab Test 21  0844 12/10/21  0815 21  1223   POTASSIUM 3.9 3.6 3.8     Recent Labs   Lab Test 21  0844 12/10/21  0815 21  1223   HGB 11.4* 10.3* 11.3*     Recent Labs   Lab Test 06/15/21  0940 21  1641   INR 0.96 1.01     Recent Labs   Lab Test 12/10/21  0815 21  1223 21  0052    389 226     P  1. Plan              Continue SCD for DVT prophylaxis.                Mobilize with PT/OT               NWB to RLE              Elevate RLE              Leave dressing and wound vac in place.               IV abx per ID              Continue current pain regiment.              Follow up with Dr. Miller's PA, Ty, in clinic on      2. Disposition              Anticipate d/c to TCU based on placement     Rosey Gonzalez  JUSTIN

## 2021-12-14 NOTE — PROCEDURES
[facility]    Single Lumen PICC Placement    Date/Time: 12/14/2021 11:19 AM  Performed by: Jimmy Guillen RN  Authorized by: Micaela Ardon MD   Indications: vascular access      UNIVERSAL PROTOCOL   Site Marked: Yes  Prior Images Obtained and Reviewed:  Yes  Required items: Required blood products, implants, devices and special equipment available    Patient identity confirmed:  Verbally with patient, arm band and hospital-assigned identification number  NA - No sedation, light sedation, or local anesthesia  Confirmation Checklist:  Patient's identity using two indicators, relevant allergies, procedure was appropriate and matched the consent or emergent situation and correct equipment/implants were available  Time out: Immediately prior to the procedure a time out was called    Universal Protocol: the Joint Commission Universal Protocol was followed    Preparation: Patient was prepped and draped in usual sterile fashion       ANESTHESIA    Anesthesia: Local infiltration  Local Anesthetic:  Lidocaine 1% without epinephrine  Anesthetic Total (mL):  1      SEDATION    Patient Sedated: No        Skin prep agent: skin prep agent completely dried prior to procedure  Sterile barriers: maximum sterile barriers were used: cap, mask, sterile gown, sterile gloves, and large sterile sheet  Hand hygiene: hand hygiene performed prior to central venous catheter insertion  Type of line used: Power PICC  Catheter type: single lumen  Lumen type: valved  Catheter size: 4 Fr  Brand: Bard  Lot number: [id number]  Placement method: venipuncture and ultrasound  Number of attempts: 1  Successful placement: yes  Orientation: right  Location: basilic vein  Arm circumference: adults 10 cm  Extremity circumference: 39  Visible catheter length: 3  Internal length: 49 cm  Total catheter length: 52  Dressing and securement: chlorhexidine patch applied, dressing applied, securement device and sterile dressing  applied  Post procedure assessment: blood return through all ports (3CG)  PROCEDURE   Patient Tolerance:  Patient tolerated the procedure well with no immediate complicationsDescribe Procedure: Nursing note  Pt a/o x 4. Two VAT RN attempted twice yesterday 12/13/2021, without success. The writer explained the risks/benefits of the procedure again to the pt. Pt verbalized understanding. Found 5 mm right basilic vein and was successful on one attempt with good blood return.

## 2021-12-14 NOTE — PROGRESS NOTES
Patient instructed on PICC placement and consent was given. Right arm was attempted by 2 RN's without success. Inability to thread guidewire. Patient requests for placement to be stopped tonight and VAT team can reattempt tomorrow.

## 2021-12-14 NOTE — PROGRESS NOTES
Care Management Follow Up    Length of Stay (days): 5    Expected Discharge Date: 12/16/2021     Concerns to be Addressed: discharge planning     Patient plan of care discussed at interdisciplinary rounds: Yes    Anticipated Discharge Disposition: Transitional Care     Anticipated Discharge Services:    Anticipated Discharge DME:      Patient/family educated on Medicare website which has current facility and service quality ratings: yes  Education Provided on the Discharge Plan:    Patient/Family in Agreement with the Plan: yes    Referrals Placed by CM/SW: Post Acute Facilities  Private pay costs discussed: Not applicable    Additional Information:    Per chart review, the following TCUs are pending:    [facility]:  Left vm  [facility]:  No answer; unable to connect  [facility]  [facility]; do not take wound vacs    Sw reached out to pt to discuss additional choices.  Pt has wound vac + IV abx.  Pt was agreeable to  TCU, Elizabeth, and Pawel  at [facility]; referrals sent.      Sw to continue to follow for discharge planning needs.      Aria Strickland, LORASW

## 2021-12-14 NOTE — PROGRESS NOTES
[facility]    HOSPITALIST PROGRESS NOTE :   --------------------------------------------------    Date of Admission:  12/9/2021    Cumulative Summary: Sav Mendoza is a 66 year old male with past medical history significant for HTN, peripheral vascular disease, obesity admitted on 12/9/2021 with post-operative infection.     Assessment & Plan     Post-operative wound infection/cellulitis   Hx of Charcot ankle s/p R ankle corrective osteotomy and subtalar and tibiotalocalcaneal fusion (11/22)  Pt underwent the above procedure with Dr. Miller on 11/22. He was seen in clinic today and noted to have redness, swelling and pain at the incision site (see below pictures). Pt reports low grade fevers at home. The patient is currently hemodynamically stable, non-toxic appearing, though he does describe fairly rapid progression of redness, pain and swelling. CRP is markedly elevated to 217. He will need close monitoring with concern for possible developing necrotizing infection given the rapidity of progression. If any worsening of clinical status would broaden antibiotics and call orthopedics urgently.      -- Patient was seen and examined , feeling well , has got the PICC line placed yesterday   -- Patient underwent Irrigation and debridement of right ankle wound and Placement of wound VAC device.  --Patient has been evaluated by infectious disease, recommending to continue IV Ancef, anticipating IV antibiotics for 6 weeks, plan for addition of oral rifampin on discharge.  --Follow-up on blood cultures and wound cultures result, wound cultures are growing staph aureus.  --Continue pain control  -- CRP is down to 61  --Orthopedic surgery has been following, appreciate their help, ok to discharge patient from their point of view   -- Tentative discharge later today to Rehab if PICC line is placed , safe disposition plan is available and Ok to be discharged from ID and ortho point of view, ID has  entered orders for antibiotics for discharge   --Patient will need to facility where wound care can be managed.     HTN  Dyslipidemia  -- Continue PTA amlodipine and lisinopril   -- pt not prescribed ASA or statin PTA     Mild anemia  Hgb is 11.3, down from 13.7 pre-operatively   -- Monitor      Obesity   -- Body mass index is 36.62 kg/m .    Clinically Significant Risk Factors Present on Admission     Diet: Advance Diet as Tolerated: Regular Diet Adult  Regular Diet Adult    Love Catheter: Not present  DVT Prophylaxis: Pneumatic Compression Devices  Code Status: Full Code    The patient's care was discussed with the Bedside Nurse and Patient.    Disposition Plan   Expected Discharge: 12/14/2021 tentative discharge later today if safe disposition plan is available,  are working diligently.  Patient is medically stable to be discharged.  Entered: Micaela Ardon MD 12/14/2021, 7:59 AM     Micaela Ardon MD, FACP  Text Page (7am - 6pm)    ----------------------------------------------------------------------------------------------------------------------    Interval History    Patient was seen and examined, sitting in chair, has got PICC line in place, tolerating antibiotics, hoping that rehab facility is available today so he can be discharged, discussed with him that sometimes it can take 1 to 2 days for finalizing the disposition plan, patient showed understanding.  Patient is otherwise denying any chest pain, palpitations or shortness of breath.  Patient is aware that he is anticipated to have IV antibiotics for 6 weeks and will also be started on rifampin on discharge.      -Data reviewed today: I reviewed all new labs and imaging results over the last 24 hours.    I personally reviewed no images or EKG's today.    Physical Exam   Temp: 98.4  F (36.9  C) Temp src: Oral BP: (!) 140/83 Pulse: 87   Resp: 18 SpO2: 94 % O2 Device: None (Room air)    Vitals:    12/09/21 1152 12/09/21 1550   Weight: 122.5 kg  (270 lb) 122.9 kg (271 lb)     Vital Signs with Ranges  Temp:  [97.3  F (36.3  C)-98.4  F (36.9  C)] 98.4  F (36.9  C)  Pulse:  [83-89] 87  Resp:  [18] 18  BP: (129-141)/(75-89) 140/83  SpO2:  [93 %-94 %] 94 %  I/O last 3 completed shifts:  In: 600 [P.O.:600]  Out: 2425 [Urine:2425]    GENERAL: Alert , awake and oriented. NAD. Conversational, appropriate.   HEENT: Normocephalic. EOMI. No icterus or injection. Nares normal.   LUNGS: Clear to auscultation. No dyspnea at rest.   HEART: Regular rate. Extremities perfused.   ABDOMEN: Soft, nontender, and nondistended. Positive bowel sounds.   EXTREMITIES: No LE edema noted.  Right foot in surgical dressing. Wound VAC is in place.  NEUROLOGIC: Moves extremities x4 on command. No acute focal neurologic abnormalities noted.     Medications       amLODIPine  5 mg Oral Daily     ceFAZolin  2 g Intravenous Q8H     lisinopril  40 mg Oral Daily     polyethylene glycol  17 g Oral Daily     senna-docusate  1 tablet Oral BID     sodium chloride (PF)  10-40 mL Intracatheter Q7 Days     sodium chloride (PF)  3 mL Intracatheter Q8H       Data   Recent Labs   Lab 12/13/21  0844 12/13/21  0733 12/12/21  0604 12/11/21  0549 12/10/21  0815 12/09/21  1223   WBC 6.9  --   --   --  7.7 9.5   HGB 11.4*  --   --   --  10.3* 11.3*   MCV  --   --   --   --  93 93   PLT  --   --   --   --  401 389     --   --   --  136 136   POTASSIUM 3.9  --   --   --  3.6 3.8   CHLORIDE 102  --   --   --  103 102   CO2 27  --   --   --  27 27   BUN 12  --   --   --  8 11   CR 0.67  --   --   --  0.64* 0.70   ANIONGAP 7  --   --   --  6 7   RAJAT 9.0  --   --   --  8.9 9.5   * 124* 124*   < > 117* 115*    < > = values in this interval not displayed.       Imaging:   No results found for this or any previous visit (from the past 24 hour(s)).

## 2021-12-14 NOTE — PLAN OF CARE
Summary: Post-op infection in RLE. Hx HTN, PVD, Obesity.  DATE & TIME: 12/13-14 /2021 1000 -2888  Cognitive Concerns/ Orientation: A&Ox4  BEHAVIOR & AGGRESSION TOOL COLOR: green  ABNL VS/O2: VSS on RA  MOBILITY: NWB RLE; ceiling lift to chair previous shift  PAIN MANAGMENT: Oxycodone x2 & Tylenol x2 given. Right ankle pain/spasms, 3/10.   DIET: Regular diet  BOWEL/BLADDER: Continent, voiding in urinal, no BM continue with stool softeners  ABNL LAB/BG: CRP improving  DRAIN/DEVICES: wound vac to right ankle  PIV, saline locked , intermittent ABX  SKIN: BLE rodo. Surgical dressing to RLE, CDI   TESTS/PROCEDURES: POD  5 R ankle I&D, wound vac placement.   D/C DATE: 1-2 days, PT recommending TCU   OTHER IMPORTANT INFO: CMS intact. Ortho surgery and PT following.

## 2021-12-14 NOTE — PLAN OF CARE
Summary: Post-op infection in RLE  DATE & TIME: 12/14 /2021 9838-0891  Cognitive Concerns/ Orientation: A&Ox4  BEHAVIOR & AGGRESSION TOOL COLOR: green  ABNL VS/O2: VSS on RA  MOBILITY: NWB RLE per Ortho; ceiling lift to chair for meals  PAIN MANAGMENT: Denies   DIET: Regular diet- great appetite  BOWEL/BLADDER: Continent, voiding in urinal, Large BM in commode   ABNL LAB/BG: CRP improving  DRAIN/DEVICES: Wound vac to right ankle  PIV, saline locked , intermittent ABX  SKIN: BLE rodo. Surgical dressing to RLE, CDI   TESTS/PROCEDURES: POD  5 R ankle I&D, wound vac placement- scant drainage  D/C DATE: 1-2 days, PT recommending TCU   OTHER IMPORTANT INFO: CMS intact. Ortho surgery and PT following

## 2021-12-15 ENCOUNTER — APPOINTMENT (OUTPATIENT)
Dept: PHYSICAL THERAPY | Facility: CLINIC | Age: 66
DRG: 857 | End: 2021-12-15
Payer: MEDICARE

## 2021-12-15 LAB — CRP SERPL-MCNC: 30.5 MG/L (ref 0–8)

## 2021-12-15 PROCEDURE — 99207 PR CDG-MDM COMPONENT: MEETS LOW - DOWN CODED: CPT | Performed by: INTERNAL MEDICINE

## 2021-12-15 PROCEDURE — 120N000001 HC R&B MED SURG/OB

## 2021-12-15 PROCEDURE — 97110 THERAPEUTIC EXERCISES: CPT | Mod: GP

## 2021-12-15 PROCEDURE — 97530 THERAPEUTIC ACTIVITIES: CPT | Mod: GP

## 2021-12-15 PROCEDURE — 36415 COLL VENOUS BLD VENIPUNCTURE: CPT | Performed by: PHYSICIAN ASSISTANT

## 2021-12-15 PROCEDURE — 250N000013 HC RX MED GY IP 250 OP 250 PS 637: Performed by: INTERNAL MEDICINE

## 2021-12-15 PROCEDURE — 86140 C-REACTIVE PROTEIN: CPT | Performed by: PHYSICIAN ASSISTANT

## 2021-12-15 PROCEDURE — 250N000013 HC RX MED GY IP 250 OP 250 PS 637: Performed by: STUDENT IN AN ORGANIZED HEALTH CARE EDUCATION/TRAINING PROGRAM

## 2021-12-15 PROCEDURE — 250N000011 HC RX IP 250 OP 636: Performed by: PHYSICIAN ASSISTANT

## 2021-12-15 PROCEDURE — 99231 SBSQ HOSP IP/OBS SF/LOW 25: CPT | Performed by: INTERNAL MEDICINE

## 2021-12-15 PROCEDURE — 250N000013 HC RX MED GY IP 250 OP 250 PS 637: Performed by: ORTHOPAEDIC SURGERY

## 2021-12-15 RX ORDER — METHOCARBAMOL 500 MG/1
500 TABLET, FILM COATED ORAL EVERY 6 HOURS PRN
Qty: 12 TABLET | DISCHARGE
Start: 2021-12-15 | End: 2021-12-17

## 2021-12-15 RX ORDER — ACETAMINOPHEN 325 MG/1
650 TABLET ORAL EVERY 4 HOURS PRN
Qty: 100 TABLET | Refills: 0 | DISCHARGE
Start: 2021-12-15

## 2021-12-15 RX ORDER — RIFAMPIN 300 MG/1
300 CAPSULE ORAL EVERY 12 HOURS
DISCHARGE
Start: 2021-12-15 | End: 2021-12-17

## 2021-12-15 RX ORDER — IBUPROFEN 600 MG/1
600 TABLET, FILM COATED ORAL EVERY 6 HOURS PRN
Qty: 30 TABLET | Refills: 0 | DISCHARGE
Start: 2021-12-15

## 2021-12-15 RX ORDER — HYDROXYZINE HYDROCHLORIDE 10 MG/1
10 TABLET, FILM COATED ORAL EVERY 6 HOURS PRN
Qty: 30 TABLET | Refills: 0 | DISCHARGE
Start: 2021-12-15 | End: 2022-10-13

## 2021-12-15 RX ORDER — OXYCODONE HYDROCHLORIDE 5 MG/1
5 TABLET ORAL EVERY 4 HOURS PRN
Qty: 20 TABLET | Refills: 0 | Status: SHIPPED | OUTPATIENT
Start: 2021-12-15 | End: 2021-12-17

## 2021-12-15 RX ORDER — AMOXICILLIN 250 MG
1-2 CAPSULE ORAL 2 TIMES DAILY
Qty: 30 TABLET | Refills: 0 | DISCHARGE
Start: 2021-12-15

## 2021-12-15 RX ADMIN — ACETAMINOPHEN 650 MG: 325 TABLET, FILM COATED ORAL at 22:20

## 2021-12-15 RX ADMIN — CEFAZOLIN 2 G: 10 INJECTION, POWDER, FOR SOLUTION INTRAVENOUS at 05:29

## 2021-12-15 RX ADMIN — SENNOSIDES AND DOCUSATE SODIUM 1 TABLET: 50; 8.6 TABLET ORAL at 08:57

## 2021-12-15 RX ADMIN — ACETAMINOPHEN 650 MG: 325 TABLET, FILM COATED ORAL at 03:54

## 2021-12-15 RX ADMIN — CEFAZOLIN 2 G: 10 INJECTION, POWDER, FOR SOLUTION INTRAVENOUS at 21:49

## 2021-12-15 RX ADMIN — CEFAZOLIN 2 G: 10 INJECTION, POWDER, FOR SOLUTION INTRAVENOUS at 15:31

## 2021-12-15 RX ADMIN — LISINOPRIL 40 MG: 40 TABLET ORAL at 08:57

## 2021-12-15 RX ADMIN — OXYCODONE HYDROCHLORIDE 5 MG: 5 TABLET ORAL at 22:20

## 2021-12-15 RX ADMIN — POLYETHYLENE GLYCOL 3350 17 G: 17 POWDER, FOR SOLUTION ORAL at 08:57

## 2021-12-15 RX ADMIN — RIFAMPIN 300 MG: 300 CAPSULE ORAL at 08:57

## 2021-12-15 RX ADMIN — RIFAMPIN 300 MG: 300 CAPSULE ORAL at 21:49

## 2021-12-15 RX ADMIN — AMLODIPINE BESYLATE 5 MG: 5 TABLET ORAL at 08:57

## 2021-12-15 RX ADMIN — SENNOSIDES AND DOCUSATE SODIUM 1 TABLET: 50; 8.6 TABLET ORAL at 21:49

## 2021-12-15 ASSESSMENT — ACTIVITIES OF DAILY LIVING (ADL)
ADLS_ACUITY_SCORE: 18
ADLS_ACUITY_SCORE: 17
ADLS_ACUITY_SCORE: 18
ADLS_ACUITY_SCORE: 17
ADLS_ACUITY_SCORE: 18
ADLS_ACUITY_SCORE: 17
ADLS_ACUITY_SCORE: 17
ADLS_ACUITY_SCORE: 18
ADLS_ACUITY_SCORE: 17
ADLS_ACUITY_SCORE: 18
ADLS_ACUITY_SCORE: 17
ADLS_ACUITY_SCORE: 17
ADLS_ACUITY_SCORE: 18
ADLS_ACUITY_SCORE: 17

## 2021-12-15 NOTE — PLAN OF CARE
DATE & TIME: 12/14/2021 1900-0730  Cognitive Concerns/ Orientation: A&Ox4  BEHAVIOR & AGGRESSION TOOL COLOR: Green  ABNL VS/O2: VSS on RA. Mild DELGADO  MOBILITY: Lift. NWB RLE, L knee mobility limited  PAIN MANAGMENT: Pt requesting Tylenol x2 and Oxy x1 for pain and discomfort when asleep with relief  DIET: Regular  BOWEL/BLADDER: Continent. Using urinal. Had BM during daytime   ABNL LAB/BG: CRP 61.6  DRAIN/DEVICES: R arm PICC with q8h Ancef, wound vac  SKIN: BLE rodo and dusky. 2+ BLE edema. Surgical dressing/vac to RLE   TESTS/PROCEDURES: I&D with vac placement 12/10  D/C DATE: 1-2 days, PT recommending TCU . SW following for placement   OTHER IMPORTANT INFO: CMS intact. Ortho surgery and PT following

## 2021-12-15 NOTE — PROGRESS NOTES
Care Management Follow Up    Length of Stay (days): 6    Expected Discharge Date: 12/16/2021     Concerns to be Addressed: discharge planning     Patient plan of care discussed at interdisciplinary rounds: Yes    Anticipated Discharge Disposition: Transitional Care     Anticipated Discharge Services:    Anticipated Discharge DME:      Patient/family educated on Medicare website which has current facility and service quality ratings: yes  Education Provided on the Discharge Plan:    Patient/Family in Agreement with the Plan: yes    Referrals Placed by CM/SW: Post Acute Facilities  Private pay costs discussed: cost for day  SNF Medicare co-payment.    Additional Information:  20 SNF referrals have been made. Some  facilities do not have current vacancies, others are assessing and others have not returned call left.     Writer met with patient to update him and clarify his vaccine status and his insurance coverage.  Patient confirms he has not been vaccinated for COVID. [facility] decline him for this reason.    Patient does not have a medicare supplement.  Writer explained about the daily medicare co- payment of $184.00 per day if he is in the TCU more than 20 days.  Patient reports his goal is to discharge home as soon as he can walk on his own. He doesn't expect to be in the TCU for the duration of his IV antibiotic need and he has been on IV antibiotic therapy at home before so he expresses he is coverage with administration.  Writer provided education regarding the limited coverage for IV therapy at home under his Medicare.    Printed off Medicare list of TCU's within 8 miles of  his home and explained the Medicare star rating system.   Explained writer has made multiple referrals in order to offer him choices which he is in agreement with.  Will make additional referrals if needed       KARMEN Grey

## 2021-12-15 NOTE — PLAN OF CARE
Summary: Post-op infection in RLE  DATE & TIME: 12/15/21 Day shift  Cognitive Concerns/ Orientation: A&Ox4  BEHAVIOR & AGGRESSION TOOL COLOR: Green  ABNL VS/O2: VSS on RA. DELGADO  MOBILITY: Lift. NWB RLE, L knee mobility limited  PAIN MANAGMENT: Pt denies any   DIET: Regular-   BOWEL/BLADDER: Continent. Had BM during shift  ABNL LAB/BG: CRP 61.6--->30.5  DRAIN/DEVICES: R arm PICC with q8h Ancef, wound vac  SKIN: BLE rodo and dusky. 2+ /+3 BLE edema. Surgical dressing/vac to RLE   TESTS/PROCEDURES: I&D with vac placement 12/10  D/C DATE: 1-2 days, PT recommending TCU . SW following for placement   OTHER IMPORTANT INFO: CMS intact. Ortho surgery and PT following    Addendum [id number]: Patient up to chair after dinner, encouraged to move more but patient needs a lot of encouragement. Worked with PT. Working on IS. Denies any pain. R ankle wrapped with ace wrap.

## 2021-12-15 NOTE — PROGRESS NOTES
[facility]    HOSPITALIST PROGRESS NOTE :   --------------------------------------------------    Date of Admission:  12/9/2021    Cumulative Summary: Sav Mendoza is a 66 year old male with past medical history significant for HTN, peripheral vascular disease, obesity admitted on 12/9/2021 with post-operative infection.     Assessment & Plan     Post-operative wound infection/cellulitis   Hx of Charcot ankle s/p R ankle corrective osteotomy and subtalar and tibiotalocalcaneal fusion (11/22)  Pt underwent the above procedure with Dr. Miller on 11/22. He was seen in clinic today and noted to have redness, swelling and pain at the incision site (see below pictures). Pt reports low grade fevers at home. The patient is currently hemodynamically stable, non-toxic appearing, though he does describe fairly rapid progression of redness, pain and swelling. CRP is markedly elevated to 217. He will need close monitoring with concern for possible developing necrotizing infection given the rapidity of progression. If any worsening of clinical status would broaden antibiotics and call orthopedics urgently.      -- Patient was seen and examined, feeling better , aware that  are working on disposition planning   -- patient has PICC line placed   -- Patient underwent Irrigation and debridement of right ankle wound and Placement of wound VAC device.  --Patient has been evaluated by infectious disease, recommending to continue IV Ancef, anticipating IV antibiotics for 6 weeks, plan for addition of oral rifampin on discharge.  -- Follow-up on blood cultures and wound cultures result, wound cultures are growing staph aureus.  -- Continue pain control  -- CRP is down to 61  -- Orthopedic surgery has been following, appreciate their help, ok to discharge patient from their point of view   -- Tentative discharge later today to Rehab if safe disposition plan is available , ID has entered orders  for antibiotics for discharge   --Patient will need the facility where wound care can be managed.     HTN  Dyslipidemia  -- Continue PTA amlodipine and lisinopril   -- pt not prescribed ASA or statin PTA     Mild anemia  Hgb is 11.3, down from 13.7 pre-operatively   -- Monitor      Obesity   -- Body mass index is 36.62 kg/m .    COVID 19 PCR; negative   -- Will discuss with patient if he would like to get first dose of vaccination as it might help with his disposition     Clinically Significant Risk Factors Present on Admission     Diet: Advance Diet as Tolerated: Regular Diet Adult  Regular Diet Adult    Love Catheter: Not present  DVT Prophylaxis: Pneumatic Compression Devices  Code Status: Full Code    The patient's care was discussed with the Bedside Nurse and Patient.    Disposition Plan   Expected Discharge: 12/16/2021 tentative discharge later today if safe disposition plan is available,  are working diligently.  Patient is medically stable to be discharged.  Entered: Micaela Ardon MD 12/15/2021, 8:21 AM     Micaela Ardon MD, FACP  Text Page (7am - 6pm)    ----------------------------------------------------------------------------------------------------------------------    Interval History    Patient was seen and examined, sitting in chair, feeling well, denying any complaints.  No chest pain, no palpitations or shortness of breath.  Patient is aware that  are working on safe disposition planning and they have sent the referrals to many facilities.    -Data reviewed today: I reviewed all new labs and imaging results over the last 24 hours.    I personally reviewed no images or EKG's today.    Physical Exam   Temp: 98.8  F (37.1  C) Temp src: Oral BP: (!) 146/81 Pulse: 80   Resp: 18 SpO2: 94 % O2 Device: None (Room air)    Vitals:    12/09/21 1152 12/09/21 1550   Weight: 122.5 kg (270 lb) 122.9 kg (271 lb)     Vital Signs with Ranges  Temp:  [98.6  F (37  C)-98.9  F (37.2  C)]  98.8  F (37.1  C)  Pulse:  [80-93] 80  Resp:  [16-20] 18  BP: (138-148)/(81-83) 146/81  SpO2:  [94 %-97 %] 94 %  I/O last 3 completed shifts:  In: -   Out: 3450 [Urine:3450]    GENERAL: Alert , awake and oriented. NAD. Conversational, appropriate.   HEENT: Normocephalic. EOMI. No icterus or injection. Nares normal.   LUNGS: Clear to auscultation. No dyspnea at rest.   HEART: Regular rate. Extremities perfused.   ABDOMEN: Soft, nontender, and nondistended. Positive bowel sounds.   EXTREMITIES: No LE edema noted.  Right foot in surgical dressing. Wound VAC is in place.  NEUROLOGIC: Moves extremities x4 on command. No acute focal neurologic abnormalities noted.     Medications       amLODIPine  5 mg Oral Daily     ceFAZolin  2 g Intravenous Q8H     lisinopril  40 mg Oral Daily     polyethylene glycol  17 g Oral Daily     rifampin  300 mg Oral Q12H JIM     senna-docusate  1 tablet Oral BID     sodium chloride (PF)  10-40 mL Intracatheter Q7 Days     sodium chloride (PF)  3 mL Intracatheter Q8H       Data   Recent Labs   Lab 12/13/21  0844 12/13/21  0733 12/12/21  0604 12/11/21  0549 12/10/21  0815 12/09/21  1223   WBC 6.9  --   --   --  7.7 9.5   HGB 11.4*  --   --   --  10.3* 11.3*   MCV  --   --   --   --  93 93   PLT  --   --   --   --  401 389     --   --   --  136 136   POTASSIUM 3.9  --   --   --  3.6 3.8   CHLORIDE 102  --   --   --  103 102   CO2 27  --   --   --  27 27   BUN 12  --   --   --  8 11   CR 0.67  --   --   --  0.64* 0.70   ANIONGAP 7  --   --   --  6 7   RAJAT 9.0  --   --   --  8.9 9.5   * 124* 124*   < > 117* 115*    < > = values in this interval not displayed.       Imaging:   No results found for this or any previous visit (from the past 24 hour(s)).

## 2021-12-15 NOTE — PROGRESS NOTES
Orthopedic Surgery  Sav Mendoza  12/15/2021  Admit Date:  2021  POD: 5 Days Post-Op   Procedure(s):  IRRIGATION AND DEBRIDEMENT RIGHT ANKLE, WOUND VAC PLACEMENT    Alert and oriented to person, place, and time.  Patient resting comfortably in bed.    Pain controlled.  Tolerating oral intake.    Denies nausea or vomiting  Denies chest pain or shortness of breath  Feels he does have increased erythema and swelling today     Vital Sign Ranges  Temperature Temp  Av.8  F (37.1  C)  Min: 98.6  F (37  C)  Max: 98.9  F (37.2  C)   Blood pressure Systolic (24hrs), Av , Min:138 , Max:148        Diastolic (24hrs), Av, Min:81, Max:83      Pulse Pulse  Av  Min: 80  Max: 93   Respirations Resp  Av.2  Min: 16  Max: 20   Pulse oximetry SpO2  Av.5 %  Min: 94 %  Max: 97 %       Prevena wound vac in place, intact and patent.   Bloody serosanguinous drainage in cannister  Dorsal foot swelling present.  Erythema and medial swelling present.    Bilateral venous stasis changes LE  Bilateral calves are soft, non-tender.   Able to wiggle toes   Sensation intact to light touch bilateral lower extremities.  Brisk capillary refill.     Labs:  Recent Labs   Lab Test 21  0844 12/10/21  0815 21  1223   POTASSIUM 3.9 3.6 3.8     Recent Labs   Lab Test 21  0844 12/10/21  0815 21  1223   HGB 11.4* 10.3* 11.3*     Recent Labs   Lab Test 06/15/21  0940 21  1641   INR 0.96 1.01     Recent Labs   Lab Test 12/10/21  0815 21  1223 21  0052    389 226     1. Plan              Continue SCD for DVT prophylaxis.                Mobilize with PT/OT               NWB to RLE              Elevate RLE              Leave dressing and wound vac in place.               IV abx per ID   Repeat CRP ordered and ace wrap applied to foot.  Dr Miller team notified regarding medial erythema and swelling.  Continue current plan for now.                Continue current pain  regiment.              Follow up with Dr. Miller's PA, Ty, in clinic on 12/21     2. Disposition              Anticipate d/c to TCU based on placement     Jo Colvin PA-C

## 2021-12-16 ENCOUNTER — HOME INFUSION (PRE-WILLOW HOME INFUSION) (OUTPATIENT)
Dept: PHARMACY | Facility: CLINIC | Age: 66
End: 2021-12-16
Payer: MEDICARE

## 2021-12-16 ENCOUNTER — APPOINTMENT (OUTPATIENT)
Dept: PHYSICAL THERAPY | Facility: CLINIC | Age: 66
DRG: 857 | End: 2021-12-16
Payer: MEDICARE

## 2021-12-16 LAB — SARS-COV-2 RNA RESP QL NAA+PROBE: NEGATIVE

## 2021-12-16 PROCEDURE — 99232 SBSQ HOSP IP/OBS MODERATE 35: CPT | Performed by: INTERNAL MEDICINE

## 2021-12-16 PROCEDURE — 250N000013 HC RX MED GY IP 250 OP 250 PS 637: Performed by: ORTHOPAEDIC SURGERY

## 2021-12-16 PROCEDURE — 97530 THERAPEUTIC ACTIVITIES: CPT | Mod: GP

## 2021-12-16 PROCEDURE — 120N000001 HC R&B MED SURG/OB

## 2021-12-16 PROCEDURE — 250N000013 HC RX MED GY IP 250 OP 250 PS 637: Performed by: INTERNAL MEDICINE

## 2021-12-16 PROCEDURE — U0003 INFECTIOUS AGENT DETECTION BY NUCLEIC ACID (DNA OR RNA); SEVERE ACUTE RESPIRATORY SYNDROME CORONAVIRUS 2 (SARS-COV-2) (CORONAVIRUS DISEASE [COVID-19]), AMPLIFIED PROBE TECHNIQUE, MAKING USE OF HIGH THROUGHPUT TECHNOLOGIES AS DESCRIBED BY CMS-2020-01-R: HCPCS | Performed by: INTERNAL MEDICINE

## 2021-12-16 PROCEDURE — 250N000013 HC RX MED GY IP 250 OP 250 PS 637: Performed by: STUDENT IN AN ORGANIZED HEALTH CARE EDUCATION/TRAINING PROGRAM

## 2021-12-16 PROCEDURE — 250N000011 HC RX IP 250 OP 636: Performed by: PHYSICIAN ASSISTANT

## 2021-12-16 RX ORDER — AMLODIPINE BESYLATE 10 MG/1
10 TABLET ORAL DAILY
DISCHARGE
Start: 2021-12-17 | End: 2021-12-17

## 2021-12-16 RX ORDER — AMLODIPINE BESYLATE 10 MG/1
10 TABLET ORAL DAILY
Status: DISCONTINUED | OUTPATIENT
Start: 2021-12-16 | End: 2021-12-17 | Stop reason: HOSPADM

## 2021-12-16 RX ADMIN — RIFAMPIN 300 MG: 300 CAPSULE ORAL at 20:27

## 2021-12-16 RX ADMIN — CEFAZOLIN 2 G: 10 INJECTION, POWDER, FOR SOLUTION INTRAVENOUS at 21:56

## 2021-12-16 RX ADMIN — RIFAMPIN 300 MG: 300 CAPSULE ORAL at 08:57

## 2021-12-16 RX ADMIN — CEFAZOLIN 2 G: 10 INJECTION, POWDER, FOR SOLUTION INTRAVENOUS at 05:14

## 2021-12-16 RX ADMIN — AMLODIPINE BESYLATE 10 MG: 10 TABLET ORAL at 08:57

## 2021-12-16 RX ADMIN — SENNOSIDES AND DOCUSATE SODIUM 1 TABLET: 50; 8.6 TABLET ORAL at 20:27

## 2021-12-16 RX ADMIN — LISINOPRIL 40 MG: 40 TABLET ORAL at 08:57

## 2021-12-16 RX ADMIN — CEFAZOLIN 2 G: 10 INJECTION, POWDER, FOR SOLUTION INTRAVENOUS at 13:14

## 2021-12-16 ASSESSMENT — ACTIVITIES OF DAILY LIVING (ADL)
ADLS_ACUITY_SCORE: 16
ADLS_ACUITY_SCORE: 18
ADLS_ACUITY_SCORE: 18
ADLS_ACUITY_SCORE: 16
ADLS_ACUITY_SCORE: 18
ADLS_ACUITY_SCORE: 18
ADLS_ACUITY_SCORE: 14
ADLS_ACUITY_SCORE: 16
ADLS_ACUITY_SCORE: 18
ADLS_ACUITY_SCORE: 16
ADLS_ACUITY_SCORE: 16
ADLS_ACUITY_SCORE: 18
ADLS_ACUITY_SCORE: 16
ADLS_ACUITY_SCORE: 14
ADLS_ACUITY_SCORE: 18
ADLS_ACUITY_SCORE: 16
ADLS_ACUITY_SCORE: 16
ADLS_ACUITY_SCORE: 18
ADLS_ACUITY_SCORE: 14
ADLS_ACUITY_SCORE: 16
ADLS_ACUITY_SCORE: 18
ADLS_ACUITY_SCORE: 18
ADLS_ACUITY_SCORE: 14
ADLS_ACUITY_SCORE: 14

## 2021-12-16 NOTE — PROGRESS NOTES
[facility]    Infectious Disease Progress Note    Date of Service (when I saw the patient): 12/16/2021     Assessment & Plan   Sav Mendoza is a 66 year old male who was admitted on 12/9/2021.     Impression:  1. 66 y.o male with HTN   2. History of complex hindfoot and ankle reconstruction with a fusion and osteotomy for correction.   3. Admitted now with concern for infection   4. S/p: PROCEDURE PERFORMED:    1.  Irrigation and debridement of right ankle wound.    2.  Placement of wound VAC device.  5. Cultures with MSSA.   6. On ancef     Recommendations;   1. Continue on ancef   2. Follow up on the blood cultures.   3. Anticipate IV Antibiotics for 6 weeks.   4. Add oral rifampin    5. Noted bacillus cereus in 1/2 wound cultures in broth only, ignoring as environmental contaminant.   6. Ok for PICC   7. Orders for out patient IV antibiotics in the chart, please order Rifampin for 6 weeks at discharge.     Yanna Espinoza MD    Interval History   Tolerating antibiotics ok   No new rashes or issues with antibiotics   Labs reviewed   Afebrile       Physical Exam   Temp: 98.8  F (37.1  C) Temp src: Oral BP: 132/81 Pulse: 82   Resp: 18 SpO2: 94 % O2 Device: None (Room air)    Vitals:    12/09/21 1152 12/09/21 1550   Weight: 122.5 kg (270 lb) 122.9 kg (271 lb)     Vital Signs with Ranges  Temp:  [97.2  F (36.2  C)-98.8  F (37.1  C)] 98.8  F (37.1  C)  Pulse:  [82-86] 82  Resp:  [18] 18  BP: (132-151)/(81-87) 132/81  SpO2:  [94 %-97 %] 94 %    Constitutional: Awake, alert, cooperative, no apparent distress  Lungs: Clear to auscultation bilaterally, no crackles or wheezing  Cardiovascular: Regular rate and rhythm, normal S1 and S2, and no murmur noted  Abdomen: Normal bowel sounds, soft, non-distended, non-tender  Skin: No rashes, no cyanosis, no edema  Other:    Medications       amLODIPine  10 mg Oral Daily     ceFAZolin  2 g Intravenous Q8H     lisinopril  40 mg Oral Daily      polyethylene glycol  17 g Oral Daily     rifampin  300 mg Oral Q12H JIM     senna-docusate  1 tablet Oral BID     sodium chloride (PF)  10-40 mL Intracatheter Q7 Days     sodium chloride (PF)  3 mL Intracatheter Q8H       Data   All microbiology laboratory data reviewed.  Recent Labs   Lab Test 12/13/21  0844 12/10/21  0815 12/09/21  1223 11/11/21  1411 09/30/21  0052   WBC 6.9 7.7 9.5  --  12.2*   HGB 11.4* 10.3* 11.3*   < > 13.2*   HCT  --  32.5* 35.4*  --  40.8   MCV  --  93 93  --  90   PLT  --  401 389  --  226    < > = values in this interval not displayed.     Recent Labs   Lab Test 12/13/21  0844 12/10/21  0815 12/09/21  1223   CR 0.67 0.64* 0.70     Recent Labs   Lab Test 02/21/21  1641   SED 14     Recent Labs   Lab Test 01/18/20  1727 06/26/17  0014 05/14/16  1110 05/13/16  0554 05/12/16  1034 05/11/16  1434 05/10/16  1951 05/10/16  1939 02/19/14  1415   CULT No beta hemolytic Streptococcus Group A isolated No growth No growth No growth No growth Cultured on the 1st day of incubation: Staphylococcus aureus  Critical Value/Significant Value, preliminary result only, called to and read   back by Taya Arriaga RN on UR10A 5/12/16 at 0850 SRQ  Susceptibility testing done on previous specimen  * Cultured on the 1st day of incubation: Staphylococcus aureus  Critical Value/Significant Value, preliminary result only, called to and read   back by Cortez Olivarez on 10A 5/11/16 at 1229 SRQ  Susceptibility testing done on previous specimen  * Cultured on the 1st day of incubation: Staphylococcus aureus  Critical Value/Significant Value, preliminary result only, called to and read   back by Aneesh Echols Pharmacist on 10A 5/11/16 at 1229 SRQ  (Note)  POSITIVE for STAPHYLOCOCCUS AUREUS and NEGATIVE for the mecA gene  (not MRSA) by [employer] multiplex nucleic acid test. The mecA gene was  not detected. Final identification and antimicrobial susceptibility  testing will be verified by standard  methods.    Specimen tested with General Compression multiplex, gram-positive blood culture  nucleic acid test for the following targets: Staph aureus, Staph  epidermidis, Staph lugdunensis, other Staph species, Enterococcus  faecalis, Enterococcus faecium, Streptococcus species, S. agalactiae,  S. anginosus grp., S. pneumoniae, S. pyogenes, Listeria sp., mecA  (methicillin resistance) and Licha/B (vancomycin resistance).    Critical Value/Significant Value called to and read back by  Leyla RODRÍGUEZ RN at 8633 3/11/16. hd  * No anaerobes isolated  Light growth Staphylococcus aureus Light growth Beta hemolytic Streptococcus group G This isolate is presumed to be  clindamycin resistant based on detection of inducible clindamycin resistance.*  Canceled, Test credited Incorrectly ordered by PCU/Clinic MD WANTED ANAEROBIC  CULTURE.  ANAEROBIC CULTURE ADDED TO ACCN [id number].

## 2021-12-16 NOTE — PROGRESS NOTES
Orthopedic Surgery  Sav Mendoza  2021  Admit Date:  2021  POD 6 Days Post-Op  S/P Procedure(s):  IRRIGATION AND DEBRIDEMENT RIGHT ANKLE, WOUND VAC PLACEMENT    Patient resting comfortably in bed.    Pain controlled.  Tolerating oral intake.    Denies nausea or vomiting  Denies chest pain or shortness of breath  No events overnight.     Alert and orient to person, place, and time.  Vital Sign Ranges  Temperature Temp  Av.1  F (36.7  C)  Min: 97.2  F (36.2  C)  Max: 98.8  F (37.1  C)   Blood pressure Systolic (24hrs), Av , Min:132 , Max:151        Diastolic (24hrs), Av, Min:81, Max:87      Pulse Pulse  Av  Min: 82  Max: 86   Respirations Resp  Av  Min: 18  Max: 18   Pulse oximetry SpO2  Av.7 %  Min: 94 %  Max: 97 %       Prevena wound vac in place, intact and patent.   Bloody serosanguinous drainage in cannister  Dorsal foot swelling present.  Erythema and medial swelling present.    Bilateral venous stasis changes LE  Bilateral calves are soft, non-tender.   Able to wiggle toes   Sensation intact to light touch bilateral lower extremities.  Brisk capillary refill.     Labs:  Recent Labs   Lab Test 21  0844 12/10/21  0815 21  1223   POTASSIUM 3.9 3.6 3.8     Recent Labs   Lab Test 21  0844 12/10/21  0815 21  1223   HGB 11.4* 10.3* 11.3*     Recent Labs   Lab Test 06/15/21  0940 21  1641   INR 0.96 1.01     Recent Labs   Lab Test 12/10/21  0815 21  1223 21  0052    389 226     1. Plan              Continue SCD for DVT prophylaxis.                Mobilize with PT/OT               NWB to RLE              Elevate RLE              Leave dressing and wound vac in place.               IV abx per ID              Repeat CRP ordered and ace wrap applied to foot.  Dr Miller team notified regarding medial erythema and swelling.  Continue current plan for now.                Continue current pain regiment.              Follow up with  Dr. Miller's PA, Ty, in clinic on 12/21     2. Disposition              Anticipate d/c to TCU based on placement     Rosey Gonzalez PA-C

## 2021-12-16 NOTE — PROGRESS NOTES
[facility]    Received referral for IV abx. Patient does not have IV abx coverage in the home with their Medicare plan. Pt does not have a part D so I have provided the [facility] discount on the drug and supplies would be self-pay. Based on Cefazolin 2g q8h, total cost is $37.80/ day for drug and supplies.     I spoke with Danial to introduce home infusion services, review benefits and offer choice of providers. He would like to proceed with [facility] and is okay with the out of pocket cost. To ensure that Danial's needs are met post-hospitalization, [facility] will need to secure a home care agency for RN and PT prior to discharge.     Thank you for the referral.    Amaris Sanchez RN  [facility] Liaison  [phone] (Mon thru Fri 8am - 5pm)  [phone] Office

## 2021-12-16 NOTE — PLAN OF CARE
DATE & TIME: 12/16/21 AM shift  Cognitive Concerns/ Orientation: A&O x4, calm & cooperative   BEHAVIOR & AGGRESSION TOOL COLOR: Green  ABNL VS/O2: VSS on RA. DELGADO. Encouraged to use IS and sit up in the chair.   MOBILITY: Lift. NWB RLE, L knee mobility limited. BLE elevated when in bed. Up in chair x 1.   PAIN MANAGMENT:denies pain this shift.   DIET: Regular, good appetite.   BOWEL/BLADDER: Continent. Using urinal and BSC. One BM this shift.   ABNL LAB/BG: no new labs  DRAIN/DEVICES: R arm PICC with q8h Ancef. Wound vac in place   SKIN: BLE rodo and dusky. +2 BLE edema. R foot ace wrapped. Surgical dressing/vac to RLE. Pt will keep dressing and wound vac until he sees Ortho on 12/21.   TESTS/PROCEDURES: I&D with vac placement 12/10  D/C DATE: Pending placement to TCU, SW is following.   OTHER IMPORTANT INFO: CMS intact. Ortho and PT are following. ID is following-plan for 6 weeks of IV Ancer and PO Rifampin

## 2021-12-16 NOTE — PROGRESS NOTES
"Care Management Follow Up    Length of Stay (days): 7    Expected Discharge Date: 12/17/2021     Concerns to be Addressed: discharge planning     Patient plan of care discussed at interdisciplinary rounds: Yes    Anticipated Discharge Disposition: Transitional Care     Anticipated Discharge Services:    Anticipated Discharge DME:      Patient/family educated on Medicare website which has current facility and service quality ratings: yes  Education Provided on the Discharge Plan:    Patient/Family in Agreement with the Plan: yes    Referrals Placed by CM/SW: Post Acute Facilities  Private pay costs discussed:     Additional Information:  [facility] can accept patient today.  Reviewed this option with patient. Explained he is quarantined for 14 days, he can have visitors.  He would like to speak with his wife.  Paged Dr Ardon asking if she wishes to discharge patient today.      Update at 1215  Entered patient's room to explain Dr Ardon did enter his discharge orders.  He was on the phone with his wife.  He shared his son worked at [facility] in the past and he will not support patient going to [facility].  Patient relays \"it has a bad reputation\".  Patient asked about going home and writer shared based on therapy notes he will need 2 staff to assist him with mobility and he will need home care for his wound vac and IV medication.  Explained to patient he has discharge orders and the TCU option I have is [facility].   Patient is asking writer to continue making referrals.  Updated Dr Ardon.      Aria Gutiérrez, KARMEN      "

## 2021-12-16 NOTE — PLAN OF CARE
DATE & TIME: 12/15/21 [id number]  Cognitive Concerns/ Orientation: A&O x4, calm & cooperative   BEHAVIOR & AGGRESSION TOOL COLOR: Green  ABNL VS/O2: VSS on RA. DELGADO  MOBILITY: Lift. NWB RLE, L knee mobility limited. BLE elevated. Up in chair during evening.   PAIN MANAGMENT: PRN Tylenol and Oxy given x1 for 6/10 R ankle pain described as throbbing, squeezing, and aching. Rest promoted.  DIET: Regular  BOWEL/BLADDER: Continent. Using urinal  ABNL LAB/BG: CRP 30.5  DRAIN/DEVICES: R arm PICC with q8h Ancef, wound vac  SKIN: BLE rodo and dusky. +3 BLE edema. R foot ace wrapped. Surgical dressing/vac to RLE   TESTS/PROCEDURES: I&D with vac placement 12/10  D/C DATE: 1-2 days, PT recommending TCU. SW following for placement   OTHER IMPORTANT INFO: CMS intact. Frequently using IS when awake. Ortho surgery and PT following

## 2021-12-16 NOTE — PROGRESS NOTES
Care Management Follow Up    Length of Stay (days): 7    Expected Discharge Date: 12/17/2021     Concerns to be Addressed: discharge planning     Patient plan of care discussed at interdisciplinary rounds: Yes    Anticipated Discharge Disposition: Transitional Care     Anticipated Discharge Services:    Anticipated Discharge DME:      Patient/family educated on Medicare website which has current facility and service quality ratings: yes  Education Provided on the Discharge Plan:    Patient/Family in Agreement with the Plan: yes    Referrals Placed by CM/SW: Post Acute Facilities  Private pay costs discussed:     Additional Information:  Explored other TCU options however still only have [facility] as an option.  Met with patient to review and wife called in to be part of the discussion.  They asked writer to review Medicare SNF benefit and  Home care benefit.  They have used home care in the past and at that time patient had wound care, IV antibiotics and PT.  Writer reviewed the frequency of RN for wound vac care and teaching IV administration and that rehab therapy is 3x a week at home. Reviewed that the IV antibiotic therapy and supplies will not be covered by Medicare.   Wife would like to bring patient home rather than patient going to [facility]. They both report that following patient's foot surgery in early December patient was non weight bearing on his right foot and had limited ability on his left leg due to his knee but they managed.  Wife has been here visiting frequently and has observed his care needs.  She reports their adult son lives with them and is currently between jobs so he can also help.  Earlier in patient's stay JAJA Michele care coordinator made a referral to [facility] and now  she will begin making home care arrangements with a discharge planned for tomorrow.  Dr Ardon updated and in agreement with holding discharge until tomorrow   KARMEN Grey

## 2021-12-16 NOTE — PROGRESS NOTES
[facility]    HOSPITALIST PROGRESS NOTE :   --------------------------------------------------    Date of Admission:  12/9/2021    Cumulative Summary: Sav Mendoza is a 66 year old male with past medical history significant for HTN, peripheral vascular disease, obesity admitted on 12/9/2021 with post-operative infection.     Assessment & Plan     Post-operative wound infection/cellulitis   Hx of Charcot ankle s/p R ankle corrective osteotomy and subtalar and tibiotalocalcaneal fusion (11/22)  Pt underwent the above procedure with Dr. Miller on 11/22. He was seen in clinic today and noted to have redness, swelling and pain at the incision site (see below pictures). Pt reports low grade fevers at home. The patient is currently hemodynamically stable, non-toxic appearing, though he does describe fairly rapid progression of redness, pain and swelling. CRP is markedly elevated to 217. He will need close monitoring with concern for possible developing necrotizing infection given the rapidity of progression. If any worsening of clinical status would broaden antibiotics and call orthopedics urgently.      -- Patient was seen and examined , feeling well  -- patient has PICC line placed   -- Patient underwent Irrigation and debridement of right ankle wound and Placement of wound VAC device.  --Patient has been evaluated by infectious disease, recommending to continue IV Ancef, anticipating IV antibiotics for 6 weeks, plan for addition of oral rifampin on discharge.  -- Follow-up on blood cultures and wound cultures result, wound cultures are growing staph aureus.  -- Continue pain control  -- CRP is down to 61  -- Orthopedic surgery has been following, appreciate their help, ok to discharge patient from their point of view   -- Tentative discharge later today to Rehab if safe disposition plan is available , ID has entered orders for antibiotics for discharge   --Patient will need the facility  where wound care can be managed.  -- Offered him consideration for Vaccination for Covid as it might help with placement , discussed with him different Vaccinations and offered him to let me know if he is interested and I will order vaccine for him as he is ready for discharhge     HTN  Dyslipidemia  -- Continue PTA amlodipine and lisinopril   -- will increase Norvasc to 10 mg po daily   -- pt not prescribed ASA or statin PTA     Mild anemia  Hgb is 11.3, down from 13.7 pre-operatively   -- Monitor      Obesity   -- Body mass index is 36.62 kg/m .    COVID 19 PCR; negative   -- discussion with patient to consider getting first dose of vaccination as it might help with his disposition     Clinically Significant Risk Factors Present on Admission     Diet: Advance Diet as Tolerated: Regular Diet Adult  Regular Diet Adult    Love Catheter: Not present  DVT Prophylaxis: Pneumatic Compression Devices  Code Status: Full Code    The patient's care was discussed with the Bedside Nurse and Patient.    Disposition Plan   Expected Discharge: 12/16/2021 tentative discharge later today if safe disposition plan is available,  are working diligently.  Patient is medically stable to be discharged.  Entered: Micaela Ardon MD 12/16/2021, 7:35 AM     Micaela Ardon MD, FACP  Text Page (7am - 6pm)    ----------------------------------------------------------------------------------------------------------------------    Interval History      Patient was seen and examined, sitting in chair , hoping for discharge today , discussed with him that social workers are working diligently for safe disposition plan.  Discussed with him regarding regarding increasing the dose of Norvasc.  No chest pain or palpitations     -Data reviewed today: I reviewed all new labs and imaging results over the last 24 hours.    I personally reviewed no images or EKG's today.    Physical Exam   Temp: 98.2  F (36.8  C) Temp src: Oral BP: (!) 142/87  Pulse: 86   Resp: 18 SpO2: 97 % O2 Device: None (Room air)    Vitals:    12/09/21 1152 12/09/21 1550   Weight: 122.5 kg (270 lb) 122.9 kg (271 lb)     Vital Signs with Ranges  Temp:  [97.2  F (36.2  C)-98.8  F (37.1  C)] 98.2  F (36.8  C)  Pulse:  [80-86] 86  Resp:  [18] 18  BP: (142-151)/(81-87) 142/87  SpO2:  [94 %-97 %] 97 %  I/O last 3 completed shifts:  In: 720 [P.O.:720]  Out: 5225 [Urine:5225]    GENERAL: Alert , awake and oriented. NAD. Conversational, appropriate.   HEENT: Normocephalic. EOMI. No icterus or injection. Nares normal.   LUNGS: Clear to auscultation. No dyspnea at rest.   HEART: Regular rate. Extremities perfused.   ABDOMEN: Soft, nontender, and nondistended. Positive bowel sounds.   EXTREMITIES: No LE edema noted.  Right foot in surgical dressing. Wound VAC is in place.  NEUROLOGIC: Moves extremities x4 on command. No acute focal neurologic abnormalities noted.     Medications       amLODIPine  5 mg Oral Daily     ceFAZolin  2 g Intravenous Q8H     lisinopril  40 mg Oral Daily     polyethylene glycol  17 g Oral Daily     rifampin  300 mg Oral Q12H JIM     senna-docusate  1 tablet Oral BID     sodium chloride (PF)  10-40 mL Intracatheter Q7 Days     sodium chloride (PF)  3 mL Intracatheter Q8H       Data   Recent Labs   Lab 12/13/21  0844 12/13/21  0733 12/12/21  0604 12/11/21  0549 12/10/21  0815 12/09/21  1223   WBC 6.9  --   --   --  7.7 9.5   HGB 11.4*  --   --   --  10.3* 11.3*   MCV  --   --   --   --  93 93   PLT  --   --   --   --  401 389     --   --   --  136 136   POTASSIUM 3.9  --   --   --  3.6 3.8   CHLORIDE 102  --   --   --  103 102   CO2 27  --   --   --  27 27   BUN 12  --   --   --  8 11   CR 0.67  --   --   --  0.64* 0.70   ANIONGAP 7  --   --   --  6 7   RAJAT 9.0  --   --   --  8.9 9.5   * 124* 124*   < > 117* 115*    < > = values in this interval not displayed.       Imaging:   No results found for this or any previous visit (from the past 24 hour(s)).

## 2021-12-16 NOTE — PROGRESS NOTES
Patient does not have iv abx coverage in the home with their Medicare plan. Pt does not have a part D so I have provided the [facility] discount on the drug and supplies would be self-pay. Based on Cefazolin 2g q8h, total cost is $37.80/ day for drug and supplies.     Nursing is only covered if patient is homebound if not cost is $90.00 per visit if [facility] bills for it. Patient should have coverage in a TCU or infusion center. Let us know how patient would like to proceed.        ([facility]) In reference to admission date 12/09/2021 to check for iv abx coverage.            Please contact Intake with any questions, [phone] or In Basket pool,  Home Infusion ([phone]).

## 2021-12-17 ENCOUNTER — APPOINTMENT (OUTPATIENT)
Dept: PHYSICAL THERAPY | Facility: CLINIC | Age: 66
DRG: 857 | End: 2021-12-17
Payer: MEDICARE

## 2021-12-17 ENCOUNTER — HOME INFUSION (PRE-WILLOW HOME INFUSION) (OUTPATIENT)
Dept: PHARMACY | Facility: CLINIC | Age: 66
End: 2021-12-17
Payer: MEDICARE

## 2021-12-17 VITALS
RESPIRATION RATE: 18 BRPM | HEART RATE: 87 BPM | WEIGHT: 271 LBS | BODY MASS INDEX: 36.7 KG/M2 | DIASTOLIC BLOOD PRESSURE: 95 MMHG | OXYGEN SATURATION: 95 % | HEIGHT: 72 IN | SYSTOLIC BLOOD PRESSURE: 139 MMHG | TEMPERATURE: 98.3 F

## 2021-12-17 LAB — BACTERIA TISS BX CULT: NORMAL

## 2021-12-17 PROCEDURE — 250N000011 HC RX IP 250 OP 636: Performed by: PHYSICIAN ASSISTANT

## 2021-12-17 PROCEDURE — 250N000013 HC RX MED GY IP 250 OP 250 PS 637: Performed by: STUDENT IN AN ORGANIZED HEALTH CARE EDUCATION/TRAINING PROGRAM

## 2021-12-17 PROCEDURE — 97530 THERAPEUTIC ACTIVITIES: CPT | Mod: GP | Performed by: PHYSICAL THERAPIST

## 2021-12-17 PROCEDURE — 99239 HOSP IP/OBS DSCHRG MGMT >30: CPT | Performed by: INTERNAL MEDICINE

## 2021-12-17 PROCEDURE — 999N000190 HC STATISTIC VAT ROUNDS

## 2021-12-17 PROCEDURE — 250N000013 HC RX MED GY IP 250 OP 250 PS 637: Performed by: INTERNAL MEDICINE

## 2021-12-17 PROCEDURE — 250N000013 HC RX MED GY IP 250 OP 250 PS 637: Performed by: ORTHOPAEDIC SURGERY

## 2021-12-17 RX ORDER — AMLODIPINE BESYLATE 10 MG/1
10 TABLET ORAL DAILY
Qty: 30 TABLET | Refills: 0 | Status: SHIPPED | OUTPATIENT
Start: 2021-12-17 | End: 2022-10-13

## 2021-12-17 RX ORDER — OXYCODONE HYDROCHLORIDE 5 MG/1
5 TABLET ORAL EVERY 4 HOURS PRN
Qty: 20 TABLET | Refills: 0 | Status: SHIPPED | OUTPATIENT
Start: 2021-12-17 | End: 2022-10-13

## 2021-12-17 RX ORDER — METHOCARBAMOL 500 MG/1
500 TABLET, FILM COATED ORAL EVERY 6 HOURS PRN
Qty: 12 TABLET | Refills: 0 | Status: SHIPPED | OUTPATIENT
Start: 2021-12-17 | End: 2022-10-13

## 2021-12-17 RX ORDER — RIFAMPIN 300 MG/1
300 CAPSULE ORAL EVERY 12 HOURS
Qty: 84 CAPSULE | Refills: 0 | Status: SHIPPED | OUTPATIENT
Start: 2021-12-17 | End: 2022-01-28

## 2021-12-17 RX ADMIN — CEFAZOLIN 2 G: 10 INJECTION, POWDER, FOR SOLUTION INTRAVENOUS at 13:52

## 2021-12-17 RX ADMIN — AMLODIPINE BESYLATE 10 MG: 10 TABLET ORAL at 09:31

## 2021-12-17 RX ADMIN — LISINOPRIL 40 MG: 40 TABLET ORAL at 09:31

## 2021-12-17 RX ADMIN — ACETAMINOPHEN 650 MG: 325 TABLET, FILM COATED ORAL at 04:24

## 2021-12-17 RX ADMIN — ACETAMINOPHEN 650 MG: 325 TABLET, FILM COATED ORAL at 00:10

## 2021-12-17 RX ADMIN — CEFAZOLIN 2 G: 10 INJECTION, POWDER, FOR SOLUTION INTRAVENOUS at 05:16

## 2021-12-17 RX ADMIN — RIFAMPIN 300 MG: 300 CAPSULE ORAL at 09:31

## 2021-12-17 ASSESSMENT — ACTIVITIES OF DAILY LIVING (ADL)
ADLS_ACUITY_SCORE: 15
ADLS_ACUITY_SCORE: 16
ADLS_ACUITY_SCORE: 16
ADLS_ACUITY_SCORE: 15
ADLS_ACUITY_SCORE: 16
ADLS_ACUITY_SCORE: 15
ADLS_ACUITY_SCORE: 16
ADLS_ACUITY_SCORE: 16
ADLS_ACUITY_SCORE: 15
ADLS_ACUITY_SCORE: 16
ADLS_ACUITY_SCORE: 15

## 2021-12-17 NOTE — PLAN OF CARE
DATE & TIME: 12/16/21 PM shift  Cognitive Concerns/ Orientation: A&O x4, calm & cooperative   BEHAVIOR & AGGRESSION TOOL COLOR: Green  ABNL VS/O2: VSS on RA. DELGADO. Encouraged to use IS and sit up in the chair.   MOBILITY: Lift. NWB RLE, L knee mobility limited. BLE elevated when in bed. Up in chair x 1.   PAIN MANAGMENT:denies pain this shift.   DIET: Regular, good appetite.   BOWEL/BLADDER: Continent. Using urinal and BSC. One BM this shift.   ABNL LAB/BG: no new labs  DRAIN/DEVICES: R arm PICC with q8h Ancef. Wound vac in place   SKIN: BLE rodo and dusky. +2 BLE edema. R foot ace wrapped. Surgical dressing/vac to RLE. Pt will keep dressing and wound vac until he sees Ortho on 12/21.   TESTS/PROCEDURES: I&D with vac placement 12/10  D/C DATE: tomorrow once home infusion service is set up. Plan to discharge at 13:00 pm with family. SW/CC are following.   OTHER IMPORTANT INFO: CMS intact. Ortho and PT are following. ID is following-plan for 6 weeks of IV Ancer and PO Rifampin

## 2021-12-17 NOTE — PROGRESS NOTES
Home Infusion  Danial will be discharging today and going home on IV cefazolin q8.  I met with Danial at bedside and instructed in IV administration via SL PICC line with SAS flushing.   Had Danial perform hands on with practice equipment and teaching sheets. Provided information about supplies and supply delivery, storage of medication, checking of label, dosing times, plan for SNV and 24/7 availability of [facility] staff. [facility] (P: [phone] F: [phone]) will follow Danial for RN, PT, OT. They will do their start of care on Monday. [facility] will provide RN support until [facility] assumes care on 12/20.   Danial verbalized understanding of information given. He demonstrated very good technique with practice and verbalized good understanding of process.  Stated he feels comfortable with administering abx later tonight.   Dosing schedule: Danial is currently dosing his cefazolin at 0600, 1400, 2200 and he would like to continue with this schedule at home.   Delivery: Medication and supplies will be delivered to pt's home prior to first home dose.   Danial is ready for discharge from [facility] perspective.    Amaris Sanchez RN  [facility] Liaison  [phone] (M-F 8a-5p)  [phone] Office

## 2021-12-17 NOTE — PLAN OF CARE
Physical Therapy Discharge Summary    Reason for therapy discharge:    Discharged to home with home therapy.    Progress towards therapy goal(s). See goals on Care Plan in [facility] electronic health record for goal details.  Goals partially met.  Barriers to achieving goals:   discharge from facility.    Therapy recommendation(s):    Continued therapy is recommended.  Rationale/Recommendations:  PT recommended TCU but d/t lack of bed availability in a desired TCU, patient and family have declined d/c to TCU and plan on returning home with HHPT. Reviewed that patient will need 2 people for sit <> stand transfers, has been unable to pivot transfer from bed <> chair yet however strength in left leg is improving and pain is slightly better. Did review and practice slide board transfers for 2 days, pt able to perform slide board transfer from bed/recliner <> wheelchair with SBA. Pt can order sliding board for home and is recommended to do so. Has FWW, wheelchair, commode already.

## 2021-12-17 NOTE — PLAN OF CARE
Discharge    Patient discharged to home with son. Awaiting sons arrival, plan was for 2:45 pm.      Care plan note  Pt is A/Ox4. VSS on RA. Denies pain. Up with the lift. PT have seen pt today as well. IV ancef given at 1:45 pm; pt will do next dose at 10 pm at home. Supplies should be delivered by 6 pm today. Pt has follow up with ortho on 12/23. Pt is discharging home and will have wound vac on. Pt has been advised on elevating the legs; keeping wound dressings clean/dry/intact. NWB to RLE. Pt will have PT/OT/nursing at home.     Listed belongings gathered and given to patient (including from security/pharmacy). Yes  Care Plan and Patient education resolved: Yes  Prescriptions if needed, hard copies sent with patient  NA  Medication Bin checked and emptied on discharge Yes  SW/care coordinator/charge RN aware of discharge: Yes

## 2021-12-17 NOTE — PROGRESS NOTES
Orthopedic Surgery  Sav Mendoza  2021  Admit Date:  2021  POD 7 Days Post-Op  S/P Procedure(s):  IRRIGATION AND DEBRIDEMENT RIGHT ANKLE, WOUND VAC PLACEMENT    Patient resting comfortably in bed.    Pain controlled.  Tolerating oral intake.    Denies nausea or vomiting  Denies chest pain or shortness of breath  No events overnight.     Alert and orient to person, place, and time.  Vital Sign Ranges  Temperature Temp  Av.3  F (36.8  C)  Min: 98.2  F (36.8  C)  Max: 98.4  F (36.9  C)   Blood pressure Systolic (24hrs), Av , Min:135 , Max:139        Diastolic (24hrs), Av, Min:79, Max:95      Pulse Pulse  Av.3  Min: 82  Max: 87   Respirations Resp  Av.3  Min: 18  Max: 20   Pulse oximetry SpO2  Av %  Min: 95 %  Max: 95 %       Prevena wound vac in place, intact and patent.   Bloody serosanguinous drainage in cannister  Dorsal foot swelling present.  Erythema and medial swelling present.    Bilateral venous stasis changes LE  Bilateral calves are soft, non-tender.   Able to wiggle toes   Sensation intact to light touch bilateral lower extremities.  Brisk capillary refill.     Labs:  Recent Labs   Lab Test 21  0844 12/10/21  0815 21  1223   POTASSIUM 3.9 3.6 3.8     Recent Labs   Lab Test 21  0844 12/10/21  0815 21  1223   HGB 11.4* 10.3* 11.3*     Recent Labs   Lab Test 06/15/21  0940 21  1641   INR 0.96 1.01     Recent Labs   Lab Test 12/10/21  0815 21  1223 21  0052    389 [id number]     1. Plan              Continue SCD for DVT prophylaxis.                Mobilize with PT/OT               NWB to RLE              Elevate RLE              Leave dressing and wound vac in place.               IV abx per ID              Repeat CRP ordered and ace wrap applied to foot.  Dr Miller team notified regarding medial erythema and swelling.  Continue current plan for now.                Continue current pain regiment.              Follow up with  Dr. Miller's PA, Ty, in clinic on 12/21    2. Disposition   Anticipate d/c to home today with home IV abx infusion.    Rosey Gonzalez PA-C

## 2021-12-17 NOTE — PLAN OF CARE
DATE & TIME: 12/16/21 5259-5521  Cognitive Concerns/ Orientation: A&O x4, calm & cooperative   BEHAVIOR & AGGRESSION TOOL COLOR: Green  ABNL VS/O2: VSS on RA. DELGADO. Encouraged to use IS. Educated on continued use after discharge   MOBILITY: Lift. NWB RLE, L knee mobility limited. BLE elevated when in bed.   PAIN MANAGMENT: 2/10 R ankle pain, described as throbbing and aching. PRN Tylenol given x2 with relief  DIET: Regular, good appetite.   BOWEL/BLADDER: Continent. Using urinal and BSC  ABNL LAB/BG: None  DRAIN/DEVICES: R arm PICC with q8h Ancef. Wound vac in place   SKIN: BLE rodo and dusky. +2 BLE edema. R foot ace wrapped. Surgical dressing/vac to RLE. Pt will keep dressing and wound vac until he sees Ortho on 12/21.   TESTS/PROCEDURES: I&D with vac placement 12/10  D/C DATE: Plan to discharge at 13:00 pm with family and home infusions. Pt will be on abx for 6 weeks (IV Ancef and PO Rifampin). SW/CC are following.   OTHER IMPORTANT INFO: CMS intact. Ortho and PT are following.

## 2021-12-17 NOTE — DISCHARGE SUMMARY
[facility]  Hospitalist Discharge Summary      Date of Admission:  12/9/2021  Date of Discharge:  12/17/2021  Discharging Provider: Micaela Ardon MD, FACP    Discharge Diagnoses   Postoperative wound infection with associated cellulitis.  History of Charcot right ankle s/p right ankle corrective osteotomy and subtalar and tibiotalocalcaneal fusion   Essential hypertension.  Dyslipidemia.  Mild anemia.  Obesity.  COVID-19 PCR negative currently unvaccinated.    Follow-ups Needed After Discharge   Follow-up Appointments     Follow Up Care      Follow-up with Danyelle Osborn PA-C with Dr Connor in 7-14 days   [phone]             Unresulted Labs Ordered in the Past 30 Days of this Admission     No orders found from 11/9/2021 to 12/10/2021.          Discharge Disposition   Discharged to home  Condition at discharge: Stable    Hospital Course   Cumulative Summary: Sav Mendoza is a 66 year old male with past medical history significant for HTN, peripheral vascular disease, obesity admitted on 12/9/2021 with post-operative infection.   Here are further details regarding the current hospitalization      Post-operative wound infection/cellulitis :  Hx of Charcot ankle s/p R ankle corrective osteotomy and subtalar and tibiotalocalcaneal fusion (11/22)    Pt underwent above procedure with Dr. Miller on 11/22. He was seen in clinic and was noted to have redness, swelling and pain at the incision site.  Pt reports low grade fevers at home. The patient is currently hemodynamically stable, non-toxic appearing, though he does describe fairly rapid progression of redness, pain and swelling. CRP was markedly elevated to 217. He will need close monitoring with concern for possible developing necrotizing infection given the rapidity of progression. If any worsening of clinical status would broaden antibiotics and call orthopedics urgently.      --Patient was admitted and has undergone irrigation and  debridement of right ankle wound and placement of wound VAC device, has been followed closely by orthopedic surgery and at this time is okay to be discharged from their point of view.  --Patient was also evaluated by infectious disease, patient was started on IV antibiotics which were then narrowed down to cefazolin 2 g IV every 8 hours which he will be receiving for 3 more weeks.  --Patient will also be discharged on oral rifampin for 21 days.  --Patient will have follow-up with orthopedic surgery and infectious disease after the discharge.  --Patient also has a wound VAC, patient has done IV antibiotics and wound VAC in the past at home and at this time he is confident that with assist of his family, home RN, home care he would be able to manage.     HTN  Dyslipidemia  -- Continue PTA amlodipine and lisinopril , his Norvasc dose has been increased to 10 mg p.o. daily from 5 mg, I have given him a prescription for new dose for 30 days.  --Patient will need further refills from his primary care physician when he will have a follow-up.  --Patient will be continued on full dose aspirin 325 mg p.o. daily as per recommendation by orthopedic surgery for DVT prophylaxis for 30 days.     Mild anemia  Hgb is 11.3, down from 13.7 pre-operatively   --Hemoglobin has been is stable, will recommend repeating CBC when he has a follow-up with primary care physician     Obesity   -- Body mass index is 36.62 kg/m .     COVID 19 PCR; negative   --Encouraged patient get COVID-19 vaccination    Patient was seen and examined on the day of discharge , he is feeling well, does not have any complaints , I did review the discharge medications and instructions with the patient and plan for him to follow up with the PCP after the hospitalization .patient was in agreement , he is discharged in stable condition back to his home with home health care, home RN, PT OT and home infusion therapy.    Consultations This Hospital Stay   ORTHOPEDIC  SURGERY IP CONSULT  PHYSICAL THERAPY ADULT IP CONSULT  HOSPITALIST IP CONSULT  PHYSICAL THERAPY ADULT IP CONSULT  INFECTIOUS DISEASES IP CONSULT  INFECTIOUS DISEASES IP CONSULT  CARE MANAGEMENT / SOCIAL WORK IP CONSULT  VASCULAR ACCESS ADULT IP CONSULT  VASCULAR ACCESS ADULT IP CONSULT  OCCUPATIONAL THERAPY ADULT IP CONSULT    Code Status   Full Code    Time Spent on this Encounter   I, Micaela Ardon MD, personally saw the patient today and spent greater than 30 minutes discharging this patient.     Micaela Ardon MD  [facility] [address] [facility]  [address] [place] [address]  Phone: [phone]  ______________________________________________________________________    Physical Exam   Vital Signs: Temp: 98.3  F (36.8  C) Temp src: Oral BP: (!) 139/95 Pulse: 87   Resp: 18 SpO2: 95 % O2 Device: None (Room air)    Weight: 271 lbs 0 oz    Physical Exam  Vitals and nursing note reviewed.   Constitutional:       Appearance: He is well-developed.   HENT:      Head: Normocephalic and atraumatic.   Eyes:      Pupils: Pupils are equal, round, and reactive to light.   Neck:      Thyroid: No thyromegaly.   Cardiovascular:      Rate and Rhythm: Normal rate and regular rhythm.      Heart sounds: Normal heart sounds.   Pulmonary:      Effort: Pulmonary effort is normal. No respiratory distress.      Breath sounds: Normal breath sounds.   Abdominal:      General: Bowel sounds are normal. There is no distension.      Palpations: Abdomen is soft.   Musculoskeletal:         General: No tenderness. Normal range of motion.      Cervical back: Normal range of motion and neck supple.      Comments: Right foot in dressing, wound VAC present   Skin:     General: Skin is warm and dry.   Neurological:      Mental Status: He is alert and oriented to person, place, and time.   Psychiatric:         Behavior: Behavior normal.          Primary Care Physician   Logan Calvo    Discharge Orders      Home care nursing  "referral      Home Care PT Referral for Hospital Discharge      Home Care OT Referral for Hospital Discharge      Home infusion referral      Reason for your hospital stay    S/p hand surgery     Contact Surgeon Team    You may experience symptoms that require follow-up before your scheduled appointment. Refer to the \"Stoplight Tool\" for instructions on when to contact your Surgeon Team if you are concerned about pain control, blood clots, constipation, or if you are unable to urinate.     Orthopedic Urgent Care    If you are not able to reach your Surgeon Team and you need immediate care, go to the Orthopedic Urgent Care at your Surgeon's office.  Do NOT go to the Emergency Room unless you have shortness of breath, chest pain, or other signs of a medical emergency.     Call 911    Call 911 immediately if you experience sudden-onset chest pain, arm weakness/numbness, slurred speech, or shortness of breath     Breathing exercises    Perform breathing exercises using your Incentive Spirometer 10 times per hour while awake for 2 weeks.     Fever Management    A low grade fever can be expected after surgery.  Use acetaminophen (TYLENOL) as needed for fever management.  Contact your Surgeon Team if you have a fever greater than 101.5 F, chills, and/or night sweats.     Constipation management    Constipation (hard, dry bowel movements) is expected after surgery due to the combination of being less active, the anesthetic, and the opioid pain medication.  You can do the following to help reduce constipation:  ~  FLUIDS:  Drink clear liquids (water or Gatorade), or juice (apple/prune).  ~  DIET:  Eat a fiber rich diet.    ~  ACTIVITY:  Get up and move around several times a day.  Increase your activity as you are able.  MEDICATIONS:  Reduce the risk of constipation by starting medications before you are constipated.  You can take Miralax   (1 packet as directed) and/or a stool softener (Senokot 1-2 tablets 1-2 times a day). "  If you already have constipation and these medications are not working, you can get magnesium citrate and use as directed.  If you continue to have constipation you can try an over the counter suppository or enema.  Call your Surgeon Team if it has been greater than 3 days since your last bowel movement.     Reduced Urine Output    Changes in the amount of fluids you drank before and after surgery may result in problems urinating.  It is important to stay well-hydrated after surgery and drink plenty of water. If it has been greater than 8 hours since you have urinated despite drinking plenty of water, call your Surgeon Team.     No pharmacologic VTE prophylaxis prescribed    Your Surgeon did not prescribe medication for anticoagulation.     Activity - Exercises to prevent blood clots    Unless otherwise directed by your Surgeon team, perform the following exercises at least three times per day for the first four weeks after surgery to prevent blood clots in your legs: 1) Point and flex your feet (Ankle Pumps), 2) Move your ankle around in big circles, 3) Wiggle your toes, 4) Walk, even for short distances, several times a day, will help decrease the risk of blood clots.     Pain after Surgery    Pain after surgery is normal and expected.  You will   have some amount of pain for several weeks after surgery.  Your pain will improve with time.  There are several things you can do to help reduce your pain including: rest, ice, elevation, and using pain medications as needed. Contact your Surgeon Team if you have pain that persists or worsens after surgery despite rest, ice, elevation, and taking your medication(s) as prescribed. Contact your Surgeon Team if you have new numbness, tingling, or weakness in your operative extremity.     Swelling after Surgery    Swelling and/or bruising of the surgical extremity is common and may persist for several months after surgery. In addition to frequent icing and elevation, gentle  "compressive support with an ACE wrap or tubigrip may help with swelling. Apply compression regularly, removing at least twice daily to perform skin checks. Contact your Surgeon Team if your swelling increases and is NOT associated with an increase in your activity level, or if your swelling increases and is associated with redness and pain.     LOWER Extremity Elevation    Swelling is expected for several months after surgery. This type of swelling is usually associated with gravity and activity, and can be improved with elevation.   The best way to do this is to get your \"toes above your nose\" by laying down and placing several pillows lengthwise under your calf and heel. When elevating your leg keep your knee completely straight. Perform this elevation as often as possible especially for the first two weeks after surgery.     Cold therapy    Ice can be used to control swelling and discomfort after surgery. Place a thin towel over your operative site and apply the ice pack overtop. Leave ice pack in place for 20 minutes, then remove for 20 minutes. Repeat this 20 minutes on/20 minutes off routine as often as tolerated.     acetaminophen (TYLENOL) Instructions    You were discharged with acetaminophen (TYLENOL) for pain management after surgery. Acetaminophen most effectively manages pain symptoms when it is taken on a schedule without missing doses (every four, six, or eight hours). Your Provider will prescribe a safe daily dose between 3000 - 4000 mg.  Do NOT exceed this daily dose. Most patients use acetaminophen for pain control for the first four weeks after surgery.  You can wean from this medication as your pain decreases.     NSAID Instructions    You were discharged with an anti-inflammatory medication for pain management to use in combination with acetaminophen (TYLENOL) and the narcotic pain medication.  Take this medication exactly as directed.  You should only take one anti-inflammatory at a time.  Some " common anti-inflammatories include: ibuprofen (ADVIL, MOTRIN), naproxen (ALEVE, NAPROSYN), celecoxib (CELEBREX), meloxicam (MOBIC), ketorolac (TORADOL).  Take this medication with food and water.     Opioid Instructions (Greater than or equal to 65 years)    You were discharged with an opioid medication (hydromorphone, oxycodone, hydrocodone, or tramadol). This medication should only be taken for breakthrough pain that is not controlled with acetaminophen (TYLENOL). If you rate your pain less than 3 you do not need this medication.  Pain rating 0-3:  You do not need this medication  Pain rating 4-6:  Take 1/2 tablet every 4-6 hours as needed  Pain rating 7-10:  Take 1 tablet every 4-6 hours as needed  Do not exceed 4 tablets per day     Opioids - Tapering Instructions    In the first three days following surgery, your symptoms may warrant use of the narcotic pain medication every four to six hours as prescribed. This is normal. As your pain symptoms improve, focus your efforts on decreasing (tapering) use of narcotic medications. The most successful tapering strategy is to first, decrease the number of tablets you take every 4-6 hours to the minimum prescribed. Then, increase the amount of time between doses.  For example:  First, taper to   or 1 tablet every 4-6 hours.  Then, taper to   or 1 tablet every 6-8 hours.  Then, taper to   or 1 tablet every 8-10 hours.  Then, taper to   or 1 tablet every 10-12 hours.  Then, taper to   or 1 tablet at bedtime.  The bedtime dose can help with comfort during sleep and is typically the last dose to be discontinued after surgery.     Follow Up Care    Follow-up with Danyelle Osborn PA-C with Dr Connor in 7-14 days [phone]     Activity     NO range of motion     Return to Driving    Return to driving - Driving is NOT permitted until directed by your provider. Under no circumstance are you permitted to drive while using narcotic pain medications.     Splint / Cast    Do NOT  remove your splint/cast.  Keep your splint/cast clean and dry.  If your splint/cast gets wet, contact your surgeon team.     Dressing / Wound Care - Wound    You have a clean dressing on your surgical wound. Dressing change instructions as follows Leave in place. Contact your Surgeon Team if you have increased redness, warmth around the surgical wound, and/or drainage from the surgical wound.     Dressing / Wound care - Shower with wound/dressing covered    You must COVER your dressing or incision with saran wrap (or any other non-permeable covering) to allow the incision to remain dry while showering.  You may shower 2 days after surgery as long as the surgical wound stays dry. Continue to cover your dressing or incision for showering until your first office visit.  You are strictly prohibited from soaking   or submerging the surgical wound underwater.     Dressing / Wound Care - NO Tub Bathing    Tub bathing, swimming, or any other activities that will cause your incision to be submerged in water should be avoided until allowed by your Surgeon.     NO weight bearing    No weight bearing on your operative extremity.     MD face to face encounter    Documentation of Face to Face and Certification for Home Health Services    I certify that patient: Sav Mendoza is under my care and that I, or a nurse practitioner or physician's assistant working with me, had a face-to-face encounter that meets the physician face-to-face encounter requirements with this patient on: 12/17/2021.    This encounter with the patient was in whole, or in part, for the following medical condition, which is the primary reason for home health care: Acute illness myopathy, status post surgery for wound infection , has wound vac and is on IV antibiotics     I certify that, based on my findings, the following services are medically necessary home health services: Nursing, Occupational Therapy and Physical Therapy.    My clinical findings  support the need for the above services because: Nurse is needed: To assess Vitals after changes in medications or other medical regimen and follow up after recent hospitalization. Occupational Therapy Services are needed to assess and treat cognitive ability and address ADL safety due to impairment in Cognition and Physical Therapy Services are needed to assess and treat the following functional impairments: recent hospitalization causing acute illness myopathy    Further, I certify that my clinical findings support that this patient is homebound (i.e. absences from home require considerable and taxing effort and are for medical reasons or [employer] services or infrequently or of short duration when for other reasons) because: Leaving home is medically contraindicated for the following reason(s): Foot wound with wound vac     Based on the above findings. I certify that this patient is confined to the home and needs intermittent skilled nursing care, physical therapy and/or speech therapy.  The patient is under my care, and I have initiated the establishment of the plan of care.  This patient will be followed by a physician who will periodically review the plan of care.  Physician/Provider to provide follow up care: Logan Calvo    Attending hospital physician (the Medicare certified [facility] provider): Micaela Ardon MD, FACP  Physician Signature: See electronic signature associated with these discharge orders.  Date: 12/17/2021     Rolling Knee Walker DME    DME Documentation: Describe the reason for need to support medical necessity: Impaired gait due to Foot/Ankle surgery. Anticipated length of need: 3 months     Walker DME    DME Documentation: Describe the reason for need to support medical necessity: Impaired gait due to Foot/Ankle surgery. Anticipated length of need: 3 months     Regular Diet Adult       Significant Results and Procedures   Results for orders placed or performed during the hospital encounter of  12/09/21   Ankle XR, G/E 3 views, right    Narrative    XR ANKLE RIGHT G/E 3 VIEWS 12/9/2021 1:51 PM     HISTORY: post op wound infection    COMPARISON: 4/12/2019      Impression    IMPRESSION: Distal tibial osteotomy. Tibial, talar and calcaneal  fixation. Resection of the distal fibula. Diffuse soft tissue  swelling. Skin staples in the lateral ankle region. Degenerative  changes in the midfoot..    RANI BANKS MD         SYSTEM ID:  [id number]       Discharge Medications   Current Discharge Medication List      START taking these medications    Details   acetaminophen (TYLENOL) 325 MG tablet Take 2 tablets (650 mg) by mouth every 4 hours as needed for other (mild pain)  Qty: 100 tablet, Refills: 0    Associated Diagnoses: Surgical site infection      aspirin (ASA) 325 MG EC tablet Take 1 tablet (325 mg) by mouth daily  Qty: 30 tablet, Refills: 0    Associated Diagnoses: Surgical site infection      ceFAZolin (ANCEF) 1 GM vial Inject 2 g into the vein every 8 hours CBC with differential, creatinine, SGOT weekly while on this medication to be faxed to Dr. Kirkpatrick office.  Qty: 1 each    Associated Diagnoses: Surgical site infection      hydrOXYzine (ATARAX) 10 MG tablet Take 1 tablet (10 mg) by mouth every 6 hours as needed for itching or anxiety (with pain, moderate pain)  Qty: 30 tablet, Refills: 0    Associated Diagnoses: Surgical site infection      ibuprofen (ADVIL/MOTRIN) 600 MG tablet Take 1 tablet (600 mg) by mouth every 6 hours as needed (mild)  Qty: 30 tablet, Refills: 0    Associated Diagnoses: Surgical site infection      methocarbamol (ROBAXIN) 500 MG tablet Take 1 tablet (500 mg) by mouth every 6 hours as needed for muscle spasms  Qty: 12 tablet, Refills: 0    Comments: Future refills by PCP Dr. Logan Calvo with phone number [phone].  Associated Diagnoses: Surgical site infection      rifampin (RIFADIN) 300 MG capsule Take 1 capsule (300 mg) by mouth every 12 hours  Qty: 84 capsule, Refills:  0    Associated Diagnoses: Surgical site infection      senna-docusate (SENOKOT-S/PERICOLACE) 8.6-50 MG tablet Take 1-2 tablets by mouth 2 times daily Take while on oral narcotics to prevent or treat constipation.  Qty: 30 tablet, Refills: 0    Associated Diagnoses: Surgical site infection         CONTINUE these medications which have CHANGED    Details   amLODIPine (NORVASC) 10 MG tablet Take 1 tablet (10 mg) by mouth daily  Qty: 30 tablet, Refills: 0    Comments: Future refills by PCP Dr. Logan Calvo with phone number [phone].  Associated Diagnoses: Surgical site infection      oxyCODONE (ROXICODONE) 5 MG tablet Take 1 tablet (5 mg) by mouth every 4 hours as needed  Qty: 20 tablet, Refills: 0    Comments: Future refills by PCP Dr. Logan Calvo with phone number [phone].  Associated Diagnoses: Surgical site infection         CONTINUE these medications which have NOT CHANGED    Details   docusate sodium (COLACE) 100 MG tablet Take 100 mg by mouth daily      lisinopril (ZESTRIL) 40 MG tablet TAKE 1 TABLET BY MOUTH ONCE DAILY IN THE EVENING.  Qty: 90 tablet, Refills: 3    Associated Diagnoses: Benign essential hypertension      multivitamin, therapeutic with minerals (THERA-VIT-M) TABS Take 1 tablet by mouth daily      polyethylene glycol (MIRALAX) 17 GM/Dose powder Take 17 g (1 capful) by mouth daily as needed for constipation  Qty: 507 g, Refills: 0    Associated Diagnoses: Drug-induced constipation      vitamin C (ASCORBIC ACID) 500 MG tablet Take 500 mg by mouth daily      Blood Pressure Monitoring (BLOOD PRESSURE MONITOR/WRIST) LAURA 1 Application 2 times daily as needed (HYPERTENSION)  Qty: 1 each, Refills: 0    Associated Diagnoses: Benign essential hypertension           Allergies   No Known Allergies

## 2021-12-17 NOTE — PROGRESS NOTES
[facility]    [facility] received a referral for Danial for home IV abx and physical therapy. He has been setup with [facility] (P: [phone] F: [phone]) for home RN and PT. They will do their start of care on Monday. [facility] will provide RN support until [facility] assumes care on 12/20. I will do bedside education with Danial at 1100a. Please ensure that Danial receives his 1400h ancef prior to discharge. I have discussed the above plan with Danial and he is in agreement.     Thank you for the referral.    Amaris Sanchez RN  [facility] Liaison  [phone] (Mon thru Fri 8am - 5pm)  [phone] Office

## 2021-12-18 DIAGNOSIS — Z71.89 OTHER SPECIFIED COUNSELING: ICD-10-CM

## 2021-12-20 ENCOUNTER — PATIENT OUTREACH (OUTPATIENT)
Dept: CARE COORDINATION | Facility: CLINIC | Age: 66
End: 2021-12-20
Payer: MEDICARE

## 2021-12-20 NOTE — PROGRESS NOTES
Clinic Care Coordination Contact  [facility]: Post-Discharge Note  SITUATION                                                      Admission:    Admission Date: 12/09/21   Reason for Admission: Postoperative wound infection with associated cellulitis  Discharge:   Discharge Date: 12/17/21  Discharge Diagnosis: Postoperative wound infection with associated cellulitis    BACKGROUND                                                      Sav Mendoza is a 66 year old male who presents to the ED with concern for post-operative infection.      He presented to orthopedics clinic initially with severe varus collapse of this right leg. On 11/22 he underwent a 60 degree lateral closing wedge osteotomy through the previous ankle joint as well as TTC fusion with corrective osteotomy through the subtalar joint.       He was seen in clinic initially on 11/30 and reportedly everything looked okay at that time. He was Placed in a boot and advised to be non-weight bearing, though it sounds like he has had difficulty with adhering to this recommendation.      He started having low grade fevers at home a few days ago and increased pain and swelling in the R ankle yesterday. He was seen in clinic today. There is concern for infection of the incision and surrounding cellulitis so he was instructed to come to the ED for IV antibiotics and operative debridement in the next few days. He reports that the ankle looks worse now even then from this morning in clinic.     ASSESSMENT      Enrollment  Primary Care Care Coordination Status: Declined    Discharge Assessment  How are you doing now that you are home?: Its nice to be home, less pain in my ankle, trying to PT on my own  How are your symptoms? (Red Flag symptoms escalate to triage hotline per guidelines): Unchanged  Do you feel your condition is stable enough to be safe at home until your provider visit?: Yes  Does the patient have their discharge instructions? : Yes  Does the  patient have questions regarding their discharge instructions? : No  Were you started on any new medications or were there changes to any of your previous medications? : Yes  Does the patient have all of their medications?: Yes  Do you have questions regarding any of your medications? : No  Do you have all of your needed medical supplies or equipment (DME)?  (i.e. oxygen tank, CPAP, cane, etc.): Yes  Discharge follow-up appointment scheduled within 14 calendar days? : Yes  Discharge Follow Up Appointment Date: 12/23/21  Discharge Follow Up Appointment Scheduled with?: Specialty Care Provider                PLAN                                                      Outpatient Plan: Follow-up with Danyelle Osborn PA-C with Dr Connor in 7-14 days [phone]    No future appointments.      For any urgent concerns, please contact our 24 hour nurse triage line: [phone] ([phone])         GAEL Schwartz  [phone]  [facility]

## 2021-12-21 ENCOUNTER — TRANSFERRED RECORDS (OUTPATIENT)
Dept: HEALTH INFORMATION MANAGEMENT | Facility: CLINIC | Age: 66
End: 2021-12-21
Payer: MEDICARE

## 2021-12-21 ENCOUNTER — LAB REQUISITION (OUTPATIENT)
Dept: LAB | Facility: CLINIC | Age: 66
End: 2021-12-21
Payer: MEDICARE

## 2021-12-21 DIAGNOSIS — T81.49XA INFECTION FOLLOWING A PROCEDURE, OTHER SURGICAL SITE, INITIAL ENCOUNTER: ICD-10-CM

## 2021-12-21 LAB
AST SERPL W P-5'-P-CCNC: 14 U/L (ref 0–45)
BASOPHILS # BLD AUTO: 0.1 10E3/UL (ref 0–0.2)
BASOPHILS NFR BLD AUTO: 1 %
BUN SERPL-MCNC: 14 MG/DL (ref 7–30)
CREAT SERPL-MCNC: 0.58 MG/DL (ref 0.66–1.25)
CRP SERPL-MCNC: 18 MG/L (ref 0–8)
EOSINOPHIL # BLD AUTO: 0.2 10E3/UL (ref 0–0.7)
EOSINOPHIL NFR BLD AUTO: 4 %
ERYTHROCYTE [DISTWIDTH] IN BLOOD BY AUTOMATED COUNT: 14.8 % (ref 10–15)
ERYTHROCYTE [SEDIMENTATION RATE] IN BLOOD BY WESTERGREN METHOD: 83 MM/HR (ref 0–20)
GFR SERPL CREATININE-BSD FRML MDRD: >90 ML/MIN/1.73M2
HCT VFR BLD AUTO: 37.4 % (ref 40–53)
HGB BLD-MCNC: 11.8 G/DL (ref 13.3–17.7)
IMM GRANULOCYTES # BLD: 0 10E3/UL
IMM GRANULOCYTES NFR BLD: 0 %
LYMPHOCYTES # BLD AUTO: 1.9 10E3/UL (ref 0.8–5.3)
LYMPHOCYTES NFR BLD AUTO: 33 %
MCH RBC QN AUTO: 30 PG (ref 26.5–33)
MCHC RBC AUTO-ENTMCNC: 31.6 G/DL (ref 31.5–36.5)
MCV RBC AUTO: 95 FL (ref 78–100)
MONOCYTES # BLD AUTO: 0.4 10E3/UL (ref 0–1.3)
MONOCYTES NFR BLD AUTO: 7 %
NEUTROPHILS # BLD AUTO: 3.1 10E3/UL (ref 1.6–8.3)
NEUTROPHILS NFR BLD AUTO: 55 %
NRBC # BLD AUTO: 0 10E3/UL
NRBC BLD AUTO-RTO: 0 /100
PLATELET # BLD AUTO: 523 10E3/UL (ref 150–450)
RBC # BLD AUTO: 3.93 10E6/UL (ref 4.4–5.9)
WBC # BLD AUTO: 5.7 10E3/UL (ref 4–11)

## 2021-12-21 PROCEDURE — 84520 ASSAY OF UREA NITROGEN: CPT | Performed by: INTERNAL MEDICINE

## 2021-12-21 PROCEDURE — 82565 ASSAY OF CREATININE: CPT | Performed by: INTERNAL MEDICINE

## 2021-12-21 PROCEDURE — 85652 RBC SED RATE AUTOMATED: CPT | Performed by: INTERNAL MEDICINE

## 2021-12-21 PROCEDURE — 84450 TRANSFERASE (AST) (SGOT): CPT | Performed by: INTERNAL MEDICINE

## 2021-12-21 PROCEDURE — 85025 COMPLETE CBC W/AUTO DIFF WBC: CPT | Mod: GZ | Performed by: INTERNAL MEDICINE

## 2021-12-21 PROCEDURE — 86140 C-REACTIVE PROTEIN: CPT | Performed by: INTERNAL MEDICINE

## 2021-12-22 ENCOUNTER — HOME INFUSION (PRE-WILLOW HOME INFUSION) (OUTPATIENT)
Dept: PHARMACY | Facility: CLINIC | Age: 66
End: 2021-12-22
Payer: MEDICARE

## 2021-12-26 ENCOUNTER — HEALTH MAINTENANCE LETTER (OUTPATIENT)
Age: 66
End: 2021-12-26

## 2021-12-28 ENCOUNTER — LAB REQUISITION (OUTPATIENT)
Dept: LAB | Facility: CLINIC | Age: 66
End: 2021-12-28
Payer: MEDICARE

## 2021-12-28 DIAGNOSIS — T81.49XA INFECTION FOLLOWING A PROCEDURE, OTHER SURGICAL SITE, INITIAL ENCOUNTER: ICD-10-CM

## 2021-12-28 LAB
AST SERPL W P-5'-P-CCNC: 13 U/L (ref 0–45)
BASOPHILS # BLD AUTO: 0.1 10E3/UL (ref 0–0.2)
BASOPHILS NFR BLD AUTO: 1 %
BUN SERPL-MCNC: 13 MG/DL (ref 7–30)
CREAT SERPL-MCNC: 0.53 MG/DL (ref 0.66–1.25)
CRP SERPL-MCNC: 17 MG/L (ref 0–8)
EOSINOPHIL # BLD AUTO: 0.3 10E3/UL (ref 0–0.7)
EOSINOPHIL NFR BLD AUTO: 5 %
ERYTHROCYTE [DISTWIDTH] IN BLOOD BY AUTOMATED COUNT: 14.6 % (ref 10–15)
ERYTHROCYTE [SEDIMENTATION RATE] IN BLOOD BY WESTERGREN METHOD: 64 MM/HR (ref 0–20)
GFR SERPL CREATININE-BSD FRML MDRD: >90 ML/MIN/1.73M2
HCT VFR BLD AUTO: 39.7 % (ref 40–53)
HGB BLD-MCNC: 12.6 G/DL (ref 13.3–17.7)
IMM GRANULOCYTES # BLD: 0 10E3/UL
IMM GRANULOCYTES NFR BLD: 0 %
LYMPHOCYTES # BLD AUTO: 1.7 10E3/UL (ref 0.8–5.3)
LYMPHOCYTES NFR BLD AUTO: 28 %
MCH RBC QN AUTO: 30 PG (ref 26.5–33)
MCHC RBC AUTO-ENTMCNC: 31.7 G/DL (ref 31.5–36.5)
MCV RBC AUTO: 95 FL (ref 78–100)
MONOCYTES # BLD AUTO: 0.4 10E3/UL (ref 0–1.3)
MONOCYTES NFR BLD AUTO: 7 %
NEUTROPHILS # BLD AUTO: 3.4 10E3/UL (ref 1.6–8.3)
NEUTROPHILS NFR BLD AUTO: 59 %
NRBC # BLD AUTO: 0 10E3/UL
NRBC BLD AUTO-RTO: 0 /100
PLATELET # BLD AUTO: 349 10E3/UL (ref 150–450)
RBC # BLD AUTO: 4.2 10E6/UL (ref 4.4–5.9)
WBC # BLD AUTO: 5.8 10E3/UL (ref 4–11)

## 2021-12-28 PROCEDURE — 85004 AUTOMATED DIFF WBC COUNT: CPT | Performed by: INTERNAL MEDICINE

## 2021-12-28 PROCEDURE — 82565 ASSAY OF CREATININE: CPT | Performed by: INTERNAL MEDICINE

## 2021-12-28 PROCEDURE — 86140 C-REACTIVE PROTEIN: CPT | Performed by: INTERNAL MEDICINE

## 2021-12-28 PROCEDURE — 84450 TRANSFERASE (AST) (SGOT): CPT | Performed by: INTERNAL MEDICINE

## 2021-12-28 PROCEDURE — 85652 RBC SED RATE AUTOMATED: CPT | Performed by: INTERNAL MEDICINE

## 2021-12-28 PROCEDURE — 84520 ASSAY OF UREA NITROGEN: CPT | Performed by: INTERNAL MEDICINE

## 2021-12-29 ENCOUNTER — MEDICAL CORRESPONDENCE (OUTPATIENT)
Dept: HEALTH INFORMATION MANAGEMENT | Facility: CLINIC | Age: 66
End: 2021-12-29
Payer: MEDICARE

## 2021-12-30 ENCOUNTER — HOME INFUSION (PRE-WILLOW HOME INFUSION) (OUTPATIENT)
Dept: PHARMACY | Facility: CLINIC | Age: 66
End: 2021-12-30
Payer: MEDICARE

## 2021-12-31 ENCOUNTER — TELEPHONE (OUTPATIENT)
Dept: FAMILY MEDICINE | Facility: CLINIC | Age: 66
End: 2021-12-31
Payer: MEDICARE

## 2021-12-31 NOTE — TELEPHONE ENCOUNTER
Reason for Call:  Form, our goal is to have forms completed with 72 hours, however, some forms may require a visit or additional information.    Type of letter, form or note:  doctor's orders 485 plan of care 12-21-21/2-18-22 face to face    Who is the form from?: [facility] (if other please explain)    Where did the form come from: form was faxed in    What clinic location was the form placed at?: [facility]    Where the form was placed: placed in pod C providers form folder Box/Folder    What number is listed as a contact on the form?: [phone]       Additional comments: this face to face is a requirement of Medicare    Call taken on 12/31/2021 at 11:11 AM by Miryam Sagastume

## 2021-12-31 NOTE — TELEPHONE ENCOUNTER
Reason for Call:  Form, our goal is to have forms completed with 72 hours, however, some forms may require a visit or additional information.    Type of letter, form or note:   doctors orders    Who is the form from?: [facility] (if other please explain)    Where did the form come from: form was faxed in    What clinic location was the form placed at?: [facility]    Where the form was placed: placed in pod C providers signature folder Box/Folder    What number is listed as a contact on the form?: [phone]       Additional comments: 2nd request    Call taken on 12/31/2021 at 11:07 AM by Miryam Sagastume

## 2022-01-03 ENCOUNTER — VIRTUAL VISIT (OUTPATIENT)
Dept: FAMILY MEDICINE | Facility: CLINIC | Age: 67
End: 2022-01-03
Payer: MEDICARE

## 2022-01-03 DIAGNOSIS — E66.01 MORBID OBESITY (H): ICD-10-CM

## 2022-01-03 DIAGNOSIS — T81.49XA SURGICAL SITE INFECTION: ICD-10-CM

## 2022-01-03 DIAGNOSIS — Z09 HOSPITAL DISCHARGE FOLLOW-UP: Primary | ICD-10-CM

## 2022-01-03 PROCEDURE — 99441 PR PHYSICIAN TELEPHONE EVALUATION 5-10 MIN: CPT | Mod: 95 | Performed by: FAMILY MEDICINE

## 2022-01-03 NOTE — PROGRESS NOTES
Danial is a 66 year old who is being evaluated via a billable telephone visit.      What phone number would you like to be contacted at?[phone]  How would you like to obtain your AVS? Vitohart    Assessment & Plan     Hospital discharge follow-up  Surgical site infection  -Currently on IV and oral abx per ortho  -Pain significantly improved  -Follow-up with ortho as schedule  -Home health for wound care and meds    Morbid obesity (H)  -Currently recovering from right ankle surgery and post-op infection  -Increase physical activity when able      Return in about 5 months (around 6/3/2022) for Routine preventive, with me.    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 Subsequently developed post-op wound cellulitis on 12/9. Had surgery on 12/10 to washout infection and has been on antibiotics since. Following with ortho. Currently at home, has PICC line for IV abx and help from home health nurse. Pain has significantly improved, only taking acetaminophen at this point. Has follow-up with ortho in 3 days.     Post Discharge Outreach 12/20/2021   Admission Date 12/9/2021   Reason for Admission Postoperative wound infection with associated cellulitis   Discharge Date 12/17/2021   Discharge Diagnosis Postoperative wound infection with associated cellulitis   How are you doing now that you are home? Its nice to be home, less pain in my ankle, trying to PT on my own   How are your symptoms? (Red Flag symptoms escalate to triage hotline per guidelines) Unchanged   Do you feel your condition is stable enough to be safe at home until your provider visit? Yes   Does the patient have their discharge instructions?  Yes   Does the patient have questions regarding their discharge instructions?  No   Were you started on any new  medications or were there changes to any of your previous medications?  Yes   Does the patient have all of their medications? Yes   Do you have questions regarding any of your medications?  No   Do you have all of your needed medical supplies or equipment (DME)?  (i.e. oxygen tank, CPAP, cane, etc.) Yes   Discharge follow-up appointment scheduled within 14 calendar days?  Yes   Discharge Follow Up Appointment Date 12/23/2021   Discharge Follow Up Appointment Scheduled with? Specialty Care Provider     Hospital Follow-up Visit:    Hospital/Nursing Home/IP Rehab Facility: [facility]  Date of Admission: 12/09/2021  Date of Discharge: 12/17/2021  Reason(s) for Admission: cellulitis of right lower ankle and foot       Was your hospitalization related to COVID-19? No   Problems taking medications regularly:  None  Medication changes since discharge: None  Problems adhering to non-medication therapy:  None    Summary of hospitalization:  [facility] discharge summary reviewed  Diagnostic Tests/Treatments reviewed.  Follow up needed: none  Other Healthcare Providers Involved in Patient s Care:         Homecare and ortho  Update since discharge: improved.       Post Discharge Medication Reconciliation: discharge medications reconciled, continue medications without change.  Plan of care communicated with patient                Review of Systems         Objective    Vitals - Patient Reported  Weight (Patient Reported): 122.5 kg (270 lb)  Height (Patient Reported): 182.9 cm (6')  BMI (Based on Pt Reported Ht/Wt): 36.62      Physical Exam   healthy, alert and no distress  PSYCH: Alert and oriented times 3; coherent speech, normal   rate and volume, able to articulate logical thoughts, able   to abstract reason, no tangential thoughts, no hallucinations   or delusions  His affect is normal  RESP: No cough, no audible wheezing, able to talk in full sentences  Remainder of exam unable to be  completed due to telephone visits      Phone call duration: 10 minutes

## 2022-01-04 ENCOUNTER — LAB REQUISITION (OUTPATIENT)
Dept: LAB | Facility: CLINIC | Age: 67
End: 2022-01-04
Payer: MEDICARE

## 2022-01-04 ENCOUNTER — MEDICAL CORRESPONDENCE (OUTPATIENT)
Dept: HEALTH INFORMATION MANAGEMENT | Facility: CLINIC | Age: 67
End: 2022-01-04

## 2022-01-04 DIAGNOSIS — Z53.9 DIAGNOSIS NOT YET DEFINED: Primary | ICD-10-CM

## 2022-01-04 DIAGNOSIS — T81.49XA INFECTION FOLLOWING A PROCEDURE, OTHER SURGICAL SITE, INITIAL ENCOUNTER: ICD-10-CM

## 2022-01-04 LAB
AST SERPL W P-5'-P-CCNC: 13 U/L (ref 0–45)
BASOPHILS # BLD AUTO: 0.1 10E3/UL (ref 0–0.2)
BASOPHILS NFR BLD AUTO: 1 %
BUN SERPL-MCNC: 14 MG/DL (ref 7–30)
CK SERPL-CCNC: 38 U/L (ref 30–300)
CREAT SERPL-MCNC: 0.55 MG/DL (ref 0.66–1.25)
CRP SERPL-MCNC: 13 MG/L (ref 0–8)
EOSINOPHIL # BLD AUTO: 0.3 10E3/UL (ref 0–0.7)
EOSINOPHIL NFR BLD AUTO: 6 %
ERYTHROCYTE [DISTWIDTH] IN BLOOD BY AUTOMATED COUNT: 14.6 % (ref 10–15)
ERYTHROCYTE [SEDIMENTATION RATE] IN BLOOD BY WESTERGREN METHOD: 44 MM/HR (ref 0–20)
GFR SERPL CREATININE-BSD FRML MDRD: >90 ML/MIN/1.73M2
HCT VFR BLD AUTO: 38.5 % (ref 40–53)
HGB BLD-MCNC: 12.3 G/DL (ref 13.3–17.7)
HOLD SPECIMEN: NORMAL
IMM GRANULOCYTES # BLD: 0 10E3/UL
IMM GRANULOCYTES NFR BLD: 0 %
LYMPHOCYTES # BLD AUTO: 2 10E3/UL (ref 0.8–5.3)
LYMPHOCYTES NFR BLD AUTO: 36 %
MCH RBC QN AUTO: 30.1 PG (ref 26.5–33)
MCHC RBC AUTO-ENTMCNC: 31.9 G/DL (ref 31.5–36.5)
MCV RBC AUTO: 94 FL (ref 78–100)
MONOCYTES # BLD AUTO: 0.5 10E3/UL (ref 0–1.3)
MONOCYTES NFR BLD AUTO: 9 %
NEUTROPHILS # BLD AUTO: 2.7 10E3/UL (ref 1.6–8.3)
NEUTROPHILS NFR BLD AUTO: 48 %
NRBC # BLD AUTO: 0 10E3/UL
NRBC BLD AUTO-RTO: 0 /100
PLATELET # BLD AUTO: 269 10E3/UL (ref 150–450)
RBC # BLD AUTO: 4.08 10E6/UL (ref 4.4–5.9)
WBC # BLD AUTO: 5.6 10E3/UL (ref 4–11)

## 2022-01-04 PROCEDURE — 85025 COMPLETE CBC W/AUTO DIFF WBC: CPT | Performed by: INTERNAL MEDICINE

## 2022-01-04 PROCEDURE — 84520 ASSAY OF UREA NITROGEN: CPT | Performed by: INTERNAL MEDICINE

## 2022-01-04 PROCEDURE — 84450 TRANSFERASE (AST) (SGOT): CPT | Performed by: INTERNAL MEDICINE

## 2022-01-04 PROCEDURE — 82565 ASSAY OF CREATININE: CPT | Performed by: INTERNAL MEDICINE

## 2022-01-04 PROCEDURE — 85652 RBC SED RATE AUTOMATED: CPT | Performed by: INTERNAL MEDICINE

## 2022-01-04 PROCEDURE — G0180 MD CERTIFICATION HHA PATIENT: HCPCS | Performed by: FAMILY MEDICINE

## 2022-01-04 PROCEDURE — 82550 ASSAY OF CK (CPK): CPT | Performed by: INTERNAL MEDICINE

## 2022-01-04 PROCEDURE — 86140 C-REACTIVE PROTEIN: CPT | Performed by: INTERNAL MEDICINE

## 2022-01-06 ENCOUNTER — HOME INFUSION (PRE-WILLOW HOME INFUSION) (OUTPATIENT)
Dept: PHARMACY | Facility: CLINIC | Age: 67
End: 2022-01-06
Payer: MEDICARE

## 2022-01-12 ENCOUNTER — LAB REQUISITION (OUTPATIENT)
Dept: LAB | Facility: CLINIC | Age: 67
End: 2022-01-12
Payer: MEDICARE

## 2022-01-12 ENCOUNTER — HOME INFUSION (PRE-WILLOW HOME INFUSION) (OUTPATIENT)
Dept: PHARMACY | Facility: CLINIC | Age: 67
End: 2022-01-12
Payer: MEDICARE

## 2022-01-12 DIAGNOSIS — T81.49XA INFECTION FOLLOWING A PROCEDURE, OTHER SURGICAL SITE, INITIAL ENCOUNTER: ICD-10-CM

## 2022-01-12 LAB
AST SERPL W P-5'-P-CCNC: 12 U/L (ref 0–45)
BASOPHILS # BLD AUTO: 0 10E3/UL (ref 0–0.2)
BASOPHILS NFR BLD AUTO: 1 %
BUN SERPL-MCNC: 15 MG/DL (ref 7–30)
CREAT SERPL-MCNC: 0.58 MG/DL (ref 0.66–1.25)
CRP SERPL-MCNC: 16 MG/L (ref 0–8)
EOSINOPHIL # BLD AUTO: 0.3 10E3/UL (ref 0–0.7)
EOSINOPHIL NFR BLD AUTO: 6 %
ERYTHROCYTE [DISTWIDTH] IN BLOOD BY AUTOMATED COUNT: 14.2 % (ref 10–15)
ERYTHROCYTE [SEDIMENTATION RATE] IN BLOOD BY WESTERGREN METHOD: 54 MM/HR (ref 0–20)
GFR SERPL CREATININE-BSD FRML MDRD: >90 ML/MIN/1.73M2
HCT VFR BLD AUTO: 38.8 % (ref 40–53)
HGB BLD-MCNC: 12.5 G/DL (ref 13.3–17.7)
IMM GRANULOCYTES # BLD: 0 10E3/UL
IMM GRANULOCYTES NFR BLD: 0 %
LYMPHOCYTES # BLD AUTO: 1.5 10E3/UL (ref 0.8–5.3)
LYMPHOCYTES NFR BLD AUTO: 33 %
MCH RBC QN AUTO: 30 PG (ref 26.5–33)
MCHC RBC AUTO-ENTMCNC: 32.2 G/DL (ref 31.5–36.5)
MCV RBC AUTO: 93 FL (ref 78–100)
MONOCYTES # BLD AUTO: 0.4 10E3/UL (ref 0–1.3)
MONOCYTES NFR BLD AUTO: 9 %
NEUTROPHILS # BLD AUTO: 2.3 10E3/UL (ref 1.6–8.3)
NEUTROPHILS NFR BLD AUTO: 51 %
NRBC # BLD AUTO: 0 10E3/UL
NRBC BLD AUTO-RTO: 0 /100
PLATELET # BLD AUTO: 260 10E3/UL (ref 150–450)
RBC # BLD AUTO: 4.16 10E6/UL (ref 4.4–5.9)
WBC # BLD AUTO: 4.6 10E3/UL (ref 4–11)

## 2022-01-12 PROCEDURE — 85025 COMPLETE CBC W/AUTO DIFF WBC: CPT | Performed by: INTERNAL MEDICINE

## 2022-01-12 PROCEDURE — 85652 RBC SED RATE AUTOMATED: CPT | Performed by: INTERNAL MEDICINE

## 2022-01-12 PROCEDURE — 86140 C-REACTIVE PROTEIN: CPT | Performed by: INTERNAL MEDICINE

## 2022-01-12 PROCEDURE — 84520 ASSAY OF UREA NITROGEN: CPT | Performed by: INTERNAL MEDICINE

## 2022-01-12 PROCEDURE — 82565 ASSAY OF CREATININE: CPT | Performed by: INTERNAL MEDICINE

## 2022-01-12 PROCEDURE — 84450 TRANSFERASE (AST) (SGOT): CPT | Performed by: INTERNAL MEDICINE

## 2022-01-13 ENCOUNTER — HOME INFUSION (PRE-WILLOW HOME INFUSION) (OUTPATIENT)
Dept: PHARMACY | Facility: CLINIC | Age: 67
End: 2022-01-13

## 2022-01-19 ENCOUNTER — LAB REQUISITION (OUTPATIENT)
Dept: LAB | Facility: CLINIC | Age: 67
End: 2022-01-19
Payer: MEDICARE

## 2022-01-19 ENCOUNTER — HOME INFUSION (PRE-WILLOW HOME INFUSION) (OUTPATIENT)
Dept: PHARMACY | Facility: CLINIC | Age: 67
End: 2022-01-19

## 2022-01-19 DIAGNOSIS — T81.49XA INFECTION FOLLOWING A PROCEDURE, OTHER SURGICAL SITE, INITIAL ENCOUNTER: ICD-10-CM

## 2022-01-19 LAB
AST SERPL W P-5'-P-CCNC: 13 U/L (ref 0–45)
BASOPHILS # BLD AUTO: 0 10E3/UL (ref 0–0.2)
BASOPHILS NFR BLD AUTO: 1 %
BUN SERPL-MCNC: 16 MG/DL (ref 7–30)
CREAT SERPL-MCNC: 0.54 MG/DL (ref 0.66–1.25)
CRP SERPL-MCNC: 9.3 MG/L (ref 0–8)
EOSINOPHIL # BLD AUTO: 0.2 10E3/UL (ref 0–0.7)
EOSINOPHIL NFR BLD AUTO: 5 %
ERYTHROCYTE [DISTWIDTH] IN BLOOD BY AUTOMATED COUNT: 13.7 % (ref 10–15)
ERYTHROCYTE [SEDIMENTATION RATE] IN BLOOD BY WESTERGREN METHOD: 35 MM/HR (ref 0–20)
GFR SERPL CREATININE-BSD FRML MDRD: >90 ML/MIN/1.73M2
HCT VFR BLD AUTO: 39.1 % (ref 40–53)
HGB BLD-MCNC: 12.6 G/DL (ref 13.3–17.7)
IMM GRANULOCYTES # BLD: 0 10E3/UL
IMM GRANULOCYTES NFR BLD: 0 %
LYMPHOCYTES # BLD AUTO: 1.4 10E3/UL (ref 0.8–5.3)
LYMPHOCYTES NFR BLD AUTO: 32 %
MCH RBC QN AUTO: 30 PG (ref 26.5–33)
MCHC RBC AUTO-ENTMCNC: 32.2 G/DL (ref 31.5–36.5)
MCV RBC AUTO: 93 FL (ref 78–100)
MONOCYTES # BLD AUTO: 0.3 10E3/UL (ref 0–1.3)
MONOCYTES NFR BLD AUTO: 7 %
NEUTROPHILS # BLD AUTO: 2.4 10E3/UL (ref 1.6–8.3)
NEUTROPHILS NFR BLD AUTO: 55 %
NRBC # BLD AUTO: 0 10E3/UL
NRBC BLD AUTO-RTO: 0 /100
PLATELET # BLD AUTO: 267 10E3/UL (ref 150–450)
RBC # BLD AUTO: 4.2 10E6/UL (ref 4.4–5.9)
WBC # BLD AUTO: 4.5 10E3/UL (ref 4–11)

## 2022-01-19 PROCEDURE — 85025 COMPLETE CBC W/AUTO DIFF WBC: CPT | Mod: GZ | Performed by: INTERNAL MEDICINE

## 2022-01-19 PROCEDURE — 84450 TRANSFERASE (AST) (SGOT): CPT | Performed by: INTERNAL MEDICINE

## 2022-01-19 PROCEDURE — 84520 ASSAY OF UREA NITROGEN: CPT | Performed by: INTERNAL MEDICINE

## 2022-01-19 PROCEDURE — 85652 RBC SED RATE AUTOMATED: CPT | Performed by: INTERNAL MEDICINE

## 2022-01-19 PROCEDURE — 82565 ASSAY OF CREATININE: CPT | Performed by: INTERNAL MEDICINE

## 2022-01-19 PROCEDURE — 86140 C-REACTIVE PROTEIN: CPT | Performed by: INTERNAL MEDICINE

## 2022-01-21 ENCOUNTER — HOME INFUSION (PRE-WILLOW HOME INFUSION) (OUTPATIENT)
Dept: PHARMACY | Facility: CLINIC | Age: 67
End: 2022-01-21
Payer: MEDICARE

## 2022-01-21 ENCOUNTER — TELEPHONE (OUTPATIENT)
Dept: FAMILY MEDICINE | Facility: CLINIC | Age: 67
End: 2022-01-21
Payer: MEDICARE

## 2022-01-21 NOTE — TELEPHONE ENCOUNTER
Reason for Call:  Form, our goal is to have forms completed with 72 hours, however, some forms may require a visit or additional information.    Type of letter, form or note:   doctor's orders/ PT Evaluation    Who is the form from?: Home care    Where did the form come from: form was faxed in    What clinic location was the form placed at?: [facility]    Where the form was placed: placed in pod C providers signature folder Box/Folder    What number is listed as a contact on the form?: [phone]       Additional comments:     Call taken on 1/21/2022 at 7:35 AM by Miryam Sagastume

## 2022-01-25 ENCOUNTER — MEDICAL CORRESPONDENCE (OUTPATIENT)
Dept: HEALTH INFORMATION MANAGEMENT | Facility: CLINIC | Age: 67
End: 2022-01-25
Payer: MEDICARE

## 2022-04-14 NOTE — PROGRESS NOTES
This is a recent snapshot of the patient's [facility] medical record.  For current drug dose and complete information and questions, call [phone] or In Basket pool, fv home infusion ([id number])  CSN Number:  [id number]

## 2022-04-16 NOTE — PROGRESS NOTES
This is a recent snapshot of the patient's [facility] medical record.  For current drug dose and complete information and questions, call [phone] or In Basket pool, fv home infusion ([id number])  CSN Number:  [id number]

## 2022-04-26 NOTE — PROGRESS NOTES
This is a recent snapshot of the patient's [facility] medical record.  For current drug dose and complete information and questions, call [phone] or In Basket pool, fv home infusion ([id number])  CSN Number:  [id number]

## 2022-04-28 NOTE — PROGRESS NOTES
This is a recent snapshot of the patient's [facility] medical record.  For current drug dose and complete information and questions, call [phone] or In Basket pool, fv home infusion ([id number])  CSN Number:  [id number]

## 2022-04-29 NOTE — PROGRESS NOTES
This is a recent snapshot of the patient's [facility] medical record.  For current drug dose and complete information and questions, call [phone] or In Basket pool, fv home infusion ([id number])  CSN Number:  [id number]

## 2022-06-03 NOTE — OP NOTE
Procedure Date: 12/10/2021    PREOPERATIVE DIAGNOSIS:  Infected right ankle, status post right hindfoot fusion with intramedullary nail.    POSTOPERATIVE DIAGNOSIS:  Infected right ankle, status post right hindfoot fusion with intramedullary nail.    PROCEDURE PERFORMED:    1.  Irrigation and debridement of right ankle wound.    2.  Placement of wound VAC device.  3. Excisional debridement to bone.    SURGEON:  Max Connor MD    ASSISTANT:  Abigail Osborn PA-C    ANESTHESIA:  General.    ESTIMATED BLOOD LOSS:  10 mL    TOURNIQUET TIME:  Approximately 30 minutes.    COMPLICATIONS:  None.    DESCRIPTION OF PROCEDURE:  The patient was taken to the operating room where after administration of general anesthetic, antibiotic prophylaxis and sterile prep and drape, the leg was exsanguinated by gravity and his previous surgical wound was opened.  Moderate hematoma and seropurulent fluid was obtained.  Two sets of cultures were sent.  The wound was taken down to bone.  Exposed hardware was cleaned.  I used a curet, rongeur and elevator to remove any devitalized and loose tissue.  The wound was then irrigated with 3 liters of solution including antibiotic.  The wound was approximated with mattress sutures using 2-0 nylon followed by wound VAC and a sterile compressive bandage.  The bone was solid.  There was no evidence of construct instability.    The patient tolerated the procedure well and was taken to recovery room in stable condition.    ADDENDUM  Of note, the debridement was to bone. This was excisional in nature and a scalpel, rongeur and curette were used to debride this.       Max Connor MD        D: 12/10/2021   T: 12/10/2021   MT: PAKMT    Name:     BRADEN LUIS  MRN:      [id number]        Account:        [id number]   :      1955           Procedure Date: 12/10/2021     Document: [id number] / [id number]  
Moderna dose 1 and 2

## 2022-06-16 NOTE — PROGRESS NOTES
This is a recent snapshot of the patient's [facility] medical record.  For current drug dose and complete information and questions, call [phone] or In [facility] pool, fv home infusion ([id number])  CSN Number:  [id number]

## 2022-06-24 NOTE — PROGRESS NOTES
This is a recent snapshot of the patient's [facility] medical record.  For current drug dose and complete information and questions, call [phone] or In Basket pool, fv home infusion ([id number])  CSN Number:  [id number]

## 2022-07-14 DIAGNOSIS — I10 BENIGN ESSENTIAL HYPERTENSION: ICD-10-CM

## 2022-07-14 NOTE — PROGRESS NOTES
This is a recent snapshot of the patient's [facility] medical record.  For current drug dose and complete information and questions, call [phone] or In Basket pool, fv home infusion ([id number])  CSN Number:  [id number]

## 2022-07-18 RX ORDER — LISINOPRIL 40 MG/1
40 TABLET ORAL DAILY
Qty: 90 TABLET | Refills: 0 | Status: SHIPPED | OUTPATIENT
Start: 2022-07-18 | End: 2022-10-13

## 2022-07-18 NOTE — TELEPHONE ENCOUNTER
Routing refill request to provider for review/approval because:  --Abnormal blood pressure.      --Last order in chart:      Disp Refills Start End RAJWINDER   lisinopril (ZESTRIL) 40 MG tablet 90 tablet 3 6/21/2021  No   Sig: TAKE 1 TABLET BY MOUTH ONCE DAILY IN THE EVENING.           --Last visit:  1/3/2022 [place].    --Future Visit: none.      BP Readings from Last 6 Encounters:   12/17/21 (!) 139/95   11/22/21 133/83   11/11/21 129/85   09/30/21 (!) 157/87   06/21/21 (!) 154/95   06/18/21 (!) 161/81

## 2022-08-07 ENCOUNTER — HEALTH MAINTENANCE LETTER (OUTPATIENT)
Age: 67
End: 2022-08-07

## 2022-10-13 ENCOUNTER — OFFICE VISIT (OUTPATIENT)
Dept: FAMILY MEDICINE | Facility: CLINIC | Age: 67
End: 2022-10-13
Payer: MEDICARE

## 2022-10-13 VITALS
RESPIRATION RATE: 15 BRPM | DIASTOLIC BLOOD PRESSURE: 94 MMHG | SYSTOLIC BLOOD PRESSURE: 172 MMHG | BODY MASS INDEX: 37.96 KG/M2 | OXYGEN SATURATION: 94 % | WEIGHT: 271.12 LBS | HEART RATE: 56 BPM | TEMPERATURE: 98.4 F | HEIGHT: 71 IN

## 2022-10-13 DIAGNOSIS — Z12.5 SCREENING FOR PROSTATE CANCER: ICD-10-CM

## 2022-10-13 DIAGNOSIS — Z13.220 LIPID SCREENING: ICD-10-CM

## 2022-10-13 DIAGNOSIS — Z28.21 VACCINATION DECLINED BY PATIENT: ICD-10-CM

## 2022-10-13 DIAGNOSIS — Z00.00 ENCOUNTER FOR MEDICARE ANNUAL WELLNESS EXAM: Primary | ICD-10-CM

## 2022-10-13 DIAGNOSIS — I10 BENIGN ESSENTIAL HYPERTENSION: ICD-10-CM

## 2022-10-13 PROCEDURE — 80061 LIPID PANEL: CPT | Performed by: FAMILY MEDICINE

## 2022-10-13 PROCEDURE — 99214 OFFICE O/P EST MOD 30 MIN: CPT | Mod: 25 | Performed by: FAMILY MEDICINE

## 2022-10-13 PROCEDURE — G0103 PSA SCREENING: HCPCS | Performed by: FAMILY MEDICINE

## 2022-10-13 PROCEDURE — G0438 PPPS, INITIAL VISIT: HCPCS | Performed by: FAMILY MEDICINE

## 2022-10-13 PROCEDURE — 82043 UR ALBUMIN QUANTITATIVE: CPT | Performed by: FAMILY MEDICINE

## 2022-10-13 PROCEDURE — 36415 COLL VENOUS BLD VENIPUNCTURE: CPT | Performed by: FAMILY MEDICINE

## 2022-10-13 PROCEDURE — 80048 BASIC METABOLIC PNL TOTAL CA: CPT | Performed by: FAMILY MEDICINE

## 2022-10-13 RX ORDER — AMLODIPINE BESYLATE 10 MG/1
10 TABLET ORAL DAILY
Qty: 90 TABLET | Refills: 3 | Status: SHIPPED | OUTPATIENT
Start: 2022-10-13 | End: 2023-10-25

## 2022-10-13 RX ORDER — LISINOPRIL 40 MG/1
40 TABLET ORAL DAILY
Qty: 90 TABLET | Refills: 3 | Status: SHIPPED | OUTPATIENT
Start: 2022-10-13 | End: 2023-10-25

## 2022-10-13 ASSESSMENT — ACTIVITIES OF DAILY LIVING (ADL): CURRENT_FUNCTION: NO ASSISTANCE NEEDED

## 2022-10-13 NOTE — PROGRESS NOTES
SUBJECTIVE:   Danial is a 67 year old who presents for Preventive Visit.    Patient has been advised of split billing requirements and indicates understanding: Yes  Are you in the first 12 months of your Medicare coverage?  No    Healthy Habits:    In general, how would you rate your overall health?  Good    Frequency of exercise:  4-5 days/week    Duration of exercise:  Less than 15 minutes    Taking medications regularly:  Yes    Barriers to taking medications:  None    Medication side effects:  None    Ability to successfully perform activities of daily living:  No assistance needed    Home Safety:  No safety concerns identified    Hearing Impairment:  No hearing concerns    In the past 6 months, have you been bothered by leaking of urine?  No    In general, how would you rate your overall mental or emotional health?  Good      PHQ-2 Total Score:    Additional concerns today:  Yes    Other concerns:  BP elevated today. Stopped taking amlodipine because pharmacy was no longer giving it to him. Was not aware that he needed to request more refills. Still taking lisinopril.    Planning to move out of state to be closer to grandchildren.    Do you feel safe in your environment? Yes    Have you ever done Advance Care Planning? (For example, a Health Directive, POLST, or a discussion with a medical provider or your loved ones about your wishes): No, advance care planning information given to patient to review.  Patient declined advance care planning discussion at this time.    Fall risk  Fallen 2 or more times in the past year?: No  Any fall with injury in the past year?: No    Cognitive Screening   1) Repeat 3 items (Leader, Season, Table)    2) Clock draw: NORMAL  3) 3 item recall: Recalls 2 objects   Results: NORMAL clock, 1-2 items recalled: COGNITIVE IMPAIRMENT LESS LIKELY    Mini-CogTM Copyright GÓMEZ James. Licensed by the author for use in [facility]; reprinted with permission (laine@.[facility]). All rights  reserved.          Reviewed and updated as needed this visit by clinical staff   Tobacco  Allergies  Meds   Med Hx  Surg Hx  Fam Hx  Soc Hx        Reviewed and updated as needed this visit by Provider                 Social History     Tobacco Use     Smoking status: Never     Smokeless tobacco: Never   Substance Use Topics     Alcohol use: Not Currently     Comment: rare       Alcohol Use 6/21/2021   Prescreen: >3 drinks/day or >7 drinks/week? No   Prescreen: >3 drinks/day or >7 drinks/week? -     Current providers sharing in care for this patient include:   Patient Care Team:  Logan Calvo DO as PCP - General (Family Medicine)  Logan Calvo DO as Assigned PCP    The following health maintenance items are reviewed in Epic and correct as of today:  Health Maintenance   Topic Date Due     HEPATITIS B IMMUNIZATION (1 of 3 - 3-dose series) Never done     COVID-19 Vaccine (1) Never done     ZOSTER IMMUNIZATION (1 of 2) Never done     Pneumococcal Vaccine: 65+ Years (1 - PCV) Never done     PHQ-2 (once per calendar year)  01/01/2022     MICROALBUMIN  06/21/2022     INFLUENZA VACCINE (1) Never done     BMP  12/13/2022     MEDICARE ANNUAL WELLNESS VISIT  10/13/2023     ANNUAL REVIEW OF HM ORDERS  10/13/2023     FALL RISK ASSESSMENT  10/13/2023     DTAP/TDAP/TD IMMUNIZATION (2 - Td or Tdap) 05/10/2026     LIPID  06/21/2026     ADVANCE CARE PLANNING  10/13/2027     COLORECTAL CANCER SCREENING  06/16/2031     HEPATITIS C SCREENING  Completed     AORTIC ANEURYSM SCREENING (SYSTEM ASSIGNED)  Completed     IPV IMMUNIZATION  Aged Out     MENINGITIS IMMUNIZATION  Aged Out       Review of Systems  CONSTITUTIONAL: NEGATIVE for fever, chills, change in weight  INTEGUMENTARY/SKIN: NEGATIVE for worrisome rashes, moles or lesions  EYES: NEGATIVE for vision changes or irritation  ENT/MOUTH: NEGATIVE for ear, mouth and throat problems  RESP: NEGATIVE for significant cough or SOB  BREAST: NEGATIVE for masses, tenderness or  "discharge  CV: NEGATIVE for chest pain, palpitations or peripheral edema  GI: NEGATIVE for nausea, abdominal pain, heartburn, or change in bowel habits  : NEGATIVE for frequency, dysuria, or hematuria  MUSCULOSKELETAL: NEGATIVE for significant arthralgias or myalgia  NEURO: NEGATIVE for weakness, dizziness or paresthesias  ENDOCRINE: NEGATIVE for temperature intolerance, skin/hair changes  HEME: NEGATIVE for bleeding problems  PSYCHIATRIC: NEGATIVE for changes in mood or affect    OBJECTIVE:   BP (!) 172/94 (BP Location: Left arm, Patient Position: Sitting, Cuff Size: Thigh)   Pulse 56   Temp 98.4  F (36.9  C) (Temporal)   Resp 15   Ht 1.814 m (5' 11.4\")   Wt 123 kg (271 lb 1.9 oz)   SpO2 94%   BMI 37.39 kg/m   Estimated body mass index is 37.39 kg/m  as calculated from the following:    Height as of this encounter: 1.814 m (5' 11.4\").    Weight as of this encounter: 123 kg (271 lb 1.9 oz).  Physical Exam  GENERAL: healthy, alert and no distress  EYES: Eyes grossly normal to inspection, PERRL and conjunctivae and sclerae normal  HENT: nose and mouth without ulcers or lesions  NECK: no adenopathy, no asymmetry, masses, or scars and thyroid normal to palpation  RESP: lungs clear to auscultation - no rales, rhonchi or wheezes  CV: regular rate and rhythm, normal S1 S2, no S3 or S4, no murmur, click or rub, no peripheral edema and peripheral pulses strong  MS: no gross musculoskeletal defects noted, no edema  NEURO: Normal strength and tone, mentation intact and speech normal  PSYCH: mentation appears normal, affect normal/bright    ASSESSMENT / PLAN:   Sav was seen today for physical.    Diagnoses and all orders for this visit:    Encounter for Medicare annual wellness exam  -     REVIEW OF HEALTH MAINTENANCE PROTOCOL ORDERS    Lipid screening  -     Lipid Profile    Screening for prostate cancer  -     Prostate Specific Antigen Screen    All Vaccinations declined by patient    Benign essential " "hypertension  -Elevated today 2/2 to having not taken amlodipine for several weeks.   -Pt to restart amlodipine.  -Discussed finding new PCP in new state and get BP rechecked in next 2-4 weeks  -     amLODIPine (NORVASC) 10 MG tablet; Take 1 tablet (10 mg) by mouth daily  -     lisinopril (ZESTRIL) 40 MG tablet; Take 1 tablet (40 mg) by mouth daily Schedule annual appt for further refills.  -     Basic metabolic panel  -     Albumin Random Urine Quantitative with Creat Ratio    COUNSELING:  Reviewed preventive health counseling, as reflected in patient instructions       Regular exercise       Healthy diet/nutrition    Estimated body mass index is 37.39 kg/m  as calculated from the following:    Height as of this encounter: 1.814 m (5' 11.4\").    Weight as of this encounter: 123 kg (271 lb 1.9 oz).    Weight management plan: Discussed healthy diet and exercise guidelines    He reports that he has never smoked. He has never used smokeless tobacco.      Appropriate preventive services were discussed with this patient, including applicable screening as appropriate for cardiovascular disease, diabetes, osteopenia/osteoporosis, and glaucoma.  As appropriate for age/gender, discussed screening for colorectal cancer, prostate cancer, breast cancer, and cervical cancer. Checklist reviewing preventive services available has been given to the patient.    Reviewed patients plan of care and provided an AVS. The Basic Care Plan (routine screening as documented in Health Maintenance) for Sav meets the Care Plan requirement. This Care Plan has been established and reviewed with the Patient.    Counseling Resources:  ATP IV Guidelines  Pooled Cohorts Equation Calculator  Breast Cancer Risk Calculator  Breast Cancer: Medication to Reduce Risk  FRAX Risk Assessment  ICSI Preventive Guidelines  Dietary Guidelines for Americans, 2010  USDA's MyPlate  ASA Prophylaxis  Lung CA Screening    DO CHETAN Montes [facility] [place]

## 2022-10-13 NOTE — PATIENT INSTRUCTIONS
Patient Education   Personalized Prevention Plan  You are due for the preventive services outlined below.  Your care team is available to assist you in scheduling these services.  If you have already completed any of these items, please share that information with your care team to update in your medical record.  Health Maintenance Due   Topic Date Due     Hepatitis B Vaccine (1 of 3 - 3-dose series) Never done     COVID-19 Vaccine (1) Never done     Zoster (Shingles) Vaccine (1 of 2) Never done     FALL RISK ASSESSMENT  Never done     Pneumococcal Vaccine (1 - PCV) Never done     PHQ-2 (once per calendar year)  01/01/2022     Annual Wellness Visit  06/21/2022     Kidney Microalbumin Urine Test  06/21/2022     ANNUAL REVIEW OF HM ORDERS  06/21/2022     Flu Vaccine (1) Never done

## 2022-10-14 LAB
ANION GAP SERPL CALCULATED.3IONS-SCNC: 3 MMOL/L (ref 3–14)
BUN SERPL-MCNC: 23 MG/DL (ref 7–30)
CALCIUM SERPL-MCNC: 9.7 MG/DL (ref 8.5–10.1)
CHLORIDE BLD-SCNC: 108 MMOL/L (ref 94–109)
CHOLEST SERPL-MCNC: 210 MG/DL
CO2 SERPL-SCNC: 29 MMOL/L (ref 20–32)
CREAT SERPL-MCNC: 0.77 MG/DL (ref 0.66–1.25)
CREAT UR-MCNC: 175 MG/DL
FASTING STATUS PATIENT QL REPORTED: NO
GFR SERPL CREATININE-BSD FRML MDRD: >90 ML/MIN/1.73M2
GLUCOSE BLD-MCNC: 106 MG/DL (ref 70–99)
HDLC SERPL-MCNC: 34 MG/DL
LDLC SERPL CALC-MCNC: 103 MG/DL
MICROALBUMIN UR-MCNC: 34 MG/L
MICROALBUMIN/CREAT UR: 19.43 MG/G CR (ref 0–17)
NONHDLC SERPL-MCNC: 176 MG/DL
POTASSIUM BLD-SCNC: 4.3 MMOL/L (ref 3.4–5.3)
PSA SERPL-MCNC: 4.68 UG/L (ref 0–4)
SODIUM SERPL-SCNC: 140 MMOL/L (ref 133–144)
TRIGL SERPL-MCNC: 364 MG/DL

## 2022-10-20 DIAGNOSIS — R97.20 ELEVATED PROSTATE SPECIFIC ANTIGEN (PSA): Primary | ICD-10-CM

## 2022-10-23 ENCOUNTER — HEALTH MAINTENANCE LETTER (OUTPATIENT)
Age: 67
End: 2022-10-23

## 2022-11-04 NOTE — PATIENT INSTRUCTIONS
"  TRIMMED CALLOSITIES TODAY     ELEVATE ABOVE HEART WHENEVER ABLE     COMPRESSION STOCKING    PEDRITO BOARD 5 MINUTES AFTER SHOWER OR FOOT SOAKIKNG    FIT BIT TWITCH RECOMMENDED  10,000 STEPS PER DAY     1400 CALORIES     PER DAY     EXERCYCYLE DAILY     Diabetes: Exchange List    What are the exchange lists?     The exchange lists show you portions of food that equal 1 exchange. Foods are divided into food lists. The foods on each list are called exchanges because they have a similar number of calories, protein, carbohydrate and fat content. Foods from each list can be traded or \"exchanged\" for any other food on the same list because they all have a similar exchange value. A dietitian will help you plan how much food your child should eat at each meal and from what lists the foods should come from. At first you should measure your food until you are able to make good estimates about serving sizes. The following list is a sample of foods found on the exchange lists. For more information, you can buy the Exchange Lists for Meal Planning from: The [employer] [address] [place] [address] [phone] http://www.diabetes.org    Carbohydrate group     Starch List: One starch exchange contains about 15 grams of carbohydrate, 3 grams of protein, 0 to 1 grams of fat, and 80 calories. A starch exchange is sometimes called a carb exchange. Examples of one starch (carb) exchange are:     one slice of bread     1/2 hamburger or hot dog bun     3/4 cup of unsweetened cereal     1/3 cup pasta     3 cups popcorn     crackers (6 small saltines, 3 squares of kylah crackers, 3 of most other crackers)     1 pancake or waffle (5 inch)     15 to 20 fat-free or baked potato or corn chips.     The vegetables included in the starch exchanges include:     corn (1/2 cup or 1/2 cob)     white potato (1/4 large baked with skin or 1/2 cup mashed)     yam or sweet potato (1/2 cup)     green peas (1/2 cup)     squash, " "winter (1 cup)     lima beans (2/3 cup).     Fruit List: 1 fruit exchange contains about 15 grams of carbohydrate and 60 calories. Examples of one fruit exchange are:     grape juice (1/3 cup)     apple or pineapple juice (1/2 cup)     orange or grapefruit juice (1/2 cup)     1 small apple     orange or peach     1/2 banana     1 cup raspberries     1/3 of a small cantaloupe     1 slice of watermelon.     Milk List: 1 milk exchange contains about 8 grams of protein and 12 grams of carbohydrate. Items on the milk list are divided into fat-free, reduced fat, and whole milk depending on the number of fat grams in the exchange. Examples of one milk exchange are: Fat-Free (0 to 3 grams of fat)     1 cup of skim or non-fat milk     1 cup of 1% milk (also includes 1/2 fat exchange)     6 ounces flavored fat-free yogurt     Reduced-Fat (5 grams of fat)     6 ounces of plain, low-fat yogurt     1 cup 2% milk (also includes one fat exchange).     Whole Milk (8 grams of fat)     8 ounces of plain yogurt (made from whole milk)     1 cup whole milk.     Vegetable List: One vegetable exchange has 5 grams of carbohydrate, 2 grams of protein, no fat, and 25 calories. One-half cup of cooked or a cup of raw vegetables is a good measure for 1 exchange of most vegetables. Raw lettuce may be taken in larger quantities, but salad dressing usually equals 1 fat exchange. Other Carbohydrates List: One \"other carbohydrate\" exchange has 15 grams of carbohydrate. Many of these foods count as a starch exchange and one or more fat exchanges.     brownie (2 inch square) = 1 carb, 1 fat exchange     fruit snack roll = 1 carb exchange     granola bar = 1 and 1/2 carb exchanges     ice cream (1/2 cup) = 1 carb, 2 fat exchanges     frozen yogurt (1/2 cup) = 1 carb, 0 to 1 fat exchanges     tortilla chips (6-12) = 1 carb, 2 fat exchanges.     Meat and Meat Substitute Group     Meats are divided into very lean meats, lean meats, medium-fat meats, and " high-fat meats. People with diabetes should try to eat more lean and medium fat meats and stay away from the high fat choices. The Very Lean meat group includes foods that contain 7 grams of protein, 0 to 1 gram of fat, and 35 calories for 1 exchange. Examples include:     1 ounce chicken or turkey (white meat, no skin)     1 ounce fresh fish     1 ounce fat-free cheese     2 egg whites     The Lean meat group includes foods that contain 7 grams of protein, 3 grams of fat, and 55 calories for 1 meat exchange. Examples include:     1 ounce chicken or turkey (dark meat, no skin)     1 ounce fish     1 ounce lean pork     1 ounce USDA Select or Choice grades of lean beef     1 ounce cheese (with 3 grams of fat or less per ounce).     The Medium-Fat group includes foods that have 7 grams of protein, 5 grams of fat, and 75 calories for 1 meat exchange. Examples include:     1 ounce of ground beef, most cuts of beef, pork, lamb or veal     1 ounce of cheese (5 grams of fat per ounce or less)     1 egg     1 ounce fried fish.     The High-Fat foods have 7 grams of protein, 8 grams of fat, and 100 calories for 1 meat exchange. This group includes:     1 ounce of pork sausage     1 ounce of spare ribs     1 oz of regular cheese (American, Swiss etc.)     1 oz of lunch meat     1 hot dog (turkey or chicken).     Fat Group     Fat List: Fat is necessary for the body and is particularly important during periods of fasting (overnight), when it is very slowly absorbed. 1 fat exchange contains 5 grams of fat and 45 calories. The monounsaturated fats and polyunsaturated fats are better for us than saturated fats. The fat list includes: 1 exchange of monounsaturated fats equals:     1/2 Tbsp peanut butter     6 almonds     1 tsp of oil (olive, peanut, canola).     1 exchange of polyunsaturated fats equals:     1 tsp margarine     1 tsp of any vegetable oil (except coconut).     1 exchange of saturated fat includes:     1 tsp butter      1 strip of lam     2 Tbsp of cream (half and half).     Free Foods     A free food contains less than 20 calories or less than 5 grams of carbohydrate per serving. If the food has a serving size listed on its package, it should be limited to 3 servings spread throughout the day. Examples of free foods include:     4 Tbsp fat-free margarine     1 Tbsp fat-free Miracle Whip     sugar-free gelatin     diet soft drinks     catsup     soy sauce     spices.     Combination Foods     Many foods, such as casseroles, are mixed together. Your dietitian can help you figure out how many exchanges to count for combination foods. For example:     lasagna (1 cup) = 2 carb exchanges and 2 medium-fat meat exchanges     spaghetti with meatballs (1 cup) = 2 carb exchanges and 2 medium-fat meat exchanges     pizza, cheese (1/4 of 12 in.) = 2 carbs, 2 medium-fat meats     chicken noodle soup (1 cup) = 1 carb exchange     frozen entrée (less than 300 calories) = 2 carbs, 3 lean meat exchanges     macaroni and cheese (1 cup) = 2 carb exchanges and 2 medium-fat meat exchanges.            <<--- Click to launch

## 2023-10-24 DIAGNOSIS — I10 BENIGN ESSENTIAL HYPERTENSION: ICD-10-CM

## 2023-10-25 RX ORDER — LISINOPRIL 40 MG/1
40 TABLET ORAL DAILY
Qty: 90 TABLET | Refills: 0 | Status: SHIPPED | OUTPATIENT
Start: 2023-10-25 | End: 2024-01-29

## 2023-10-25 RX ORDER — AMLODIPINE BESYLATE 10 MG/1
10 TABLET ORAL DAILY
Qty: 90 TABLET | Refills: 0 | Status: SHIPPED | OUTPATIENT
Start: 2023-10-25 | End: 2024-01-29

## 2024-01-20 ENCOUNTER — HEALTH MAINTENANCE LETTER (OUTPATIENT)
Age: 69
End: 2024-01-20

## 2024-01-26 DIAGNOSIS — I10 BENIGN ESSENTIAL HYPERTENSION: ICD-10-CM

## 2024-01-29 RX ORDER — AMLODIPINE BESYLATE 10 MG/1
10 TABLET ORAL DAILY
Qty: 45 TABLET | Refills: 0 | Status: SHIPPED | OUTPATIENT
Start: 2024-01-29

## 2024-01-29 RX ORDER — LISINOPRIL 40 MG/1
40 TABLET ORAL DAILY
Qty: 45 TABLET | Refills: 0 | Status: SHIPPED | OUTPATIENT
Start: 2024-01-29

## 2024-03-26 DIAGNOSIS — I10 BENIGN ESSENTIAL HYPERTENSION: ICD-10-CM

## 2024-03-27 RX ORDER — LISINOPRIL 40 MG/1
40 TABLET ORAL DAILY
Qty: 45 TABLET | Refills: 0 | OUTPATIENT
Start: 2024-03-27

## 2024-03-27 RX ORDER — AMLODIPINE BESYLATE 10 MG/1
10 TABLET ORAL DAILY
Qty: 45 TABLET | Refills: 0 | OUTPATIENT
Start: 2024-03-27

## 2025-01-26 ENCOUNTER — HEALTH MAINTENANCE LETTER (OUTPATIENT)
Age: 70
End: 2025-01-26

## 2025-02-20 NOTE — TELEPHONE ENCOUNTER
"Last office visit 02/09/2019.  Routing refill request to provider for review/approval because:  Labs out of range:  BP above 140/90  Patient needs to be seen because it has been more than 1 year since last office visit.              Requested Prescriptions   Pending Prescriptions Disp Refills     amLODIPine (NORVASC) 5 MG tablet [Pharmacy Med Name: amLODIPine Besylate 5 MG Oral Tablet] 90 tablet 0     Sig: Take 1 tablet by mouth once daily       Calcium Channel Blockers Protocol  Failed - 4/18/2020  1:08 PM        Failed - Blood pressure under 140/90 in past 12 months     BP Readings from Last 3 Encounters:   01/18/20 (!) 156/96   08/29/19 (!) 142/84   08/24/19 (!) 158/99                 Failed - Recent (12 mo) or future (30 days) visit within the authorizing provider's specialty     Patient has had an office visit with the authorizing provider or a provider within the authorizing providers department within the previous 12 mos or has a future within next 30 days. See \"Patient Info\" tab in inbasket, or \"Choose Columns\" in Meds & Orders section of the refill encounter.              Failed - Normal serum creatinine on file in past 12 months     Recent Labs   Lab Test 02/09/19  1124   CR 0.70       Ok to refill medication if creatinine is low          Passed - Medication is active on med list        Passed - Patient is age 18 or older             "
Pt due for clinic visit before any further refills after this  
Reminder letter sent today.  Nahomy Sanchez LPN  
none

## (undated) DEVICE — Device

## (undated) DEVICE — DRSG ABDOMINAL 07 1/2X8" 7197D

## (undated) DEVICE — LINEN TOWEL PACK X30 5481

## (undated) DEVICE — SOL NACL 0.9% IRRIG 1000ML BOTTLE 2F7124

## (undated) DEVICE — SYR BULB IRRIG 50ML LATEX FREE 0035280

## (undated) DEVICE — SU SILK 4-0 TIE 12X30" A303H

## (undated) DEVICE — SOL WATER IRRIG 1000ML BOTTLE 2F7114

## (undated) DEVICE — STPL SKIN 35W 059037

## (undated) DEVICE — STPL SKIN 35W 6.9MM  PXW35

## (undated) DEVICE — SU VICRYL 0 TIE 54" J608H

## (undated) DEVICE — WIRE GUIDE STRK BALL TIP 3X800MM 1806-0080S

## (undated) DEVICE — LINEN TOWEL PACK X5 5464

## (undated) DEVICE — TUBING SUCTION 10'X3/16" N510

## (undated) DEVICE — ESU PENCIL W/COATED BLADE E2450H

## (undated) DEVICE — SOL NACL 0.9% IRRIG 1000ML BOTTLE 07138-09

## (undated) DEVICE — WIPES FOLEY CARE SURESTEP PROVON DFC100

## (undated) DEVICE — ESU PENCIL W/HOLSTER E2350H

## (undated) DEVICE — GLOVE PROTEXIS W/NEU-THERA 8.5  2D73TE85

## (undated) DEVICE — CAST PADDING 6" STERILE 9046S

## (undated) DEVICE — PREP DURAPREP 26ML APL 8630

## (undated) DEVICE — GLOVE PROTEXIS BLUE W/NEU-THERA 7.5  2D73EB75

## (undated) DEVICE — SU PDS II 0 CT-1 27" Z340H

## (undated) DEVICE — BLADE CLIPPER SGL USE 9680

## (undated) DEVICE — DRSG MEDIPORE 3 1/2X13 3/4" 3573

## (undated) DEVICE — SU PDS II 0 TP-1 60" Z991G

## (undated) DEVICE — KIT CULTURE ESWAB AEROBE/ANAEROBE WHITE TOP R723480

## (undated) DEVICE — GLOVE PROTEXIS MICRO 8.5  2D73PM85

## (undated) DEVICE — PACK EXTREMITY SOP15EXFSD

## (undated) DEVICE — LINEN TOWEL PACK X6 WHITE 5487

## (undated) DEVICE — GLOVE PROTEXIS W/NEU-THERA 8.0  2D73TE80

## (undated) DEVICE — DRAPE SHEET REV FOLD 3/4 9349

## (undated) DEVICE — GLOVE PROTEXIS W/NEU-THERA 7.0  2D73TE70

## (undated) DEVICE — DRSG PREVENA NEGATIVE PRESSURE WND 13CM SGL LF PRE1101US

## (undated) DEVICE — SPONGE LAP 18X18" X8435

## (undated) DEVICE — GLOVE PROTEXIS W/NEU-THERA 7.5  2D73TE75

## (undated) DEVICE — MANIFOLD NEPTUNE 4 PORT 700-20

## (undated) DEVICE — SUCTION TIP POOLE K770

## (undated) DEVICE — COVER CAMERA IN-LIGHT DISP LT-C02

## (undated) DEVICE — SU SILK 2-0 TIE 12X30" A305H

## (undated) DEVICE — SU SILK 0 TIE 6X30" A306H

## (undated) DEVICE — GLOVE PROTEXIS POWDER FREE 7.0 ORTHOPEDIC 2D73ET70

## (undated) DEVICE — SUCTION MANIFOLD DORNOCH ULTRA CART UL-CL500

## (undated) DEVICE — SU VICRYL 0 CT-1 27" J340H

## (undated) DEVICE — GLOVE PROTEXIS POWDER FREE SMT 7.5  2D72PT75X

## (undated) DEVICE — LIGHT HANDLE X1 31140133

## (undated) DEVICE — TOURNIQUET CUFF 24" STERILE

## (undated) DEVICE — GOWN XXLG REINFORCED 9071EL

## (undated) DEVICE — SU ETHILON 2-0 FS 18" 664G

## (undated) DEVICE — CAST PADDING 4" STERILE 9044S

## (undated) DEVICE — BNDG ELASTIC 4" DBL LENGTH UNSTERILE 6611-14

## (undated) DEVICE — BLADE SAW SAGITTAL STRK 18X90X1.27MM HD SYS 6 6118-127-090

## (undated) DEVICE — IMM LIMB ELEVATOR DC40-0203

## (undated) DEVICE — GLOVE PROTEXIS BLUE W/NEU-THERA 8.5  2D73EB85

## (undated) DEVICE — PREP CHLORAPREP 26ML TINTED ORANGE  260815

## (undated) DEVICE — APPLICATOR COTTON TIP 6"X2 STERILE LF 6012

## (undated) DEVICE — DRAPE STOCKINETTE IMPERVIOUS 12" 1587

## (undated) DEVICE — SU VICRYL 3-0 SH 27" UND J416H

## (undated) DEVICE — SPONGE LAP 4X18" X8415

## (undated) DEVICE — SUCTION CANISTER MEDIVAC LINER 3000ML W/LID 65651-530

## (undated) DEVICE — ESU GROUND PAD ADULT W/CORD E7507

## (undated) DEVICE — SU SILK 3-0 TIE 12X30" A304H

## (undated) DEVICE — BLADE KNIFE SURG 10 371110

## (undated) DEVICE — SUCTION TIP YANKAUER STR K87

## (undated) DEVICE — DRILL POINT STRK 4.2X340MM 1806-4260S

## (undated) DEVICE — GLOVE PROTEXIS POWDER FREE 8.5 ORTHOPEDIC 2D73ET85

## (undated) DEVICE — BNDG ELASTIC 4"X15YDS STERILE

## (undated) DEVICE — PUMP UNIT NEGATIVE PRESSURE PREVENA PLUS PRE4010.S

## (undated) DEVICE — CAST PADDING 4" UNSTERILE 9044

## (undated) DEVICE — SU VICRYL 3-0 SH 27" J316H

## (undated) DEVICE — GLOVE PROTEXIS BLUE W/NEU-THERA 7.0  2D73EB70

## (undated) RX ORDER — BUPIVACAINE HYDROCHLORIDE 2.5 MG/ML
INJECTION, SOLUTION EPIDURAL; INFILTRATION; INTRACAUDAL
Status: DISPENSED
Start: 2021-12-10

## (undated) RX ORDER — PROPOFOL 10 MG/ML
INJECTION, EMULSION INTRAVENOUS
Status: DISPENSED
Start: 2021-12-10

## (undated) RX ORDER — PROPOFOL 10 MG/ML
INJECTION, EMULSION INTRAVENOUS
Status: DISPENSED
Start: 2021-11-22

## (undated) RX ORDER — FENTANYL CITRATE 50 UG/ML
INJECTION, SOLUTION INTRAMUSCULAR; INTRAVENOUS
Status: DISPENSED
Start: 2021-11-22

## (undated) RX ORDER — LIDOCAINE HYDROCHLORIDE 20 MG/ML
INJECTION, SOLUTION EPIDURAL; INFILTRATION; INTRACAUDAL; PERINEURAL
Status: DISPENSED
Start: 2017-12-12

## (undated) RX ORDER — SODIUM CHLORIDE, SODIUM LACTATE, POTASSIUM CHLORIDE, CALCIUM CHLORIDE 600; 310; 30; 20 MG/100ML; MG/100ML; MG/100ML; MG/100ML
INJECTION, SOLUTION INTRAVENOUS
Status: DISPENSED
Start: 2017-12-12

## (undated) RX ORDER — FENTANYL CITRATE 50 UG/ML
INJECTION, SOLUTION INTRAMUSCULAR; INTRAVENOUS
Status: DISPENSED
Start: 2017-12-12

## (undated) RX ORDER — LIDOCAINE HYDROCHLORIDE 20 MG/ML
INJECTION, SOLUTION EPIDURAL; INFILTRATION; INTRACAUDAL; PERINEURAL
Status: DISPENSED
Start: 2021-12-10

## (undated) RX ORDER — ONDANSETRON 2 MG/ML
INJECTION INTRAMUSCULAR; INTRAVENOUS
Status: DISPENSED
Start: 2017-12-12

## (undated) RX ORDER — LIDOCAINE HYDROCHLORIDE 20 MG/ML
INJECTION, SOLUTION EPIDURAL; INFILTRATION; INTRACAUDAL; PERINEURAL
Status: DISPENSED
Start: 2021-11-22

## (undated) RX ORDER — HYDROMORPHONE HYDROCHLORIDE 1 MG/ML
INJECTION, SOLUTION INTRAMUSCULAR; INTRAVENOUS; SUBCUTANEOUS
Status: DISPENSED
Start: 2021-12-10

## (undated) RX ORDER — PHENYLEPHRINE HCL IN 0.9% NACL 1 MG/10 ML
SYRINGE (ML) INTRAVENOUS
Status: DISPENSED
Start: 2017-12-12

## (undated) RX ORDER — FENTANYL CITRATE 50 UG/ML
INJECTION, SOLUTION INTRAMUSCULAR; INTRAVENOUS
Status: DISPENSED
Start: 2021-12-10

## (undated) RX ORDER — HYDROMORPHONE HYDROCHLORIDE 1 MG/ML
INJECTION, SOLUTION INTRAMUSCULAR; INTRAVENOUS; SUBCUTANEOUS
Status: DISPENSED
Start: 2021-11-22

## (undated) RX ORDER — FENTANYL CITRATE 0.05 MG/ML
INJECTION, SOLUTION INTRAMUSCULAR; INTRAVENOUS
Status: DISPENSED
Start: 2021-12-10

## (undated) RX ORDER — PROPOFOL 10 MG/ML
INJECTION, EMULSION INTRAVENOUS
Status: DISPENSED
Start: 2017-12-12

## (undated) RX ORDER — FENTANYL CITRATE 50 UG/ML
INJECTION, SOLUTION INTRAMUSCULAR; INTRAVENOUS
Status: DISPENSED
Start: 2021-06-16

## (undated) RX ORDER — HYDRALAZINE HYDROCHLORIDE 20 MG/ML
INJECTION INTRAMUSCULAR; INTRAVENOUS
Status: DISPENSED
Start: 2017-12-12

## (undated) RX ORDER — HYDROMORPHONE HYDROCHLORIDE 1 MG/ML
INJECTION, SOLUTION INTRAMUSCULAR; INTRAVENOUS; SUBCUTANEOUS
Status: DISPENSED
Start: 2017-12-12

## (undated) RX ORDER — ONDANSETRON 2 MG/ML
INJECTION INTRAMUSCULAR; INTRAVENOUS
Status: DISPENSED
Start: 2021-11-22

## (undated) RX ORDER — DEXAMETHASONE SODIUM PHOSPHATE 4 MG/ML
INJECTION, SOLUTION INTRA-ARTICULAR; INTRALESIONAL; INTRAMUSCULAR; INTRAVENOUS; SOFT TISSUE
Status: DISPENSED
Start: 2021-11-22

## (undated) RX ORDER — GLYCOPYRROLATE 0.2 MG/ML
INJECTION, SOLUTION INTRAMUSCULAR; INTRAVENOUS
Status: DISPENSED
Start: 2017-12-12

## (undated) RX ORDER — CEFAZOLIN SODIUM IN 0.9 % NACL 3 G/100 ML
INTRAVENOUS SOLUTION, PIGGYBACK (ML) INTRAVENOUS
Status: DISPENSED
Start: 2021-12-10

## (undated) RX ORDER — METOPROLOL TARTRATE 1 MG/ML
INJECTION, SOLUTION INTRAVENOUS
Status: DISPENSED
Start: 2017-12-12

## (undated) RX ORDER — CEFAZOLIN SODIUM IN 0.9 % NACL 3 G/100 ML
INTRAVENOUS SOLUTION, PIGGYBACK (ML) INTRAVENOUS
Status: DISPENSED
Start: 2021-11-22